# Patient Record
Sex: FEMALE | Race: WHITE | NOT HISPANIC OR LATINO | Employment: OTHER | URBAN - METROPOLITAN AREA
[De-identification: names, ages, dates, MRNs, and addresses within clinical notes are randomized per-mention and may not be internally consistent; named-entity substitution may affect disease eponyms.]

---

## 2017-01-16 ENCOUNTER — OFFICE VISIT (OUTPATIENT)
Dept: FAMILY MEDICINE CLINIC | Facility: CLINIC | Age: 62
End: 2017-01-16

## 2017-01-16 VITALS
RESPIRATION RATE: 16 BRPM | SYSTOLIC BLOOD PRESSURE: 122 MMHG | WEIGHT: 123.3 LBS | HEIGHT: 63 IN | OXYGEN SATURATION: 98 % | TEMPERATURE: 97.6 F | DIASTOLIC BLOOD PRESSURE: 80 MMHG | HEART RATE: 84 BPM | BODY MASS INDEX: 21.85 KG/M2

## 2017-01-16 DIAGNOSIS — M25.552 PAIN OF BOTH HIP JOINTS: ICD-10-CM

## 2017-01-16 DIAGNOSIS — R35.0 URINARY FREQUENCY: Primary | ICD-10-CM

## 2017-01-16 DIAGNOSIS — M25.551 PAIN OF BOTH HIP JOINTS: ICD-10-CM

## 2017-01-16 DIAGNOSIS — K21.9 GASTROESOPHAGEAL REFLUX DISEASE WITHOUT ESOPHAGITIS: ICD-10-CM

## 2017-01-16 LAB
BILIRUB BLD-MCNC: NEGATIVE MG/DL
CLARITY, POC: CLEAR
COLOR UR: YELLOW
GLUCOSE UR STRIP-MCNC: NEGATIVE MG/DL
KETONES UR QL: NEGATIVE
LEUKOCYTE EST, POC: ABNORMAL
NITRITE UR-MCNC: NEGATIVE MG/ML
PH UR: 6.5 [PH] (ref 5–8)
PROT UR STRIP-MCNC: ABNORMAL MG/DL
RBC # UR STRIP: ABNORMAL /UL
SP GR UR: 1.02 (ref 1–1.03)
UROBILINOGEN UR QL: NORMAL

## 2017-01-16 PROCEDURE — 81003 URINALYSIS AUTO W/O SCOPE: CPT | Performed by: FAMILY MEDICINE

## 2017-01-16 PROCEDURE — 99213 OFFICE O/P EST LOW 20 MIN: CPT | Performed by: FAMILY MEDICINE

## 2017-01-16 RX ORDER — NITROFURANTOIN 25; 75 MG/1; MG/1
100 CAPSULE ORAL 2 TIMES DAILY
Qty: 14 CAPSULE | Refills: 0 | Status: SHIPPED | OUTPATIENT
Start: 2017-01-16 | End: 2017-02-20

## 2017-01-16 RX ORDER — PHENAZOPYRIDINE HYDROCHLORIDE 95 MG/1
95 TABLET ORAL 3 TIMES DAILY PRN
Qty: 21 TABLET | Refills: 0 | Status: SHIPPED | OUTPATIENT
Start: 2017-01-16 | End: 2017-02-20

## 2017-01-16 RX ORDER — OMEPRAZOLE 40 MG/1
40 CAPSULE, DELAYED RELEASE ORAL DAILY
Qty: 30 CAPSULE | Refills: 5 | Status: SHIPPED | OUTPATIENT
Start: 2017-01-16 | End: 2017-07-27 | Stop reason: SDUPTHER

## 2017-01-16 NOTE — MR AVS SNAPSHOT
Shanika Hector   1/16/2017 10:30 AM   Office Visit    Dept Phone:  [phone]   Encounter #:  [id number]    Provider:  Bryan Vega MD   Department:  [facility] PRIMARY [facility]ARE                Your Full Care Plan              Today's Medication Changes          These changes are accurate as of: 1/16/17 11:09 AM.  If you have any questions, ask your nurse or doctor.               New Medication(s)Ordered:     nitrofurantoin (macrocrystal-monohydrate) 100 MG capsule   Commonly known as:  MACROBID   Take 1 capsule by mouth 2 (Two) Times a Day.   Started by:  Bryan Vega MD       phenazopyridine 95 MG tablet   Commonly known as:  PYRIDIUM   Take 1 tablet by mouth 3 (Three) Times a Day As Needed for bladder spasms.   Started by:  Bryan Vega MD            Where to Get Your Medications      These medications were sent to [address] AT [facility] - [phone]  - [phone] [place] [address]     Phone:  [phone]     nitrofurantoin (macrocrystal-monohydrate) 100 MG capsule    omeprazole 40 MG capsule    phenazopyridine 95 MG tablet                  Your Updated Medication List          This list is accurate as of: 1/16/17 11:09 AM.  Always use your most recent med list.                acetaminophen 650 MG 8 hr tablet   Commonly known as:  TYLENOL       amitriptyline 10 MG tablet   Commonly known as:  ELAVIL   Take 1 tablet by mouth Every Night.       CLINORIL 200 MG tablet   Generic drug:  sulindac       clobetasol 0.05 % cream   Commonly known as:  TEMOVATE       hydrocortisone 2.5 % cream       nitrofurantoin (macrocrystal-monohydrate) 100 MG capsule   Commonly known as:  MACROBID   Take 1 capsule by mouth 2 (Two) Times a Day.       omeprazole 40 MG capsule   Commonly known as:  priLOSEC   Take 1 capsule by mouth Daily.       phenazopyridine 95 MG tablet   Commonly  known as:  PYRIDIUM   Take 1 tablet by mouth 3 (Three) Times a Day As Needed for bladder spasms.       triamcinolone 0.1 % ointment   Commonly known as:  KENALOG       VAGIFEM 10 MCG tablet vaginal tablet   Generic drug:  estradiol               We Performed the Following     Ambulatory Referral to Pain Management     POC Urinalysis Dipstick, Automated     Urine Culture       You Were Diagnosed With        Codes Comments    Urinary frequency    -  Primary ICD-10-CM: R35.0  ICD-9-CM: 788.41     Pain of both hip joints     ICD-10-CM: M25.551, M25.552  ICD-9-CM: 719.45     Gastroesophageal reflux disease without esophagitis     ICD-10-CM: K21.9  ICD-9-CM: 530.81       Instructions     None    Patient Instructions History      Upcoming Appointments     Visit Type Date Time Department    OFFICE VISIT 2017 10:30 AM [employer] Signup     [employer] allows you to send messages to your doctor, view your test results, renew your prescriptions, schedule appointments, and more. To sign up, go to [employer] and click on the Sign Up Now link in the New User? box. Enter your [employer] Activation Code exactly as it appears below along with the last four digits of your Social Security Number and your Date of Birth () to complete the sign-up process. If you do not sign up before the expiration date, you must request a new code.    [employer] Activation Code: [email or web address]  Expires: 2017  5:36 AM    If you have questions, you can email [email or web address] or call [phone] to talk to our [employer] staff. Remember, [employer] is NOT to be used for urgent needs. For medical emergencies, dial 911.               Other Info from Your Visit           Allergies     Sulfa Antibiotics  Other (See Comments)    Past history, unknown reaction      Reason for Visit     Urinary Frequency           Vital Signs     Blood Pressure Pulse Temperature Respirations Height Weight    122/80 84  "97.6 °F (36.4 °C) (Oral) 16 63\" (160 cm) 123 lb 4.8 oz (55.9 kg)    Last Menstrual Period Oxygen Saturation Body Mass Index Smoking Status          (Approximate) 98% 21.84 kg/m2 Never Smoker        Problems and Diagnoses Noted     Acid reflux disease    Urinary frequency    -  Primary    Pain of both hip joints          Results     POC Urinalysis Dipstick, Automated      Component Value Standard Range & Units    Color Yellow Yellow, Straw, Dark Yellow, Terrie    Clarity, UA Clear Clear    Glucose, UA Negative Negative, 1000 mg/dL (3+) mg/dL    Bilirubin Negative Negative    Ketones, UA Negative Negative    Specific Gravity  1.020 1.005 - 1.030    Blood, UA Trace Negative    pH, Urine 6.5 5.0 - 8.0    Protein, POC Trace Negative mg/dL    Urobilinogen, UA Normal Normal    Leukocytes Moderate (2+) Negative    Nitrite, UA Negative Negative                    "

## 2017-01-18 LAB
BACTERIA UR CULT: ABNORMAL
BACTERIA UR CULT: ABNORMAL
OTHER ANTIBIOTIC SUSC ISLT: ABNORMAL

## 2017-01-19 NOTE — PROGRESS NOTES
Urine Culture Positive for UTI. Continue same antibiotic, macrobid. Call or see a doctor if not getting better.

## 2017-02-20 ENCOUNTER — OFFICE VISIT (OUTPATIENT)
Dept: PAIN MEDICINE | Facility: CLINIC | Age: 62
End: 2017-02-20

## 2017-02-20 VITALS
TEMPERATURE: 99.4 F | DIASTOLIC BLOOD PRESSURE: 75 MMHG | SYSTOLIC BLOOD PRESSURE: 121 MMHG | OXYGEN SATURATION: 99 % | WEIGHT: 122 LBS | RESPIRATION RATE: 16 BRPM | BODY MASS INDEX: 21.62 KG/M2 | HEIGHT: 63 IN | HEART RATE: 96 BPM

## 2017-02-20 DIAGNOSIS — M99.04 SOMATIC DYSFUNCTION OF SACROILIAC JOINT: Primary | ICD-10-CM

## 2017-02-20 PROCEDURE — 99214 OFFICE O/P EST MOD 30 MIN: CPT | Performed by: ANESTHESIOLOGY

## 2017-02-20 RX ORDER — SODIUM PHOSPHATE,MONO-DIBASIC 19G-7G/118
2 ENEMA (ML) RECTAL DAILY
COMMUNITY
End: 2017-07-10

## 2017-02-20 RX ORDER — MAGNESIUM GLUCONATE 27 MG(500)
500 TABLET ORAL DAILY
COMMUNITY
End: 2017-07-10

## 2017-02-20 NOTE — PROGRESS NOTES
CHIEF COMPLAINT    Pt was last seen on 12/28/2015 for neck pain. States she had trigger point injections here which helped and saw PT last year who did dry needling which helped for awhile. She has new pain now which started last July. She said she was diagnosed with bursitis in her hips, so she has pain in her lower back, hips. She has tried PT, sports med and got injections in hips which did not help. She complains of leg pain as well. She describes it as a constant needle pain. Tylenol gives her a couple hours of relief. States she has some intermittent numbness in her right toes and right hand. Also states when she drives it feels like sciatica pain in her leg.     Subjective   Shanika Hector is a 61 y.o. female  who presents to the office for follow-up.She has a history of neck pain, and recently diagnosed with sacroilitis.  Neck pain is not flared up at this time.        Back Pain   This is a chronic problem. The current episode started more than 1 month ago (JUly 2016). The problem occurs constantly. The problem has been gradually worsening since onset. The pain is present in the sacro-iliac and lumbar spine. The quality of the pain is described as aching and shooting. The pain radiates to the right foot. The pain is moderate. The symptoms are aggravated by sitting, standing, bending and twisting. Associated symptoms include headaches, numbness and paresthesias. Pertinent negatives include no chest pain, fever or weakness. Risk factors: no trauma. She has tried analgesics, bed rest, heat, home exercises, ice, NSAIDs and walking for the symptoms. The treatment provided no relief.        PEG Assessment   What number best describes your pain on average in the past week?7  What number best describes how, during the past week, pain has interfered with your enjoyment of life?7  What number best describes how, during the past week, pain has interfered with your general activity?  7      The following portions of the  "patient's history were reviewed and updated as appropriate: allergies, current medications, past family history, past medical history, past social history, past surgical history and problem list.    Review of Systems   Constitutional: Positive for activity change (decreased). Negative for appetite change, chills and fever.   HENT: Negative for ear pain.    Eyes: Negative for pain.   Respiratory: Positive for cough and shortness of breath. Negative for chest tightness and wheezing.    Cardiovascular: Negative for chest pain, palpitations and leg swelling.   Gastrointestinal: Negative for constipation, diarrhea, nausea and vomiting.   Endocrine: Negative for polydipsia and polyuria.   Genitourinary: Negative for difficulty urinating.   Musculoskeletal: Positive for back pain, neck pain and neck stiffness.   Skin: Negative for rash.   Neurological: Positive for numbness, headaches and paresthesias. Negative for dizziness, weakness and light-headedness.   Hematological: Bruises/bleeds easily.   Psychiatric/Behavioral: Positive for agitation and sleep disturbance. Negative for confusion, hallucinations and suicidal ideas. The patient is nervous/anxious.        Vitals:    02/20/17 1410   BP: 121/75   Pulse: 96   Resp: 16   Temp: 99.4 °F (37.4 °C)   SpO2: 99%   Weight: 122 lb (55.3 kg)   Height: 63\" (160 cm)   PainSc: 7  Comment: back, hips, leg   PainLoc: Neck         Objective   Physical Exam   Constitutional: She is oriented to person, place, and time. She appears well-developed and well-nourished.   HENT:   Head: Normocephalic and atraumatic.   Right Ear: External ear normal.   Left Ear: External ear normal.   Eyes: Conjunctivae and EOM are normal. Pupils are equal, round, and reactive to light.   Neck: Neck supple.   Cardiovascular: Normal rate, regular rhythm and normal heart sounds.    Pulmonary/Chest: Effort normal and breath sounds normal.   Abdominal: Soft. Bowel sounds are normal.   Musculoskeletal:        " Lumbar back: She exhibits bony tenderness. She exhibits normal range of motion, no tenderness, no swelling and no edema.   Pain on SI palpation.  Juliana positive.  Minimal pain on flexion.  SLR equivocal.       Neurological: She is alert and oriented to person, place, and time. No cranial nerve deficit or sensory deficit.   Reflex Scores:       Patellar reflexes are 1+ on the right side and 1+ on the left side.       Achilles reflexes are 1+ on the right side and 1+ on the left side.  Psychiatric: She has a normal mood and affect. Her behavior is normal.   Nursing note and vitals reviewed.          Assessment/Plan   Shanika was seen today for pain, extremity pain and back pain.    Diagnoses and all orders for this visit:    Somatic dysfunction of sacroiliac joint  -     Case Request        --- Follow-up for procedure  -- Bilateral Sacroiliac joint injections, x2, 3-4 wks apart    --  We discussed the potential therapeutic effect of the SI injection, and also the diagnostic need, and how the injection is used prognostically to consider RF ablation of the sensory innervation to the SI joints.      -- she has some elements of sciatica, but the most painful maneuver on exam were the SI joint provocation maneuvers, which triggered her pain most accurately also.     -- if diagnostically negative, then the next step would be to consider diagnostic LTFESI.                     RAMONITA REPORT  RAMONITA report has been reviewed and scanned into the patient's chart.    Date of last RAMONITA : 2-15-17  No current use of controlled substances.      EMR Dragon/Transcription disclaimer:   Much of this encounter note is an electronic transcription/translation of spoken language to printed text. The electronic translation of spoken language may permit erroneous, or at times, nonsensical words or phrases to be inadvertently transcribed; Although I have reviewed the note for such errors, some may still exist.

## 2017-03-16 ENCOUNTER — OUTSIDE FACILITY SERVICE (OUTPATIENT)
Dept: PAIN MEDICINE | Facility: CLINIC | Age: 62
End: 2017-03-16

## 2017-03-16 PROCEDURE — 27096 INJECT SACROILIAC JOINT: CPT | Performed by: ANESTHESIOLOGY

## 2017-03-23 ENCOUNTER — TELEPHONE (OUTPATIENT)
Dept: PAIN MEDICINE | Facility: CLINIC | Age: 62
End: 2017-03-23

## 2017-03-23 NOTE — TELEPHONE ENCOUNTER
Patient called and states that her procedure has not helped at all and she states that when she lays down or rest that her pain is uncomfortable and that she is not getting any rest.

## 2017-04-06 ENCOUNTER — OUTSIDE FACILITY SERVICE (OUTPATIENT)
Dept: PAIN MEDICINE | Facility: CLINIC | Age: 62
End: 2017-04-06

## 2017-04-06 PROCEDURE — 27096 INJECT SACROILIAC JOINT: CPT | Performed by: ANESTHESIOLOGY

## 2017-05-08 ENCOUNTER — OFFICE VISIT (OUTPATIENT)
Dept: PAIN MEDICINE | Facility: CLINIC | Age: 62
End: 2017-05-08

## 2017-05-08 VITALS
TEMPERATURE: 98.8 F | WEIGHT: 122 LBS | RESPIRATION RATE: 16 BRPM | HEIGHT: 63 IN | BODY MASS INDEX: 21.62 KG/M2 | OXYGEN SATURATION: 97 % | HEART RATE: 70 BPM | SYSTOLIC BLOOD PRESSURE: 119 MMHG | DIASTOLIC BLOOD PRESSURE: 80 MMHG

## 2017-05-08 DIAGNOSIS — S46.812D STRAIN OF LEFT TRAPEZIUS MUSCLE, SUBSEQUENT ENCOUNTER: ICD-10-CM

## 2017-05-08 DIAGNOSIS — M46.1 BILATERAL SACROILIITIS (HCC): ICD-10-CM

## 2017-05-08 DIAGNOSIS — M99.04 SOMATIC DYSFUNCTION OF SACROILIAC JOINT: ICD-10-CM

## 2017-05-08 DIAGNOSIS — G89.29 OTHER CHRONIC PAIN: Primary | ICD-10-CM

## 2017-05-08 DIAGNOSIS — M79.18 MYOFASCIAL PAIN ON LEFT SIDE: ICD-10-CM

## 2017-05-08 PROBLEM — S46.812A STRAIN OF LEFT TRAPEZIUS MUSCLE: Status: ACTIVE | Noted: 2017-05-08

## 2017-05-08 PROCEDURE — 20552 NJX 1/MLT TRIGGER POINT 1/2: CPT | Performed by: ANESTHESIOLOGY

## 2017-05-08 PROCEDURE — 99213 OFFICE O/P EST LOW 20 MIN: CPT | Performed by: ANESTHESIOLOGY

## 2017-07-10 ENCOUNTER — OFFICE VISIT (OUTPATIENT)
Dept: PAIN MEDICINE | Facility: CLINIC | Age: 62
End: 2017-07-10

## 2017-07-10 VITALS
HEART RATE: 76 BPM | BODY MASS INDEX: 21.83 KG/M2 | WEIGHT: 123.2 LBS | TEMPERATURE: 97.8 F | OXYGEN SATURATION: 96 % | SYSTOLIC BLOOD PRESSURE: 121 MMHG | DIASTOLIC BLOOD PRESSURE: 69 MMHG | HEIGHT: 63 IN | RESPIRATION RATE: 16 BRPM

## 2017-07-10 DIAGNOSIS — M54.17 LUMBOSACRAL RADICULOPATHY: ICD-10-CM

## 2017-07-10 DIAGNOSIS — M54.2 CERVICALGIA: ICD-10-CM

## 2017-07-10 DIAGNOSIS — M46.1 BILATERAL SACROILIITIS (HCC): Primary | ICD-10-CM

## 2017-07-10 DIAGNOSIS — G89.29 OTHER CHRONIC PAIN: ICD-10-CM

## 2017-07-10 DIAGNOSIS — M54.12 CERVICAL RADICULAR PAIN: ICD-10-CM

## 2017-07-10 DIAGNOSIS — M99.04 SOMATIC DYSFUNCTION OF SACROILIAC JOINT: ICD-10-CM

## 2017-07-10 PROCEDURE — 99214 OFFICE O/P EST MOD 30 MIN: CPT | Performed by: ANESTHESIOLOGY

## 2017-07-10 RX ORDER — OXAZEPAM 15 MG/1
15 CAPSULE ORAL
Qty: 2 CAPSULE | Refills: 0 | Status: SHIPPED | OUTPATIENT
Start: 2017-07-10 | End: 2017-07-10

## 2017-07-10 RX ORDER — CHOLECALCIFEROL (VITAMIN D3) 125 MCG
5 CAPSULE ORAL NIGHTLY PRN
COMMUNITY
End: 2017-11-13

## 2017-07-10 NOTE — PROGRESS NOTES
CHIEF COMPLAINT    Since last visit, pt states her neck, back, hip, and leg pain has increased.    Subjective   Shanika Hector is a 62 y.o. female  who presents to the office for follow-up.She has a history of back pain and history of diagnostically positive Sacroiliac joint injections.  Her insurer has denied the more definitive treatment of neural interruption / ablation of the sensory branches to the SI joints.  We are appealing this denial of care.      She has been dealing with this for over a year, and she has failed with tylenol, PT, MMT, NSAIDs, muscle relaxants.  She has significant diagnostic relief from SI joint injections.      Back Pain   This is a chronic problem. The current episode started more than 1 month ago (JUly 2016). The problem occurs constantly. The problem has been gradually worsening since onset. The pain is present in the sacro-iliac. The quality of the pain is described as aching and shooting. Radiates to: bilateral buttock radiation from the SI joints; she does have some other history of radiation of pain in a radicular like fashion on the right. The pain is moderate. The symptoms are aggravated by sitting and standing. Associated symptoms include numbness and paresthesias. Pertinent negatives include no chest pain, fever, headaches or weakness. Risk factors: no trauma. She has tried analgesics, bed rest, heat, home exercises, ice, NSAIDs and walking for the symptoms. The treatment provided significant (Significant diagnostic relief on SI joint injection as above) relief.   Muscle Pain   This is a recurrent problem. The current episode started more than 1 month ago. The problem occurs constantly. The problem has been gradually worsening (she attributes her neck pain to how she has to sleep due to her Sacroiliac pain) since onset. The pain is present in the neck (left trap area). The pain is medium. The symptoms are aggravated by any movement. Associated symptoms include constipation.  Pertinent negatives include no chest pain, diarrhea, eye pain, fever, headaches, nausea, rash, shortness of breath, vomiting, weakness or wheezing. Past treatments include acetaminophen, cold pack, heat pack, OTC NSAID, prescription NSAID and rest. The treatment provided no relief (previous TPI no help.). Swelling location: mild in the area. She has been sleeping poorly. Her past medical history is significant for chronic back pain.        PEG Assessment   What number best describes your pain on average in the past week?9  What number best describes how, during the past week, pain has interfered with your enjoyment of life?8  What number best describes how, during the past week, pain has interfered with your general activity?  8      The following portions of the patient's history were reviewed and updated as appropriate: allergies, current medications, past family history, past medical history, past social history, past surgical history and problem list.    Review of Systems   Constitutional: Positive for activity change (decreased). Negative for appetite change, chills and fever.   HENT: Negative for ear pain.    Eyes: Negative for pain.   Respiratory: Negative for cough, chest tightness, shortness of breath and wheezing.    Cardiovascular: Negative for chest pain, palpitations and leg swelling.   Gastrointestinal: Positive for constipation. Negative for diarrhea, nausea and vomiting.   Endocrine: Negative for polydipsia and polyuria.   Genitourinary: Negative for difficulty urinating.   Musculoskeletal: Positive for back pain, neck pain and neck stiffness.   Skin: Negative for rash.   Allergic/Immunologic: Negative for immunocompromised state.   Neurological: Positive for numbness and paresthesias. Negative for dizziness, weakness, light-headedness and headaches.   Hematological: Bruises/bleeds easily.   Psychiatric/Behavioral: Positive for agitation and sleep disturbance. Negative for confusion, hallucinations  "and suicidal ideas. The patient is nervous/anxious.        Vitals:    07/10/17 0801   BP: 121/69   Pulse: 76   Resp: 16   Temp: 97.8 °F (36.6 °C)   SpO2: 96%   Weight: 123 lb 3.2 oz (55.9 kg)   Height: 63\" (160 cm)   PainSc:   9   PainLoc: Leg         Objective   Physical Exam   Constitutional: She is oriented to person, place, and time. She appears well-developed and well-nourished.   HENT:   Head: Normocephalic and atraumatic.   Eyes: Conjunctivae and EOM are normal. Pupils are equal, round, and reactive to light.   Neck: Neck supple.   Pulmonary/Chest: Effort normal.   Musculoskeletal:        Cervical back: She exhibits tenderness (left trapezius spasm), swelling, pain and spasm.   Pain on SI palpation.  Juliana positive.    Neurological: She is alert and oriented to person, place, and time. No cranial nerve deficit or sensory deficit. Gait normal.   Reflex Scores:       Patellar reflexes are 1+ on the right side and 1+ on the left side.  SLR negative left bilaterally   Psychiatric: She has a normal mood and affect. Her behavior is normal.   Nursing note and vitals reviewed.          Assessment/Plan   Shanika was seen today for back pain, extremity pain and neck pain.    Diagnoses and all orders for this visit:    Bilateral sacroiliitis  -     MRI Lumbar Spine Without Contrast; Future  -     Case Request    Somatic dysfunction of sacroiliac joint  -     MRI Lumbar Spine Without Contrast; Future  -     Case Request    Other chronic pain  -     MRI Lumbar Spine Without Contrast; Future    Cervicalgia  -     MRI Cervical Spine Without Contrast; Future    Cervical radicular pain  -     MRI Cervical Spine Without Contrast; Future    Lumbosacral radiculopathy  -     MRI Lumbar Spine Without Contrast; Future    Other orders  -     oxazepam (SERAX) 15 MG capsule; Take 1 capsule by mouth Once for 1 dose. May repeat x1 if needed        --- Follow-up for procedure... We still are in process to seek authorization as we appeal " denial of this needed therapy for this patient...  [id number] x3, for the S1-3 sacral dorsal branch Radiofrequency once again.         One often misrepresented article was the one by Williams et al from [employer] in January 2006.  This article notes that radiofrequency neurotomy of the sensory innervation to the sacroiliac joint had limited evidence at that time.  It is a misrepresentation of the data and our knowledge of human anatomy to state that this is therefore experimental.  These interventional treatments have a significant role for patients who have failed more conservative therapies.  This includes patients who have failed conservative management including physical therapy and manual techniques and pelvic stabilization and also with the nonnarcotic medication management.  Also these patients are likely not candidates for surgical treatment.  Furthermore prolotherapy is not usually a viable option either from an insurance coverage standpoint.  The alternative therapy is to do nothing and allow the patient to suffer from immobility and muscle wasting which will no doubt cause more problems, pain related and otherwise to general health, or to alternatively prescribe ever increasing doses of narcotic medications forever and suffer the consequences of that.      -- MRI of lumbar and cervical spines      RAMONITA REPORT  RAMONITA report has been reviewed and scanned into the patient's chart.  Date of last RAMONITA : as above.  No current use of opioids.        EMR Dragon/Transcription disclaimer:   Much of this encounter note is an electronic transcription/translation of spoken language to printed text. The electronic translation of spoken language may permit erroneous, or at times, nonsensical words or phrases to be inadvertently transcribed; Although I have reviewed the note for such errors, some may still exist.

## 2017-07-18 ENCOUNTER — TELEPHONE (OUTPATIENT)
Dept: PAIN MEDICINE | Facility: CLINIC | Age: 62
End: 2017-07-18

## 2017-07-18 NOTE — TELEPHONE ENCOUNTER
Let's check status of appeal. Also, is she having more neck or low back pain? Also, do not give pain medication over telephone but could consider other medications such as a muscle relaxant. But first, let's ascertain status of appeal. Thanks. kris

## 2017-07-18 NOTE — TELEPHONE ENCOUNTER
Patient called and states that she needs something for pain. States she is having a hard time dealing with the day to day pain while waiting for her procedure to be approved. Procedure is in an appeal process.

## 2017-07-19 NOTE — TELEPHONE ENCOUNTER
Patient took it upon herself and appealed so when our appeal went through it was denied because they had already denied within so many days prior. Rosanna explained to patient that it was denied because of this.

## 2017-07-27 DIAGNOSIS — K21.9 GASTROESOPHAGEAL REFLUX DISEASE WITHOUT ESOPHAGITIS: ICD-10-CM

## 2017-07-27 RX ORDER — OMEPRAZOLE 40 MG/1
CAPSULE, DELAYED RELEASE ORAL
Qty: 30 CAPSULE | Refills: 5 | Status: SHIPPED | OUTPATIENT
Start: 2017-07-27 | End: 2018-02-19 | Stop reason: SDUPTHER

## 2017-09-11 ENCOUNTER — TELEPHONE (OUTPATIENT)
Dept: PAIN MEDICINE | Facility: CLINIC | Age: 62
End: 2017-09-11

## 2017-09-11 NOTE — TELEPHONE ENCOUNTER
Patient called and states she has been selected for Jury Duty and states that she wants a note because she is not able to sit for long periods of time.

## 2017-09-27 ENCOUNTER — OFFICE VISIT (OUTPATIENT)
Dept: PAIN MEDICINE | Facility: CLINIC | Age: 62
End: 2017-09-27

## 2017-09-27 VITALS
HEART RATE: 82 BPM | TEMPERATURE: 98.1 F | HEIGHT: 63 IN | DIASTOLIC BLOOD PRESSURE: 83 MMHG | OXYGEN SATURATION: 100 % | RESPIRATION RATE: 16 BRPM | BODY MASS INDEX: 21.93 KG/M2 | WEIGHT: 123.8 LBS | SYSTOLIC BLOOD PRESSURE: 127 MMHG

## 2017-09-27 DIAGNOSIS — M54.16 LUMBAR RADICULOPATHY: ICD-10-CM

## 2017-09-27 DIAGNOSIS — M47.812 CERVICAL SPONDYLOSIS WITHOUT MYELOPATHY: ICD-10-CM

## 2017-09-27 DIAGNOSIS — M48.061 LUMBAR SPINAL STENOSIS: Primary | ICD-10-CM

## 2017-09-27 PROCEDURE — 99214 OFFICE O/P EST MOD 30 MIN: CPT | Performed by: ANESTHESIOLOGY

## 2017-10-19 ENCOUNTER — OUTSIDE FACILITY SERVICE (OUTPATIENT)
Dept: PAIN MEDICINE | Facility: CLINIC | Age: 62
End: 2017-10-19

## 2017-10-19 ENCOUNTER — DOCUMENTATION (OUTPATIENT)
Dept: PAIN MEDICINE | Facility: CLINIC | Age: 62
End: 2017-10-19

## 2017-10-19 PROCEDURE — 64483 NJX AA&/STRD TFRM EPI L/S 1: CPT | Performed by: ANESTHESIOLOGY

## 2017-10-19 PROCEDURE — 64490 INJ PARAVERT F JNT C/T 1 LEV: CPT | Performed by: ANESTHESIOLOGY

## 2017-10-19 PROCEDURE — 64491 INJ PARAVERT F JNT C/T 2 LEV: CPT | Performed by: ANESTHESIOLOGY

## 2017-10-19 NOTE — PROGRESS NOTES
EXTENDED NOTE.... Two independent procedures.....    ---    Bilateral L4 Lumbar Transforaminal Epidural Steroid Injection  [facility]    PREOPERATIVE DIAGNOSIS:  Lumbar Radiculopathy and Lumbar Spinal Stenosis    POSTOPERATIVE DIAGNOSIS:  Same as preop diagnosis    PROCEDURE:  CPT 65832(-50) --  Diagnostic Transforaminal Epidural Steroid Injection at the L4 level, bilaterally    PRE-PROCEDURE DISCUSSION WITH PATIENT:    Risks and complications were discussed with the patient prior to starting the procedure and informed consent was obtained.  We discussed various topics including but not limited to bleeding, infection, injury, nerve injury, paralysis, coma, death, postprocedural painful flare-up, postprocedural site soreness, and a lack of pain relief.  We discussed the diagnostic aspect of transforaminal epidural / selective nerve root blockade.    SURGEON:  Facundo Huerta MD    REASON FOR PROCEDURE:    Degenerative changes are noted in the area., Radiating pattern of pain is likely consistent with degenerative changes in the area. and Radicular pain pattern seems consistent with this dermatome.    SEDATION:  Versed 4mg & Fentanyl 100 mcg IV  ANESTHETIC:  Marcaine 0.25%  STEROID:  Methylprednisolone (DEPO MEDROL) 80mg/ml    DESCRIPTON OF PROCEDURE:  After obtaining informed consent, an I.V. was started in the preoperative area. The patient taken to the operating room and was placed in the prone position with a pillow under the abdomen.  All pressure points were well padded.  EKG, blood pressure, and pulse oximeter were monitored.  The lumbosacral area was prepped with Chloraprep and draped in a sterile fashion. Under fluoroscopic guidance in an oblique dimension, the transverse process of the aforementioned vertebra at the junction of the body at 6 o'clock position on one side was identified. Skin and subcutaneous tissue was anesthetized with 1% lidocaine. A 22-gauge spinal needle was  introduced under fluoroscopic guidance at the above junction into the foramen without parasthesias and into the epidural space. After confirming the position of the needle with PA, lateral, and oblique fluoroscopic views, aspiration was checked and was clear of blood or CSF.  Next, 1 mL of Omnipaque was injected. After seeing adequate spread on the corresponding nerve root, a total volume 2mL of injectate containing 0.5ml of the above mentioned local anesthetic, 1 ml saline,  and half of the above mentioned corticosteroid was injected into the epidural space.    The needle was removed intact.      Next, the same vertebral level and corresponding foramen on the contralateral side was visualized with fluoroscopy in an oblique view, and a similar approach was used to place the spinal needle in that foramen.  After confirming the position of the needle with PA, lateral, and oblique fluoroscopic views, aspiration was checked and was clear of blood or CSF.  Next, 1 mL of Omnipaque was injected. After seeing adequate spread on the corresponding nerve root, a total volume 2mL of injectate containing 0.5ml of the above mentioned local anesthetic, 1 ml saline, and half of the above mentioned corticosteroid was injected into the epidural space. The needle was removed intact.  Vital signs were stable throughout.      ESTIMATED BLOOD LOSS:  <5 mL  SPECIMENS:  none    COMPLICATIONS:   No complications were noted. and There was no indication of vascular uptake on live injection of contrast dye.    TOLERANCE & DISCHARGE CONDITION:    The patient tolerated the procedure well.  The patient was transported to the recovery area without difficulties.  The patient was discharged to home under the care of family in stable and satisfactory condition.    PLAN OF CARE:  1. The patient was given our standard instruction sheet.  2. The patient will Repeat injection 2 wks.  3. The patient will resume all medications as per the medication  reconciliation sheet.      -------    Cervical Medial Branch Blockade  [facility]      PREOPERATIVE DIAGNOSIS:  Cervical spondylosis without myelopathy   POSTOPERATIVE DIAGNOSIS:  Same as preoperative diagnosis    PROCEDURE:    Cervical Facet Nerve (medial branch) Blocks, with Fluoroscopy:      LEVELS: left  C5, C6 and C7    PRE-PROCEDURE DISCUSSION WITH PATIENT:    Risks and complications were discussed with the patient prior to starting the procedure and informed consent was obtained.  We discussed various topics, including but not limited to bleeding, infection, injury, nerve injury, paralysis, coma, death, postprocedural soreness or painful flare, worsening of clinical picture, lack of pain relief.  We discussed the diagnostic nature of medial branch blockades.      SURGEON:  Facundo Huerta MD    REASON FOR PROCEDURE:    The patient complains of pain that seems to have a significant axial component Tenderness of the affected facet joints on palpation Tenderness of the affected facet joints on exam under fluoroscopy Pain on extension of the cervical spine    SEDATION:  see above  ANESTHETIC:   Marcaine 0.5%  STEROID:  dexamethasone 3mg  TOTAL VOLUME OF SOLUTION:  3 mL      DESCRIPTON OF PROCEDURE:  After obtaining informed consent, the patient was placed in the prone position. IV access was obtained.  EKG, blood pressure, and pulse oximeter were monitored and all sedation was administered by an RN under my direct guidance.  The cervical area was prepped with Chloraprep and draped with sterile barrier.     Under fluoroscopic guidance the waists of the above mentioned vertebra were identified and marked.  Skin and subcutaneous tissue were then anesthetized with 1% lidocaine 1mL at each point. Then spinal needles were introduced under fluoroscopic guidance at the waists of these vertebrae contacting periosteum on the lateral edge of the vertebra on the AP fluroscopic view. After confirming the  position of the needle under fluoroscopic PA and lateral views, confirming the needle position in the center of the trapezoid at each level, and confirming negative aspiration of blood and CSF, 0.25 mL of Omnipaque was injected.  Proper spread and lack of vascular uptake was demonstrated.  A solution was prepared as above, and 1 mL of that solution was injected very slowly each level.      Needles were removed intact from all levels.  Vitals were stable throughout.       ESTIMATED BLOOD LOSS:  minimal  SPECIMENS:  None    COMPLICATIONS:    No complications were noted., There was no indication of vascular uptake on live injection of contrast dye. and There was no indication of intrathecal uptake on live injection of contrast dye.    TOLERANCE & DISCHARGE CONDITION:    The patient tolerated the procedure well.  The patient was transported to the recovery area without difficulties.  The patient was discharged to home under the care of family in stable and satisfactory condition.  The patient was too sedated in the PACU to notice improvement in pain on extension and rotation of the cervical spine.      PLAN OF CARE:  4. The patient was given our standard instruction sheet.  5. We discussed that Cervical Medial Branch Blockade is a diagnostic procedure in consideration for radiofrequency ablation if two diagnostic procedures proved to be positive for significant benefit.  If sustained relief of six to eight weeks is obtained, then an alternative plan could be therapeutic cervical medial branch blocks on an as-needed basis.  6. The patient is asked to keep a pain log hourly for 8 hours postoperatively today.  7. The patient will Return to clinic 2 wks.  8. The patient will resume all medications as per the medication reconciliation sheet.      \

## 2017-10-26 DIAGNOSIS — Z00.00 HEALTH CARE MAINTENANCE: Primary | ICD-10-CM

## 2017-11-02 ENCOUNTER — OUTSIDE FACILITY SERVICE (OUTPATIENT)
Dept: PAIN MEDICINE | Facility: CLINIC | Age: 62
End: 2017-11-02

## 2017-11-02 ENCOUNTER — DOCUMENTATION (OUTPATIENT)
Dept: PAIN MEDICINE | Facility: CLINIC | Age: 62
End: 2017-11-02

## 2017-11-02 PROCEDURE — 64483 NJX AA&/STRD TFRM EPI L/S 1: CPT | Performed by: ANESTHESIOLOGY

## 2017-11-02 PROCEDURE — 64490 INJ PARAVERT F JNT C/T 1 LEV: CPT | Performed by: ANESTHESIOLOGY

## 2017-11-02 NOTE — PROGRESS NOTES
TWO INDEPENDENT PROCEDURES.....    ---    Lumbar Transforaminal Epidural Steroid Injection (one level Unilateral)  [facility]      PREOPERATIVE DIAGNOSIS:  Lumbar Spinal Stenosis and right Lumbar Radiculopathy    POSTOPERATIVE DIAGNOSIS:  Same as preop diagnosis    PROCEDURE:  CPT 17172 --  Diagnostic Transforaminal Epidural Steroid Injection at the L5 level, on the right     PRE-PROCEDURE DISCUSSION WITH PATIENT:    Risks and complications were discussed with the patient prior to starting the procedure and informed consent was obtained.  We discussed various topics including but not limited to bleeding, infection, injury, nerve injury, paralysis, coma, death, postprocedural painful flare-up, postprocedural site soreness, and a lack of pain relief.  We discussed the diagnostic aspect of transforaminal epidural / selective nerve root blockade.    SURGEON:  Facundo Huerta MD    REASON FOR PROCEDURE:    Diagnostic injection at this level is needed, Despite some diagnostic positivity at the particular procedure, some modifcation of approach is reasonable to determine whether or not more relief might be achieved., Making some clinical improvement after the previous procedure. and The left Lumbar radic resolved after Bilateral L4 LTFESI, and the right is decreased by 40-50%, and is approximating this L5 dermatome more than the L4.    SEDATION:  Versed 4mg & Fentanyl 100 mcg IV  ANESTHETIC:  Marcaine 0.25%  STEROID:  Methylprednisolone (DEPO MEDROL) 80mg/ml    DESCRIPTON OF PROCEDURE:  After obtaining informed consent, an I.V. was started in the preoperative area. The patient taken to the operating room and was placed in the prone position with a pillow under the abdomen.  All pressure points were well padded.  EKG, blood pressure, and pulse oximeter were monitored.  The lumbar area was prepped with Chloraprep and draped in a sterile fashion. Under fluoroscopic guidance in an oblique dimension on the  above mentioned side, the transverse process of the aforementioned vertebra at the junction of the body at 6 o'clock position was identified. Skin and subcutaneous tissue was anesthetized with 1% lidocaine. A 22-gauge spinal needle was introduced under fluoroscopic guidance at the above junction into the foramen without parasthesias and into the epidural space. After confirming the position of the needle with PA, lateral, and oblique fluoroscopic views, aspiration was checked and was clear of blood or CSF.  Next, 1 mL of Omnipaque was injected. After seeing adequate spread on the corresponding nerve root, a total volume 3mL of injectate containing 1ml of the above mentioned local anesthetic, 1 ml saline,  and the above mentioned corticosteroid was injected into the epidural space.    The needle was removed intact.  Vital signs were stable throughout.        ESTIMATED BLOOD LOSS:  <5 mL  SPECIMENS:  none    COMPLICATIONS:   No complications were noted. and There was no indication of vascular uptake on live injection of contrast dye.    TOLERANCE & DISCHARGE CONDITION:    The patient tolerated the procedure well.  The patient was transported to the recovery area without difficulties.  The patient was discharged to home under the care of family in stable and satisfactory condition.    PLAN OF CARE:  1. The patient was given our standard instruction sheet.  2. The patient will Return to clinic 2 wks.  3. The patient will resume all medications as per the medication reconciliation sheet.      ---    Cervical Medial Branch Blockade  [facility]      PREOPERATIVE DIAGNOSIS:  Cervical spondylosis without myelopathy   POSTOPERATIVE DIAGNOSIS:  Same as preoperative diagnosis    PROCEDURE:    Cervical Facet Nerve (medial branch) Blocks, with Fluoroscopy:      LEVELS: left  C7 and T1    PRE-PROCEDURE DISCUSSION WITH PATIENT:    Risks and complications were discussed with the patient prior to starting the  procedure and informed consent was obtained.  We discussed various topics, including but not limited to bleeding, infection, injury, nerve injury, paralysis, coma, death, postprocedural soreness or painful flare, worsening of clinical picture, lack of pain relief.  We discussed the diagnostic nature of medial branch blockades.      SURGEON:  Facundo Huerta MD    REASON FOR PROCEDURE:    The patient complains of pain that seems to have a significant axial component Positive cervical facet loading maneuver.  Previous CMBB was Left C5-7 and there was a mild tx+ effect but dx-effect was equivocal.  Known significant C7-T1 arthrosis.    SEDATION:  see above  ANESTHETIC:   Marcaine 0.5%  STEROID:  Dexamethasone 4mg  TOTAL VOLUME OF SOLUTION:  3 mL      DESCRIPTON OF PROCEDURE:  After obtaining informed consent, the patient was placed in the prone position. IV access was obtained.  EKG, blood pressure, and pulse oximeter were monitored and all sedation was administered by an RN under my direct guidance.  The cervical area was prepped with Chloraprep and draped with sterile barrier.     Under fluoroscopic guidance the waists of the above mentioned vertebra were identified and marked.  Skin and subcutaneous tissue were then anesthetized with 1% lidocaine 1mL at each point. Then spinal needles were introduced under fluoroscopic guidance at the waists of these vertebrae contacting periosteum on the lateral edge of the vertebra on the AP fluroscopic view. After confirming the position of the needle under fluoroscopic PA and lateral views, confirming the needle position in the center of the trapezoid at each level, and confirming negative aspiration of blood and CSF, 0.25 mL of Omnipaque was injected.  Proper spread and lack of vascular uptake was demonstrated.  A solution was prepared as above, and 1 mL of that solution was injected very slowly each level.      Needles were removed intact from all levels.  Vitals were stable  throughout.       ESTIMATED BLOOD LOSS:  minimal  SPECIMENS:  None    COMPLICATIONS:    No complications were noted. and There was no indication of vascular uptake on live injection of contrast dye.    TOLERANCE & DISCHARGE CONDITION:    The patient tolerated the procedure well.  The patient was transported to the recovery area without difficulties.  The patient was discharged to home under the care of family in stable and satisfactory condition.  The patient did notice improvement in pain on extension and rotation of the cervical spine.      PLAN OF CARE:  4. The patient was given our standard instruction sheet.  5. We discussed that Cervical Medial Branch Blockade is a diagnostic procedure in consideration for radiofrequency ablation if two diagnostic procedures proved to be positive for significant benefit.  If sustained relief of six to eight weeks is obtained, then an alternative plan could be therapeutic cervical medial branch blocks on an as-needed basis.  6. The patient is asked to keep a pain log hourly for 8 hours postoperatively today.  7. The patient will Return to clinic 2 wks.  8. The patient will resume all medications as per the medication reconciliation sheet.

## 2017-11-06 ENCOUNTER — TRANSCRIBE ORDERS (OUTPATIENT)
Dept: ADMINISTRATIVE | Facility: HOSPITAL | Age: 62
End: 2017-11-06

## 2017-11-06 DIAGNOSIS — Z12.31 VISIT FOR SCREENING MAMMOGRAM: Primary | ICD-10-CM

## 2017-11-06 LAB
ALBUMIN SERPL-MCNC: 4.7 G/DL (ref 3.5–5.2)
ALBUMIN/GLOB SERPL: 1.6 G/DL
ALP SERPL-CCNC: 66 U/L (ref 39–117)
ALT SERPL-CCNC: 12 U/L (ref 1–33)
APPEARANCE UR: CLEAR
AST SERPL-CCNC: 13 U/L (ref 1–32)
BASOPHILS # BLD AUTO: 0.01 10*3/MM3 (ref 0–0.2)
BASOPHILS NFR BLD AUTO: 0.2 % (ref 0–1.5)
BILIRUB SERPL-MCNC: 0.6 MG/DL (ref 0.1–1.2)
BILIRUB UR QL STRIP: NEGATIVE
BUN SERPL-MCNC: 19 MG/DL (ref 8–23)
BUN/CREAT SERPL: 25 (ref 7–25)
CALCIUM SERPL-MCNC: 9.6 MG/DL (ref 8.6–10.5)
CHLORIDE SERPL-SCNC: 99 MMOL/L (ref 98–107)
CHOLEST SERPL-MCNC: 248 MG/DL (ref 0–200)
CO2 SERPL-SCNC: 31.4 MMOL/L (ref 22–29)
COLOR UR: YELLOW
CREAT SERPL-MCNC: 0.76 MG/DL (ref 0.57–1)
EOSINOPHIL # BLD AUTO: 0.08 10*3/MM3 (ref 0–0.7)
EOSINOPHIL NFR BLD AUTO: 1.4 % (ref 0.3–6.2)
ERYTHROCYTE [DISTWIDTH] IN BLOOD BY AUTOMATED COUNT: 12.6 % (ref 11.7–13)
GFR SERPLBLD CREATININE-BSD FMLA CKD-EPI: 77 ML/MIN/1.73
GFR SERPLBLD CREATININE-BSD FMLA CKD-EPI: 93 ML/MIN/1.73
GLOBULIN SER CALC-MCNC: 2.9 GM/DL
GLUCOSE SERPL-MCNC: 87 MG/DL (ref 65–99)
GLUCOSE UR QL: NEGATIVE
HCT VFR BLD AUTO: 42.1 % (ref 35.6–45.5)
HDLC SERPL-MCNC: 83 MG/DL (ref 40–60)
HGB BLD-MCNC: 12.8 G/DL (ref 11.9–15.5)
HGB UR QL STRIP: NEGATIVE
IMM GRANULOCYTES # BLD: 0 10*3/MM3 (ref 0–0.03)
IMM GRANULOCYTES NFR BLD: 0 % (ref 0–0.5)
KETONES UR QL STRIP: NEGATIVE
LDLC SERPL CALC-MCNC: 147 MG/DL (ref 0–100)
LDLC/HDLC SERPL: 1.77 {RATIO}
LEUKOCYTE ESTERASE UR QL STRIP: NEGATIVE
LYMPHOCYTES # BLD AUTO: 1.4 10*3/MM3 (ref 0.9–4.8)
LYMPHOCYTES NFR BLD AUTO: 24.9 % (ref 19.6–45.3)
MCH RBC QN AUTO: 30.8 PG (ref 26.9–32)
MCHC RBC AUTO-ENTMCNC: 30.4 G/DL (ref 32.4–36.3)
MCV RBC AUTO: 101.2 FL (ref 80.5–98.2)
MONOCYTES # BLD AUTO: 0.75 10*3/MM3 (ref 0.2–1.2)
MONOCYTES NFR BLD AUTO: 13.3 % (ref 5–12)
NEUTROPHILS # BLD AUTO: 3.38 10*3/MM3 (ref 1.9–8.1)
NEUTROPHILS NFR BLD AUTO: 60.2 % (ref 42.7–76)
NITRITE UR QL STRIP: NEGATIVE
PH UR STRIP: 7.5 [PH] (ref 5–8)
PLATELET # BLD AUTO: 314 10*3/MM3 (ref 140–500)
POTASSIUM SERPL-SCNC: 4.3 MMOL/L (ref 3.5–5.2)
PROT SERPL-MCNC: 7.6 G/DL (ref 6–8.5)
PROT UR QL STRIP: NEGATIVE
RBC # BLD AUTO: 4.16 10*6/MM3 (ref 3.9–5.2)
SODIUM SERPL-SCNC: 144 MMOL/L (ref 136–145)
SP GR UR: 1.02 (ref 1–1.03)
TRIGL SERPL-MCNC: 92 MG/DL (ref 0–150)
UROBILINOGEN UR STRIP-MCNC: NORMAL MG/DL
VLDLC SERPL CALC-MCNC: 18.4 MG/DL (ref 5–40)
WBC # BLD AUTO: 5.62 10*3/MM3 (ref 4.5–10.7)

## 2017-11-13 ENCOUNTER — OFFICE VISIT (OUTPATIENT)
Dept: FAMILY MEDICINE CLINIC | Facility: CLINIC | Age: 62
End: 2017-11-13

## 2017-11-13 ENCOUNTER — OFFICE VISIT (OUTPATIENT)
Dept: OBSTETRICS AND GYNECOLOGY | Facility: CLINIC | Age: 62
End: 2017-11-13

## 2017-11-13 VITALS
DIASTOLIC BLOOD PRESSURE: 78 MMHG | SYSTOLIC BLOOD PRESSURE: 142 MMHG | HEIGHT: 63 IN | BODY MASS INDEX: 21.85 KG/M2 | HEART RATE: 84 BPM | TEMPERATURE: 98.1 F | WEIGHT: 123.3 LBS

## 2017-11-13 VITALS
SYSTOLIC BLOOD PRESSURE: 132 MMHG | DIASTOLIC BLOOD PRESSURE: 78 MMHG | HEIGHT: 63 IN | BODY MASS INDEX: 21.79 KG/M2 | WEIGHT: 123 LBS

## 2017-11-13 DIAGNOSIS — Z23 NEEDS FLU SHOT: ICD-10-CM

## 2017-11-13 DIAGNOSIS — M46.1 BILATERAL SACROILIITIS (HCC): ICD-10-CM

## 2017-11-13 DIAGNOSIS — Z00.00 ANNUAL PHYSICAL EXAM: ICD-10-CM

## 2017-11-13 DIAGNOSIS — Z78.0 POST-MENOPAUSAL: ICD-10-CM

## 2017-11-13 DIAGNOSIS — Z11.59 NEED FOR HEPATITIS C SCREENING TEST: ICD-10-CM

## 2017-11-13 DIAGNOSIS — Z13.9 SCREENING FOR CONDITION: Primary | ICD-10-CM

## 2017-11-13 DIAGNOSIS — Z12.4 PAP SMEAR FOR CERVICAL CANCER SCREENING: ICD-10-CM

## 2017-11-13 DIAGNOSIS — Z00.00 HEALTH CARE MAINTENANCE: Primary | ICD-10-CM

## 2017-11-13 LAB
BILIRUB BLD-MCNC: NEGATIVE MG/DL
CLARITY, POC: CLEAR
COLOR UR: YELLOW
GLUCOSE UR STRIP-MCNC: NEGATIVE MG/DL
KETONES UR QL: NEGATIVE
LEUKOCYTE EST, POC: NEGATIVE
NITRITE UR-MCNC: NEGATIVE MG/ML
PH UR: 7 [PH] (ref 5–8)
PROT UR STRIP-MCNC: NEGATIVE MG/DL
RBC # UR STRIP: NEGATIVE /UL
SP GR UR: 1 (ref 1–1.03)
UROBILINOGEN UR QL: NORMAL

## 2017-11-13 PROCEDURE — 81002 URINALYSIS NONAUTO W/O SCOPE: CPT | Performed by: OBSTETRICS & GYNECOLOGY

## 2017-11-13 PROCEDURE — 99396 PREV VISIT EST AGE 40-64: CPT | Performed by: OBSTETRICS & GYNECOLOGY

## 2017-11-13 PROCEDURE — 99396 PREV VISIT EST AGE 40-64: CPT | Performed by: FAMILY MEDICINE

## 2017-11-13 PROCEDURE — 90472 IMMUNIZATION ADMIN EACH ADD: CPT | Performed by: FAMILY MEDICINE

## 2017-11-13 PROCEDURE — 90471 IMMUNIZATION ADMIN: CPT | Performed by: FAMILY MEDICINE

## 2017-11-13 PROCEDURE — 90715 TDAP VACCINE 7 YRS/> IM: CPT | Performed by: FAMILY MEDICINE

## 2017-11-13 PROCEDURE — 90686 IIV4 VACC NO PRSV 0.5 ML IM: CPT | Performed by: FAMILY MEDICINE

## 2017-11-13 RX ORDER — ESTRADIOL 0.1 MG/G
1 CREAM VAGINAL 2 TIMES WEEKLY
Qty: 45 G | Refills: 6 | Status: SHIPPED | OUTPATIENT
Start: 2017-11-13 | End: 2017-11-20

## 2017-11-13 NOTE — PROGRESS NOTES
GYN Annual Exam     CC- Here for annual exam.     Pt new to practice? no  Pt new to me? no    HPI:   HPI    Shanika Hector is a 62 y.o. female who presents for annual well woman exam. Patient states first date of last normal period was: No LMP recorded. Patient is postmenopausal..       Problems:  Patient Active Problem List   Diagnosis   • Anxiety   • Cervical radiculopathy   • Acid reflux   • Generalized osteoarthritis   • Gastroesophageal reflux disease   • Irritable bowel syndrome   • Lichen sclerosus of female genitalia   • Osteoporosis   • Pruritus   • Seborrheic eczema   • Osteoarthritis of cervical spine without myelopathy   • Eczema   • Degeneration of intervertebral disc   • Somatic dysfunction of sacroiliac joint   • Bilateral sacroiliitis   • Strain of left trapezius muscle   ; otherwise none    OB History      Para Term  AB Living    2 2 2       SAB TAB Ectopic Multiple Live Births                     Past Medical History:   Diagnosis Date   • ITZEL positive     Repeat ITZEL 2014 negative   • Anxiety    • Bursitis 2016   • Cervical radiculopathy    • Cervical spondylosis    • Degeneration of intervertebral disc    • Eczema    • GERD (gastroesophageal reflux disease)    • Irritable bowel syndrome    • Lichen sclerosus of female genitalia    • Myalgia and myositis    • Osteoporosis    • Pruritus        Past Surgical History:   Procedure Laterality Date   • BREAST CYST ASPIRATION      10+ years ago   • CHOLECYSTECTOMY     • MOUTH SURGERY           Current Outpatient Prescriptions:   •  acetaminophen (TYLENOL) 650 MG 8 hr tablet, Take 2 tablets by mouth 2 (two) times a day as needed., Disp: , Rfl:   •  estradiol (ESTRACE) 0.1 MG/GM vaginal cream, Insert 1 g into the vagina 2 (Two) Times a Week., Disp: 45 g, Rfl: 6  •  omeprazole (priLOSEC) 40 MG capsule, TAKE ONE CAPSULE BY MOUTH DAILY, Disp: 30 capsule, Rfl: 5    Allergies   Allergen Reactions   • Sulfa Antibiotics Other (See Comments)      "Past history, unknown reaction       Social History   Substance Use Topics   • Smoking status: Never Smoker   • Smokeless tobacco: Never Used   • Alcohol use No     Counseling given: Not Answered      Family History   Problem Relation Age of Onset   • Irritable bowel syndrome Mother    • Hypertension Father          Review of Systems    /78  Ht 63\" (160 cm)  Wt 123 lb (55.8 kg)  BMI 21.79 kg/m2    Physical Exam   Constitutional: She is oriented to person, place, and time. She appears well-developed and well-nourished. No distress.   Neck: Normal range of motion. Neck supple. No JVD present. No tracheal deviation present. No thyromegaly present.   Cardiovascular: Normal rate, regular rhythm, normal heart sounds and intact distal pulses.  Exam reveals no gallop and no friction rub.    No murmur heard.  Pulmonary/Chest: Effort normal and breath sounds normal. No stridor. No respiratory distress. She has no wheezes. She has no rales. She exhibits no tenderness.   Abdominal: Soft. Bowel sounds are normal. She exhibits no distension and no mass. There is no tenderness. There is no rebound and no guarding. No hernia.   Genitourinary: Vagina normal and uterus normal. No vaginal discharge found.   Genitourinary Comments: cx wnl, pap done   Musculoskeletal: Normal range of motion. She exhibits no edema, tenderness or deformity.   Lymphadenopathy:     She has no cervical adenopathy.   Neurological: She is alert and oriented to person, place, and time.   Skin: Skin is warm and dry. No rash noted. She is not diaphoretic. No erythema. No pallor.   Bilateral normal breasts   Psychiatric: She has a normal mood and affect. Her behavior is normal. Judgment and thought content normal.   Nursing note and vitals reviewed.       As part of wellness and prevention, the following topics were discussed with the patient:    []  Nutrition  []  Physical activity/regular exercise   []  Healthy weight  []  Injury prevention  []  " Substance misuse/abuse  []  Sexual behavior  []  STD prevention  []  Contaception  []  Dental health  []  Mental health  []  Immunization  [x]  Encouraged SBE     Assessment     1) GYN annual well woman exam.   2) PAP done today? yes       Plan     1) Breast Health - Clinical breast exam yearly, Self breast awareness monthly  2) Repeat Pap - 1 yr  3) If smoker; Counseling given: Not Answered    4) Seat belts recommended  5) Follow up prn and one year.    Shanika was seen today for gynecologic exam.    Diagnoses and all orders for this visit:    Screening for condition  -     POC Urinalysis Dipstick    Annual physical exam    Pap smear for cervical cancer screening  -     PapIG, HPV, Rfx 16 / 18 - ThinPrep Vial, Cervix    Other orders  -     estradiol (ESTRACE) 0.1 MG/GM vaginal cream; Insert 1 g into the vagina 2 (Two) Times a Week.        RTO Return in about 1 year (around 11/13/2018) for Annual physical.    Nino Nguyen MD    11/13/2017  3:38 PM

## 2017-11-13 NOTE — PROGRESS NOTES
"Subjective   Shanika Hector is a 62 y.o. female.     Chief Complaint   Patient presents with   • Annual Exam     follow up with labs        History of Present Illness     Ongoing low back pain.  Sacroiliitis with spinal stenosis with bilateral sciatica symptoms.  Multiple epidural injections.  Pain gets problematic at times.  Most recent PEG 3 score was 8 each category.  She is not exercising.  She does know or conditioning.  Negative depression screen.  Not feeling sad and hopeless or depressed and no anhedonia.    She would like TB and hepatitis screening.      Social History   Substance Use Topics   • Smoking status: Never Smoker   • Smokeless tobacco: Never Used   • Alcohol use No     Immunization History   Administered Date(s) Administered   • Flu Vaccine Quad PF >36MO 11/13/2017   • Influenza, Quadrivalent 10/26/2015   • Tdap 11/13/2017     Family History   Problem Relation Age of Onset   • Irritable bowel syndrome Mother    • Hypertension Father          The following portions of the patient's history were reviewed and updated as appropriate: allergies, current medications, past family history, past medical history, past social history, past surgical history and problem list.          Review of Systems   Constitutional: Negative.  Negative for fatigue and fever.   Respiratory: Negative.  Negative for choking.    Cardiovascular: Negative.    Gastrointestinal: Negative.    Musculoskeletal: Positive for back pain.   Skin: Negative for rash.   Neurological: Negative.  Negative for weakness.   Psychiatric/Behavioral: Negative.        Objective   Blood pressure 142/78, pulse 84, temperature 98.1 °F (36.7 °C), temperature source Oral, height 63\" (160 cm), weight 123 lb 4.8 oz (55.9 kg), not currently breastfeeding.  Physical Exam   Constitutional: She is oriented to person, place, and time. She appears well-developed and well-nourished.   HENT:   Head: Normocephalic and atraumatic.   Right Ear: Tympanic membrane, " external ear and ear canal normal.   Left Ear: Tympanic membrane, external ear and ear canal normal.   Nose: Nose normal.   Mouth/Throat: Oropharynx is clear and moist. No oropharyngeal exudate.   Eyes: EOM are normal. Pupils are equal, round, and reactive to light. No scleral icterus.   Neck: Normal range of motion. Neck supple. No thyromegaly present.   Cardiovascular: Normal rate, regular rhythm, normal heart sounds and intact distal pulses.    No murmur heard.  Pulmonary/Chest: Effort normal and breath sounds normal. She has no wheezes.   Abdominal: Soft. Bowel sounds are normal. She exhibits no distension and no mass. There is no tenderness. No hernia.   Musculoskeletal: She exhibits tenderness (sacro iliac areas. Neg SLL). She exhibits no edema.   Lymphadenopathy:     She has no cervical adenopathy.   Neurological: She is alert and oriented to person, place, and time. She exhibits normal muscle tone.   Skin: Skin is warm and dry. No rash noted.   Psychiatric: She has a normal mood and affect. Her behavior is normal. Judgment and thought content normal.   Nursing note and vitals reviewed.      Assessment/Plan   Shanika was seen today for annual exam.    Diagnoses and all orders for this visit:    Health care maintenance  -     QuantiFERON TB Gold    Needs flu shot  -     Flu Vaccine Quad PF 3YR+ (FLUARIX/FLUZONE [id number])    Post-menopausal  -     DEXA Bone Density Axial; Future    Need for hepatitis C screening test  -     Hepatitis C Antibody    Bilateral sacroiliitis  -     Ambulatory Referral to Physical Therapy Evaluate and treat    Other orders  -     Tdap Vaccine Greater Than or Equal To 8yo IM      Annual complete physical examination.    Spinal stenosis and sacroiliitis.  I'm recommending she get to physical therapy for core conditioning, strengthening, flexibility.  I strongly believe this will help her symptoms.    Hepatitis C screening today.    Osteoporosis.  Previous  on Fosamax. Stopped because of  dental work.  Last DEXA scan about 4 years ago.  Needs repeat.    Immunizations.  TdaP today.  Follow-up next year for a welcome to Medicare physical.

## 2017-11-14 LAB — HCV AB S/CO SERPL IA: <0.1 S/CO RATIO (ref 0–0.9)

## 2017-11-16 LAB
ANNOTATION COMMENT IMP: NORMAL
GAMMA INTERFERON BACKGROUND BLD IA-ACNC: 0.07 IU/ML
M TB IFN-G BLD-IMP: NEGATIVE
M TB IFN-G CD4+ BCKGRND COR BLD-ACNC: <0 IU/ML
M TB IFN-G CD4+ T-CELLS BLD-ACNC: 0.04 IU/ML
MITOGEN IGNF BLD-ACNC: 8.79 IU/ML
QUANTIFERON INCUBATION: NORMAL
SERVICE CMNT-IMP: NORMAL

## 2017-11-20 ENCOUNTER — OFFICE VISIT (OUTPATIENT)
Dept: PAIN MEDICINE | Facility: CLINIC | Age: 62
End: 2017-11-20

## 2017-11-20 ENCOUNTER — TREATMENT (OUTPATIENT)
Dept: PHYSICAL THERAPY | Facility: CLINIC | Age: 62
End: 2017-11-20

## 2017-11-20 VITALS
RESPIRATION RATE: 16 BRPM | TEMPERATURE: 97 F | HEIGHT: 63 IN | OXYGEN SATURATION: 97 % | SYSTOLIC BLOOD PRESSURE: 116 MMHG | BODY MASS INDEX: 21.97 KG/M2 | DIASTOLIC BLOOD PRESSURE: 77 MMHG | WEIGHT: 124 LBS | HEART RATE: 57 BPM

## 2017-11-20 DIAGNOSIS — M54.16 LUMBAR RADICULOPATHY: ICD-10-CM

## 2017-11-20 DIAGNOSIS — M48.061 NEURAL FORAMINAL STENOSIS OF LUMBAR SPINE: ICD-10-CM

## 2017-11-20 DIAGNOSIS — G89.29 OTHER CHRONIC PAIN: ICD-10-CM

## 2017-11-20 DIAGNOSIS — M46.1 SACROILIITIS (HCC): Primary | ICD-10-CM

## 2017-11-20 DIAGNOSIS — S46.812D STRAIN OF LEFT TRAPEZIUS MUSCLE, SUBSEQUENT ENCOUNTER: ICD-10-CM

## 2017-11-20 DIAGNOSIS — M54.42 CHRONIC BILATERAL LOW BACK PAIN WITH BILATERAL SCIATICA: ICD-10-CM

## 2017-11-20 DIAGNOSIS — G89.29 CHRONIC BILATERAL LOW BACK PAIN WITH BILATERAL SCIATICA: ICD-10-CM

## 2017-11-20 DIAGNOSIS — M54.2 NECK PAIN: ICD-10-CM

## 2017-11-20 DIAGNOSIS — M54.41 CHRONIC BILATERAL LOW BACK PAIN WITH BILATERAL SCIATICA: ICD-10-CM

## 2017-11-20 DIAGNOSIS — M51.26 DISPLACEMENT OF LUMBAR INTERVERTEBRAL DISC WITHOUT MYELOPATHY: Primary | ICD-10-CM

## 2017-11-20 DIAGNOSIS — M79.604 ACUTE PAIN OF RIGHT LOWER EXTREMITY: ICD-10-CM

## 2017-11-20 PROCEDURE — 99214 OFFICE O/P EST MOD 30 MIN: CPT | Performed by: ANESTHESIOLOGY

## 2017-11-20 PROCEDURE — 97112 NEUROMUSCULAR REEDUCATION: CPT | Performed by: PHYSICAL THERAPIST

## 2017-11-20 PROCEDURE — 97161 PT EVAL LOW COMPLEX 20 MIN: CPT | Performed by: PHYSICAL THERAPIST

## 2017-11-20 PROCEDURE — 97110 THERAPEUTIC EXERCISES: CPT | Performed by: PHYSICAL THERAPIST

## 2017-11-20 RX ORDER — GABAPENTIN 600 MG/1
600 TABLET ORAL 3 TIMES DAILY
Qty: 90 TABLET | Refills: 0 | Status: SHIPPED | OUTPATIENT
Start: 2017-11-20 | End: 2017-12-18

## 2017-11-20 NOTE — PROGRESS NOTES
CHIEF COMPLAINT  Pt states had increase in bilateral upper leg pain following 11/2/17 L5 TFESI for 1 week, with continued R upper leg pain.  Pt also states is having balance difficulty due to R leg pain/weakness.      Subjective   Shanika Hector is a 62 y.o. female  who presents to the office for follow-up of procedure.  She completed a Right L5 LTFESI (along with a Left C7-T1 CMBB)   on  11-2-17 performed by Dr. Huerta for management of neck/shoulder pain along with the significant LB/RLE pain. Patient reports overall no relief from the procedure.   The previous procedure from a few weeks prior as a bilateral L4 lumbar transforaminal epidural steroid injection.  She had significant resolution of left lumbar radicular symptoms and also had some improvement and L4 dermatome right leg symptoms, and the residual radicular pain was more in the L5 dermatome on the right leg.  Therefore we change the approach as noted above on that second procedure.    Regarding the right upper leg, there are no significant canal or foraminal stenoses in the upper/mid lumbar area on the 8-30-17 MRI.            Back Pain   This is a chronic problem. The current episode started more than 1 month ago (JUly 2016). The problem occurs constantly. The problem has been rapidly worsening since onset. The pain is present in the sacro-iliac. The quality of the pain is described as aching and shooting. The pain radiates to the right thigh, right foot and right knee (bilateral buttock radiation from the SI joints; she does have some other history of radiation of pain in a radicular like fashion bilaterally R>L laterally especially in the legs). The pain is severe. The symptoms are aggravated by sitting and standing. Associated symptoms include numbness (feetat times) and paresthesias. Pertinent negatives include no chest pain, fever, headaches or weakness. Risk factors: no trauma. She has tried analgesics, bed rest, heat, home exercises, ice, NSAIDs and  walking for the symptoms. Improvement on treatment: radicular pains continue on the right.   Muscle Pain   This is a recurrent problem. The current episode started more than 1 month ago. The problem occurs constantly. The problem is unchanged (she attributes her neck pain to how she has to sleep due to her Sacroiliac pain). The pain is present in the neck (left trap area). The pain is medium. The symptoms are aggravated by any movement. Associated symptoms include constipation (pt states off and on.). Pertinent negatives include no chest pain, diarrhea, eye pain, fever, headaches, nausea, rash, shortness of breath, vomiting, weakness or wheezing. Past treatments include acetaminophen, cold pack, heat pack, OTC NSAID, prescription NSAID and rest. The treatment provided no relief (previous TPI no help.). Swelling location: mild in the area. She has been sleeping poorly. Her past medical history is significant for chronic back pain.   Neck Pain    This is a chronic problem. The problem has been unchanged. The pain is present in the left side. The quality of the pain is described as aching and stabbing. The pain is moderate. Associated symptoms include numbness (feetat times). Pertinent negatives include no chest pain, fever, headaches or weakness.        PEG Assessment   What number best describes your pain on average in the past week?8  What number best describes how, during the past week, pain has interfered with your enjoyment of life?8  What number best describes how, during the past week, pain has interfered with your general activity?  8      The following portions of the patient's history were reviewed and updated as appropriate: allergies, current medications, past family history, past medical history, past social history, past surgical history and problem list.    Review of Systems   Constitutional: Positive for activity change (decreased). Negative for appetite change, chills and fever.   HENT: Negative for  "ear pain.    Eyes: Negative for pain.   Respiratory: Negative for cough, chest tightness, shortness of breath and wheezing.    Cardiovascular: Negative for chest pain, palpitations and leg swelling.   Gastrointestinal: Positive for constipation (pt states off and on.). Negative for diarrhea, nausea and vomiting.   Endocrine: Negative for polydipsia and polyuria.   Genitourinary: Negative for difficulty urinating.   Musculoskeletal: Positive for neck pain and neck stiffness. Negative for back pain.   Skin: Negative for rash.   Allergic/Immunologic: Negative for immunocompromised state.   Neurological: Positive for numbness (feetat times) and paresthesias. Negative for dizziness, weakness, light-headedness and headaches.   Hematological: Bruises/bleeds easily.   Psychiatric/Behavioral: Positive for sleep disturbance. Negative for agitation, confusion, hallucinations and suicidal ideas. The patient is not nervous/anxious.          Vitals:    11/20/17 0752   BP: 116/77   Pulse: 57   Resp: 16   Temp: 97 °F (36.1 °C)   SpO2: 97%   Weight: 124 lb (56.2 kg)   Height: 63\" (160 cm)   PainSc: 6  Comment: bilateral upper leg pain,R > L, ranges from 5-9/10   PainLoc: Leg         Objective   Physical Exam   Constitutional: She is oriented to person, place, and time. Vital signs are normal. She appears well-developed and well-nourished.  Non-toxic appearance. No distress.   HENT:   Head: Normocephalic and atraumatic.   Right Ear: Hearing and external ear normal.   Left Ear: Hearing and external ear normal.   Nose: Nose normal.   Eyes: Conjunctivae, EOM and lids are normal. Pupils are equal, round, and reactive to light.   Neck: Neck supple. Muscular tenderness present. No spinous process tenderness present. Decreased range of motion present.   Pulmonary/Chest: Effort normal. No respiratory distress.   Abdominal: Normal appearance.   Musculoskeletal:        Cervical back: She exhibits decreased range of motion and tenderness.       "  Lumbar back: She exhibits tenderness.   Neurological: She is alert and oriented to person, place, and time. No cranial nerve deficit. Gait abnormal.   slr and fem stretch positive on the right.  Mild antalgia.  Sensitivity / dysesthesia on right L4 dermatomoe.   Psychiatric: She has a normal mood and affect. Her behavior is normal.   Nursing note and vitals reviewed.          Assessment/Plan   Shanika was seen today for leg pain.    Diagnoses and all orders for this visit:    Displacement of lumbar intervertebral disc without myelopathy  -     Ambulatory Referral to Neurosurgery    Lumbar radiculopathy  -     Case Request  -     Ambulatory Referral to Neurosurgery    Neural foraminal stenosis of lumbar spine  -     Ambulatory Referral to Neurosurgery    Other chronic pain    Acute pain of right lower extremity    Neck pain    Strain of left trapezius muscle, subsequent encounter    Other orders  -     gabapentin (NEURONTIN) 600 MG tablet; Take 1 tablet by mouth 3 (Three) Times a Day.        --- Follow-up for procedure... Right L4 Lumbar transforaminal epidural steroid injection  -- acute reliever with Gabapentin.    -- if this continues to be needed, we will need to complete a prescribing agreement at next visit  -- referral to Neurosurgery...  I feel the RIght L4 LTFSI is needed diagnostically prior to the consultation.  She has known foraminal narrowing from disc bulge/tear from MRI from 8-30-17.  -- she will consult with Physical therapy later today; neck pain continues but is stable             RAMONITA REPORT  RAMONITA report has been reviewed and scanned into the patient's chart.  Date of last RAMONITA : as above.  No current use of opioids.        EMR Dragon/Transcription disclaimer:   Much of this encounter note is an electronic transcription/translation of spoken language to printed text. The electronic translation of spoken language may permit erroneous, or at times, nonsensical words or phrases to be  inadvertently transcribed; Although I have reviewed the note for such errors, some may still exist.

## 2017-11-20 NOTE — PROGRESS NOTES
Physical Therapy Initial Evaluation and Plan of Care    TIME IN 1205 TIME OUT 1245  Patient: Shanika Hector   : 1955  Diagnosis/ICD-10 Code:  Sacroiliitis [M46.1]  Referring practitioner: Bryan Vega MD     OSWESTRY- BACK    Please answer every section and susy in each section only the ONE answer which applies to you or most closely describes your problem.  Thank you.    Section 1- Pain Intensity  []  The pain comes and goes and is very mild.  []  The pain is mild and does not vary much.  []  The pain comes and goes and is moderate.  []  The pain is moderate and does not vary much.  [x]  The pain comes and goes and is severe.  []  The pain is severe and does not vary much.    Section 2- Personal Care  []  I would not have to change my way of washing or dressing in order to avoid pain.  [x]  I do not normally change my way of washing or dressing even though it causes      some pain.  []  Washing and dressing increases the pain but I manage not to change my way of doing it.  []  Washing and dressing increase the pain and I find it necessary to change my way of doing it.  []  Because of the pain I am unable to do some washing and dressing without help.  []  Because of the pain I am unable to do any washing or dressing without help  .  Section 3- Lifting  []  I can lift heavy weight without extra pain.  []  I can lift heavy weight but it causes extra pain.  []  Pain prevents me lifting heavy weight off the floor.  [x]  Pain prevents me lifting heavy weight off the floor but I can manage if they are conveniently positioned, e.g., on a table.  []  Pain prevents me from lifting heavy weight but I can manage light to medium weight if it is conveniently positioned.  []  I can only lift very light weight at the most.    Section 4- Walking  [] I have no pain on walking.  [] I have some pain on walking but it does not increase with distance.  [] I cannot walk more than One Mile without increasing pain.  [x] I cannot  walk more than 1/2 Mile without increasing pain.  [] I cannot walk more than 1/4 Mile without increasing pain.  [] I cannot walk at all without increasing pain.    Section 5- Sitting  [] I can sit in any chair as long as I like.  [] I can sit only in my favorite chair as long as I like.  [] Pain prevents me from sitting more than one hour.  [] Pain prevents me from sitting more than 1/2 hour.  [] Pain prevents me from sitting more than 10 minutes.  [x] I avoid sitting because it increases pain straight away.      Section 6- Standing  [] I can stand as long as I want without pain.  [x] I have some pain on standing but it does not increase with time.  [] I cannot stand for longer than one hour without increasing pain.  [] I cannot stand for longer than 1/2 hour without increasing pain.  [] I cannot stand for longer than 10 minutes without increasing pain.  [] I avoid standing because it increases the pain straight away.    Section 7- Sleeping  [] I get no pain in bed.  [] I get pain in bed but it does not prevent me from sleeping well.  [] Because of pain my normal nights sleep is reduced by less than 1/4.  [] Because of pain my normal nights sleep is reduced by less than 1/2.  [x] Because of pain my normal nights sleep is reduced by less than 3/4.  [] Pain prevents me from sleeping at all.    Section 8-Social Life  [] My social life is normal and gives me no pain.  [x] My social life is normal but increases the degree of my pain.  [] Pain has no significant effect on my social life apart from limiting my more energetic interests e.g., dancing etc.  [] Pain has restricted my social life and I do not go out very often.  [] Pain has restricted my social life to my home.  [] I have hardly any social life because of pain.    Section 9- Traveling  [] I get no pain while traveling.  [] I get some pain while traveling but none of my usual forms of travel make it any worse.  [x] I get extra pain while traveling but it does  not compel me to seek alternative forms of travel.  [] I get extra pain while traveling which compels me to seek alternative forms of travel.  [] Pain restricts all forms of travel.  [] Pain prevents all forms of travel except that done lying down.    Section 10 - Changing Degree of Pain  [] My pain is rapidly getting better.  [] My pain fluctuates but overall is definitely getting better.  [] My pain seems to be getting better but improvement is slow at present.  [x] My pain is neither getting better or worse.  [] My pain is gradually worsening.  [] My pain is rapidly worsening.                                                           Initial 6 Visits D/C Change   % Score 56%      VAS #                   Subjective Evaluation    History of Present Illness  Date of onset: 3/20/2017  Mechanism of injury: Pt reports insidious onset of bilateral LE radicular symptoms starting about 4 1/2 to 5 years ago.  Pt reports exacerbation of symptoms in 2017.  Pt reports steroid injection in lumbar spine 17 without relief, Gabapentin prescription that is not yet filled, plan for 2nd epidural injection in December, and possible plan for referral to neurosurgeon.      Subjective comment: Pt reports battling sciatica bilaterally 4 1/2 years.  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  Pain  Current pain ratin  At best pain ratin  At worst pain rating: 10 (after last injection 17)  Location: bilateral hip to posterior knee pain R > L; occasionally extending to bilateral calves  Relieving factors: heat (hip and lumbar spine flexion, sit in hot tub of water, Bio[employer]ze [employer])  Aggravating factors: sleeping (lying on right side, sitting, driving in car, touching lateral and posterior hips; heating pad)    Diagnostic Tests  MRI studies: abnormal (L4 disc torn)    Treatments  Previous treatment: injection treatment and  physical therapy (physical therapy at [facility])  Current treatment comments: extra strength Tylenol; will start Gabapentin after today.     Patient Goals  Patient goals for therapy: decreased pain (become more active)             Objective       Static Posture     Thoracic Spine  Shifted left and hyperkyphosis.    Palpation   Left   No palpable tenderness to the quadratus lumborum.   Hypertonic in the lumbar paraspinals and piriformis.   Tenderness of the piriformis.     Right   No palpable tenderness to the quadratus lumborum.   Hypertonic in the lumbar paraspinals and piriformis. Tenderness of the piriformis.     Tenderness     Left Hip   Tenderness in the greater trochanter. No tenderness in the PSIS.     Right Hip   Tenderness in the PSIS and greater trochanter.     Neurological Testing   Sensation     Lumbar   Left   Intact: light touch    Right   Intact: light touch    Active Range of Motion     Lumbar   Flexion: 60 degrees with pain  Extension: 20 degrees   Left lateral flexion: 20 degrees   Right lateral flexion: 25 degrees with pain    Additional Active Range of Motion Details  Bilateral rotation 50% and increased pain to L    Passive Range of Motion     Additional Passive Range of Motion Details  Hip IR and ER L grossly WFL and R limited compared to left    Strength/Myotome Testing     Left Hip   Planes of Motion   Flexion: 4-    Right Hip   Planes of Motion   Flexion: 4-  Abduction: 4-    Tests       Thoracic   Negative slump.     Lumbar     Left   Positive passive SLR.     Right   Positive passive SLR.     Left Pelvic Girdle/Sacrum   Negative: gapping.     Right Pelvic Girdle/Sacrum   Negative: gapping.     Additional Tests Details  Supine leg length (+) Right longer  BUDDY (+) bilaterally  Hamstring flexibility R moderate restriction; L moderate restriction  Lumbar L1-5 CPA's hypomobile and pain-free         Assessment & Plan     Assessment  Impairments: abnormal or restricted ROM, impaired physical  strength, lacks appropriate home exercise program and pain with function  Assessment details: Pt is pleasant 62 y.o. Female who presents with chronic bilateral posterior hip pain and radiating symptoms to bilateral thighs, rarely extending past knees.  Pt demonstrates decreased lumbopelvic control/stabilization, increased neural tension bilaterally R > L, and SIJ dysfunction with possible anterior innominate rotation on right.  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  Prognosis: good  Functional Limitations: carrying objects, lifting, uncomfortable because of pain and sitting  Goals  Plan Goals: Short-Term Goals - In 2 weeks:  1. Pt will be (I) with initial HEP.  2. Sahrmann for lumbar stabilization 0.1/5 with ability to maintain neutral spine during supine heel slide.  3.  Pt will sit in standard chair for 15 minutes without increased pain.    Long-Term Goals - In 4 weeks:  1. Sahrmann for lumbar stabilization score improve to 0.25/5 with ability to maintain neutral spine during supine heel slide to demonstrate improved local control as required for walking and stair navigation.  2. Bilateral hip flexion and abduction strength improve to 4+/5.  3. Pt will sit for 30 minutes without increased pain.  4. YANIRA score improve to 46% to demonstrate functional improvement.    Plan  Therapy options: will be seen for skilled physical therapy services  Planned modality interventions: cryotherapy, electrical stimulation/Russian stimulation and thermotherapy (hydrocollator packs)  Planned therapy interventions: abdominal trunk stabilization, balance/weight-bearing training, body mechanics training, functional ROM exercises, home exercise program, joint mobilization, manual therapy, neuromuscular re-education, soft tissue mobilization, spinal/joint mobilization, strengthening, stretching and therapeutic  activities  Frequency: 2x week  Duration in weeks: 4  Treatment plan discussed with: patient        Manual Therapy:    -     mins  35402;  Therapeutic Exercise:    10     mins  27165;     Neuromuscular Patsy:    15    mins  33573;    Therapeutic Activity:     -     mins  76992;     Gait Training:      -     mins  66538;     Ultrasound:     -     mins  53494;    Electrical Stimulation:    -     mins  43723 ( );  Dry Needling     -     mins self-pay    Timed Treatment:   25   mins   Total Treatment:     40   mins    PT SIGNATURE: Paula Sanchez, PT, DPT   DATE TREATMENT INITIATED: 11/20/2017    Initial Certification  Certification Period: 2/18/2018  I certify that the therapy services are furnished while this patient is under my care.  The services outlined above are required by this patient, and will be reviewed every 90 days.     PHYSICIAN: Bryan Vega MD      DATE:     Please sign and return via fax to [phone]. Thank you, [facility].

## 2017-11-20 NOTE — PATIENT INSTRUCTIONS
Benefits of dry needling   Purpose of treatment  HEP performance  Please view [facility] Shanika Hector for a complete list of HEP instructions.  Pathology and involved anatomy

## 2017-11-21 LAB
CYTOLOGIST CVX/VAG CYTO: NORMAL
CYTOLOGY CVX/VAG DOC THIN PREP: NORMAL
DX ICD CODE: NORMAL
HIV 1 & 2 AB SER-IMP: NORMAL
HPV I/H RISK 1 DNA CVX QL PROBE+SIG AMP: NEGATIVE
OTHER STN SPEC: NORMAL
PATH REPORT.FINAL DX SPEC: NORMAL
STAT OF ADQ CVX/VAG CYTO-IMP: NORMAL

## 2017-11-24 ENCOUNTER — HOSPITAL ENCOUNTER (OUTPATIENT)
Dept: BONE DENSITY | Facility: HOSPITAL | Age: 62
Discharge: HOME | End: 2017-11-24
Admitting: FAMILY MEDICINE

## 2017-11-24 DIAGNOSIS — Z78.0 POST-MENOPAUSAL: ICD-10-CM

## 2017-11-24 PROCEDURE — 77080 DXA BONE DENSITY AXIAL: CPT

## 2017-11-27 ENCOUNTER — HOSPITAL ENCOUNTER (OUTPATIENT)
Dept: MAMMOGRAPHY | Facility: HOSPITAL | Age: 62
Discharge: HOME | End: 2017-11-27
Attending: OBSTETRICS & GYNECOLOGY | Admitting: OBSTETRICS & GYNECOLOGY

## 2017-11-27 DIAGNOSIS — Z12.31 VISIT FOR SCREENING MAMMOGRAM: ICD-10-CM

## 2017-11-27 PROCEDURE — G0202 SCR MAMMO BI INCL CAD: HCPCS

## 2017-11-29 ENCOUNTER — TREATMENT (OUTPATIENT)
Dept: PHYSICAL THERAPY | Facility: CLINIC | Age: 62
End: 2017-11-29

## 2017-11-29 DIAGNOSIS — M54.42 CHRONIC BILATERAL LOW BACK PAIN WITH BILATERAL SCIATICA: ICD-10-CM

## 2017-11-29 DIAGNOSIS — M54.41 CHRONIC BILATERAL LOW BACK PAIN WITH BILATERAL SCIATICA: ICD-10-CM

## 2017-11-29 DIAGNOSIS — M46.1 SACROILIITIS (HCC): Primary | ICD-10-CM

## 2017-11-29 DIAGNOSIS — G89.29 CHRONIC BILATERAL LOW BACK PAIN WITH BILATERAL SCIATICA: ICD-10-CM

## 2017-11-29 PROCEDURE — 97112 NEUROMUSCULAR REEDUCATION: CPT | Performed by: PHYSICAL THERAPIST

## 2017-11-29 PROCEDURE — 97110 THERAPEUTIC EXERCISES: CPT | Performed by: PHYSICAL THERAPIST

## 2017-11-29 NOTE — PROGRESS NOTES
Physical Therapy Daily Progress Note    Time In 1454  Time Out 1550    Shanika Hector reports: pain in right hip extending to right proximal thigh.  Pt reports increased symptoms with posterior pelvic tilt exercise.      Subjective     Objective       Tests     Additional Tests Details  Supine leg length (+) right longer       See Exercise, Manual, and Modality Logs for complete treatment.       Assessment & Plan     Assessment  Assessment details: Pt demonstrated directional preference of lumbar extension with centralized symptoms when lying prone.  Pt also reported pain with posterior pelvic tilt likely associated with lumbar flexion.  Posterior pelvic tilt omitted from treatment and HEP today and may re-initiate in future as indicated and if tolerated.  Reviewed seated posture with patient to support lumbar lordosis and directional preference for lumbar extension.        Progress strengthening /stabilization /functional activity           Manual Therapy:    -     mins  56239;  Therapeutic Exercise:    28     mins  07490;     Neuromuscular Patsy:    10    mins  62050;    Therapeutic Activity:     -     mins  18994;     Gait Training:      -     mins  62275;     Ultrasound:     -     mins  05938;    Electrical Stimulation:    15     mins  64581 ( );  Dry Needling     -     mins self-pay    Timed Treatment:   38   mins   Total Treatment:     56   mins    Paula Sanchez, PT, DPT  Physical Therapist

## 2017-11-29 NOTE — PATIENT INSTRUCTIONS
See assessment for further details  HEP performance  Please view [facility] Shanika Hector for a complete list of HEP instructions.  Purpose of treatment  Pathology and involved anatomy

## 2017-12-01 ENCOUNTER — TREATMENT (OUTPATIENT)
Dept: PHYSICAL THERAPY | Facility: CLINIC | Age: 62
End: 2017-12-01

## 2017-12-01 DIAGNOSIS — M46.1 SACROILIITIS (HCC): Primary | ICD-10-CM

## 2017-12-01 DIAGNOSIS — M54.42 CHRONIC BILATERAL LOW BACK PAIN WITH BILATERAL SCIATICA: ICD-10-CM

## 2017-12-01 DIAGNOSIS — G89.29 CHRONIC BILATERAL LOW BACK PAIN WITH BILATERAL SCIATICA: ICD-10-CM

## 2017-12-01 DIAGNOSIS — M54.41 CHRONIC BILATERAL LOW BACK PAIN WITH BILATERAL SCIATICA: ICD-10-CM

## 2017-12-01 PROCEDURE — 97014 ELECTRIC STIMULATION THERAPY: CPT | Performed by: PHYSICAL THERAPIST

## 2017-12-01 PROCEDURE — 97112 NEUROMUSCULAR REEDUCATION: CPT | Performed by: PHYSICAL THERAPIST

## 2017-12-01 PROCEDURE — 97110 THERAPEUTIC EXERCISES: CPT | Performed by: PHYSICAL THERAPIST

## 2017-12-01 NOTE — PROGRESS NOTES
Physical Therapy Daily Progress Note    Time In 1400  Time Out 1457    Shanika Hector reports: felt good when got up from doing exercises but then felt increased right leg pain.  Pt reports difficulty with right piriformis stretch.   Pt reports right leg pain pre-treatment today.      Subjective     Objective   See Exercise, Manual, and Modality Logs for complete treatment.     Assessment & Plan     Assessment  Assessment details: Pt required frequent review of appropriate response to treatment and encouragement to terminate or alter activity to avoid increased symptoms.  Pt continues to demonstrate directional preference for lumbar extension but required verbal and tactile cues to minimize degree of motion to avoid increased leg symptoms.       Progress strengthening /stabilization /functional activity         Manual Therapy:    -     mins  70569;  Therapeutic Exercise:    28     mins  19079;     Neuromuscular Patsy:    10    mins  32594;    Therapeutic Activity:     -     mins  44750;     Gait Training:      -     mins  28226;     Ultrasound:     -     mins  53687;    Electrical Stimulation:    15     mins  86735 ( );  Dry Needling     -     mins self-pay    Timed Treatment:   38   mins   Total Treatment:     57   mins    Paula Sanchez, PT, DPT  Physical Therapist

## 2017-12-01 NOTE — PATIENT INSTRUCTIONS
Review HEP performance and appropriate response to treatment.  Please view [facility] Shanika Hector for a complete list of HEP instructions.

## 2017-12-04 ENCOUNTER — TREATMENT (OUTPATIENT)
Dept: PHYSICAL THERAPY | Facility: CLINIC | Age: 62
End: 2017-12-04

## 2017-12-04 ENCOUNTER — OFFICE VISIT (OUTPATIENT)
Dept: NEUROSURGERY | Facility: CLINIC | Age: 62
End: 2017-12-04

## 2017-12-04 VITALS
HEIGHT: 63 IN | WEIGHT: 125 LBS | BODY MASS INDEX: 22.15 KG/M2 | RESPIRATION RATE: 16 BRPM | HEART RATE: 60 BPM | SYSTOLIC BLOOD PRESSURE: 128 MMHG | DIASTOLIC BLOOD PRESSURE: 66 MMHG

## 2017-12-04 DIAGNOSIS — M46.1 BILATERAL SACROILIITIS (HCC): Primary | ICD-10-CM

## 2017-12-04 DIAGNOSIS — M46.1 SACROILIITIS (HCC): Primary | ICD-10-CM

## 2017-12-04 DIAGNOSIS — M54.42 CHRONIC BILATERAL LOW BACK PAIN WITH BILATERAL SCIATICA: ICD-10-CM

## 2017-12-04 DIAGNOSIS — M54.41 CHRONIC BILATERAL LOW BACK PAIN WITH BILATERAL SCIATICA: ICD-10-CM

## 2017-12-04 DIAGNOSIS — G89.29 CHRONIC BILATERAL LOW BACK PAIN WITH BILATERAL SCIATICA: ICD-10-CM

## 2017-12-04 DIAGNOSIS — G57.10 LATERAL FEMORAL CUTANEOUS NEUROPATHY, UNSPECIFIED LATERALITY: ICD-10-CM

## 2017-12-04 PROCEDURE — 97014 ELECTRIC STIMULATION THERAPY: CPT | Performed by: PHYSICAL THERAPIST

## 2017-12-04 PROCEDURE — 99243 OFF/OP CNSLTJ NEW/EST LOW 30: CPT | Performed by: NEUROLOGICAL SURGERY

## 2017-12-04 PROCEDURE — 97110 THERAPEUTIC EXERCISES: CPT | Performed by: PHYSICAL THERAPIST

## 2017-12-04 PROCEDURE — 97112 NEUROMUSCULAR REEDUCATION: CPT | Performed by: PHYSICAL THERAPIST

## 2017-12-04 RX ORDER — POLYETHYLENE GLYCOL 3350 17 G/17G
17 POWDER, FOR SOLUTION ORAL DAILY
COMMUNITY
End: 2018-01-29 | Stop reason: HOSPADM

## 2017-12-04 NOTE — PROGRESS NOTES
Physical Therapy Daily Progress Note    Time In 1029  Time Out 1123    Shanika Hector reports: doing well and able to sit as long as her hand is under her hip.    Subjective     Objective       Tests     Additional Tests Details  Supine leg length (-)       See Exercise, Manual, and Modality Logs for complete treatment.       Assessment & Plan     Assessment  Assessment details: Modified prone shoulder exercises without increased left shoulder pain.  Continued exercises with focus on directional preference for lumbar extension and trunk control/stabilization.  Progressed lumbopelvic control/stabilization exercises in standing position.  Pt demonstrated bilateral hip adduction and IR during controlled sit <-> stand and tactile cues initiated to facilitate hip ER and abduction.  Pt demonstrated improved performance with sit to stand with Theraband above bilateral knees.      Progress strengthening /stabilization /functional activity           Manual Therapy:    -     mins  87662;  Therapeutic Exercise:    28     mins  95300;     Neuromuscular Patsy:    10    mins  93673;    Therapeutic Activity:     -     mins  35686;     Gait Training:      -     mins  15924;     Ultrasound:     -     mins  19193;    Electrical Stimulation:    15     mins  22902 ( );  Dry Needling     -     mins self-pay    Timed Treatment:   38   mins   Total Treatment:     54   mins    Paula Sanchez, PT, DPT  Physical Therapist

## 2017-12-04 NOTE — PROGRESS NOTES
Subjective   Patient ID: Shanika Hector is a 62 y.o. female is being seen for consultation today at the request of Facundo Huerta MD for back and leg pain R>L. She is unaccompanied.    History of Present Illness Shivani is a 61 yo female with a hisotry of bilaterla leg pain R worse than L since July 2016.  No inciting event.  SHe reports sacrililiac pain and posterior leg pain.  She reports some PT recently.  SHe has only gone 3 times.  Most of her pain is in her legs.  Her lateral legs bother her.  SHe has been in pain management with several injections with limited efficacy.  No loss of b/b.  SHe occasionally has dorsum of foot numbness.  This comes and goes.  Her pain is rated 8/10.  Sitting aggravates.  SHe reports laying on her side aggravates.  Movement helps.  She denies NSAIDS.  She has some GI issues and is poorly toerant of oral NSAIDS.  SHe is a non smoker.  SHe has a history of osteoporosis and this is not managed.  She has had an elevated ER in the past and was managed by rheumatology for a time.  She denies RA.  She has not taken oral steroids.  Her pain is above the knee.      The following portions of the patient's history were reviewed and updated as appropriate: allergies, current medications, past family history, past medical history, past social history, past surgical history and problem list.    Review of Systems   HENT: Negative for trouble swallowing.    Eyes: Negative for visual disturbance.   Respiratory: Negative for cough and wheezing.    Cardiovascular: Negative for chest pain and palpitations.   Gastrointestinal: Negative for abdominal pain.   Genitourinary: Negative for difficulty urinating and enuresis.   Musculoskeletal: Positive for back pain (into both legs). Negative for gait problem.   Skin: Negative for rash.   Neurological: Positive for weakness (R leg) and numbness (intermittent numbness feet).   Psychiatric/Behavioral: Positive for sleep disturbance.       Objective   Physical  Exam   Neurological: She has a normal Romberg Test. Gait normal.   Reflex Scores:       Tricep reflexes are 1+ on the right side and 1+ on the left side.       Bicep reflexes are 1+ on the right side and 1+ on the left side.       Brachioradialis reflexes are 1+ on the right side and 1+ on the left side.       Patellar reflexes are 3+ on the right side and 3+ on the left side.       Achilles reflexes are 1+ on the right side and 1+ on the left side.    Neurologic Exam     Motor Exam     Strength   Right deltoid: 5/5  Left deltoid: 5/5  Right biceps: 5/5  Left biceps: 5/5  Right triceps: 5/5  Left triceps: 5/5  Right wrist flexion: 5/5  Left wrist flexion: 5/5  Right wrist extension: 5/5  Left wrist extension: 5/5  Right interossei: 5/5  Left interossei: 5/5  Right iliopsoas: 5/5  Left iliopsoas: 5/5  Right quadriceps: 5/5  Left quadriceps: 5/5  Right hamstrin/5  Left hamstrin/5  Right anterior tibial: 5/5  Left anterior tibial: 5/5  Right gastroc: 5/5  Left gastroc: 5/5    Sensory Exam   Right arm light touch: normal  Left arm light touch: normal  Right leg light touch: normal  Left leg light touch: normal  Right arm pinprick: normal  Left arm pinprick: normal  Right leg pinprick: normal  Left leg pinprick: normal    Gait, Coordination, and Reflexes     Gait  Gait: normal    Coordination   Romberg: negative    Tremor   Resting tremor: absent  Intention tremor: absent    Reflexes   Right brachioradialis: 1+  Left brachioradialis: 1+  Right biceps: 1+  Left biceps: 1+  Right triceps: 1+  Left triceps: 1+  Right patellar: 3+  Left patellar: 3+  Right achilles: 1+  Left achilles: 1+  Right Rowland: absent  Left Rowland: absent  Right ankle clonus: absent  Left ankle clonus: absent     Her pain is truly on the LAteral aspect of both legs.    No SLR  No pain at hips with internal or external rotation.  Tender at SI joints.     Assessment/Plan   Independent Review of Radiographic Studies:  SHe has some mild  acquired stenosis at L45 and L5S1.  No significant disc bulges are noted.  Mild lateral recess encroachment at L45.  L5S1 looks ok laterally.    Medical Decision Making:  Osteoporotic with bilateral lateral leg pain.  Her pain is diminished by activity which is classic for OA related pain.  He rpain is also in the lateral aspects of the legs.  I am concerned this may be meralgia parasthetics (lateral femeoral nerve irritation). If so, gabapentin should assist.  This could also be polymyalgia rheumatica at her age.  I suggest she actually may need to see a rheumatologist.  I agree with PT.  I agree with neurontin as a possible way to assist with this.  Topical NSAIDS might help.  At this juncture the success of decompressing her L45 (worst level) is dubious.  I agree with some of these other endeavors.  Her SI joint issues might respond to topical diclofenac and I wrote this.    Shanika was seen today for back pain and leg pain.    Diagnoses and all orders for this visit:    Bilateral sacroiliitis    Lateral femoral cutaneous neuropathy, unspecified laterality    Other orders  -     diclofenac (VOLTAREN) 1 % gel gel; Apply 4 g topically 3 (Three) Times a Day.      No Follow-up on file.

## 2017-12-07 ENCOUNTER — DOCUMENTATION (OUTPATIENT)
Dept: PAIN MEDICINE | Facility: CLINIC | Age: 62
End: 2017-12-07

## 2017-12-07 ENCOUNTER — OUTSIDE FACILITY SERVICE (OUTPATIENT)
Dept: PAIN MEDICINE | Facility: CLINIC | Age: 62
End: 2017-12-07

## 2017-12-07 PROCEDURE — 64483 NJX AA&/STRD TFRM EPI L/S 1: CPT | Performed by: ANESTHESIOLOGY

## 2017-12-10 NOTE — PROGRESS NOTES
Bilateral L4 Lumbar Transforaminal Epidural Steroid Injection  [facility]    PREOPERATIVE DIAGNOSIS:  Lumbar Radiculopathy, Lumbar Spinal Stenosis and Lumbar Disc Displacement    POSTOPERATIVE DIAGNOSIS:  Same as preop diagnosis    PROCEDURE:  CPT 44610(-50) --  Diagnostic & Therapeutic Transforaminal Epidural Steroid Injection at the L4 level, bilaterally    PRE-PROCEDURE DISCUSSION WITH PATIENT:    Risks and complications were discussed with the patient prior to starting the procedure and informed consent was obtained.  We discussed various topics including but not limited to bleeding, infection, injury, nerve injury, paralysis, coma, death, postprocedural painful flare-up, postprocedural site soreness, and a lack of pain relief.  We discussed the diagnostic aspect of transforaminal epidural / selective nerve root blockade.    SURGEON:  Facundo Huerta MD    REASON FOR PROCEDURE:    Previous diagnostic positivity from injection at same location, Previous clinically significant therapeutic effect is noted., Stenotic area is noted, and is likely contributing to this chronic &/or recurrent pain.  and Radiating pattern of pain is likely consistent with degenerative changes in the area.    SEDATION:  Versed 4mg & Fentanyl 100 mcg IV  ANESTHETIC:  Marcaine 0.25%  STEROID:  Methylprednisolone (DEPO MEDROL) 80mg/ml    DESCRIPTON OF PROCEDURE:  After obtaining informed consent, an I.V. was started in the preoperative area. The patient taken to the operating room and was placed in the prone position with a pillow under the abdomen.  All pressure points were well padded.  EKG, blood pressure, and pulse oximeter were monitored.  The lumbosacral area was prepped with Chloraprep and draped in a sterile fashion. Under fluoroscopic guidance in an oblique dimension, the transverse process of the aforementioned vertebra at the junction of the body at 6 o'clock position on one side was identified. Skin and  subcutaneous tissue was anesthetized with 1% lidocaine. A 22-gauge spinal needle was introduced under fluoroscopic guidance at the above junction into the foramen without parasthesias and into the epidural space. After confirming the position of the needle with PA, lateral, and oblique fluoroscopic views, aspiration was checked and was clear of blood or CSF.  Next, 1 mL of Omnipaque was injected. After seeing adequate spread on the corresponding nerve root, a total volume 2mL of injectate containing 0.5ml of the above mentioned local anesthetic, 1 ml saline,  and half of the above mentioned corticosteroid was injected into the epidural space.    The needle was removed intact.      Next, the same vertebral level and corresponding foramen on the contralateral side was visualized with fluoroscopy in an oblique view, and a similar approach was used to place the spinal needle in that foramen.  After confirming the position of the needle with PA, lateral, and oblique fluoroscopic views, aspiration was checked and was clear of blood or CSF.  Next, 1 mL of Omnipaque was injected. After seeing adequate spread on the corresponding nerve root, a total volume 2mL of injectate containing 0.5ml of the above mentioned local anesthetic, 1 ml saline, and half of the above mentioned corticosteroid was injected into the epidural space. The needle was removed intact.  Vital signs were stable throughout.      ESTIMATED BLOOD LOSS:  <5 mL  SPECIMENS:  none    COMPLICATIONS:   No complications were noted. and There was no indication of vascular uptake on live injection of contrast dye.    TOLERANCE & DISCHARGE CONDITION:    The patient tolerated the procedure well.  The patient was transported to the recovery area without difficulties.  The patient was discharged to home under the care of family in stable and satisfactory condition.    PLAN OF CARE:  1. The patient was given our standard instruction sheet.  2. The patient will Return to  clinic 2 wks.  3. The patient will resume all medications as per the medication reconciliation sheet.

## 2017-12-11 ENCOUNTER — TREATMENT (OUTPATIENT)
Dept: PHYSICAL THERAPY | Facility: CLINIC | Age: 62
End: 2017-12-11

## 2017-12-11 DIAGNOSIS — G89.29 CHRONIC BILATERAL LOW BACK PAIN WITH BILATERAL SCIATICA: ICD-10-CM

## 2017-12-11 DIAGNOSIS — M54.41 CHRONIC BILATERAL LOW BACK PAIN WITH BILATERAL SCIATICA: ICD-10-CM

## 2017-12-11 DIAGNOSIS — M54.42 CHRONIC BILATERAL LOW BACK PAIN WITH BILATERAL SCIATICA: ICD-10-CM

## 2017-12-11 DIAGNOSIS — M46.1 SACROILIITIS (HCC): Primary | ICD-10-CM

## 2017-12-11 PROCEDURE — 97110 THERAPEUTIC EXERCISES: CPT | Performed by: PHYSICAL THERAPIST

## 2017-12-11 PROCEDURE — 97014 ELECTRIC STIMULATION THERAPY: CPT | Performed by: PHYSICAL THERAPIST

## 2017-12-11 PROCEDURE — 97112 NEUROMUSCULAR REEDUCATION: CPT | Performed by: PHYSICAL THERAPIST

## 2017-12-11 NOTE — PATIENT INSTRUCTIONS
Purpose of treatment  HEP performance  Please view [facility] Shanika Hector for a complete list of HEP instructions.

## 2017-12-11 NOTE — PROGRESS NOTES
Physical Therapy Daily Progress Note    Time In 0957  Time Out 1051    Shanika Hector reports: injection last week at L4 with increased pain for 24 hours and no improvement in symptoms.  Pt reports taking Gabapentin 3x/day now, but it doesn't start working until 4 hours later.  Pt reports improvement in symptoms with HEP performance lumbar extension and increased available LE ROM during sciatic nerve glide.    Subjective     Objective       Static Posture     Thoracic Spine  Hyperkyphosis.    Comments  Standing posture (-) lateral shift      General Comments     Spine Comments  Supine leg length (-)     See Exercise, Manual, and Modality Logs for complete treatment.     Assessment & Plan     Assessment  Assessment details: Pt demonstrates improvement in upright posture without lateral shift and improved ability to maintain level pelvis inter-session.  Progressed prone and quadruped exercises today with mild difficulty and without increased pain.  Pt required mild verbal and tactile cues for neutral spine and level pelvis during quadruped activities today.  Replaced seated calf stretch with standing to decrease tension on sciatic nerve and improve muscle flexibility.      Progress per Plan of Care         Manual Therapy:    -     mins  33837;  Therapeutic Exercise:    23     mins  91312;     Neuromuscular Patsy:    15    mins  34642;    Therapeutic Activity:     -     mins  18834;     Gait Training:      -     mins  77268;     Ultrasound:     -     mins  05397;    Electrical Stimulation:    15    mins  20292 ( );  Dry Needling     -     mins self-pay    Timed Treatment:   38   mins   Total Treatment:     54   mins    Paula Sanchez, PT, DPT  Physical Therapist

## 2017-12-15 ENCOUNTER — TREATMENT (OUTPATIENT)
Dept: PHYSICAL THERAPY | Facility: CLINIC | Age: 62
End: 2017-12-15

## 2017-12-15 DIAGNOSIS — M46.1 SACROILIITIS (HCC): Primary | ICD-10-CM

## 2017-12-15 DIAGNOSIS — M54.42 CHRONIC BILATERAL LOW BACK PAIN WITH BILATERAL SCIATICA: ICD-10-CM

## 2017-12-15 DIAGNOSIS — G89.29 CHRONIC BILATERAL LOW BACK PAIN WITH BILATERAL SCIATICA: ICD-10-CM

## 2017-12-15 DIAGNOSIS — M54.41 CHRONIC BILATERAL LOW BACK PAIN WITH BILATERAL SCIATICA: ICD-10-CM

## 2017-12-15 PROCEDURE — 97110 THERAPEUTIC EXERCISES: CPT | Performed by: PHYSICAL THERAPIST

## 2017-12-15 PROCEDURE — 97014 ELECTRIC STIMULATION THERAPY: CPT | Performed by: PHYSICAL THERAPIST

## 2017-12-15 PROCEDURE — 97112 NEUROMUSCULAR REEDUCATION: CPT | Performed by: PHYSICAL THERAPIST

## 2017-12-18 ENCOUNTER — TREATMENT (OUTPATIENT)
Dept: PHYSICAL THERAPY | Facility: CLINIC | Age: 62
End: 2017-12-18

## 2017-12-18 ENCOUNTER — OFFICE VISIT (OUTPATIENT)
Dept: PAIN MEDICINE | Facility: CLINIC | Age: 62
End: 2017-12-18

## 2017-12-18 VITALS
WEIGHT: 127 LBS | OXYGEN SATURATION: 98 % | SYSTOLIC BLOOD PRESSURE: 127 MMHG | BODY MASS INDEX: 22.5 KG/M2 | DIASTOLIC BLOOD PRESSURE: 73 MMHG | HEIGHT: 63 IN | HEART RATE: 78 BPM | TEMPERATURE: 97.3 F | RESPIRATION RATE: 16 BRPM

## 2017-12-18 DIAGNOSIS — S46.812S STRAIN OF LEFT TRAPEZIUS MUSCLE, SEQUELA: ICD-10-CM

## 2017-12-18 DIAGNOSIS — M54.41 CHRONIC BILATERAL LOW BACK PAIN WITH BILATERAL SCIATICA: ICD-10-CM

## 2017-12-18 DIAGNOSIS — M54.16 LUMBAR RADICULOPATHY: Primary | ICD-10-CM

## 2017-12-18 DIAGNOSIS — G57.10 LATERAL FEMORAL CUTANEOUS NEUROPATHY, UNSPECIFIED LATERALITY: ICD-10-CM

## 2017-12-18 DIAGNOSIS — M54.42 CHRONIC BILATERAL LOW BACK PAIN WITH BILATERAL SCIATICA: ICD-10-CM

## 2017-12-18 DIAGNOSIS — M46.1 BILATERAL SACROILIITIS (HCC): ICD-10-CM

## 2017-12-18 DIAGNOSIS — Z79.899 ENCOUNTER FOR LONG-TERM (CURRENT) USE OF MEDICATIONS: ICD-10-CM

## 2017-12-18 DIAGNOSIS — M46.1 SACROILIITIS (HCC): Primary | ICD-10-CM

## 2017-12-18 DIAGNOSIS — G89.29 CHRONIC BILATERAL LOW BACK PAIN WITH BILATERAL SCIATICA: ICD-10-CM

## 2017-12-18 DIAGNOSIS — M79.18 MYOFASCIAL PAIN SYNDROME: ICD-10-CM

## 2017-12-18 DIAGNOSIS — M54.12 CERVICAL RADICULOPATHY: ICD-10-CM

## 2017-12-18 PROCEDURE — 99214 OFFICE O/P EST MOD 30 MIN: CPT | Performed by: ANESTHESIOLOGY

## 2017-12-18 PROCEDURE — 97014 ELECTRIC STIMULATION THERAPY: CPT | Performed by: PHYSICAL THERAPIST

## 2017-12-18 PROCEDURE — 97112 NEUROMUSCULAR REEDUCATION: CPT | Performed by: PHYSICAL THERAPIST

## 2017-12-18 PROCEDURE — 97110 THERAPEUTIC EXERCISES: CPT | Performed by: PHYSICAL THERAPIST

## 2017-12-18 RX ORDER — GABAPENTIN 800 MG/1
800 TABLET ORAL 4 TIMES DAILY
Qty: 120 TABLET | Refills: 1 | Status: SHIPPED | OUTPATIENT
Start: 2017-12-18 | End: 2018-01-29

## 2017-12-18 NOTE — PATIENT INSTRUCTIONS
-- Follow-up 1 month  -- continue with Gabapentin, increasing the dose as another trial  -- prescribe a TENS unit for home use  -- continue diclofenac for arthritis/sacroilitis  -- can take Acetaminophen 325mg or 500mg with each gabapentin dose.  Do not exceed 3000 mg per day

## 2017-12-18 NOTE — PATIENT INSTRUCTIONS
See assessment for further details  HEP performance  Please view [facility] Shanika Hector for a complete list of HEP instructions.

## 2017-12-18 NOTE — PROGRESS NOTES
Re-Assessment / Re-Certification    Time In 1008     Time Out 1102    Patient: Shanika Hector   : 1955  Diagnosis/ICD-10 Code:  Sacroiliitis [M46.1]  Referring practitioner: Bryan Vega MD  Date of Initial Visit: 2017  Today's Date: 2017  Patient seen for 6 sessions    OSWESTRY- BACK    Please answer every section and susy in each section only the ONE answer which applies to you or most closely describes your problem.  Thank you.    Section 1- Pain Intensity  []  The pain comes and goes and is very mild.  [x]  The pain is mild and does not vary much.  []  The pain comes and goes and is moderate.  []  The pain is moderate and does not vary much.  []  The pain comes and goes and is severe.  []  The pain is severe and does not vary much.    Section 2- Personal Care  []  I would not have to change my way of washing or dressing in order to avoid pain.  [x]  I do not normally change my way of washing or dressing even though it causes      some pain.  []  Washing and dressing increases the pain but I manage not to change my way of doing it.  []  Washing and dressing increase the pain and I find it necessary to change my way of doing it.  []  Because of the pain I am unable to do some washing and dressing without help.  []  Because of the pain I am unable to do any washing or dressing without help  .  Section 3- Lifting  []  I can lift heavy weight without extra pain.  []  I can lift heavy weight but it causes extra pain.  []  Pain prevents me lifting heavy weight off the floor.  [x]  Pain prevents me lifting heavy weight off the floor but I can manage if they are conveniently positioned, e.g., on a table.  []  Pain prevents me from lifting heavy weight but I can manage light to medium weight if it is conveniently positioned.  []  I can only lift very light weight at the most.    Section 4- Walking  [] I have no pain on walking.  [x] I have some pain on walking but it does not increase with  distance.  [] I cannot walk more than One Mile without increasing pain.  [] I cannot walk more than 1/2 Mile without increasing pain.  [] I cannot walk more than 1/4 Mile without increasing pain.  [] I cannot walk at all without increasing pain.    Section 5- Sitting  [] I can sit in any chair as long as I like.  [] I can sit only in my favorite chair as long as I like.  [] Pain prevents me from sitting more than one hour.  [] Pain prevents me from sitting more than 1/2 hour.  [x] Pain prevents me from sitting more than 10 minutes.  [] I avoid sitting because it increases pain straight away.      Section 6- Standing  [] I can stand as long as I want without pain.  [x] I have some pain on standing but it does not increase with time.  [] I cannot stand for longer than one hour without increasing pain.  [] I cannot stand for longer than 1/2 hour without increasing pain.  [] I cannot stand for longer than 10 minutes without increasing pain.  [] I avoid standing because it increases the pain straight away.    Section 7- Sleeping  [] I get no pain in bed.  [] I get pain in bed but it does not prevent me from sleeping well.  [x] Because of pain my normal nights sleep is reduced by less than 1/4.  [] Because of pain my normal nights sleep is reduced by less than 1/2.  [] Because of pain my normal nights sleep is reduced by less than 3/4.  [] Pain prevents me from sleeping at all.    Section 8-Social Life  [] My social life is normal and gives me no pain.  [x] My social life is normal but increases the degree of my pain.  [] Pain has no significant effect on my social life apart from limiting my more energetic interests e.g., dancing etc.  [] Pain has restricted my social life and I do not go out very often.  [] Pain has restricted my social life to my home.  [] I have hardly any social life because of pain.    Section 9- Traveling  [] I get no pain while traveling.  [] I get some pain while traveling but none of my usual  forms of travel make it any worse.  [x] I get extra pain while traveling but it does not compel me to seek alternative forms of travel.  [] I get extra pain while traveling which compels me to seek alternative forms of travel.  [] Pain restricts all forms of travel.  [] Pain prevents all forms of travel except that done lying down.    Section 10 - Changing Degree of Pain  [] My pain is rapidly getting better.  [x] My pain fluctuates but overall is definitely getting better.  [] My pain seems to be getting better but improvement is slow at present.  [] My pain is neither getting better or worse.  [] My pain is gradually worsening.  [] My pain is rapidly worsening.                                                           Initial 6 Visits D/C Change   % Score  34%     VAS #                 Subjective:   Shanika Hector reports: overall doing better but if she doesn't do exercises she suffers.  Pt reports 10 minute limit to sit in standard chair without pain.  Pt reports sitting is the worst activity and most pain.  Subjective Questionnaire: Oswestry: 34%  Clinical Progress: improved  Home Program Compliance: Yes  Treatment has included: therapeutic exercise, neuromuscular re-education, manual therapy, therapeutic activity, electrical stimulation and moist heat    Subjective Evaluation    Pain  Current pain rating: 3  At worst pain ratin (when sitting or certain positions trying to sleep)         Objective       Strength/Myotome Testing     Left Hip   Planes of Motion   Flexion: 4+  Abduction: 4    Right Hip   Planes of Motion   Flexion: 4+  Abduction: 4    Tests     Additional Tests Details  Supine leg length (+) Right longer  Sahrmann for lumbar stabilization 0.1/5 with ability to maintain neutral spine during supine heel slide but not supine march         Assessment & Plan     Assessment  Impairments: impaired physical strength and pain with function  Assessment details: Pt demonstrates improved understanding of  directional preference and improved ability to partially manage symptoms with HEP performance.  Pt continues to demonstrate decreased local trunk neuromuscular control/stabilization and directional preference for lumbar extension.  Reviewed seated posture and sleeping positions with patient today.  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  Prognosis: good  Functional Limitations: uncomfortable because of pain and sitting  Plan  Therapy options: will be seen for skilled physical therapy services  Planned modality interventions: cryotherapy, electrical stimulation/Russian stimulation and thermotherapy (hydrocollator packs)  Planned therapy interventions: abdominal trunk stabilization, balance/weight-bearing training, body mechanics training, functional ROM exercises, home exercise program, joint mobilization, manual therapy, neuromuscular re-education, soft tissue mobilization, spinal/joint mobilization and therapeutic activities  Frequency: 2x week  Duration in weeks: 2  Treatment plan discussed with: patient      Progress toward previous goals: Partially Met     Previous Goals:  Plan Goals: Short-Term Goals - In 2 weeks:  1. Pt will be (I) with initial HEP. - MET  2. Sahrmann for lumbar stabilization 0.1/5 with ability to maintain neutral spine during supine heel slide. - MET  3.  Pt will sit in standard chair for 15 minutes without increased pain. - UNMET    Long-Term Goals - In 4 weeks:  1. Sahrmann for lumbar stabilization score improve to 0.25/5 with ability to maintain neutral spine during supine heel slide to demonstrate improved local control as required for walking and stair navigation. - UNMET  2. Bilateral hip flexion and abduction strength improve to 4+/5. - Partially MET  3. Pt will sit for 30 minutes without increased pain. - UNMET  4. YANIRA score improve to 46% to demonstrate functional  improvement. - MET    New Goals  Short-term goals (STG): In 2 weeks:  1. Sahrmann for lumbar stabilization score improve to 0.25/5 with ability to maintain neutral spine during supine heel slide to demonstrate improved local control as required for walking and stair navigation.  2. YANIRA score improve to 24% to demonstrate functional improvement.  3.  Pt will report ability to sit for 20 minutes in a standard chair with modifications and awareness of upright posture and without increased pain.     Recommendations: Continue as planned  Timeframe: 2 weeks  Prognosis to achieve goals: good    PT Signature: Paula Sanchez, PT, DPT      Based upon review of the patient's progress and continued therapy plan, it is my medical opinion that Shanika Hector should continue physical therapy treatment at [facility]  [address] [address] [address]  [phone].    Signature: __________________________________  Bryan Vega MD    Manual Therapy:    -     mins  91138;  Therapeutic Exercise:    28     mins  20302;     Neuromuscular Patsy:    10    mins  38996;    Therapeutic Activity:     -     mins  33308;     Gait Training:      -     mins  05654;     Ultrasound:     -     mins  69131;    Electrical Stimulation:    15     mins  18824 ( );  Dry Needling     -     mins self-pay    Timed Treatment:   38   mins   Total Treatment:     54   mins    Please sign and return via fax to [phone]. Thank you, [facility].

## 2017-12-18 NOTE — PROGRESS NOTES
CHIEF COMPLAINT  Pt reports having 25% pain reduction following 12/7/17 Bilat. L4 TFESI      Subjective   Shanika Hector is a 62 y.o. female  who presents to the office for follow-up of procedure.  She completed a bilateral L4 LTFESI   on  Dec 7th  performed by Dr. Huerta for management of bilateral leg pain. Patient reports mild relief from the procedure.     I reviewed the office note from December 4 from neurosurgeon Dr. Roland.  He did note some mild L4-L5 and L5-S1 spinal stenosis.  He had concern about osteoarthritis related pain.  He also did express some concern for potential lateral femoral cutaneous nerve irritation and/or potential for polymyalgia rheumatica.  He did recommend rheumatology consultation and prescribed topical diclofenac and also agreed with gabapentin therapy.  He also expressed in sacroiliac dysfunction may be an issue and consider topical diclofenac for this as well.    She has found some improvement with the use of the LTFESI and also with Gabapentin and also Physical therapy.  She expresses some comfort and appreciation for Physical Therapy.  She has much less sciatica pain especially in the right lateral thigh, and whether this is meralgia paresthetica or sciatica, she is responding some to Gabapentin.    She finds that Gabapentin takes a couple hours to take effect, and wears off after about 5-6 hours.    She has had 2 rheumatology consults in the past, and was dismissed after negative autoimmune serologies.      Also of note, she has found some significant relief with TENS use @ physical therapy.      Back Pain   This is a chronic problem. The current episode started more than 1 month ago (JUly 2016). The problem occurs constantly. The problem has been gradually improving since onset. The pain is present in the sacro-iliac. The quality of the pain is described as aching and shooting. The pain radiates to the right thigh, right foot and right knee (bilateral buttock radiation from the SI  joints; she does have some other history of radiation of pain in a radicular like fashion bilaterally R>L laterally especially in the legs). The pain is moderate. The symptoms are aggravated by sitting and standing. Associated symptoms include paresthesias. Pertinent negatives include no chest pain, fever, headaches, numbness (feetat times) or weakness. Risk factors: no trauma. She has tried analgesics, bed rest, heat, home exercises, ice, NSAIDs and walking for the symptoms. The treatment provided mild (radicular pains continue on the right but are improving) relief.   Muscle Pain   This is a recurrent problem. The current episode started more than 1 month ago. The problem occurs constantly. The problem is unchanged (she attributes her neck pain to how she has to sleep due to her Sacroiliac pain). The pain is present in the neck (left trap area). The pain is medium. The symptoms are aggravated by any movement. Associated symptoms include constipation (pt states off and on.). Pertinent negatives include no chest pain, diarrhea, eye pain, fever, headaches, nausea, rash, shortness of breath, vomiting, weakness or wheezing. Past treatments include acetaminophen, cold pack, heat pack, OTC NSAID, prescription NSAID and rest. The treatment provided mild (previous TPI no help.) relief. Swelling location: mild in the area. She has been sleeping poorly. Her past medical history is significant for chronic back pain.   Neck Pain    This is a chronic problem. The problem has been gradually improving. The pain is present in the left side. The quality of the pain is described as aching and stabbing. The pain is moderate. Pertinent negatives include no chest pain, fever, headaches, numbness (feetat times) or weakness. The treatment provided mild relief.        PEG Assessment   What number best describes your pain on average in the past week?5  What number best describes how, during the past week, pain has interfered with your  "enjoyment of life?5  What number best describes how, during the past week, pain has interfered with your general activity?  5      The following portions of the patient's history were reviewed and updated as appropriate: allergies, current medications, past family history, past medical history, past social history, past surgical history and problem list.    Review of Systems   Constitutional: Positive for activity change (decreasedincreased). Negative for appetite change, chills and fever.   HENT: Negative for ear pain.    Eyes: Negative for pain.   Respiratory: Negative for cough, chest tightness, shortness of breath and wheezing.    Cardiovascular: Negative for chest pain, palpitations and leg swelling.   Gastrointestinal: Positive for constipation (pt states off and on.). Negative for diarrhea, nausea and vomiting.   Endocrine: Negative for polydipsia and polyuria.   Genitourinary: Negative for difficulty urinating.   Musculoskeletal: Positive for neck pain and neck stiffness. Negative for back pain.   Skin: Negative for rash.   Allergic/Immunologic: Negative for immunocompromised state.   Neurological: Positive for paresthesias. Negative for dizziness, weakness, light-headedness, numbness (feetat times) and headaches.   Hematological: Bruises/bleeds easily.   Psychiatric/Behavioral: Positive for sleep disturbance. Negative for agitation, confusion, hallucinations and suicidal ideas. The patient is not nervous/anxious.          Vitals:    12/18/17 0752   BP: 127/73   Pulse: 78   Resp: 16   Temp: 97.3 °F (36.3 °C)   SpO2: 98%   Weight: 57.6 kg (127 lb)   Height: 160 cm (62.99\")   PainSc: 5  Comment: LBP bilaterally ranges from 3-5/10   PainLoc: Back         Objective   Physical Exam   Constitutional: She is oriented to person, place, and time. Vital signs are normal. She appears well-developed and well-nourished.  Non-toxic appearance. No distress.   HENT:   Head: Normocephalic and atraumatic.   Right Ear: Hearing " and external ear normal.   Left Ear: Hearing and external ear normal.   Nose: Nose normal.   Eyes: Conjunctivae and lids are normal. Pupils are equal, round, and reactive to light.   Pulmonary/Chest: Effort normal. No respiratory distress.   Abdominal: Normal appearance.   Musculoskeletal:        Cervical back: She exhibits tenderness and spasm.        Thoracic back: She exhibits tenderness and spasm.        Lumbar back: She exhibits tenderness, pain and spasm.   Neurological: She is alert and oriented to person, place, and time. No cranial nerve deficit.   Psychiatric: She has a normal mood and affect. Her behavior is normal.   Nursing note and vitals reviewed.          Assessment/Plan   Shanika was seen today for back pain.    Diagnoses and all orders for this visit:    Lumbar radiculopathy    Lateral femoral cutaneous neuropathy, unspecified laterality    Bilateral sacroiliitis    Myofascial pain syndrome  -     TENS (Transcutaneous Electrical Nerve Stimulator)    Strain of left trapezius muscle, sequela    Cervical radiculopathy    Encounter for long-term (current) use of medications    Other orders  -     gabapentin (NEURONTIN) 800 MG tablet; Take 1 tablet by mouth 4 (Four) Times a Day.        -- Follow-up 1 month  -- continue with Gabapentin, increasing the dose as another trial  -- prescribe a TENS unit for home use  -- continue diclofenac for arthritis/sacroilitis  -- can take Acetaminophen 325mg or 500mg with each gabapentin dose.  Do not exceed 3000 mg per day             RAMONITA REPORT    As part of the patient's treatment plan, I am prescribing controlled substances. The patient has been made aware of appropriate use of such medications, including potential risk of somnolence, limited ability to drive and/or work safely, and the potential for dependence or overdose. It has also bee made clear that these medications are for use by this patient only, without concomitant use of alcohol or other substances  unless prescribed.     Patient has completed prescribing agreement detailing terms of continued prescribing of controlled substances, including monitoring RAMONITA reports, urine drug screening, and pill counts if necessary. The patient is aware that inappropriate use will results in cessation of prescribing such medications.    RAMONITA report has been reviewed and scanned into the patient's chart.    Date of last RAMONITA : as above    History and physical exam exhibit continued safe and appropriate use of controlled substances.       EMR Dragon/Transcription disclaimer:   Much of this encounter note is an electronic transcription/translation of spoken language to printed text. The electronic translation of spoken language may permit erroneous, or at times, nonsensical words or phrases to be inadvertently transcribed; Although I have reviewed the note for such errors, some may still exist.

## 2017-12-26 ENCOUNTER — TREATMENT (OUTPATIENT)
Dept: PHYSICAL THERAPY | Facility: CLINIC | Age: 62
End: 2017-12-26

## 2017-12-26 DIAGNOSIS — M54.42 CHRONIC BILATERAL LOW BACK PAIN WITH BILATERAL SCIATICA: ICD-10-CM

## 2017-12-26 DIAGNOSIS — G89.29 CHRONIC BILATERAL LOW BACK PAIN WITH BILATERAL SCIATICA: ICD-10-CM

## 2017-12-26 DIAGNOSIS — M54.41 CHRONIC BILATERAL LOW BACK PAIN WITH BILATERAL SCIATICA: ICD-10-CM

## 2017-12-26 DIAGNOSIS — M46.1 SACROILIITIS (HCC): Primary | ICD-10-CM

## 2017-12-26 PROCEDURE — 97110 THERAPEUTIC EXERCISES: CPT | Performed by: PHYSICAL THERAPIST

## 2017-12-26 PROCEDURE — 97014 ELECTRIC STIMULATION THERAPY: CPT | Performed by: PHYSICAL THERAPIST

## 2017-12-26 PROCEDURE — 97112 NEUROMUSCULAR REEDUCATION: CPT | Performed by: PHYSICAL THERAPIST

## 2017-12-26 NOTE — PROGRESS NOTES
Physical Therapy Daily Progress Note    Time In 1055  Time Out 1200    Shanika Hector reports: doing well pre-treatment, but still having a hard time sitting even with medication.    Subjective     Objective   See Exercise, Manual, and Modality Logs for complete treatment.       Assessment & Plan     Assessment  Assessment details: Pt required mild verbal cues to maintain trunk control/stabilization during therapeutic exercises and activities.  Pt reported only hip muscle fatigue and related soreness during and post-treatment and no increased back pain or LE symptoms.  Initiated additional standing hip and trunk strengthening today with mild difficulty.        Progress per Plan of Care           Manual Therapy:    -     mins  16953;  Therapeutic Exercise:    30     mins  73070;     Neuromuscular Patsy:    12    mins  46664;    Therapeutic Activity:     -     mins  72544;     Gait Training:      -     mins  38548;     Ultrasound:     -     mins  00429;    Electrical Stimulation:    15     mins  29083 ( );  Dry Needling     -     mins self-pay    Timed Treatment:   42   mins   Total Treatment:     65   mins    Paula Sanchez, PT, DPT  Physical Therapist

## 2017-12-28 ENCOUNTER — TREATMENT (OUTPATIENT)
Dept: PHYSICAL THERAPY | Facility: CLINIC | Age: 62
End: 2017-12-28

## 2017-12-28 DIAGNOSIS — G89.29 CHRONIC BILATERAL LOW BACK PAIN WITH BILATERAL SCIATICA: ICD-10-CM

## 2017-12-28 DIAGNOSIS — M54.41 CHRONIC BILATERAL LOW BACK PAIN WITH BILATERAL SCIATICA: ICD-10-CM

## 2017-12-28 DIAGNOSIS — M54.42 CHRONIC BILATERAL LOW BACK PAIN WITH BILATERAL SCIATICA: ICD-10-CM

## 2017-12-28 DIAGNOSIS — M46.1 SACROILIITIS (HCC): Primary | ICD-10-CM

## 2017-12-28 PROCEDURE — 97112 NEUROMUSCULAR REEDUCATION: CPT | Performed by: PHYSICAL THERAPIST

## 2017-12-28 PROCEDURE — 97110 THERAPEUTIC EXERCISES: CPT | Performed by: PHYSICAL THERAPIST

## 2017-12-28 NOTE — PROGRESS NOTES
Physical Therapy Daily Progress Note    Time In 1430  Time Out 1530    Shanika Hector reports: doing well and performance of HEP.    Subjective     Objective       Tests     Additional Tests Details  Supine leg length (+) right longer       See Exercise, Manual, and Modality Logs for complete treatment.       Assessment & Plan     Assessment  Assessment details: Progressed HEP today increased lumbopelvic control/stabilization and hip strengthening exercises performed in PT session with mild difficulty and without pain.  Pt demonstrates good understanding of HEP and is appropriate for d/c to (I) with HEP and instructed to call PT with questions or concerns.        Other - d/c to (I) with HEP and instructed to call PT with questions or concerns.            Manual Therapy:    -     mins  57883;  Therapeutic Exercise:    37     mins  01792;     Neuromuscular Patsy:    8    mins  32166;    Therapeutic Activity:     -     mins  90358;     Gait Training:      -     mins  07714;     Ultrasound:     -     mins  64964;    Electrical Stimulation:    15     mins  35942 ( );  Dry Needling     -     mins self-pay    Timed Treatment:   45   mins   Total Treatment:     60   mins    Paula Sanchez, PT, DPT  Physical Therapist

## 2017-12-29 NOTE — PROGRESS NOTES
Discharge Report    Patient Name: Shanika Hector       Patient MRN: [id number]  : 1955  Physician:Bryan Vega MD  Date: 2017    Encounter Diagnoses   Name Primary?   • Sacroiliitis Yes   • Chronic bilateral low back pain with bilateral sciatica        Treatment has included therapeutic exercise, neuromuscular re-education, manual therapy, electrical stimulation and dry needling    Physical Therapy Daily Progress Note  17    Shanika Hector reports: doing well and performance of HEP.    Subjective     Objective       Tests     Additional Tests Details  Supine leg length (+) right longer       See Exercise, Manual, and Modality Logs for complete treatment.       Assessment & Plan     Assessment  Assessment details: Progressed HEP today increased lumbopelvic control/stabilization and hip strengthening exercises performed in PT session with mild difficulty and without pain.  Pt demonstrates good understanding of HEP and is appropriate for d/c to (I) with HEP and instructed to call PT with questions or concerns.        Other - d/c to (I) with HEP and instructed to call PT with questions or concerns.     Progress towards goals: Partially Met    Discharge Reason: Anticipate patient will achieve long-term goals independently  ; and insurance visit limits    Plan of Care: Continue with current home exercise program as instructed    Prognosis: good    Understanding at Discharge: good    Paula Sanchez, PT, DPT  Physical Therapist

## 2018-01-29 ENCOUNTER — OFFICE VISIT (OUTPATIENT)
Dept: PAIN MEDICINE | Facility: CLINIC | Age: 63
End: 2018-01-29

## 2018-01-29 ENCOUNTER — LAB (OUTPATIENT)
Dept: LAB | Facility: HOSPITAL | Age: 63
End: 2018-01-29

## 2018-01-29 VITALS
BODY MASS INDEX: 23.57 KG/M2 | DIASTOLIC BLOOD PRESSURE: 84 MMHG | OXYGEN SATURATION: 97 % | SYSTOLIC BLOOD PRESSURE: 125 MMHG | HEIGHT: 63 IN | HEART RATE: 79 BPM | RESPIRATION RATE: 18 BRPM | WEIGHT: 133 LBS | TEMPERATURE: 97.3 F

## 2018-01-29 DIAGNOSIS — M99.04 SOMATIC DYSFUNCTION OF SACROILIAC JOINT: ICD-10-CM

## 2018-01-29 DIAGNOSIS — M46.1 BILATERAL SACROILIITIS (HCC): ICD-10-CM

## 2018-01-29 DIAGNOSIS — M47.812 OSTEOARTHRITIS OF CERVICAL SPINE WITHOUT MYELOPATHY: ICD-10-CM

## 2018-01-29 DIAGNOSIS — M54.12 CERVICAL RADICULOPATHY: ICD-10-CM

## 2018-01-29 DIAGNOSIS — M15.9 GENERALIZED OSTEOARTHRITIS: ICD-10-CM

## 2018-01-29 DIAGNOSIS — M47.816 LUMBAR FACET ARTHROPATHY: ICD-10-CM

## 2018-01-29 DIAGNOSIS — G57.10 LATERAL FEMORAL CUTANEOUS NEUROPATHY, UNSPECIFIED LATERALITY: ICD-10-CM

## 2018-01-29 DIAGNOSIS — G89.29 OTHER CHRONIC PAIN: Primary | ICD-10-CM

## 2018-01-29 PROCEDURE — 99214 OFFICE O/P EST MOD 30 MIN: CPT | Performed by: NURSE PRACTITIONER

## 2018-01-29 PROCEDURE — 82652 VIT D 1 25-DIHYDROXY: CPT | Performed by: NURSE PRACTITIONER

## 2018-01-29 PROCEDURE — 36415 COLL VENOUS BLD VENIPUNCTURE: CPT

## 2018-01-29 RX ORDER — CYCLOBENZAPRINE HCL 10 MG
10 TABLET ORAL 2 TIMES DAILY PRN
Qty: 60 TABLET | Refills: 1 | Status: SHIPPED | OUTPATIENT
Start: 2018-01-29 | End: 2018-02-26

## 2018-01-29 NOTE — PROGRESS NOTES
CHIEF COMPLAINT  Back pain is unchanged since last visit.    Subjective   Shanika Hector is a 62 y.o. female  who presents to the office for follow-up.She has a history of chronic back pain, which she reports as being unchanged since last office visit.    At her last office visit with Dr. Huerta, she was trialed on Gabapentin 800 mg TID. Patient was increased up to QID. She reports little relief.  She is also reporting side effects from this, including weight gain. She reports 10-12 lbs in last month and a half.  She states she wants to come off of this totally. Is down to 2/day now.  Wants to stop this.     She completed a bilateral L4 LTFESI   on  Dec 7th  performed by Dr. Huerta for management of bilateral leg pain.  She completed a Right L5 LTFESI (along with a Left C7-T1 CMBB)   on  11-2-17 performed by Dr. Huerta for management of neck/shoulder pain along with the significant LB/RLE pain.   She completed a diagnostic bilateral Sacroiliac joint injection, x2,   on  3/16/17 and 4/6/17 performed by Dr. Huerta for management of bilateral lumbrosacral pain.  She reported 100% relief for 2 days with both procedures.     I reviewed the office note from December 4 from neurosurgeon Dr. Roland.  He did note some mild L4-L5 and L5-S1 spinal stenosis.  He had concern about osteoarthritis related pain.  He also did express some concern for potential lateral femoral cutaneous nerve irritation and/or potential for polymyalgia rheumatica.  He did recommend rheumatology consultation and prescribed topical diclofenac and also agreed with gabapentin therapy.  He also expressed in sacroiliac dysfunction may be an issue and consider topical diclofenac for this as well.    She has had 2 rheumatology consults in the past, and was dismissed after negative autoimmune serologies.      She is having complaints of pain in her legs. Describes it as muscle cramping.  Reports mild relief with TFLESI previously with this.      Back Pain    This is a chronic problem. The current episode started more than 1 year ago (JUly 2016). The problem occurs constantly. The problem has been gradually worsening since onset. The pain is present in the sacro-iliac. The quality of the pain is described as aching and shooting. The pain radiates to the right thigh, right foot and right knee (bilateral buttock radiation from the SI joints; she does have some other history of radiation of pain in a radicular like fashion bilaterally R>L laterally especially in the legs). The pain is at a severity of 8/10. The pain is moderate. The symptoms are aggravated by sitting and standing. Associated symptoms include paresthesias. Pertinent negatives include no chest pain, dysuria, fever, headaches, numbness or weakness. Risk factors: no trauma. She has tried analgesics, bed rest, heat, home exercises, ice, NSAIDs and walking for the symptoms. The treatment provided mild (radicular pains continue on the right but are improving) relief.   Muscle Pain   This is a recurrent problem. The current episode started more than 1 month ago. The problem occurs constantly. The problem is unchanged (she attributes her neck pain to how she has to sleep due to her Sacroiliac pain). The pain is present in the neck (left trap area). The pain is medium. The symptoms are aggravated by any movement. Associated symptoms include constipation. Pertinent negatives include no chest pain, diarrhea, dysuria, eye pain, fever, headaches, nausea, rash, shortness of breath, vomiting, weakness or wheezing. Past treatments include acetaminophen, cold pack, heat pack, OTC NSAID, prescription NSAID and rest. The treatment provided mild (previous TPI no help.) relief. Swelling location: mild in the area. She has been sleeping poorly. Her past medical history is significant for chronic back pain.   Neck Pain    This is a chronic problem. The problem has been gradually improving. The pain is present in the left side.  "The quality of the pain is described as aching and stabbing. The pain is moderate. Pertinent negatives include no chest pain, fever, headaches, numbness or weakness. The treatment provided mild relief.        PEG Assessment   What number best describes your pain on average in the past week?8  What number best describes how, during the past week, pain has interfered with your enjoyment of life?8  What number best describes how, during the past week, pain has interfered with your general activity?  8      The following portions of the patient's history were reviewed and updated as appropriate: allergies, current medications, past family history, past medical history, past social history, past surgical history and problem list.    Review of Systems   Constitutional: Negative for chills and fever.   Eyes: Negative for pain.   Respiratory: Negative for shortness of breath and wheezing.    Cardiovascular: Negative for chest pain.   Gastrointestinal: Positive for constipation. Negative for diarrhea, nausea and vomiting.   Genitourinary: Negative for difficulty urinating, dyspareunia and dysuria.   Musculoskeletal: Positive for back pain and neck pain.   Skin: Negative for rash.   Neurological: Positive for paresthesias. Negative for dizziness, weakness, light-headedness, numbness and headaches.   Psychiatric/Behavioral: Positive for sleep disturbance. Negative for confusion, hallucinations, self-injury and suicidal ideas. The patient is not nervous/anxious.        Vitals:    01/29/18 0805   BP: 125/84   Pulse: 79   Resp: 18   Temp: 97.3 °F (36.3 °C)   SpO2: 97%   Weight: 60.3 kg (133 lb)   Height: 160 cm (63\")   PainSc:   8   PainLoc: Back     Objective   Physical Exam   Constitutional: She is oriented to person, place, and time. She appears well-developed and well-nourished.   HENT:   Head: Normocephalic and atraumatic.   Eyes: Conjunctivae are normal.   Neck: Neck supple.   Cardiovascular: Normal rate.  "   Pulmonary/Chest: Effort normal.   Musculoskeletal:        Cervical back: She exhibits tenderness (left trapezius spasm), swelling, pain and spasm.        Lumbar back: She exhibits tenderness and bony tenderness (moderate tenderness  + L4-S3 ; + loading manuever).   Pain on bilatera SI palpation.  Juliana positive.    Neurological: She is alert and oriented to person, place, and time. Gait normal.   Reflex Scores:       Bicep reflexes are 1+ on the right side and 1+ on the left side.       Brachioradialis reflexes are 1+ on the right side and 1+ on the left side.       Patellar reflexes are 1+ on the right side and 1+ on the left side.  SLR negative left bilaterally   Psychiatric: She has a normal mood and affect. Her behavior is normal.   Nursing note and vitals reviewed.      Assessment/Plan   Shanika was seen today for back pain.    Diagnoses and all orders for this visit:    Other chronic pain    Somatic dysfunction of sacroiliac joint  -     Case Request    Bilateral sacroiliitis  -     Case Request    Osteoarthritis of cervical spine without myelopathy  -     Vitamin D 1,25 Dihydroxy; Future    Cervical radiculopathy    Lateral femoral cutaneous neuropathy, unspecified laterality    Generalized osteoarthritis  -     Vitamin D 1,25 Dihydroxy; Future    Lumbar facet arthropathy  -     Case Request    Other orders  -     cyclobenzaprine (FLEXERIL) 10 MG tablet; Take 1 tablet by mouth 2 (Two) Times a Day As Needed for Muscle Spasms.        --- check Vitamin D. Orders noted.  --- Bilateral L4-S3 RFL. No blood thinners. Reviewed the procedure at length with the patient.  Included in the review was expectations, complications, risk and benefits.The procedure was described in detail and the risks, benefits and alternatives were discussed with the patient (including but not limited to: bleeding, infection, nerve damage, worsening of pain, inability to perform injection, paralysis, seizures, and death) who agreed to  proceed.  Discussed the potential for sedation if warranted/wanted.  Questions were answered and in a way the patient could understand.  Patient verbalized understanding and wishes to proceed.  This intervention will be ordered.  --- Titrate down on gabapentin. Currently at 2/day. Go down to 1/day for 1 week and then stop. Discussed medication with the patient.  Included in this discussion was the potential for side effects and adverse events.  Patient verbalized understanding and wished to proceed.    --- Trial of Flexeril 10 mg BID PRN. Discussed medication with the patient.  Included in this discussion was the potential for side effects and adverse events.  Patient verbalized understanding and wished to proceed.  Prescription will be sent to pharmacy.  --- Follow-up after procedure or sooner if needed.    ----------  Education about Sacroiliac joint injections:  This Sacroiliac joint injection (blockade) we have suggested is intended for diagnostic purposes, with the intent of offering the patient Radiofrequency thermal rhizotomy (RF) of the sensory branches to the joint if the block is diagnostically effective.  The diagnostic blockade is necessary to determine the likelihood that RF therapy could be efficacious in providing long term relief to the patient.    In this procedure, the sacroiliac joint is aligned with imaging, and under image guidance a needle is placed with the needle tip into the joint.  The needle position is confirmed to be appropriately in the joint before injection of medication into the joint.  When xray fluoroscopy is used, contrast dye is used to confirm a proper arthrogram (i.e., outline of the joint).  When ultrasound is used, IV fluid (normal saline) is injected to see the flow of the fluid into the joint.  Once confirmed, then the medication can be injected into the joint.  Oftentimes this medication is a combination of local anesthetics (for diagnostic purposes) and also a steroid (to  decrease irritation & inflammation in the joint, also known as sacroilitis).      Medically, a successful RF procedure should provide a patient with 50% pain relief or more for at least 6 months.  Clinical experience suggests that successful patients receive relief more in the range of 12 months on average.  We also discussed that many patients receive therapeutic success from the intraarticular joint injection, and may not require RF ablation.  If a patient receives more than 8 weeks of relief from joint injection, then occasional repeat joint blocks for therapeutic purposes is a very reasonable alternative therapy.  This course of therapy is consistent with our LCDs according to our CMS  in the area, and therefore other insurance providers should follow accordingly.  We will monitor our patients to screen for these therapeutic responders and will offer RF therapy only when necessary.      We discussed that joint injections & also RF procedures are not without risks.  Best practices regarding anticoagulant use & neuraxial procedures will be respected.  Oftentimes a patient on an anticoagulant can be offered a joint injection safely, but again this is not risk-free, and such patients give consent with regards to this increased bleeding risk, which could cause problems including but not limited to worsening of pain, nerve damage, or muscle damage.  Patients that are ill or otherwise may be at risk for sepsis will not have their spines accessed by neuraxial injections of any type.  This patient will not be offered these therapies if there is an increased risk.   We discussed that there is a risk of postprocedural pain and also a risk of worsening of clinical picture with these procedures as with any neuraxial procedure.    ----------           RAMONITA REPORT  RAMONITA report has been reviewed and scanned into the patient's chart.    Date of last RAMONITA : 1-26-18          EMR Dragon/Transcription disclaimer:    Much of this encounter note is an electronic transcription/translation of spoken language to printed text. The electronic translation of spoken language may permit erroneous, or at times, nonsensical words or phrases to be inadvertently transcribed; Although I have reviewed the note for such errors, some may still exist.

## 2018-02-01 LAB — 1,25(OH)2D3 SERPL-MCNC: 32.5 PG/ML (ref 19.9–79.3)

## 2018-02-14 DIAGNOSIS — M47.816 LUMBAR FACET ARTHROPATHY: Primary | ICD-10-CM

## 2018-02-19 DIAGNOSIS — M54.16 LUMBAR RADICULOPATHY: Primary | ICD-10-CM

## 2018-02-19 DIAGNOSIS — K21.9 GASTROESOPHAGEAL REFLUX DISEASE WITHOUT ESOPHAGITIS: ICD-10-CM

## 2018-02-19 RX ORDER — GABAPENTIN 800 MG/1
800 TABLET ORAL 2 TIMES DAILY
Qty: 60 TABLET | Refills: 2 | OUTPATIENT
Start: 2018-02-19 | End: 2018-04-09

## 2018-02-19 RX ORDER — OMEPRAZOLE 40 MG/1
CAPSULE, DELAYED RELEASE ORAL
Qty: 30 CAPSULE | Refills: 5 | Status: SHIPPED | OUTPATIENT
Start: 2018-02-19 | End: 2018-10-06 | Stop reason: SDUPTHER

## 2018-02-26 ENCOUNTER — OFFICE VISIT (OUTPATIENT)
Dept: PAIN MEDICINE | Facility: CLINIC | Age: 63
End: 2018-02-26

## 2018-02-26 ENCOUNTER — OFFICE VISIT (OUTPATIENT)
Dept: SPORTS MEDICINE | Facility: CLINIC | Age: 63
End: 2018-02-26

## 2018-02-26 VITALS
RESPIRATION RATE: 18 BRPM | BODY MASS INDEX: 23.07 KG/M2 | WEIGHT: 130.2 LBS | TEMPERATURE: 97.3 F | HEIGHT: 63 IN | OXYGEN SATURATION: 98 % | HEART RATE: 79 BPM | SYSTOLIC BLOOD PRESSURE: 111 MMHG | DIASTOLIC BLOOD PRESSURE: 76 MMHG

## 2018-02-26 VITALS
SYSTOLIC BLOOD PRESSURE: 112 MMHG | BODY MASS INDEX: 23.04 KG/M2 | DIASTOLIC BLOOD PRESSURE: 72 MMHG | HEIGHT: 63 IN | WEIGHT: 130 LBS

## 2018-02-26 DIAGNOSIS — M99.04 SOMATIC DYSFUNCTION OF SACROILIAC JOINT: ICD-10-CM

## 2018-02-26 DIAGNOSIS — G89.29 OTHER CHRONIC PAIN: Primary | ICD-10-CM

## 2018-02-26 DIAGNOSIS — G89.29 CHRONIC LEFT SHOULDER PAIN: Primary | ICD-10-CM

## 2018-02-26 DIAGNOSIS — M51.36 DDD (DEGENERATIVE DISC DISEASE), LUMBAR: ICD-10-CM

## 2018-02-26 DIAGNOSIS — M47.816 LUMBAR FACET ARTHROPATHY: ICD-10-CM

## 2018-02-26 DIAGNOSIS — M25.512 CHRONIC LEFT SHOULDER PAIN: Primary | ICD-10-CM

## 2018-02-26 DIAGNOSIS — M46.1 BILATERAL SACROILIITIS (HCC): ICD-10-CM

## 2018-02-26 DIAGNOSIS — M75.42 IMPINGEMENT SYNDROME OF LEFT SHOULDER: ICD-10-CM

## 2018-02-26 PROCEDURE — 99214 OFFICE O/P EST MOD 30 MIN: CPT | Performed by: NURSE PRACTITIONER

## 2018-02-26 PROCEDURE — 73030 X-RAY EXAM OF SHOULDER: CPT | Performed by: FAMILY MEDICINE

## 2018-02-26 PROCEDURE — 20610 DRAIN/INJ JOINT/BURSA W/O US: CPT | Performed by: FAMILY MEDICINE

## 2018-02-26 PROCEDURE — 99214 OFFICE O/P EST MOD 30 MIN: CPT | Performed by: FAMILY MEDICINE

## 2018-02-26 RX ORDER — TRIAMCINOLONE ACETONIDE 40 MG/ML
80 INJECTION, SUSPENSION INTRA-ARTICULAR; INTRAMUSCULAR ONCE
Status: COMPLETED | OUTPATIENT
Start: 2018-02-26 | End: 2018-02-26

## 2018-02-26 RX ADMIN — TRIAMCINOLONE ACETONIDE 80 MG: 40 INJECTION, SUSPENSION INTRA-ARTICULAR; INTRAMUSCULAR at 10:56

## 2018-02-26 NOTE — PROGRESS NOTES
"Shanika is a 62 y.o. year old female    Chief Complaint   Patient presents with   • Left Shoulder - Pain       History of Present Illness  HPI     L shoulder pain: Onset greater than 1 year ago with no inciting event.  She states that it is acutely worsened over the past week.  She takes care of her grandchildren including a young baby which she has to carry off.  He has multilevel degenerative disc disease of her cervical spine and is seeing pain management for this.  Feels like her shoulder pain is different.  Pain radiates down to her mid bicep.  Also has nighttime awakenings.  Requests injection.    I have reviewed the patient's medical, family, and social history in detail and updated the computerized patient record.    Review of Systems   Constitutional: Negative for fever.   Musculoskeletal:        Per HPI   Skin: Negative for rash.   Neurological: Negative for weakness and numbness.   Psychiatric/Behavioral: Negative for sleep disturbance.   All other systems reviewed and are negative.      /72  Ht 160 cm (63\")  Wt 59 kg (130 lb)  BMI 23.03 kg/m2     Physical Exam    Vital signs reviewed.   General: No acute distress.  Eyes: conjunctiva clear; pupils equally round and reactive  ENT: external ears and nose atraumatic; oropharynx clear  CV: no peripheral edema, 2+ distal pulses  Resp: normal respiratory effort, no use of accessory muscles  Skin: no rashes or wounds; normal turgor  Psych: mood and affect appropriate; recent and remote memory intact  Neuro: sensation to light touch intact; 2+ DTR C5, C6 bilateral    MSK Exam:  Ortho Exam    L shoulder: no tenderness or warmth; full ROM; negative Martha's; negative empty can; negative lift off; positive Aburto; negative scarf  R shoulder: no tenderness or warmth; full ROM; negative Martha's; negative empty can; negative lift off    Left Shoulder X-Ray  Indication: Pain  Views: Axillary Internal and External    Findings:  No fracture  No bony " "lesion  Normal soft tissues  Normal joint spaces    No prior studies were available for comparison.    Shoulder Injection Procedure Note    Left shoulder subacromial bursa injection was discussed with the patient in detail, including indication, risks, benefits, and alternatives. Verbal consent was given for the procedure. Injection was performed by physician.  Injection site was identified by physical examination and cleaned with Betadine and alcohol swabs. Prior to needle insertion, ethyl chloride spray was used for surface anesthesia. Sterile technique was used.  A 22-gauge, 1.5\" needle was directed to the joint from a(n) posterior approach. Injectate was passed into the subacromial bursa without difficulty. The needle was removed and a simple bandage was applied. The procedure was tolerated well without difficulty.    Injection mixture:  1% lidocaine without epinephrine: 2 mL  0.5% bupivacaine without epinephrine: 2 mL  40 mg/mL triamcinolone acetonide: 2 mL    Diagnoses and all orders for this visit:    Chronic left shoulder pain  -     XR Shoulder 2+ View Left  -     triamcinolone acetonide (KENALOG-40) injection 80 mg; Inject 2 mL into the joint 1 (One) Time.    Impingement syndrome of left shoulder  -     triamcinolone acetonide (KENALOG-40) injection 80 mg; Inject 2 mL into the joint 1 (One) Time.      Discussed x-ray findings as well as likely diagnosis of impingement syndrome.  Had near full relief of pain prior to leaving office.  Home exercise program rotator cuff tendinitis given as well as therapy.  Follow-up when necessary.    EMR Dragon/Transcription disclaimer:    Much of this encounter note is an electronic transcription/translation of spoken language to printed text.  The electronic translation of spoken language may permit erroneous, or at times, nonsensical words or phrases to be inadvertently transcribed.  Although I have reviewed the note for such errors some may still exist.    "

## 2018-02-26 NOTE — PROGRESS NOTES
"CHIEF COMPLAINT  Back pain is unchanged since last visit.    Subjective   Shanika Hector is a 62 y.o. female  who presents to the office for follow-up.She has a history of chronic back pain, which she reports as being unchanged since last office visit.  At her last office visit, she was ordered to have lumbar RFL.   Reviewed denial from insurance. Her RFL was denied. Has a large deductible.  She wants to cash pay for RFL procedure.  She has a $7000 deductible with her current insurance, and if the procedure is less than this, she wants to pay for it. We discussed waiting until April when she gets Medicare but she declined. \"I have to do something.\"     Complains of pain in her low back. It can radiate to her lateral and posterior thighs but stops above the knee. Today her pain is 7/10VAs. Describes the pain as continuous and unchanged. At her last office visit, we had discussed weaning down on the Gabapentin. \"I only got down to Gabapentin 600 mg 2/day but then I crashed.\"  \"I had to go back to 800 mg.\" Is taking gabapentin 800 mg 2/day. Also taking OTC Tylenol Extra Strength. Noted no improvement with Flexeril. Denies any side effects. ADL's by self.    She completed a bilateral L4 LTFESI   on  Dec 7th  performed by Dr. Huerta for management of bilateral leg pain.  She completed a Right L5 LTFESI (along with a Left C7-T1 CMBB)   on  11-2-17 performed by Dr. Huerta for management of neck/shoulder pain along with the significant LB/RLE pain.   She completed a diagnostic bilateral Sacroiliac joint injection, x2,   on  3/16/17 and 4/6/17 performed by Dr. Huerta for management of bilateral lumbrosacral pain.  She reported 100% relief for 2 days with both procedures.     Has previously had 2 rheumatology consults.  HAs been seen by Dr. Roland who recommended no surgery and had concern for SI joint dysfunction.    Is going to see Dr. Amaya later for joint pain in her shoulders.  Back Pain   This is a chronic problem. The " current episode started more than 1 year ago (JUly 2016). The problem occurs constantly. The problem has been gradually worsening since onset. The pain is present in the sacro-iliac. The quality of the pain is described as aching and shooting. The pain radiates to the right thigh, right foot and right knee (bilateral buttock radiation from the SI joints; she does have some other history of radiation of pain in a radicular like fashion bilaterally R>L laterally especially in the legs). The pain is at a severity of 8/10. The pain is moderate. The symptoms are aggravated by sitting and standing. Associated symptoms include paresthesias. Pertinent negatives include no chest pain, dysuria, fever, headaches, numbness or weakness. Risk factors: no trauma. She has tried analgesics, bed rest, heat, home exercises, ice, NSAIDs and walking for the symptoms. The treatment provided mild (radicular pains continue on the right but are improving) relief.   Muscle Pain   This is a recurrent problem. The current episode started more than 1 month ago. The problem occurs constantly. The problem is unchanged (she attributes her neck pain to how she has to sleep due to her Sacroiliac pain). The pain is present in the neck (left trap area). The pain is medium. The symptoms are aggravated by any movement. Pertinent negatives include no chest pain, constipation, diarrhea, dysuria, eye pain, fever, headaches, nausea, rash, shortness of breath, vomiting, weakness or wheezing. Past treatments include acetaminophen, cold pack, heat pack, OTC NSAID, prescription NSAID and rest. The treatment provided mild (previous TPI no help.) relief. Swelling location: mild in the area. She has been sleeping poorly. Her past medical history is significant for chronic back pain.      PEG Assessment   What number best describes your pain on average in the past week?8  What number best describes how, during the past week, pain has interfered with your enjoyment  "of life?8  What number best describes how, during the past week, pain has interfered with your general activity?  8    The following portions of the patient's history were reviewed and updated as appropriate: allergies, current medications, past family history, past medical history, past social history, past surgical history and problem list.    Review of Systems   Constitutional: Negative for chills and fever.   Eyes: Negative for pain.   Respiratory: Negative for shortness of breath and wheezing.    Cardiovascular: Negative for chest pain.   Gastrointestinal: Negative for constipation, diarrhea, nausea and vomiting.   Genitourinary: Negative for difficulty urinating, dyspareunia and dysuria.   Musculoskeletal: Positive for back pain and neck pain.   Skin: Negative for rash.   Neurological: Positive for paresthesias. Negative for dizziness, weakness, light-headedness, numbness and headaches.   Psychiatric/Behavioral: Negative for confusion, hallucinations, self-injury, sleep disturbance and suicidal ideas. The patient is not nervous/anxious.        Vitals:    02/26/18 0820   BP: 111/76   Pulse: 79   Resp: 18   Temp: 97.3 °F (36.3 °C)   SpO2: 98%   Weight: 59.1 kg (130 lb 3.2 oz)   Height: 160 cm (63\")   PainSc:   7   PainLoc: Back     Objective   Physical Exam   Constitutional: She is oriented to person, place, and time. She appears well-developed and well-nourished.   HENT:   Head: Normocephalic and atraumatic.   Eyes: Conjunctivae are normal.   Neck: Neck supple.   Cardiovascular: Normal rate.    Pulmonary/Chest: Effort normal.   Musculoskeletal:        Cervical back: She exhibits tenderness (left trapezius spasm), swelling, pain and spasm.        Lumbar back: She exhibits tenderness and bony tenderness (moderate tenderness  + L4-S3 ; + loading manuever).   MODERATE PAIN on bilatera SI palpation.  Juliana positive previously   Neurological: She is alert and oriented to person, place, and time. Gait normal.   Reflex " Scores:       Patellar reflexes are 1+ on the right side and 1+ on the left side.  Psychiatric: She has a normal mood and affect. Her behavior is normal.   Nursing note and vitals reviewed.      Assessment/Plan   Shanika was seen today for back pain.    Diagnoses and all orders for this visit:    Other chronic pain    Somatic dysfunction of sacroiliac joint    Bilateral sacroiliitis    DDD (degenerative disc disease), lumbar    Lumbar facet arthropathy      --- Proceed with bilateral L4-S3 RFL. No blood thinners. Reviewed the procedure at length with the patient.  Included in the review was expectations, complications, risk and benefits.The procedure was described in detail and the risks, benefits and alternatives were discussed with the patient (including but not limited to: bleeding, infection, nerve damage, worsening of pain, inability to perform injection, paralysis, seizures, and death) who agreed to proceed.  Discussed the potential for sedation if warranted/wanted.  Questions were answered and in a way the patient could understand.  Patient verbalized understanding and wishes to proceed.  This intervention will be ordered.  --- Discontinue Flexeril due to side effects.  --- Continue with Gabapentin as previously prescribed. Discussing weaning in future when her pain is better controlled after procedure. Patient verbalized understanding and agreed with plan of care.   --- Discussed Tylenol/Acetaminophen daily dosing limits.   --- Follow-up after procedure or sooner if needed.    ----------  Education about Sacroiliac joint injections:  This Sacroiliac joint injection (blockade) we have suggested is intended for diagnostic purposes, with the intent of offering the patient Radiofrequency thermal rhizotomy (RF) of the sensory branches to the joint if the block is diagnostically effective.  The diagnostic blockade is necessary to determine the likelihood that RF therapy could be efficacious in providing long term  relief to the patient.    In this procedure, the sacroiliac joint is aligned with imaging, and under image guidance a needle is placed with the needle tip into the joint.  The needle position is confirmed to be appropriately in the joint before injection of medication into the joint.  When xray fluoroscopy is used, contrast dye is used to confirm a proper arthrogram (i.e., outline of the joint).  When ultrasound is used, IV fluid (normal saline) is injected to see the flow of the fluid into the joint.  Once confirmed, then the medication can be injected into the joint.  Oftentimes this medication is a combination of local anesthetics (for diagnostic purposes) and also a steroid (to decrease irritation & inflammation in the joint, also known as sacroilitis).      Medically, a successful RF procedure should provide a patient with 50% pain relief or more for at least 6 months.  Clinical experience suggests that successful patients receive relief more in the range of 12 months on average.  We also discussed that many patients receive therapeutic success from the intraarticular joint injection, and may not require RF ablation.  If a patient receives more than 8 weeks of relief from joint injection, then occasional repeat joint blocks for therapeutic purposes is a very reasonable alternative therapy.  This course of therapy is consistent with our LCDs according to our CMS  in the area, and therefore other insurance providers should follow accordingly.  We will monitor our patients to screen for these therapeutic responders and will offer RF therapy only when necessary.      We discussed that joint injections & also RF procedures are not without risks.  Best practices regarding anticoagulant use & neuraxial procedures will be respected.  Oftentimes a patient on an anticoagulant can be offered a joint injection safely, but again this is not risk-free, and such patients give consent with regards to this  increased bleeding risk, which could cause problems including but not limited to worsening of pain, nerve damage, or muscle damage.  Patients that are ill or otherwise may be at risk for sepsis will not have their spines accessed by neuraxial injections of any type.  This patient will not be offered these therapies if there is an increased risk.   We discussed that there is a risk of postprocedural pain and also a risk of worsening of clinical picture with these procedures as with any neuraxial procedure.    ----------       RAMONITA REPORT      RAMONITA report has been reviewed and scanned into the patient's chart.    Date of last RAMONITA : 2-23-18        EMR Dragon/Transcription disclaimer:   Much of this encounter note is an electronic transcription/translation of spoken language to printed text. The electronic translation of spoken language may permit erroneous, or at times, nonsensical words or phrases to be inadvertently transcribed; Although I have reviewed the note for such errors, some may still exist.

## 2018-03-08 ENCOUNTER — DOCUMENTATION (OUTPATIENT)
Dept: PAIN MEDICINE | Facility: CLINIC | Age: 63
End: 2018-03-08

## 2018-03-08 ENCOUNTER — OUTSIDE FACILITY SERVICE (OUTPATIENT)
Dept: PAIN MEDICINE | Facility: CLINIC | Age: 63
End: 2018-03-08

## 2018-03-08 PROCEDURE — 64636 DESTROY L/S FACET JNT ADDL: CPT | Performed by: ANESTHESIOLOGY

## 2018-03-08 PROCEDURE — 64635 DESTROY LUMB/SAC FACET JNT: CPT | Performed by: ANESTHESIOLOGY

## 2018-03-08 NOTE — PROGRESS NOTES
Bilateral L4-S3 Medial and Dorsal Branch Radiofrequency Lesioning  [facility]      PREOPERATIVE DIAGNOSIS:  Sacroiliac Dysfunction, bilaterally.  Lumbar spondylosis without myelopathy    POSTOPERATIVE DIAGNOSIS:  Same as above.      PROCEDURE:   Bilateral Lumbrosacral Medial Branch and Sacral Dorsal Branch Nerve Radiofrequency lesioning, with fluoroscopy:  The medial branches of L4 and L5 and the Dorsal Branches of S1-3      PRE-PROCEDURE DISCUSSION WITH PATIENT:    Risks and complications were discussed with the patient prior to starting the procedure and informed consent was obtained.      SURGEON:  PROVIDER: Facundo Huerta MD    REASON FOR PROCEDURE:    Patient has pain consistent with SI pathology on history and physical exam. Tender to palpation over the affected SI joint. Previous diagnostic positivity of SI joint injection.   Positive Ag Test.    SEDATION:  Fentanyl 100 mcg IV  TIME OF PROCEDURE:  The intraoperative procedure time after administration of the sedatives was 37 minutes.    ANESTHETIC:  Lidocaine 1%  STEROID:  NONE  TOTAL VOLUME OF SOLUTION:  24 mL      DESCRIPTON OF PROCEDURE:  After obtaining informed consent, IV access was  obtained in the preoperative area.   The patient was taken to the operating room.  The patient was placed in the prone position with a pillow under the abdomen. All pressure points were well padded.  EKG, blood pressure, and pulse oximeter were monitored.  The patient was monitored and sedated by the RN under my direction. The lumbosacral area was prepped with Chloraprep and draped in a sterile fashion.     Under fluoroscopic guidance the transverse process of the L5 vertebrae at the junctions of the superior articular processes were identified on the first side. Also identified was the groove between the ala and the superior articular process of the sacrum on the ipsilateral side.  Also identified were the points on the lateral margin of the S1 &  S2 & S3 posterior foramina.  Skin and subcutaneous tissue were anesthetized with 1% lidocaine above each of these points.     A radiofrequency probe needle was introduced under fluoroscopic guidance at the above junctions in the following manner.   After confirming the position of the needle with fluoroscope in all views, testing was initiated.   Aspiration was negative for blood and CSF.  First, sensory testing was started on the needles at the L4 and L5 medial branches, with initiation @ 1V and 50Hz and slowly decreased until painful pressure stimulation diminished at 0.5V.  Next, motor testing was confirmed to be negative at 3V and 2Hz for any radicular stimulation.  Then 1mL of the local anesthetic was instilled in each needle.  Two minutes elapsed, and during this time a lateral fluoroscopic view was confirmed again to ensure the needles had not advanced nor retracted.  Then, Radiofrequency Lesioning was initiated for 1.5 minutes at 85 degrees Celsius.  Needles were removed intact from each of the areas.       At the lateral margins of the S1-S3 foramina bilaterally, two needles were placed in parallel, about 3 mm apart, contacting periosteum at the lateral margin of the foramen at each level and not entering any of the foramina.  Lateral fluoroscopic imaging confirmed.  Motor testing performed as detailed above, and then anesthesia was applied as above in each needle prior to performing RF at the same thermal parameters.      A similar procedure was repeated to block the L4-S3 nerves on the contralateral side.   Onset of analgesia was noted.  Vital signs remained stable throughout.        ESTIMATED BLOOD LOSS:  minimal  SPECIMENS:  none    COMPLICATIONS:   No complications were noted.    TOLERANCE & DISCHARGE CONDITION:    The patient tolerated the procedure well.  The patient was transported to the recovery area without difficulties.  The patient was discharged to home under the care of family in stable and  satisfactory condition.    PLAN OF CARE:  1. The patient was given our standard instruction sheet.  2. The patient is asked to keep a pain log each hour for 8 hours after the procedure today.  3. The patient will  Return to clinic 6 wks  4. The patient will resume all medications as per the medication reconciliation sheet.

## 2018-03-19 RX ORDER — AMITRIPTYLINE HYDROCHLORIDE 10 MG/1
TABLET, FILM COATED ORAL
Qty: 30 TABLET | Refills: 5 | OUTPATIENT
Start: 2018-03-19

## 2018-03-19 RX ORDER — METHYLPREDNISOLONE 4 MG/1
TABLET ORAL
Qty: 1 EACH | Refills: 0 | OUTPATIENT
Start: 2018-03-19 | End: 2018-04-09

## 2018-04-09 ENCOUNTER — OFFICE VISIT (OUTPATIENT)
Dept: PAIN MEDICINE | Facility: CLINIC | Age: 63
End: 2018-04-09

## 2018-04-09 VITALS
HEIGHT: 63 IN | RESPIRATION RATE: 16 BRPM | DIASTOLIC BLOOD PRESSURE: 72 MMHG | BODY MASS INDEX: 21.62 KG/M2 | WEIGHT: 122 LBS | HEART RATE: 92 BPM | TEMPERATURE: 97.9 F | SYSTOLIC BLOOD PRESSURE: 114 MMHG | OXYGEN SATURATION: 98 %

## 2018-04-09 DIAGNOSIS — G57.10 LATERAL FEMORAL CUTANEOUS NEUROPATHY, UNSPECIFIED LATERALITY: ICD-10-CM

## 2018-04-09 DIAGNOSIS — M51.36 DDD (DEGENERATIVE DISC DISEASE), LUMBAR: ICD-10-CM

## 2018-04-09 DIAGNOSIS — G89.29 OTHER CHRONIC PAIN: Primary | ICD-10-CM

## 2018-04-09 DIAGNOSIS — M47.816 LUMBAR FACET ARTHROPATHY: ICD-10-CM

## 2018-04-09 PROCEDURE — 99214 OFFICE O/P EST MOD 30 MIN: CPT | Performed by: NURSE PRACTITIONER

## 2018-04-09 NOTE — PROGRESS NOTES
"CHIEF COMPLAINT  Pt has no improvement yet with Bilat. Upper leg pain,R>L,and LBP is \"numb,no pain\" since 3/8/16 L4-S3 RF    Subjective   Shanika Hector is a 62 y.o. female  who presents to the office for follow-up.She has a history of chronic back and leg pain. She had a bilateral L4-S3 RFL performed by Dr. Huerta on 3-8-18. Reports no pain in low back. Did take a MDP after procedure.  However, her right thigh pain is unchanged.    States she has an area of numb feeling in her low back. Denies any pain in her back.     Complains of pain in her right thigh, as well as more consistent numbness and tingling in her feet. Today her right leg pain is 7/10VAS. Is no longer taking Gabapentin.  Has noticed that numbness and tingling has increased since cessation of Gabapentin. \"I'm not taking that again.\" Has not tried Vitamin B. Takes OTC Tylenol with moderate temporary improvement. ADL's by self.     Has previously had 2 rheumatology consults.  Has been seen by Dr. Roland who recommended no surgery and had concern for SI joint dysfunction.     Is going to see Dr. Amaya later for joint pain in her shoulders.    Back Pain   This is a chronic problem. The current episode started more than 1 year ago (JUly 2016). The problem occurs constantly. Progression since onset: improved with RFL. The pain is present in the sacro-iliac. The quality of the pain is described as aching and shooting. The pain is at a severity of 0/10. The patient is experiencing no pain. The symptoms are aggravated by sitting and standing. Associated symptoms include leg pain and paresthesias. Pertinent negatives include no chest pain, dysuria, fever, headaches, numbness or weakness. Risk factors: no trauma. She has tried analgesics, bed rest, heat, home exercises, ice, NSAIDs and walking for the symptoms. The treatment provided mild (L4-S3 RFL--- significant improvement in low back pain) relief.   Leg Pain    Incident onset: July 2016. The pain is present in " "the right thigh, right knee, right ankle, right foot and right toes. The quality of the pain is described as burning (numbing). The pain is at a severity of 7/10. The pain is moderate. The pain has been worsening since onset. Associated symptoms include an inability to bear weight and a loss of motion. Pertinent negatives include no numbness. The symptoms are aggravated by movement and weight bearing (laying on right side). She has tried acetaminophen, elevation, heat, ice, non-weight bearing, NSAIDs and rest (PT, TFLESI) for the symptoms. The treatment provided moderate relief.     The following portions of the patient's history were reviewed and updated as appropriate: allergies, current medications, past family history, past medical history, past social history, past surgical history and problem list.    Review of Systems   Constitutional: Negative for activity change, appetite change, chills and fever.   Eyes: Negative for pain.   Respiratory: Negative for shortness of breath and wheezing.    Cardiovascular: Negative for chest pain.   Gastrointestinal: Negative for constipation, diarrhea, nausea and vomiting.   Genitourinary: Negative for difficulty urinating, dyspareunia and dysuria.   Musculoskeletal: Positive for back pain and neck pain.   Skin: Negative for rash.   Neurological: Positive for paresthesias. Negative for dizziness, weakness, light-headedness, numbness and headaches.   Psychiatric/Behavioral: Positive for sleep disturbance. Negative for confusion, hallucinations, self-injury and suicidal ideas. The patient is not nervous/anxious.        Vitals:    04/09/18 0803   BP: 114/72   Pulse: 92   Resp: 16   Temp: 97.9 °F (36.6 °C)   SpO2: 98%   Weight: 55.3 kg (122 lb)   Height: 160 cm (62.99\")   PainSc: 7  Comment: Leg pain,R>L, ranges from 5-8/10   PainLoc: Leg     Objective   Physical Exam   Constitutional: She is oriented to person, place, and time. She appears well-developed and well-nourished. "   HENT:   Head: Normocephalic and atraumatic.   Eyes: Conjunctivae are normal.   Neck: Neck supple.   Cardiovascular: Normal rate.    Pulmonary/Chest: Effort normal.   Musculoskeletal:        Cervical back: She exhibits tenderness (left trapezius spasm), swelling, pain and spasm.        Lumbar back: She exhibits tenderness and bony tenderness (moderate tenderness  + L4-S3 ; + loading manuever).        Legs:  NEgative tenderness of Si joint, Negative tenderness of bilateral Lumbar facets    Negative SLR bilaterally   Neurological: She is alert and oriented to person, place, and time. She has normal strength. No cranial nerve deficit. Gait normal.   Reflex Scores:       Patellar reflexes are 1+ on the right side and 1+ on the left side.       Achilles reflexes are 1+ on the right side and 1+ on the left side.  Psychiatric: She has a normal mood and affect. Her behavior is normal.   Nursing note and vitals reviewed.      Assessment/Plan   Shanika was seen today for leg pain.    Diagnoses and all orders for this visit:    Other chronic pain    DDD (degenerative disc disease), lumbar    Lumbar facet arthropathy    Lateral femoral cutaneous neuropathy, unspecified laterality  -     Case Request      --- Right lateral femoral cutaneous nerve block. No blood thinners. Reviewed the procedure at length with the patient.  Included in the review was expectations, complications, risk and benefits.The procedure was described in detail and the risks, benefits and alternatives were discussed with the patient (including but not limited to: bleeding, infection, nerve damage, worsening of pain, inability to perform injection, paralysis, seizures, and death) who agreed to proceed.  Discussed the potential for sedation if warranted/wanted.  Questions were answered and in a way the patient could understand.  Patient verbalized understanding and wishes to proceed.  This intervention will be ordered. She wants to wait until after May 1st due  to Medicare insurance change.   --- Discussed OTC Vitamin B. Discussed medication with the patient.  Included in this discussion was the potential for side effects and adverse events.  Patient verbalized understanding and wished to proceed.  Prescription will be sent to pharmacy.  --- Discussed consideration for Amitriptylline, Lyrica, or Cymbalta. Patient wants to wait at this time.   --- Discussed how cessation of Gabapentin may be playing into increased dysthesia complaints.   --- Follow-up after procedure or sooner if needed.          RAMONITA REPORT  RAMONITA report has been reviewed and scanned into the patient's chart.    Date of last RAMONITA : 4-6-18        EMR Dragon/Transcription disclaimer:   Much of this encounter note is an electronic transcription/translation of spoken language to printed text. The electronic translation of spoken language may permit erroneous, or at times, nonsensical words or phrases to be inadvertently transcribed; Although I have reviewed the note for such errors, some may still exist.

## 2018-05-04 ENCOUNTER — OFFICE VISIT (OUTPATIENT)
Dept: FAMILY MEDICINE CLINIC | Facility: CLINIC | Age: 63
End: 2018-05-04

## 2018-05-04 VITALS
BODY MASS INDEX: 22.22 KG/M2 | WEIGHT: 125.4 LBS | SYSTOLIC BLOOD PRESSURE: 102 MMHG | OXYGEN SATURATION: 98 % | HEIGHT: 63 IN | DIASTOLIC BLOOD PRESSURE: 60 MMHG | HEART RATE: 99 BPM | TEMPERATURE: 97.9 F

## 2018-05-04 DIAGNOSIS — N30.90 CYSTITIS: Primary | ICD-10-CM

## 2018-05-04 LAB
BILIRUB BLD-MCNC: NEGATIVE MG/DL
CLARITY, POC: ABNORMAL
COLOR UR: YELLOW
GLUCOSE UR STRIP-MCNC: NEGATIVE MG/DL
KETONES UR QL: NEGATIVE
LEUKOCYTE EST, POC: ABNORMAL
NITRITE UR-MCNC: NEGATIVE MG/ML
PH UR: 6.5 [PH] (ref 5–8)
PROT UR STRIP-MCNC: NEGATIVE MG/DL
RBC # UR STRIP: ABNORMAL /UL
SP GR UR: 1.01 (ref 1–1.03)
UROBILINOGEN UR QL: NORMAL

## 2018-05-04 PROCEDURE — 99213 OFFICE O/P EST LOW 20 MIN: CPT | Performed by: FAMILY MEDICINE

## 2018-05-04 PROCEDURE — 81003 URINALYSIS AUTO W/O SCOPE: CPT | Performed by: FAMILY MEDICINE

## 2018-05-04 RX ORDER — NITROFURANTOIN 25; 75 MG/1; MG/1
100 CAPSULE ORAL 2 TIMES DAILY
Qty: 14 CAPSULE | Refills: 0 | Status: SHIPPED | OUTPATIENT
Start: 2018-05-04 | End: 2018-05-14

## 2018-05-04 NOTE — PROGRESS NOTES
"Subjective   Shanika Hector is a 62 y.o. female.     Chief Complaint   Patient presents with   • Cystitis     x tue   • Urinary Frequency        Cystitis   Pertinent negatives include no chills, nausea or vomiting.   Urinary Frequency    Associated symptoms include frequency, hesitancy and urgency. Pertinent negatives include no chills, discharge, flank pain, hematuria, nausea, possible pregnancy, sweats or vomiting.   Dysuria    This is a new problem. The current episode started in the past 7 days. The problem occurs every urination. The problem has been gradually worsening. The quality of the pain is described as aching and burning. The pain is at a severity of 7/10. There has been no fever. The fever has been present for less than 1 day. She is sexually active. There is no history of pyelonephritis. Associated symptoms include frequency, hesitancy and urgency. Pertinent negatives include no chills, discharge, flank pain, hematuria, nausea, possible pregnancy, sweats or vomiting.     About 3 days of urinary symptoms.  Urgency.  Hesitancy.  Some dysuria.  No fever.  No vomiting.  No abdominal pain.  She's had these before.  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  Mild to moderate but persistent symptoms.        The following portions of the patient's history were reviewed and updated as appropriate: allergies, current medications, past family history, past medical history, past social history, past surgical history and problem list.          Review of Systems   Constitutional: Negative for chills.   Gastrointestinal: Negative for nausea and vomiting.   Genitourinary: Positive for dysuria, frequency, hesitancy and urgency. Negative for flank pain and hematuria.       Objective   Blood pressure 102/60, pulse 99, temperature 97.9 °F (36.6 °C), temperature source Oral, height 160 cm (62.99\"), weight 56.9 kg (125 lb 6.4 oz), SpO2 98 %, not currently breastfeeding.  Physical Exam   Constitutional: No " distress.   Abdominal: Soft. Bowel sounds are normal. She exhibits no distension and no mass. There is no tenderness. There is no guarding.   No CVA tenderness to percussion   Skin: No rash noted. No erythema. No pallor.       Assessment/Plan   Shanika was seen today for cystitis and urinary frequency.    Diagnoses and all orders for this visit:    Cystitis  -     POC Urinalysis Dipstick, Automated  -     Urine Culture - Urine, Urine, Clean Catch    Other orders  -     nitrofurantoin, macrocrystal-monohydrate, (MACROBID) 100 MG capsule; Take 1 capsule by mouth 2 (Two) Times a Day.      Acute cystitis.  Uncomplicated at this time.  Urinalysis today reveals some blood and leukocytes.  Send urine culture.  Start Macrobid 100 mg twice a day for 5-7 days.  If having severe symptoms this weekend seek medical attention medially.  Otherwise plenty fluids and rest.

## 2018-05-09 ENCOUNTER — TELEPHONE (OUTPATIENT)
Dept: FAMILY MEDICINE CLINIC | Facility: CLINIC | Age: 63
End: 2018-05-09

## 2018-05-09 LAB
BACTERIA UR CULT: ABNORMAL
BACTERIA UR CULT: ABNORMAL
OTHER ANTIBIOTIC SUSC ISLT: ABNORMAL

## 2018-05-09 NOTE — TELEPHONE ENCOUNTER
Lara, looks like pt has a urine on 5/4/18. But nothing has come back. Please check on this it is on your overdue task thanks

## 2018-05-10 NOTE — PROGRESS NOTES
Urine Culture Positive for UTI. Continue same antibiotic. Call or see a doctor if not getting better.

## 2018-05-14 ENCOUNTER — OFFICE VISIT (OUTPATIENT)
Dept: PAIN MEDICINE | Facility: CLINIC | Age: 63
End: 2018-05-14

## 2018-05-14 VITALS
DIASTOLIC BLOOD PRESSURE: 75 MMHG | HEART RATE: 80 BPM | BODY MASS INDEX: 22.15 KG/M2 | HEIGHT: 63 IN | OXYGEN SATURATION: 98 % | RESPIRATION RATE: 16 BRPM | TEMPERATURE: 97.7 F | SYSTOLIC BLOOD PRESSURE: 117 MMHG | WEIGHT: 125 LBS

## 2018-05-14 DIAGNOSIS — M51.36 DDD (DEGENERATIVE DISC DISEASE), LUMBAR: ICD-10-CM

## 2018-05-14 DIAGNOSIS — M47.816 LUMBAR FACET ARTHROPATHY: ICD-10-CM

## 2018-05-14 DIAGNOSIS — M46.1 BILATERAL SACROILIITIS (HCC): ICD-10-CM

## 2018-05-14 DIAGNOSIS — G57.10 LATERAL FEMORAL CUTANEOUS NEUROPATHY, UNSPECIFIED LATERALITY: ICD-10-CM

## 2018-05-14 DIAGNOSIS — G89.29 OTHER CHRONIC PAIN: Primary | ICD-10-CM

## 2018-05-14 DIAGNOSIS — M54.16 LUMBAR RADICULOPATHY: ICD-10-CM

## 2018-05-14 PROCEDURE — 99214 OFFICE O/P EST MOD 30 MIN: CPT | Performed by: ANESTHESIOLOGY

## 2018-05-14 NOTE — PATIENT INSTRUCTIONS
--- Follow-up after 2nd opinion  --- I recommend that she get a neurological opinion, as I am concerned about meralgia paresthetica, but she has researched the lateral femoral cutaneous nerve block and she has not wanted to consider that at this time in considering all her options.  She is hesitant about medications and their potential to interfere with IBS management.  Therefore I feel that a neurologist consultation to consider her history of neuropathy and also my (and neurosurgeon Dr. Roland's) concern about LFCN neuropathy (meralgia parasthetica) is most reasonable  -- she has not had a recent rheumatological opinion

## 2018-05-14 NOTE — PROGRESS NOTES
"CHIEF COMPLAINT  Pt states did not receive from the 3/8/18 L4-S3 RF. Pt continues with pain,described as constant,to the touch and deep pain from bilateral hips into thighs;R hip/upper leg is worse than L.  Pt has no leg numbness, but has bilateral gluteal numbness with bilat. feet numbness.     Subjective   Shanika Hector is a 63 y.o. female  who presents to the office for follow-up.She has a history of back pain as well as thigh pain and also neuropathy related issues.    This 53-year-old had had a history of lumbar and lumbosacral area pain and also evidence of sacroiliac dysfunction.  This was also concern of the neurosurgeon after neurosurgical consult.  After positive diagnostic blockades we proceeded with a bilateral L4 S3 radiofrequency lesioning for that lower lumbar lumbosacral and also sacroiliac pain.  Therefore radiofrequency procedure was completed on March 8 and then on the follow-up visit on April 9 was confirmed that the patient had no low back pain.  The patient was complaining at that point about right thigh pain.  Also she had a history of some neuropathic symptoms with numbness and tingling in the feet.    I reviewed the previous office note from my colleague, Anny WEISS, from April 9.  There was concern about the right thigh pain that it could be meralgia paresthetica and the plan was for a right sided lateral femoral cutaneous nerve block.  In the interim.  And that procedure has not been completed.  The patient did present about a week and a half ago to her primary care provider and was treated for cystitis.    I did review a patient email chain multiple notes in the Epic system from [employer].  The patient stated \"up her legs give me fits feeling like my skin is crawling making daily life miserable and nighttime even worse\".  She is asking for some more education regarding the lateral femoral cutaneous nerve block.  This guidance was given to the patient by my sonam colleague.      She " complains of painful points over the SI joints that started 2 wks ago.   She describes this as tender.      She is distressed about bilateral thigh and hip pain anteriorly and laterally, bilaterally, tender to touch, and the pain is slightly worse on the right than the left.  She denies any leg numbness.   Walking is painful.    She has intermittent anterior thigh pain that might flare if she tries to sleep on her stomach.      She had stopped gabapentin due to cost, and there was some concern previously about weight gain.  Concern about taking more drugs and the effect that can have on her Irritable Bowel Syndrome symptoms.      History of Present Illness     PEG Assessment   What number best describes your pain on average in the past week?9  What number best describes how, during the past week, pain has interfered with your enjoyment of life?9  What number best describes how, during the past week, pain has interfered with your general activity?  9      The following portions of the patient's history were reviewed and updated as appropriate: allergies, current medications, past family history, past medical history, past social history, past surgical history and problem list.    Review of Systems   Constitutional: Positive for activity change (decreased). Negative for appetite change, chills and fever.   Eyes: Negative for pain.   Respiratory: Negative for apnea, chest tightness, shortness of breath and wheezing.    Cardiovascular: Negative for chest pain.   Gastrointestinal: Negative for constipation, diarrhea, nausea and vomiting.   Genitourinary: Negative for difficulty urinating, dyspareunia and dysuria.   Musculoskeletal: Positive for back pain and neck pain.   Skin: Negative for rash.   Neurological: Positive for weakness (legs bilat.) and numbness. Negative for dizziness, light-headedness and headaches.   Psychiatric/Behavioral: Positive for sleep disturbance. Negative for confusion, hallucinations,  "self-injury and suicidal ideas. The patient is nervous/anxious.          Vitals:    05/14/18 0807   BP: 117/75   Pulse: 80   Resp: 16   Temp: 97.7 °F (36.5 °C)   SpO2: 98%   Weight: 56.7 kg (125 lb)   Height: 160 cm (62.99\")         Objective   Physical Exam   Constitutional: She is oriented to person, place, and time. She appears well-developed and well-nourished.   HENT:   Head: Normocephalic and atraumatic.   Eyes: Conjunctivae are normal.   Neck: Neck supple.   Cardiovascular: Normal rate.    Pulmonary/Chest: Effort normal.   Musculoskeletal:        Cervical back: She exhibits no tenderness (left trapezius spasm), no swelling, no pain and no spasm.        Lumbar back: She exhibits tenderness and bony tenderness.        Legs:  Moderate tenderness of Si joint, Negative tenderness of bilateral Lumbar facets.    Positive Britt.  She has pain while sitting.     Neurological: She is alert and oriented to person, place, and time. She has normal strength. No cranial nerve deficit. Gait normal.   Reflex Scores:       Patellar reflexes are 1+ on the right side and 1+ on the left side.       Achilles reflexes are 1+ on the right side and 1+ on the left side.  Psychiatric: She has a normal mood and affect. Her behavior is normal.   Nursing note and vitals reviewed.          Assessment/Plan   Shanika was seen today for back pain and leg pain.    Diagnoses and all orders for this visit:    Other chronic pain    Lateral femoral cutaneous neuropathy, unspecified laterality  -     Ambulatory Referral to Neurology    DDD (degenerative disc disease), lumbar    Bilateral sacroiliitis    Lumbar facet arthropathy    Lumbar radiculopathy  -     Ambulatory Referral to Neurology        --- Follow-up after 2nd opinion  --- I recommend that she get a neurological opinion, as I am concerned about meralgia paresthetica, but she has researched the lateral femoral cutaneous nerve block and she has not wanted to consider that at this time in " considering all her options.  She is hesitant about medications and their potential to interfere with IBS management.  Therefore I feel that a neurologist consultation to consider her history of neuropathy and also my (and neurosurgeon Dr. Roland's) concern about LFCN neuropathy (meralgia parasthetica) is most reasonable  -- she has not had a recent rheumatological opinion    --    Addendum: the patient did some research on her own about meralgia parasthetica and she agrees with Rosaura WEISS & Dr. Roland's assessments and she would like to have the [facility] LFCN block as suggested above.    We will work on scheduling that.          RAMONITA REPORT  RAMONITA report has been reviewed and scanned into the patient's chart.  Date of last RAMONITA : as above.  No current use of opioids.          EMR Dragon/Transcription disclaimer:   Much of this encounter note is an electronic transcription/translation of spoken language to printed text. The electronic translation of spoken language may permit erroneous, or at times, nonsensical words or phrases to be inadvertently transcribed; Although I have reviewed the note for such errors, some may still exist.

## 2018-05-21 ENCOUNTER — OFFICE VISIT (OUTPATIENT)
Dept: FAMILY MEDICINE CLINIC | Facility: CLINIC | Age: 63
End: 2018-05-21

## 2018-05-21 VITALS
OXYGEN SATURATION: 98 % | DIASTOLIC BLOOD PRESSURE: 74 MMHG | WEIGHT: 126.5 LBS | BODY MASS INDEX: 21.6 KG/M2 | HEIGHT: 64 IN | SYSTOLIC BLOOD PRESSURE: 120 MMHG | HEART RATE: 76 BPM | TEMPERATURE: 97.9 F

## 2018-05-21 DIAGNOSIS — E78.00 PURE HYPERCHOLESTEROLEMIA: ICD-10-CM

## 2018-05-21 DIAGNOSIS — G89.29 OTHER CHRONIC PAIN: ICD-10-CM

## 2018-05-21 DIAGNOSIS — M15.9 GENERALIZED OSTEOARTHRITIS: ICD-10-CM

## 2018-05-21 DIAGNOSIS — Z00.00 MEDICARE WELCOME EXAM: Primary | ICD-10-CM

## 2018-05-21 DIAGNOSIS — K21.9 GASTROESOPHAGEAL REFLUX DISEASE WITHOUT ESOPHAGITIS: ICD-10-CM

## 2018-05-21 PROCEDURE — G0009 ADMIN PNEUMOCOCCAL VACCINE: HCPCS | Performed by: FAMILY MEDICINE

## 2018-05-21 PROCEDURE — 90670 PCV13 VACCINE IM: CPT | Performed by: FAMILY MEDICINE

## 2018-05-21 PROCEDURE — G0402 INITIAL PREVENTIVE EXAM: HCPCS | Performed by: FAMILY MEDICINE

## 2018-05-21 PROCEDURE — 99213 OFFICE O/P EST LOW 20 MIN: CPT | Performed by: FAMILY MEDICINE

## 2018-05-21 RX ORDER — AMITRIPTYLINE HYDROCHLORIDE 10 MG/1
10 TABLET, FILM COATED ORAL NIGHTLY
COMMUNITY
End: 2018-05-21 | Stop reason: SDUPTHER

## 2018-05-21 RX ORDER — AMITRIPTYLINE HYDROCHLORIDE 10 MG/1
10 TABLET, FILM COATED ORAL NIGHTLY
Qty: 90 TABLET | Refills: 1 | Status: SHIPPED | OUTPATIENT
Start: 2018-05-21 | End: 2018-07-16 | Stop reason: HOSPADM

## 2018-05-21 RX ORDER — HEPATITIS A VACCINE, INACTIVATED 50 [IU]/ML
INJECTION, SUSPENSION INTRAMUSCULAR
COMMUNITY
Start: 2018-05-12 | End: 2018-05-21

## 2018-05-21 RX ORDER — ASPIRIN 81 MG/1
81 TABLET ORAL DAILY
COMMUNITY
End: 2018-11-12 | Stop reason: SDDI

## 2018-05-21 RX ORDER — NAPROXEN SODIUM 220 MG
220 TABLET ORAL 2 TIMES DAILY PRN
COMMUNITY
End: 2018-08-13

## 2018-05-21 NOTE — PROGRESS NOTES
QUICK REFERENCE INFORMATION:  The ABCs of the Annual Wellness Visit    [facility]    HEALTH RISK ASSESSMENT    1955    Recent Hospitalizations:  No hospitalization(s) within the last year..      Current Medical Providers:  Patient Care Team:  Bryan Vega MD as PCP - General  Bryan Vega MD as PCP - Family Medicine      Smoking Status:  History   Smoking Status   • Never Smoker   Smokeless Tobacco   • Never Used       Alcohol Consumption:  History   Alcohol Use   • Yes     Comment: occationally       Depression Screen:   PHQ-2/PHQ-9 Depression Screening 5/21/2018   Little interest or pleasure in doing things 0   Feeling down, depressed, or hopeless 0   Total Score 0       Health Habits and Functional and Cognitive Screening:  Functional & Cognitive Status 5/21/2018   Do you have difficulty preparing food and eating? No   Do you have difficulty bathing yourself, getting dressed or grooming yourself? No   Do you have difficulty using the toilet? No   Do you have difficulty moving around from place to place? Yes   Do you have trouble with steps or getting out of a bed or a chair? Yes   In the past year have you fallen or experienced a near fall? No   Current Diet Frequent Junk Food   Dental Exam Up to date   Eye Exam Up to date   Exercise (times per week) 3 times per week   Current Exercise Activities Include Walking   Do you need help using the phone?  No   Are you deaf or do you have serious difficulty hearing?  No   Do you need help with transportation? No   Do you need help shopping? No   Do you need help preparing meals?  No   Do you need help with housework?  No   Do you need help with laundry? No   Do you need help taking your medications? No   Do you need help managing money? No   Do you ever drive or ride in a car without wearing a seat belt? No   Have you felt unusual stress, anger or loneliness in the last month? No   Who do you live with? Spouse   If you need help, do you have  trouble finding someone available to you? No   Have you been bothered in the last four weeks by sexual problems? Yes   Do you have difficulty concentrating, remembering or making decisions? No           Does the patient have evidence of cognitive impairment? No    Aspirin use counseling? Start ASA 81 mg daily       Recent Lab Results:  CMP:  Lab Results   Component Value Date    GLU 87 11/06/2017    BUN 19 11/06/2017    CREATININE 0.76 11/06/2017    EGFRIFNONA 77 11/06/2017    EGFRIFAFRI 93 11/06/2017    BCR 25.0 11/06/2017     11/06/2017    K 4.3 11/06/2017    CO2 31.4 (H) 11/06/2017    CALCIUM 9.6 11/06/2017    PROTENTOTREF 7.6 11/06/2017    ALBUMIN 4.70 11/06/2017    LABGLOBREF 2.9 11/06/2017    LABIL2 1.6 11/06/2017    BILITOT 0.6 11/06/2017    ALKPHOS 66 11/06/2017    AST 13 11/06/2017    ALT 12 11/06/2017     Lipid Panel:  Lab Results   Component Value Date    TRIG 92 11/06/2017    HDL 83 (H) 11/06/2017    VLDL 18.4 11/06/2017    LDLHDL 1.77 11/06/2017     HbA1c:       Visual Acuity:  No exam data present    Age-appropriate Screening Schedule:  Refer to the list below for future screening recommendations based on patient's age, sex and/or medical conditions. Orders for these recommended tests are listed in the plan section. The patient has been provided with a written plan.    Health Maintenance   Topic Date Due   • ZOSTER VACCINE (1 of 2) 05/11/2003   • PNEUMOCOCCAL VACCINES (65+ LOW/MEDIUM RISK) (1 of 2 - PCV13) 05/11/2018   • INFLUENZA VACCINE  08/01/2018   • LIPID PANEL  11/06/2018   • DXA SCAN  11/24/2019   • MAMMOGRAM  11/27/2019   • PAP SMEAR  11/13/2020   • COLONOSCOPY  06/30/2022   • TDAP/TD VACCINES (2 - Td) 11/13/2027        Immunization History   Administered Date(s) Administered   • Flu Vaccine Quad PF >36MO 11/13/2017   • Influenza, Quadrivalent 10/26/2015   • Tdap 11/13/2017         Subjective   History of Present Illness    Shanika Hector is a 63 y.o. female an established patient  "presenting for a Welcome to Medicare Visit.     The following portions of the patient's history were reviewed and updated as appropriate: allergies, current medications, past family history, past medical history, past social history, past surgical history and problem list.    Outpatient Medications Prior to Visit   Medication Sig Dispense Refill   • acetaminophen (TYLENOL) 650 MG 8 hr tablet Take 2 tablets by mouth 2 (two) times a day as needed.     • omeprazole (priLOSEC) 40 MG capsule TAKE ONE CAPSULE BY MOUTH DAILY 30 capsule 5   • B Complex-C-E-Zn (B COMPLEX-C-E-ZINC) tablet Take 1 tablet by mouth Daily.       No facility-administered medications prior to visit.        Patient Active Problem List   Diagnosis   • Cervical radiculopathy   • Acid reflux   • Generalized osteoarthritis   • Gastroesophageal reflux disease   • Irritable bowel syndrome   • Osteoporosis   • Pruritus   • Seborrheic eczema   • Osteoarthritis of cervical spine without myelopathy   • Eczema   • DDD (degenerative disc disease), lumbar   • Somatic dysfunction of sacroiliac joint   • Bilateral sacroiliitis   • Strain of left trapezius muscle   • Lateral femoral cutaneous neuropathy   • Other chronic pain   • Lumbar facet arthropathy       Advance Care Planning:  has an advance directive - a copy HAS NOT been provided. Have asked the patient to send this to us to add to record.    Identification of Risk Factors:  Risk factors include: chronic pain.    Review of Systems    Compared to one year ago, the patient feels her physical health is the same.  Compared to one year ago, the patient feels her mental health is the same.    Objective    Physical Exam    Vitals:    05/21/18 0842   BP: 120/74   Pulse: 76   Temp: 97.9 °F (36.6 °C)   SpO2: 98%   Weight: 57.4 kg (126 lb 8 oz)   Height: 162.6 cm (64\")       Patient's Body mass index is 21.71 kg/m². BMI is within normal parameters. No follow-up required.      Procedure   Procedures   "     Assessment/Plan   Patient Self-Management and Personalized Health Advice  The patient has been provided with information about: exercise, prevention of cardiac or vascular disease, designing advance directives and mental health concerns and preventive services including:   · Advance directive, Pneumococcal vaccine .    Visit Diagnoses:    ICD-10-CM ICD-9-CM   1. Medicare welcome exam Z00.00 V70.0   2. Gastroesophageal reflux disease without esophagitis K21.9 530.81   3. Other chronic pain G89.29 338.29   4. Generalized osteoarthritis M15.9 715.00   5. Pure hypercholesterolemia E78.00 272.0       Orders Placed This Encounter   Procedures   • Pneumococcal Conjugate Vaccine 13-Valent All   • Comprehensive Metabolic Panel     Standing Status:   Future     Standing Expiration Date:   5/22/2019     Scheduling Instructions:      Patient to have lab work drawn before next visit         • Lipid Panel     Standing Status:   Future     Standing Expiration Date:   5/22/2019     Scheduling Instructions:      Patient to have lab work drawn before next visit             Outpatient Encounter Prescriptions as of 5/21/2018   Medication Sig Dispense Refill   • acetaminophen (TYLENOL) 650 MG 8 hr tablet Take 2 tablets by mouth 2 (two) times a day as needed.     • amitriptyline (ELAVIL) 10 MG tablet Take 1 tablet by mouth Every Night. 90 tablet 1   • aspirin 81 MG EC tablet Take 81 mg by mouth Daily.     • Cyanocobalamin (VITAMIN B 12 PO) Take  by mouth.     • naproxen sodium (ALEVE) 220 MG tablet Take 220 mg by mouth 2 (Two) Times a Day As Needed.     • omeprazole (priLOSEC) 40 MG capsule TAKE ONE CAPSULE BY MOUTH DAILY 30 capsule 5   • [DISCONTINUED] amitriptyline (ELAVIL) 10 MG tablet Take 10 mg by mouth Every Night.     • [DISCONTINUED] VAQTA 50 UNIT/ML injection      • B Complex-C-E-Zn (B COMPLEX-C-E-ZINC) tablet Take 1 tablet by mouth Daily.       No facility-administered encounter medications on file as of 5/21/2018.         Reviewed use of high risk medication in the elderly: yes  Reviewed for potential of harmful drug interactions in the elderly: yes    Follow Up:  Return in about 6 months (around 11/21/2018) for Recheck.     An After Visit Summary and PPPS with all of these plans were given to the patient.

## 2018-05-21 NOTE — PROGRESS NOTES
"Subjective   Shanika Hector is a 63 y.o. female.     Chief Complaint   Patient presents with   • Annual Exam     adult wellness exam        History of Present Illness    Chronic back pain.  Low back pain.  Known degenerative disc disease.  Previous seen by rheumatology.  Positive ITZEL but no definite lupus or rheumatoid arthritis.  Was on Plaquenil and a high strength NSAID for period time that was stopped.  She has ongoing low back pain with some right lateral radiculopathy.  They're concerned about a possible femoral nerve entrapment.  She is undergoing a block soon.  She has some trouble with sleep but no depression symptoms.  She has no discomfort above the waist.  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  She has taken amitriptyline in the past with some improvement in sleep and overall feeling better.  She restarted it recently which seems to help.  She takes acetaminophen about 3 g a day.  No NSAID use.  She continues her omeprazole for GERD.      The following portions of the patient's history were reviewed and updated as appropriate: allergies, current medications, past family history, past medical history, past social history, past surgical history and problem list.          Review of Systems   Constitutional: Negative.    HENT: Negative.    Eyes: Negative.    Respiratory: Negative.    Cardiovascular: Negative.    Gastrointestinal: Negative.  Negative for constipation.   Genitourinary: Negative.  Negative for hematuria.   Musculoskeletal: Positive for back pain.   Neurological: Negative.    Psychiatric/Behavioral: Negative.  Negative for dysphoric mood.       Objective   Blood pressure 120/74, pulse 76, temperature 97.9 °F (36.6 °C), height 162.6 cm (64\"), weight 57.4 kg (126 lb 8 oz), SpO2 98 %, not currently breastfeeding.  Physical Exam   Constitutional: She appears well-developed and well-nourished. No distress.   Neck: No thyromegaly present.   Cardiovascular: Normal rate, regular rhythm, " normal heart sounds and intact distal pulses.    Pulmonary/Chest: Effort normal and breath sounds normal.   Abdominal: Soft. Bowel sounds are normal. She exhibits no distension and no mass. There is no tenderness. There is no guarding.   Musculoskeletal: She exhibits no edema.   There is some sacroiliac discomfort to palpation.  Negative straight leg lift.  Strength is 5 out of 5 lower extremities.   Skin: Skin is warm and dry.   Psychiatric: She has a normal mood and affect. Her behavior is normal. Judgment and thought content normal.   Nursing note and vitals reviewed.      Assessment/Plan   Shanika was seen today for annual exam.    Diagnoses and all orders for this visit:    Medicare welcome exam    Gastroesophageal reflux disease without esophagitis    Other chronic pain    Generalized osteoarthritis    Pure hypercholesterolemia  -     Comprehensive Metabolic Panel; Future  -     Lipid Panel; Future    Other orders  -     Pneumococcal Conjugate Vaccine 13-Valent All  -     amitriptyline (ELAVIL) 10 MG tablet; Take 1 tablet by mouth Every Night.      Back pain.  Known degenerative disc disease.  Also reported lateral femoral nerve entrapment.  She is followed by pain management.  I'm recommending restarting amitriptyline 10 g by mouth daily at bedtime.  Moderately high dose of acetaminophen.  I recommend she take Aleve over-the-counter one tablet twice a day and try to keep the acetaminophen under 3 g a day.  I'll see her back in recheck 6 months.     Hyperlipidemia.  High HDL.  Exercise as possible.  Recheck prior to next visit.

## 2018-05-24 ENCOUNTER — DOCUMENTATION (OUTPATIENT)
Dept: PAIN MEDICINE | Facility: CLINIC | Age: 63
End: 2018-05-24

## 2018-05-25 NOTE — PROGRESS NOTES
Lateral Femoral Nerve Blockade, unilateral, with Ultrasound Guidance  [facility]       PREOPERATIVE DIAGNOSIS Meralgia Paresthetica  POSTOPERATIVE DIAGNOSIS Same as preoperative    Procedure:  CPT 68989,   Diagnostic  Lateral Femoral Cutaneous Nerve Blockade, on the Right, with Ultrasound Guidance      PREPROCEDURE DISCUSSION:  Risks and complications were discussed with the patient prior to starting the procedure, including but not to limited bleeding and ecchymosis and hematoma, infection/peritonitis, visceral injury, peritoneal irritation, risk of nerve injury and paralysis, risks of coma and seizure and death, risk of a lack of pain relief, and risks of postprocedural soreness or painful flare.  Informed consent was obtained.      SURGEON:  Facundo Huerta MD    REASON FOR PROCEDURE:  Pain radiation c/w LFCN.    SEDATION:  Versed 4mg IV    ANESTHETIC SOLUTION:   Marcaine 0.5%  STEROID:  Methylprednisolone (DEPO MEDROL) 80mg/ml  TOTAL VOLUME OF SOLUTION: 8 mL    DESCRIPTION OF PROCEDURE:  After obtaining informed consent, IV access was obtained in the preoperative area and the patient was transported to the operative suite.  The patient was placed in a supine position with proper support.  All pressure points were well padded.  The patient's arms were resting comfortably at the sides.  The patient was monitored throughout the procedure with routine monitoring.  The inferior lateral aspect of the abdominal wall were prepped with chlorhexidine solution in a wide manner and draped in sterile routine fashion.    The ultrasound probe is placed  perpendicular to the inguinal ligament, with the lateral aspect of the probe overlying the ASIS, and the probe is oriented obliquely & pointing toward the pubic symphysis.  The probe is moved inferior and medial.  After identifying the femoral artery and vein, with further movement the Sartorius muscle was identified just lateral to the Femoral  Nerve/Artery/Vein.  The muscle was followed to where the Tensor Fascia Mary Muscle appeared shallow and superior to the Sartorious and the lateral femoral cutaneous nerve was identified in the plane. A 25-gauge needle was used to anesthetize the skin entry point, then the needle was advanced from the lateral border of the ultrasound transducer using an in-plane approach until the needle tip rested inside the fascial plane containing the LFC nerve.  Aspiration is checked and was confirmed negative.  Then the solution is slowly injected under ultrasound guidance with minimal resistance to injection. The needle is then removed intact.      ESTIMATED BLOOD LOSS:  minimal  SPECIMENS:  None    COMPLICATIONS: No complications were noted. and no postop weakness.    TOLERANCE AND DISCHARGE CONDITION:  The patient tolerated the procedure well and was transported to the recovery area without difficulties.  Vital signs were stable throughout.  Sterile pressure dressing and ice pack were applied to the injection site per routine.  The patient was discharged home under the care of family in stable and satisfactory condition.      PLAN OF CARE:    1. The patient was given our standard instruction sheet and will resume medications as per the medication reconciliation sheet.  2. The patient is asked to keep a pain log hourly for 8 hours after the procedure.  3. We discussed the potential diagnostic utility and nature of this blockade to distinguish between abdominal wall pain pathology versus lumbar nerve root pain pathology. Diagnostic positive results may indicate appropriateness of repeated injection for therapeutic purposes to block nerves and settle inflammation of the nerves.  4. The patient will Repeat injection 2 wks.

## 2018-06-07 ENCOUNTER — DOCUMENTATION (OUTPATIENT)
Dept: PAIN MEDICINE | Facility: CLINIC | Age: 63
End: 2018-06-07

## 2018-06-07 ENCOUNTER — OUTSIDE FACILITY SERVICE (OUTPATIENT)
Dept: PAIN MEDICINE | Facility: CLINIC | Age: 63
End: 2018-06-07

## 2018-06-07 PROCEDURE — 64450 NJX AA&/STRD OTHER PN/BRANCH: CPT | Performed by: ANESTHESIOLOGY

## 2018-06-07 PROCEDURE — 76942 ECHO GUIDE FOR BIOPSY: CPT | Performed by: ANESTHESIOLOGY

## 2018-06-10 ENCOUNTER — DOCUMENTATION (OUTPATIENT)
Dept: PAIN MEDICINE | Facility: CLINIC | Age: 63
End: 2018-06-10

## 2018-06-10 NOTE — PROGRESS NOTES
Lateral Femoral Nerve Blockade, unilateral, with Ultrasound Guidance  [facility]      PREOPERATIVE DIAGNOSIS Meralgia Paresthetica  POSTOPERATIVE DIAGNOSIS Same as preoperative    Procedure:  CPT 81522,   Diagnostic  Lateral Femoral Cutaneous Nerve Blockade, on the Right, with Ultrasound Guidance      PREPROCEDURE DISCUSSION:  Risks and complications were discussed with the patient prior to starting the procedure, including but not to limited bleeding and ecchymosis and hematoma, infection/peritonitis, visceral injury, peritoneal irritation, risk of nerve injury and paralysis, risks of coma and seizure and death, risk of a lack of pain relief, and risks of postprocedural soreness or painful flare.  Informed consent was obtained.      SURGEON:  Facundo Huerta MD    REASON FOR PROCEDURE:  Pain distribution on Right seems c/w meralgia paresthetica    SEDATION:  Versed 4mg IV    ANESTHETIC SOLUTION:   Marcaine 0.5%  STEROID:  Methylprednisolone (DEPO MEDROL) 80mg/ml  TOTAL VOLUME OF SOLUTION: 8 mL    DESCRIPTION OF PROCEDURE:  After obtaining informed consent, IV access was obtained in the preoperative area and the patient was transported to the operative suite.  The patient was placed in a supine position with proper support.  All pressure points were well padded.  The patient's arms were resting comfortably at the sides.  The patient was monitored throughout the procedure with routine monitoring.  The inferior lateral aspect of the abdominal wall were prepped with chlorhexidine solution in a wide manner and draped in sterile routine fashion.    The ultrasound probe is placed  perpendicular to the inguinal ligament, with the lateral aspect of the probe overlying the ASIS, and the probe is oriented obliquely & pointing toward the pubic symphysis.  The probe is moved inferior and medial.  After identifying the femoral artery and vein, with further movement the Sartorius muscle was identified just  lateral to the Femoral Nerve/Artery/Vein.  The muscle was followed to where the Tensor Fascia Mary Muscle appeared shallow and superior to the Sartorious and the lateral femoral cutaneous nerve was identified in the plane. A 25-gauge needle was used to anesthetize the skin entry point, then the needle was advanced from the lateral border of the ultrasound transducer using an in-plane approach until the needle tip rested inside the fascial plane containing the LFC nerve.  Aspiration is checked and was confirmed negative.  Then the solution is slowly injected under ultrasound guidance with minimal resistance to injection. The needle is then removed intact.      ESTIMATED BLOOD LOSS:  minimal  SPECIMENS:  None    COMPLICATIONS: No complications were noted.    TOLERANCE AND DISCHARGE CONDITION:  The patient tolerated the procedure well and was transported to the recovery area without difficulties.  Vital signs were stable throughout.  Sterile pressure dressing and ice pack were applied to the injection site per routine.  The patient was discharged home under the care of family in stable and satisfactory condition.      PLAN OF CARE:    1. The patient was given our standard instruction sheet and will resume medications as per the medication reconciliation sheet.  2. The patient is asked to keep a pain log hourly for 8 hours after the procedure.  3. We discussed the potential diagnostic utility and nature of this blockade to distinguish between abdominal wall pain pathology versus lumbar nerve root pain pathology. Diagnostic positive results may indicate appropriateness of repeated injection for therapeutic purposes to block nerves and settle inflammation of the nerves.  4. The patient will Return to clinic 2 wks.

## 2018-06-11 ENCOUNTER — OFFICE VISIT (OUTPATIENT)
Dept: FAMILY MEDICINE CLINIC | Facility: CLINIC | Age: 63
End: 2018-06-11

## 2018-06-11 VITALS
HEIGHT: 64 IN | HEART RATE: 80 BPM | OXYGEN SATURATION: 98 % | DIASTOLIC BLOOD PRESSURE: 80 MMHG | SYSTOLIC BLOOD PRESSURE: 116 MMHG | WEIGHT: 125.8 LBS | TEMPERATURE: 97.8 F | BODY MASS INDEX: 21.48 KG/M2

## 2018-06-11 DIAGNOSIS — N30.90 CYSTITIS: Primary | ICD-10-CM

## 2018-06-11 LAB
BILIRUB BLD-MCNC: NEGATIVE MG/DL
CLARITY, POC: ABNORMAL
COLOR UR: YELLOW
GLUCOSE UR STRIP-MCNC: NEGATIVE MG/DL
KETONES UR QL: NEGATIVE
LEUKOCYTE EST, POC: ABNORMAL
NITRITE UR-MCNC: NEGATIVE MG/ML
PH UR: 6 [PH] (ref 5–8)
PROT UR STRIP-MCNC: ABNORMAL MG/DL
RBC # UR STRIP: ABNORMAL /UL
SP GR UR: 1.02 (ref 1–1.03)
UROBILINOGEN UR QL: NORMAL

## 2018-06-11 PROCEDURE — 81003 URINALYSIS AUTO W/O SCOPE: CPT | Performed by: FAMILY MEDICINE

## 2018-06-11 PROCEDURE — 99213 OFFICE O/P EST LOW 20 MIN: CPT | Performed by: FAMILY MEDICINE

## 2018-06-11 RX ORDER — NITROFURANTOIN 25; 75 MG/1; MG/1
100 CAPSULE ORAL 2 TIMES DAILY
Qty: 14 CAPSULE | Refills: 0 | Status: SHIPPED | OUTPATIENT
Start: 2018-06-11 | End: 2018-07-16 | Stop reason: HOSPADM

## 2018-06-11 NOTE — PROGRESS NOTES
"Subjective   Shanika Hector is a 63 y.o. female.     Chief Complaint   Patient presents with   • Cystitis     x yesterday   • Urinary Frequency        History of Present Illness    UTI May 4, 2018.  Culture demonstrated Escherichia coli a 25,000 units sensitive to attention for and 2 in, Cipro, resistant to sulfa.  Patient is allergic to sulfa.Recurrent symptoms yesterday.  Frequency.  Hesitancy.  Burning.  No blood.  No fever.  No abdominal pain.  Just some suprapubic pressure.  She and epidural steroid injection last week          The following portions of the patient's history were reviewed and updated as appropriate: allergies, current medications, past family history, past medical history, past social history, past surgical history and problem list.          Review of Systems   Constitutional: Negative.    Gastrointestinal: Negative.    Genitourinary: Positive for dysuria.       Objective   Blood pressure 116/80, pulse 80, temperature 97.8 °F (36.6 °C), temperature source Oral, height 162.6 cm (64.02\"), weight 57.1 kg (125 lb 12.8 oz), SpO2 98 %, not currently breastfeeding.  Physical Exam   Constitutional: No distress.   HENT:   Head: Atraumatic.   Abdominal: Soft. Bowel sounds are normal. She exhibits no distension. There is no tenderness.   No CVA tenderness to percussion       Assessment/Plan   Shanika was seen today for cystitis and urinary frequency.    Diagnoses and all orders for this visit:    Cystitis  -     POCT urinalysis dipstick, automated  -     Urine Culture - Urine, Urine, Clean Catch    Other orders  -     nitrofurantoin, macrocrystal-monohydrate, (MACROBID) 100 MG capsule; Take 1 capsule by mouth 2 (Two) Times a Day.        Recurrent cystitis.  Previous culture Escherichia coli sensitive to Macrobid.  Repeating Macrobid twice a day for 7 days.  With worsening symptoms such as vomiting, abdominal pain, fever or other skin concerns, seek medical attention immediately, call with questions.  " Otherwise over-the-counter Azo as needed.

## 2018-06-15 LAB
BACTERIA UR CULT: ABNORMAL
BACTERIA UR CULT: ABNORMAL
OTHER ANTIBIOTIC SUSC ISLT: ABNORMAL

## 2018-06-15 NOTE — PROGRESS NOTES
Urine Culture Positive for UTI. Continue same antibiotic.... Macrobid.  Call or see a doctor if not getting better.

## 2018-07-16 ENCOUNTER — OFFICE VISIT (OUTPATIENT)
Dept: PAIN MEDICINE | Facility: CLINIC | Age: 63
End: 2018-07-16

## 2018-07-16 VITALS
WEIGHT: 128 LBS | BODY MASS INDEX: 21.85 KG/M2 | TEMPERATURE: 97.3 F | RESPIRATION RATE: 18 BRPM | HEART RATE: 91 BPM | SYSTOLIC BLOOD PRESSURE: 127 MMHG | OXYGEN SATURATION: 97 % | DIASTOLIC BLOOD PRESSURE: 78 MMHG | HEIGHT: 64 IN

## 2018-07-16 DIAGNOSIS — M70.61 GREATER TROCHANTERIC BURSITIS OF BOTH HIPS: ICD-10-CM

## 2018-07-16 DIAGNOSIS — G89.29 OTHER CHRONIC PAIN: Primary | ICD-10-CM

## 2018-07-16 DIAGNOSIS — M70.62 GREATER TROCHANTERIC BURSITIS OF BOTH HIPS: ICD-10-CM

## 2018-07-16 DIAGNOSIS — G57.10 LATERAL FEMORAL CUTANEOUS NEUROPATHY, UNSPECIFIED LATERALITY: ICD-10-CM

## 2018-07-16 DIAGNOSIS — M46.1 BILATERAL SACROILIITIS (HCC): ICD-10-CM

## 2018-07-16 PROCEDURE — 20610 DRAIN/INJ JOINT/BURSA W/O US: CPT | Performed by: NURSE PRACTITIONER

## 2018-07-16 PROCEDURE — 99214 OFFICE O/P EST MOD 30 MIN: CPT | Performed by: NURSE PRACTITIONER

## 2018-07-16 RX ORDER — METHYLPREDNISOLONE ACETATE 40 MG/ML
40 INJECTION, SUSPENSION INTRA-ARTICULAR; INTRALESIONAL; INTRAMUSCULAR; SOFT TISSUE
Status: DISCONTINUED | OUTPATIENT
Start: 2018-07-16 | End: 2018-07-16 | Stop reason: HOSPADM

## 2018-07-16 RX ORDER — BUPIVACAINE HYDROCHLORIDE 5 MG/ML
3 INJECTION, SOLUTION PERINEURAL
Status: DISCONTINUED | OUTPATIENT
Start: 2018-07-16 | End: 2018-07-16 | Stop reason: HOSPADM

## 2018-07-16 RX ADMIN — METHYLPREDNISOLONE ACETATE 40 MG: 40 INJECTION, SUSPENSION INTRA-ARTICULAR; INTRALESIONAL; INTRAMUSCULAR; SOFT TISSUE at 14:52

## 2018-07-16 RX ADMIN — METHYLPREDNISOLONE ACETATE 40 MG: 40 INJECTION, SUSPENSION INTRA-ARTICULAR; INTRALESIONAL; INTRAMUSCULAR; SOFT TISSUE at 14:54

## 2018-07-16 RX ADMIN — BUPIVACAINE HYDROCHLORIDE 3 ML: 5 INJECTION, SOLUTION PERINEURAL at 14:54

## 2018-07-16 RX ADMIN — BUPIVACAINE HYDROCHLORIDE 3 ML: 5 INJECTION, SOLUTION PERINEURAL at 14:52

## 2018-07-16 NOTE — PROGRESS NOTES
CHIEF COMPLAINT  Back pain is unchanged since last visit.    Subjective   Shanika Hector is a 63 y.o. female  who presents to the office for follow-up of procedure.  She completed a Lateral Femoral Nerve Blockade, unilateral, with Ultrasound Guidance   on  5/24/18 6/7/18 performed by Dr. HUERTA for management of leg pain. Patient reports 80-90% ongoing relief from the procedure.  However, she is having posterior thigh pain now.     Last evaluated in office by Dr. Huerta on 5/14/18.  Previously she had a bilateral L4-S3 radiofrequency lesioning.  In April she was reporting no back pain.  Her primary complaint was her right thigh pain.  She is ordered to have a lateral femoral cutaneous nerve block but did not proceed with this.  Requested more information regarding the lateral femoral cutaneous nerve block.  Has stopped gabapentin due to cost and some concern about weight gain.  Also has concerns about taking more drugs and the affect it can have on her IBS.  Recommend neurological opinion as I have concerns about myalgia paresthetica.  She later decided that she didn't want to go with the [facility] WEISS and Dr. Bill Roland's assessment and would like to proceed with a ultrasound-guided lateral femoral cutaneous nerve block.    Patient completed a right side and lateral femoral cutaneous nerve block on 5/24/18 and 6/7/18.    Reviewed Dr. Roland office note 12/4/17.  Patient is osteoporotic with a history of bilateral leg pain.  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  Plan is to trial gabapentin.  Consider rheumatology consult.  Also referred to physical therapy.  Prescribed topical NSAIDs.  Also recommend topical diclofenac for SI joint issues.    Complains of pain in bilateral hips. THis pain is continuous and worsening. Today her pain is 9/10VAS. Pain increases with walking, standing, prolonged sitting, driving, laying on side; pain decreases with changing position. Pain can interfere with her sleep. ADL's by  self.   Back Pain   This is a chronic problem. The current episode started more than 1 year ago (JUly 2016). The problem occurs constantly. Progression since onset: improved with RFL. The pain is present in the sacro-iliac. The quality of the pain is described as aching and shooting. The pain is at a severity of 0/10. The patient is experiencing no pain. The symptoms are aggravated by sitting and standing. Associated symptoms include leg pain and paresthesias. Pertinent negatives include no chest pain, dysuria, fever, headaches, numbness or weakness. Risk factors: no trauma. She has tried analgesics, bed rest, heat, home exercises, ice, NSAIDs and walking for the symptoms. The treatment provided mild (L4-S3 RFL--- significant improvement in low back pain) relief.   Leg Pain    Incident onset: July 2016. The pain is present in the right thigh, right knee, right ankle, right foot and right toes. The quality of the pain is described as burning (numbing). The pain is at a severity of 7/10. The pain is moderate. The pain has been worsening since onset. Associated symptoms include an inability to bear weight and a loss of motion. Pertinent negatives include no numbness. The symptoms are aggravated by movement and weight bearing (laying on right side). She has tried acetaminophen, elevation, heat, ice, non-weight bearing, NSAIDs and rest (PT, TFLESI) for the symptoms. The treatment provided moderate relief.      PEG Assessment   What number best describes your pain on average in the past week?9  What number best describes how, during the past week, pain has interfered with your enjoyment of life?9  What number best describes how, during the past week, pain has interfered with your general activity?  9    The following portions of the patient's history were reviewed and updated as appropriate: allergies, current medications, past family history, past medical history, past social history, past surgical history and problem  "list.    Review of Systems   Constitutional: Negative for chills and fever.   Respiratory: Negative for shortness of breath.    Cardiovascular: Negative for chest pain.   Gastrointestinal: Negative for constipation, diarrhea, nausea and vomiting.   Genitourinary: Negative for difficulty urinating, dyspareunia and dysuria.   Musculoskeletal: Positive for back pain.   Neurological: Positive for paresthesias. Negative for dizziness, weakness, light-headedness, numbness and headaches.   Psychiatric/Behavioral: Negative for confusion, hallucinations, self-injury, sleep disturbance and suicidal ideas. The patient is not nervous/anxious.        Vitals:    07/16/18 1408   BP: 127/78   Pulse: 91   Resp: 18   Temp: 97.3 °F (36.3 °C)   SpO2: 97%   Weight: 58.1 kg (128 lb)   Height: 162.6 cm (64.02\")   PainSc:   9   PainLoc: Leg     Objective   Physical Exam   Constitutional: She is oriented to person, place, and time. She appears well-developed and well-nourished.   HENT:   Head: Normocephalic and atraumatic.   Eyes: Conjunctivae are normal.   Neck: Neck supple.   Cardiovascular: Normal rate.    Pulmonary/Chest: Effort normal.   Musculoskeletal:        Cervical back: She exhibits tenderness (left trapezius spasm), swelling, pain and spasm.        Lumbar back: She exhibits no tenderness and no bony tenderness.        Legs:  NEgative tenderness of Si joint, Negative tenderness of bilateral Lumbar facets    Negative SLR bilaterally    Exquisite tenderness of bilateral greater trochanteric bursa   Neurological: She is alert and oriented to person, place, and time. She has normal strength. No cranial nerve deficit. Gait normal.   Reflex Scores:       Patellar reflexes are 1+ on the right side and 1+ on the left side.       Achilles reflexes are 1+ on the right side and 1+ on the left side.  Psychiatric: She has a normal mood and affect. Her behavior is normal.   Nursing note and vitals reviewed.      Large Joint " Arthrocentesis  Date/Time: 7/16/2018 2:52 PM  Consent given by: patient  Site marked: site marked  Timeout: Immediately prior to procedure a time out was called to verify the correct patient, procedure, equipment, support staff and site/side marked as required   Supporting Documentation  Indications: pain and diagnostic evaluation   Procedure Details  Location: hip - L greater trochanteric bursa  Preparation: Patient was prepped and draped in the usual sterile fashion  Needle size: 25 G  Approach: lateral  Medications administered: 40 mg methylPREDNISolone acetate 40 MG/ML; 3 mL bupivacaine 0.5 %  Aspirate amount: 0 mL  Patient tolerance: patient tolerated the procedure well with no immediate complications    Large Joint Arthrocentesis  Date/Time: 7/16/2018 2:54 PM  Consent given by: patient  Site marked: site marked  Timeout: Immediately prior to procedure a time out was called to verify the correct patient, procedure, equipment, support staff and site/side marked as required   Supporting Documentation  Indications: diagnostic evaluation and pain   Procedure Details  Location: hip - R greater trochanteric bursa  Needle size: 25 G  Approach: lateral  Medications administered: 3 mL bupivacaine 0.5 %; 40 mg methylPREDNISolone acetate 40 MG/ML  Aspirate amount: 0 mL  Patient tolerance: patient tolerated the procedure well with no immediate complications          Assessment/Plan   Shanika was seen today for back pain and extremity pain.    Diagnoses and all orders for this visit:    Other chronic pain    Bilateral sacroiliitis (CMS/HCC)    Lateral femoral cutaneous neuropathy, unspecified laterality    Greater trochanteric bursitis of both hips  -     Large Joint Arthrocentesis  -     Large Joint Arthrocentesis      --- bilateral greater trochanteric bursa injection.  --- Monitor results from other procedures.  --- Follow-up 3-4 weeks or sooner if needed.       RAMONITA REPORT  RAMONITA report has been reviewed and scanned  into the patient's chart.    As the clinician, I personally reviewed the RAMONITA from 7-13-18 while the patient was in the office today.          EMR Dragon/Transcription disclaimer:   Much of this encounter note is an electronic transcription/translation of spoken language to printed text. The electronic translation of spoken language may permit erroneous, or at times, nonsensical words or phrases to be inadvertently transcribed; Although I have reviewed the note for such errors, some may still exist.

## 2018-08-13 ENCOUNTER — OFFICE VISIT (OUTPATIENT)
Dept: PAIN MEDICINE | Facility: CLINIC | Age: 63
End: 2018-08-13

## 2018-08-13 VITALS
HEIGHT: 64 IN | RESPIRATION RATE: 15 BRPM | TEMPERATURE: 96.7 F | BODY MASS INDEX: 21.72 KG/M2 | WEIGHT: 127.2 LBS | OXYGEN SATURATION: 99 % | DIASTOLIC BLOOD PRESSURE: 78 MMHG | SYSTOLIC BLOOD PRESSURE: 123 MMHG | HEART RATE: 84 BPM

## 2018-08-13 DIAGNOSIS — G89.29 OTHER CHRONIC PAIN: Primary | ICD-10-CM

## 2018-08-13 DIAGNOSIS — M99.04 SOMATIC DYSFUNCTION OF SACROILIAC JOINT: ICD-10-CM

## 2018-08-13 DIAGNOSIS — G57.10 LATERAL FEMORAL CUTANEOUS NEUROPATHY, UNSPECIFIED LATERALITY: ICD-10-CM

## 2018-08-13 DIAGNOSIS — M51.36 DDD (DEGENERATIVE DISC DISEASE), LUMBAR: ICD-10-CM

## 2018-08-13 DIAGNOSIS — M47.816 LUMBAR FACET ARTHROPATHY: ICD-10-CM

## 2018-08-13 PROCEDURE — 99213 OFFICE O/P EST LOW 20 MIN: CPT | Performed by: NURSE PRACTITIONER

## 2018-08-13 RX ORDER — AMITRIPTYLINE HYDROCHLORIDE 10 MG/1
10 TABLET, FILM COATED ORAL NIGHTLY
COMMUNITY
End: 2018-11-12

## 2018-08-13 NOTE — PROGRESS NOTES
"CHIEF COMPLAINT  F/U back, hip, and bilateral leg pain. States her pain has improved a little since last visit.     Subjective   Shanika Hector \"Shivani\" is a 63 y.o. female  who presents to the office for follow-up.She has a history of chronic back and leg pain. Reports her pain is slightly improved since last office visit. At her last office visit, she had bilateral greater trochanteric bursa injections.  She reports 25% relief with this. But admits she is sleeping better and able to sit for longer periods. Admits she is not taking as much Tylenol. Keeps at 2000 mg/day with no Aleve.  Is no longer having posterior or anterior thigh pain. \"it's a 3 inch swath\" near her buttocks. 'I'm tolerating it.\"  She states her pain is generally at a \"constant 3.\" '\"as long as i'm distracted, I hardly notice it.\"    Was recently re-started on Amitriptylline. Prescribed by Dr. Vega. Does notice improvement in her sleep with this as well. Admits when she sleeps well \"My pain is better controlled.\"  Admits she has not been doing HEP as previously prescribed by her PT. Pt is $40 co-pay.     Complains of pain in her low back, bilateral buttocks and thighs. TOday her pain is 6/10VAS. (after pain scale education, she did admit her pain was 3/10VAS)> Describes her pain as intermittent and improved. Pain decreases with injections and \"distractions.\" ADL's by self.     She completed a Lateral Femoral Nerve Blockade, unilateral, with Ultrasound Guidance   on  5/24/18 6/7/18 performed by Dr. BERNARDO for management of leg pain. Patient reported 80-90% ongoing relief from the procedure at last office visit.  Previously she had a bilateral L4-S3 radiofrequency lesioning 3-8-16.    Has previously had 2 rheumatology consults.  Has been seen by Dr. Roland who recommended no surgery and had concern for SI joint dysfunction.  Refuses referral to neurology.    Back Pain   This is a chronic problem. The current episode started more than 1 year ago (JUly " 2016). The problem occurs constantly. Progression since onset: improved with RFL. The pain is present in the sacro-iliac. The quality of the pain is described as aching and shooting. The pain is at a severity of 6/10. The patient is experiencing no pain. The symptoms are aggravated by sitting and standing. Associated symptoms include leg pain, numbness (toes-baseline) and paresthesias. Pertinent negatives include no chest pain, dysuria, fever, headaches or weakness. Risk factors: no trauma. She has tried analgesics, bed rest, heat, home exercises, ice, NSAIDs and walking for the symptoms. The treatment provided mild (L4-S3 RFL--- significant improvement in low back pain) relief.   Leg Pain    Incident onset: July 2016. The pain is present in the right thigh, right knee, right ankle, right foot and right toes. The quality of the pain is described as burning (numbing). The pain is moderate. The pain has been worsening since onset. Associated symptoms include an inability to bear weight, a loss of motion and numbness (toes-baseline). The symptoms are aggravated by movement and weight bearing (laying on right side). She has tried acetaminophen, elevation, heat, ice, non-weight bearing, NSAIDs and rest (PT, TFLESI) for the symptoms. The treatment provided moderate relief.   Hip Pain    The incident occurred more than 1 week ago. There was no injury mechanism. The pain is present in the left hip and right hip. The quality of the pain is described as aching. The pain is at a severity of 6/10. The pain is moderate. The pain has been fluctuating (improved since last office visit) since onset. Associated symptoms include an inability to bear weight, a loss of motion and numbness (toes-baseline). Treatments tried: bilateral GTB injections- 7-13-18.     PEG Assessment   What number best describes your pain on average in the past week?7  What number best describes how, during the past week, pain has interfered with your  "enjoyment of life?7  What number best describes how, during the past week, pain has interfered with your general activity?  6    The following portions of the patient's history were reviewed and updated as appropriate: allergies, current medications, past family history, past medical history, past social history, past surgical history and problem list.    Review of Systems   Constitutional: Negative for chills and fever.   Respiratory: Negative for cough, shortness of breath and wheezing.    Cardiovascular: Negative for chest pain.   Gastrointestinal: Negative for constipation (\"managed\"), diarrhea, nausea and vomiting.   Genitourinary: Negative for difficulty urinating, dyspareunia and dysuria.   Musculoskeletal: Positive for back pain.   Neurological: Positive for numbness (toes-baseline) and paresthesias. Negative for dizziness, weakness, light-headedness and headaches.   Psychiatric/Behavioral: Negative for agitation, confusion, hallucinations, self-injury, sleep disturbance (pt on amitriptyline) and suicidal ideas. The patient is not nervous/anxious.        Vitals:    08/13/18 0819   BP: 123/78   Pulse: 84   Resp: 15   Temp: 96.7 °F (35.9 °C)   SpO2: 99%   Weight: 57.7 kg (127 lb 3.2 oz)   Height: 162.6 cm (64.02\")   PainSc:   6   PainLoc: Back  Comment: bilateral legs, and hips     Objective   Physical Exam   Constitutional: She is oriented to person, place, and time. She appears well-developed and well-nourished.   HENT:   Head: Normocephalic and atraumatic.   Eyes: Conjunctivae are normal.   Neck: Neck supple.   Cardiovascular: Normal rate.    Pulmonary/Chest: Effort normal.   Musculoskeletal:        Lumbar back: She exhibits no tenderness and no bony tenderness.   Negative tenderness of Si joint, Negative tenderness of bilateral Lumbar facets    Negative SLR bilaterally    MILD tenderness of bilateral greater trochanteric bursa   Neurological: She is alert and oriented to person, place, and time. Gait " normal.   Reflex Scores:       Patellar reflexes are 1+ on the right side and 1+ on the left side.  Psychiatric: She has a normal mood and affect. Her behavior is normal.   Nursing note and vitals reviewed.    Assessment/Plan   Shanika was seen today for extremity pain and back pain.    Diagnoses and all orders for this visit:    Other chronic pain    Lumbar facet arthropathy    DDD (degenerative disc disease), lumbar    Somatic dysfunction of sacroiliac joint    Lateral femoral cutaneous neuropathy, unspecified laterality      --- Continue with regimen. NO plans for interventions at this time.  --- HEP. Encouraged patient to re-start.  --- Extensive education given regarding pain scale.  --- Declined referral to neurology  ---declined referral to pain psychologist.  --- Follow-up 3 months or sooner if needed.    This was an extended office visit. Over 15 minutes was spent in face to face contact with the patient. Over half of the time was spent in education and counseling, specific to pain expectations and coping.        RAMONITA REPORT      RAMONITA report has been reviewed and scanned into the patient's chart.    As the clinician, I personally reviewed the RAMONITA from 8-9-18 while the patient was in the office today.          EMR Dragon/Transcription disclaimer:   Much of this encounter note is an electronic transcription/translation of spoken language to printed text. The electronic translation of spoken language may permit erroneous, or at times, nonsensical words or phrases to be inadvertently transcribed; Although I have reviewed the note for such errors, some may still exist.

## 2018-08-19 ENCOUNTER — RESULTS ENCOUNTER (OUTPATIENT)
Dept: FAMILY MEDICINE CLINIC | Facility: CLINIC | Age: 63
End: 2018-08-19

## 2018-08-19 DIAGNOSIS — E78.00 PURE HYPERCHOLESTEROLEMIA: ICD-10-CM

## 2018-09-17 ENCOUNTER — TELEPHONE (OUTPATIENT)
Dept: PAIN MEDICINE | Facility: CLINIC | Age: 63
End: 2018-09-17

## 2018-09-17 DIAGNOSIS — M25.50 ARTHRALGIA, UNSPECIFIED JOINT: Primary | ICD-10-CM

## 2018-10-06 DIAGNOSIS — K21.9 GASTROESOPHAGEAL REFLUX DISEASE WITHOUT ESOPHAGITIS: ICD-10-CM

## 2018-10-08 RX ORDER — OMEPRAZOLE 40 MG/1
CAPSULE, DELAYED RELEASE ORAL
Qty: 30 CAPSULE | Refills: 5 | Status: SHIPPED | OUTPATIENT
Start: 2018-10-08 | End: 2019-04-12 | Stop reason: SDUPTHER

## 2018-11-05 LAB
ALBUMIN SERPL-MCNC: 4.6 G/DL (ref 3.5–5.2)
ALBUMIN/GLOB SERPL: 1.6 G/DL
ALP SERPL-CCNC: 69 U/L (ref 39–117)
ALT SERPL-CCNC: 12 U/L (ref 1–33)
AST SERPL-CCNC: 16 U/L (ref 1–32)
BILIRUB SERPL-MCNC: 0.5 MG/DL (ref 0.1–1.2)
BUN SERPL-MCNC: 18 MG/DL (ref 8–23)
BUN/CREAT SERPL: 25.4 (ref 7–25)
CALCIUM SERPL-MCNC: 9.4 MG/DL (ref 8.6–10.5)
CHLORIDE SERPL-SCNC: 101 MMOL/L (ref 98–107)
CHOLEST SERPL-MCNC: 229 MG/DL (ref 0–200)
CO2 SERPL-SCNC: 28.6 MMOL/L (ref 22–29)
CREAT SERPL-MCNC: 0.71 MG/DL (ref 0.57–1)
GLOBULIN SER CALC-MCNC: 2.8 GM/DL
GLUCOSE SERPL-MCNC: 95 MG/DL (ref 65–99)
HDLC SERPL-MCNC: 72 MG/DL (ref 40–60)
LDLC SERPL CALC-MCNC: 141 MG/DL (ref 0–100)
POTASSIUM SERPL-SCNC: 4.3 MMOL/L (ref 3.5–5.2)
PROT SERPL-MCNC: 7.4 G/DL (ref 6–8.5)
SODIUM SERPL-SCNC: 142 MMOL/L (ref 136–145)
TRIGL SERPL-MCNC: 80 MG/DL (ref 0–150)
VLDLC SERPL CALC-MCNC: 16 MG/DL (ref 5–40)

## 2018-11-12 ENCOUNTER — OFFICE VISIT (OUTPATIENT)
Dept: PAIN MEDICINE | Facility: CLINIC | Age: 63
End: 2018-11-12

## 2018-11-12 ENCOUNTER — OFFICE VISIT (OUTPATIENT)
Dept: FAMILY MEDICINE CLINIC | Facility: CLINIC | Age: 63
End: 2018-11-12

## 2018-11-12 VITALS
WEIGHT: 129 LBS | DIASTOLIC BLOOD PRESSURE: 79 MMHG | SYSTOLIC BLOOD PRESSURE: 127 MMHG | BODY MASS INDEX: 22.02 KG/M2 | OXYGEN SATURATION: 92 % | RESPIRATION RATE: 18 BRPM | TEMPERATURE: 98.4 F | HEART RATE: 80 BPM | HEIGHT: 64 IN

## 2018-11-12 VITALS
BODY MASS INDEX: 22.04 KG/M2 | DIASTOLIC BLOOD PRESSURE: 77 MMHG | SYSTOLIC BLOOD PRESSURE: 116 MMHG | HEIGHT: 64 IN | HEART RATE: 80 BPM | WEIGHT: 129.1 LBS | TEMPERATURE: 96.7 F | OXYGEN SATURATION: 100 %

## 2018-11-12 DIAGNOSIS — M47.816 LUMBAR FACET ARTHROPATHY: ICD-10-CM

## 2018-11-12 DIAGNOSIS — E78.00 PURE HYPERCHOLESTEROLEMIA: ICD-10-CM

## 2018-11-12 DIAGNOSIS — G57.10 LATERAL FEMORAL CUTANEOUS NEUROPATHY, UNSPECIFIED LATERALITY: ICD-10-CM

## 2018-11-12 DIAGNOSIS — G89.29 OTHER CHRONIC PAIN: Primary | ICD-10-CM

## 2018-11-12 DIAGNOSIS — F41.9 ANXIETY: Primary | ICD-10-CM

## 2018-11-12 DIAGNOSIS — K58.1 IRRITABLE BOWEL SYNDROME WITH CONSTIPATION: ICD-10-CM

## 2018-11-12 DIAGNOSIS — M99.04 SOMATIC DYSFUNCTION OF SACROILIAC JOINT: ICD-10-CM

## 2018-11-12 DIAGNOSIS — M70.60 GREATER TROCHANTERIC BURSITIS, UNSPECIFIED LATERALITY: ICD-10-CM

## 2018-11-12 DIAGNOSIS — H61.21 IMPACTED CERUMEN OF RIGHT EAR: ICD-10-CM

## 2018-11-12 DIAGNOSIS — M51.36 DDD (DEGENERATIVE DISC DISEASE), LUMBAR: ICD-10-CM

## 2018-11-12 PROCEDURE — 69210 REMOVE IMPACTED EAR WAX UNI: CPT | Performed by: FAMILY MEDICINE

## 2018-11-12 PROCEDURE — 99214 OFFICE O/P EST MOD 30 MIN: CPT | Performed by: NURSE PRACTITIONER

## 2018-11-12 PROCEDURE — 99213 OFFICE O/P EST LOW 20 MIN: CPT | Performed by: FAMILY MEDICINE

## 2018-11-12 RX ORDER — IBUPROFEN 800 MG/1
800 TABLET ORAL EVERY 8 HOURS PRN
Qty: 90 TABLET | Refills: 0 | Status: SHIPPED | OUTPATIENT
Start: 2018-11-12 | End: 2019-04-15 | Stop reason: ALTCHOICE

## 2018-11-12 RX ORDER — SERTRALINE HYDROCHLORIDE 25 MG/1
25 TABLET, FILM COATED ORAL DAILY
Qty: 30 TABLET | Refills: 5 | Status: SHIPPED | OUTPATIENT
Start: 2018-11-12 | End: 2019-04-15

## 2018-11-12 NOTE — PROGRESS NOTES
"CHIEF COMPLAINT  F/U back, hip, and bilateral leg pain is unchanged since last visit.    Subjective   Shanika Hector is a 63 y.o. female  who presents to the office for follow-up.She has a history of chronic back pain. Reports this is unchanged since last office visit. She later admits that she has points that are tender and \"maybe I need shots again?\"    Complains of pain in her low back(near buttocks), right hip and left hip. Today her pain is 4/10VAs. Describes her pain as nearly continuous aching. Pain increases with walking, standing; pain decreases with rest, injections and medication.  She takes Tylenol OTC with mild relief. Has stopped taking Elavil 10 mg at night. \"It quit working.\" She states \"I don't like the after effects of Amitriptylline, I felt dopey the next day.\"  ADL's by self.    She completed a Lateral Femoral Nerve Blockade, unilateral, with Ultrasound Guidance   on  5/24/18 6/7/18 performed by Dr. BERNARDO for management of leg pain. Patient reported 80-90% ongoing relief from the procedure at last office visit.  Previously she had a bilateral L4-S3 radiofrequency lesioning 3-8-18.    Has previously had 2 rheumatology consults.  Has been seen by Dr. Roland who recommended no surgery and had concern for SI joint dysfunction. Has been referred to rheumatology, Dr Christina Roland at  January 24th 2019.  Refused referral to neurology.  Back Pain   This is a chronic problem. The current episode started more than 1 year ago (JUly 2016). The problem occurs constantly. Progression since onset: improved with RFL; unchanged since last office visit. The pain is present in the sacro-iliac. The quality of the pain is described as aching and shooting. The pain is at a severity of 4/10. The patient is experiencing no pain. The symptoms are aggravated by sitting and standing. Associated symptoms include leg pain and paresthesias. Pertinent negatives include no chest pain, dysuria, fever, headaches, numbness or " weakness. Risk factors: no trauma. She has tried analgesics, bed rest, heat, home exercises, ice, NSAIDs and walking for the symptoms. The treatment provided mild (L4-S3 RFL--- significant improvement in low back pain) relief.   Leg Pain    Incident onset: July 2016. The pain is present in the right thigh, right knee, right ankle, right foot and right toes. The quality of the pain is described as burning (numbing). The pain is moderate. The pain has been worsening since onset. Associated symptoms include an inability to bear weight and a loss of motion. Pertinent negatives include no numbness. The symptoms are aggravated by movement and weight bearing (laying on right side). She has tried acetaminophen, elevation, heat, ice, non-weight bearing, NSAIDs and rest (PT, TFLESI) for the symptoms. The treatment provided moderate relief.   Hip Pain    The incident occurred more than 1 week ago. There was no injury mechanism. The pain is present in the left hip and right hip. The quality of the pain is described as aching. The pain is at a severity of 4/10. The pain is moderate. The pain has been fluctuating (improved since last office visit) since onset. Associated symptoms include an inability to bear weight and a loss of motion. Pertinent negatives include no numbness. Treatments tried: bilateral GTB injections- 7-13-18.      PEG Assessment   What number best describes your pain on average in the past week?4  What number best describes how, during the past week, pain has interfered with your enjoyment of life?6  What number best describes how, during the past week, pain has interfered with your general activity?  3    The following portions of the patient's history were reviewed and updated as appropriate: allergies, current medications, past family history, past medical history, past social history, past surgical history and problem list.    Review of Systems   Constitutional: Negative for chills and fever.  "  Respiratory: Negative for shortness of breath.    Cardiovascular: Negative for chest pain.   Gastrointestinal: Negative for constipation, diarrhea, nausea and vomiting.   Genitourinary: Negative for difficulty urinating, dyspareunia and dysuria.   Musculoskeletal: Positive for back pain.   Neurological: Positive for paresthesias. Negative for dizziness, weakness, light-headedness, numbness and headaches.   Psychiatric/Behavioral: Negative for confusion, hallucinations, self-injury, sleep disturbance and suicidal ideas. The patient is not nervous/anxious.        Vitals:    11/12/18 0752   BP: 127/79   BP Location: Left arm   Patient Position: Sitting   Pulse: 80   Resp: 18   Temp: 98.4 °F (36.9 °C)   SpO2: 92%   Weight: 58.5 kg (129 lb)   Height: 162.6 cm (64.02\")   PainSc:   4   PainLoc: Back     Objective   Physical Exam   Constitutional: She is oriented to person, place, and time. She appears well-developed and well-nourished.   HENT:   Head: Normocephalic and atraumatic.   Eyes: Conjunctivae are normal.   Neck: Neck supple.   Cardiovascular: Normal rate.   Pulmonary/Chest: Effort normal.   Musculoskeletal:        Lumbar back: She exhibits decreased range of motion, tenderness and bony tenderness.   MODERATE tenderness of bilateral Si joint, Negative tenderness of bilateral Lumbar facets    MODERATE tenderness of bilateral greater trochanteric bursa   Neurological: She is alert and oriented to person, place, and time. Gait normal.   Reflex Scores:       Patellar reflexes are 1+ on the right side and 1+ on the left side.  Psychiatric: She has a normal mood and affect. Her behavior is normal.   Nursing note and vitals reviewed.      Assessment/Plan   Shanika was seen today for back pain.    Diagnoses and all orders for this visit:    Other chronic pain    DDD (degenerative disc disease), lumbar    Lumbar facet arthropathy    Somatic dysfunction of sacroiliac joint  -     Case Request    Greater trochanteric bursitis, " unspecified laterality  -     Case Request    Other orders  -     ibuprofen (ADVIL,MOTRIN) 800 MG tablet; Take 1 tablet by mouth Every 8 (Eight) Hours As Needed for Moderate Pain .      --- Discussed taking Ibuprofen more regularly.  Prescription sent to pharmacy.  --- Bilateral Si joint injection and hip bursa injection. No blood thinners. Reviewed the procedure at length with the patient.  Included in the review was expectations, complications, risk and benefits.The procedure was described in detail and the risks, benefits and alternatives were discussed with the patient (including but not limited to: bleeding, infection, nerve damage, worsening of pain, inability to perform injection, paralysis, seizures, and death) who agreed to proceed.  Discussed the potential for sedation if warranted/wanted.  Questions were answered and in a way the patient could understand.  Patient verbalized understanding and wishes to proceed.  This intervention will be ordered.  --- Follow-up after procedure or sooner if needed.    ----------  Education about Sacroiliac joint injections:  This Sacroiliac joint injection (blockade) we have suggested is intended for diagnostic purposes, with the intent of offering the patient Radiofrequency thermal rhizotomy (RF) of the sensory branches to the joint if the block is diagnostically effective.  The diagnostic blockade is necessary to determine the likelihood that RF therapy could be efficacious in providing long term relief to the patient.    In this procedure, the sacroiliac joint is aligned with imaging, and under image guidance a needle is placed with the needle tip into the joint.  The needle position is confirmed to be appropriately in the joint before injection of medication into the joint.  When xray fluoroscopy is used, contrast dye is used to confirm a proper arthrogram (i.e., outline of the joint).  When ultrasound is used, IV fluid (normal saline) is injected to see the flow of  the fluid into the joint.  Once confirmed, then the medication can be injected into the joint.  Oftentimes this medication is a combination of local anesthetics (for diagnostic purposes) and also a steroid (to decrease irritation & inflammation in the joint, also known as sacroilitis).      Medically, a successful RF procedure should provide a patient with 50% pain relief or more for at least 6 months.  Clinical experience suggests that successful patients receive relief more in the range of 12 months on average.  We also discussed that many patients receive therapeutic success from the intraarticular joint injection, and may not require RF ablation.  If a patient receives more than 8 weeks of relief from joint injection, then occasional repeat joint blocks for therapeutic purposes is a very reasonable alternative therapy.  This course of therapy is consistent with our LCDs according to our CMS  in the area, and therefore other insurance providers should follow accordingly.  We will monitor our patients to screen for these therapeutic responders and will offer RF therapy only when necessary.      We discussed that joint injections & also RF procedures are not without risks.  Best practices regarding anticoagulant use & neuraxial procedures will be respected.  Oftentimes a patient on an anticoagulant can be offered a joint injection safely, but again this is not risk-free, and such patients give consent with regards to this increased bleeding risk, which could cause problems including but not limited to worsening of pain, nerve damage, or muscle damage.  Patients that are ill or otherwise may be at risk for sepsis will not have their spines accessed by neuraxial injections of any type.  This patient will not be offered these therapies if there is an increased risk.   We discussed that there is a risk of postprocedural pain and also a risk of worsening of clinical picture with these procedures as with any  neuraxial procedure.    ----------       RAMONITA REPORT    RAMONITA report has been reviewed and scanned into the patient's chart.    As the clinician, I personally reviewed the RAMONITA from 11-9-18 while the patient was in the office today.          EMR Dragon/Transcription disclaimer:   Much of this encounter note is an electronic transcription/translation of spoken language to printed text. The electronic translation of spoken language may permit erroneous, or at times, nonsensical words or phrases to be inadvertently transcribed; Although I have reviewed the note for such errors, some may still exist.

## 2018-11-12 NOTE — PROGRESS NOTES
"Subjective   Shanika Hector is a 63 y.o. female.     Chief Complaint   Patient presents with   • Heartburn     follow up labs   • Abnormal Lab   • Irritable Bowel Syndrome        History of Present Illness    IBS, chronic pain syndrome.  We attempted to improve things with amitriptyline.  Helped her sleep and meld her mood out slightly, but maybe constipation worse.  She stopped it before she got a therapeutic effect with her chronic pain including the lateral femoral cutaneous neuropathy.  She continues to follow with pain management.  Next    Hyperlipidemia.  The total and LDL cholesterol is improved.    Right ear.  Decreased hearing.  She thinks she has wax.  No ear pain.  No tinnitus.      The following portions of the patient's history were reviewed and updated as appropriate: allergies, current medications, past family history, past medical history, past social history, past surgical history and problem list.          Review of Systems   Constitutional: Negative.    Respiratory: Negative.    Cardiovascular: Negative.    Gastrointestinal: Positive for constipation.   Musculoskeletal: Positive for back pain.   Psychiatric/Behavioral: Positive for dysphoric mood. The patient is nervous/anxious.        Objective   Blood pressure 116/77, pulse 80, temperature 96.7 °F (35.9 °C), temperature source Oral, height 162.6 cm (64.02\"), weight 58.6 kg (129 lb 1.6 oz), SpO2 100 %, not currently breastfeeding.  Physical Exam   Constitutional: She appears well-developed and well-nourished. No distress.   HENT:   Right external canal blocked with hard wax.  Removed with curette by physician.  No complication.   Neck: No thyromegaly present.   Cardiovascular: Normal rate, regular rhythm, normal heart sounds and intact distal pulses.   Pulmonary/Chest: Effort normal and breath sounds normal.   Musculoskeletal: She exhibits no edema.   Skin: Skin is warm and dry.   Psychiatric: She has a normal mood and affect. Her behavior is " normal. Judgment and thought content normal.   Nursing note and vitals reviewed.      Assessment/Plan   Shanika was seen today for heartburn, abnormal lab and irritable bowel syndrome.    Diagnoses and all orders for this visit:    Anxiety    Lateral femoral cutaneous neuropathy, unspecified laterality    Irritable bowel syndrome with constipation    Pure hypercholesterolemia    Impacted cerumen of right ear    Other orders  -     sertraline (ZOLOFT) 25 MG tablet; Take 1 tablet by mouth Daily.      Anxiety.  Chronic pain syndrome.  Lateral femoral kidney is neuropathy.  IBS.  Switching to Zoloft 25 mg a day to help with constipation, and probable mood support.  May also help chronic pain syndrome.  Side effects discussed.  See me in 6 months.    Cerumen impaction right ear.  Wax removed today by curette.

## 2018-11-15 ENCOUNTER — DOCUMENTATION (OUTPATIENT)
Dept: PAIN MEDICINE | Facility: CLINIC | Age: 63
End: 2018-11-15

## 2018-11-15 ENCOUNTER — OUTSIDE FACILITY SERVICE (OUTPATIENT)
Dept: PAIN MEDICINE | Facility: CLINIC | Age: 63
End: 2018-11-15

## 2018-11-15 PROCEDURE — 27096 INJECT SACROILIAC JOINT: CPT | Performed by: ANESTHESIOLOGY

## 2018-11-15 PROCEDURE — 20610 DRAIN/INJ JOINT/BURSA W/O US: CPT | Performed by: ANESTHESIOLOGY

## 2018-11-15 NOTE — PROGRESS NOTES
Bilateral Sacroiliac Joint Injection + Bilateral Greater Trochanteric Hip Bursa injections under fluoroscopic guidance    [facility]        PREOPERATIVE DIAGNOSIS:   Sacroiliac joint dysfunction as well as greater trochanteric hip bursitis     POSTOPERATIVE DIAGNOSIS:  Same as preop     PROCEDURE:  Sacroiliac Joint Injection, Bilaterally, with fluoroscopic guidance, plus bilateral greater trochanteric bursa injections under fluoroscopic guidance as well     PRE-PROCEDURE DISCUSSION WITH PATIENT:    Risks and complications were discussed with the patient prior to starting the procedure and informed consent was obtained.  We discussed various topics including but not limited to bleeding, infection, injury, postprocedural site soreness, painful flareup, worsening of clinical picture, paralysis, coma, and death.      SURGEON:  Facundo Huerta MD     REASON FOR PROCEDURE:    Patient has pain consistent with SI pathology on history and physical exam. Positive sacroiliac provocation maneuvers noted.   Hip bursitis is flared up also.       SEDATION:  Versed 4mg & Fentanyl 50 mcg IV  ANESTHETIC AGENT:  Marcaine 0.5%  STEROID AGENT:  120mg DepoMedrol split between the two SI joint injections and hip bursae     DESCRIPTON OF PROCEDURE:    After obtaining informed consent, IV access was obtained in the preoperative area.  The patient was transported to the operative suite and placed in the prone position with a pillow under the pelvic area. EKG, blood pressure, and pulse oximeter were monitored.     A solution was prepared of 6ml of Marcaine 0.5% and 120mg Depomedrol, for a total of 8ml.    The lumbosacral area was prepped with Chloraprep and draped in a sterile fashion. Under fluoroscopic guidance the inferior most portion of the right SI joint was identified. The overlying skin and subcutaneous tissue was anesthetized with 1% lidocaine. A 22-gauge spinal needle was introduced from the inferior most  portion of the joint into the RIGHT SI joint under fluoroscopic guidance in the AP dimension with slight oblique rotation to the contralateral side.  Aspiration was negative.  After confirming the position of the needle with fluoroscopy, 1 mL of Omnipaque was injected and after seeing appropriate spread into the joint a total of 2mL of 0.5% Marcaine, with approximately 40 mg of DepoMedrol, was injected very slowly.  Needle was removed intact.  A similar procedure was performed on the LEFT side.   Vital signs remained stable.  The onset of analgesia was noted.      Areas over the greater trochanters were palpated and marked on the skin and then cleansed with Hibiclens ×2. A sterile needle was inserted about 1 & 1/2 inches in to the skin and into the bursa, under fluoro guidance, with periosteum contact over the greater trochanter and the needle was withdrawn about 2 mm into the bursa. Aspiration was negative and slowly after confirming bursa spread with omnipaque on each side, equal contents of the injectate syringe were injected into each of the hip bursae.   The needle was removed intact on each side and bleeding was minimal. The insertion site was dressed with a Band-Aid.  There were no apparent complications.         ESTIMATED BLOOD LOSS:  Minimal  SPECIMENS:  None  COMPLICATIONS:   No complications were noted.       TOLERANCE & DISCHARGE CONDITION:    The patient tolerated the procedure well.  The patient was transported to the recovery area without difficulties.  The patient was discharged to home under the care of family in stable and satisfactory condition.     PLAN OF CARE:  1. The patient was given our standard instruction sheet and will resume all medications as per the medication reconciliation sheet.  2. The patient will Return to clinic 4 wks  3. The patient is instructed to keep a pain log hourly for 8 hours after the procedure.

## 2018-12-17 ENCOUNTER — OFFICE VISIT (OUTPATIENT)
Dept: PAIN MEDICINE | Facility: CLINIC | Age: 63
End: 2018-12-17

## 2018-12-17 VITALS
DIASTOLIC BLOOD PRESSURE: 87 MMHG | WEIGHT: 126 LBS | TEMPERATURE: 97.6 F | SYSTOLIC BLOOD PRESSURE: 147 MMHG | OXYGEN SATURATION: 97 % | BODY MASS INDEX: 21.51 KG/M2 | HEART RATE: 73 BPM | RESPIRATION RATE: 18 BRPM | HEIGHT: 64 IN

## 2018-12-17 DIAGNOSIS — M99.04 SOMATIC DYSFUNCTION OF SACROILIAC JOINT: ICD-10-CM

## 2018-12-17 DIAGNOSIS — G89.29 OTHER CHRONIC PAIN: Primary | ICD-10-CM

## 2018-12-17 DIAGNOSIS — M47.816 LUMBAR FACET ARTHROPATHY: ICD-10-CM

## 2018-12-17 DIAGNOSIS — M51.36 DDD (DEGENERATIVE DISC DISEASE), LUMBAR: ICD-10-CM

## 2018-12-17 PROCEDURE — 99212 OFFICE O/P EST SF 10 MIN: CPT | Performed by: NURSE PRACTITIONER

## 2018-12-17 NOTE — PROGRESS NOTES
"CHIEF COMPLAINT  Back and hip pain are unchanged since last visit, she states she received 1/2 day relief with 50% relief from the injecitons    Subjective   Shanika Hector is a 63 y.o. female  who presents to the office for follow-up of procedure.  She completed a Bilateral Sacroiliac Joint Injection + Bilateral Greater Trochanteric Hip Bursa injections under fluoroscopic guidance   on  11/15/18 performed by Dr. BERNARDO for management of BACK AND HIP PAIN. Patient reports 50% temporary relief from the procedure.     Complains of pain in her back and hips. Today her pain is 4/10VAS.  Describes the pain as intermittent aching and numbing. She states her pain is a \"nuisance pain.\"  Her biggest complaint is that she cant lay on her right side to sleep. Pain increases with increased activity and prolonged position; pain decreases with Ibuprofen and injections.  She takes Ibuprofen 800 mg intermittently and Tylenol. ADL's by self.    Has an appointment with Rheumatologist in [place] in late January. Does not want to repeat any injections until that time.     She completed a Lateral Femoral Nerve Blockade, unilateral, with Ultrasound Guidance   on  5/24/18 and 6/7/18 performed by Dr. BERNARDO for management of leg pain. Patient reported 80-90% ongoing relief from the procedure at last office visit.  Previously she had a bilateral L4-S3 radiofrequency lesioning 3-8-18.  Back Pain   This is a chronic problem. The current episode started more than 1 year ago (JUly 2016). The problem occurs constantly. Progression since onset: improved with RFL; unchanged since last office visit. The pain is present in the sacro-iliac. The quality of the pain is described as aching and shooting. The pain is at a severity of 4/10. The patient is experiencing no pain. The symptoms are aggravated by sitting and standing. Associated symptoms include leg pain and paresthesias. Pertinent negatives include no chest pain, dysuria, fever, headaches, " numbness or weakness. Risk factors: no trauma. She has tried analgesics, bed rest, heat, home exercises, ice, NSAIDs and walking for the symptoms. The treatment provided mild (L4-S3 RFL--- significant improvement in low back pain) relief.   Leg Pain    Incident onset: July 2016. The pain is present in the right thigh, right knee, right ankle, right foot and right toes. The quality of the pain is described as burning (numbing). The pain is moderate. The pain has been worsening since onset. Associated symptoms include an inability to bear weight and a loss of motion. Pertinent negatives include no numbness. The symptoms are aggravated by movement and weight bearing (laying on right side). She has tried acetaminophen, elevation, heat, ice, non-weight bearing, NSAIDs and rest (PT, TFLESI) for the symptoms. The treatment provided moderate relief.   Hip Pain    The incident occurred more than 1 week ago. There was no injury mechanism. The pain is present in the left hip and right hip. The quality of the pain is described as aching. The pain is at a severity of 4/10. The pain is moderate. The pain has been fluctuating (improved since last office visit) since onset. Associated symptoms include an inability to bear weight and a loss of motion. Pertinent negatives include no numbness. Treatments tried: bilateral GTB injections- 7-13-18.      PEG Assessment   What number best describes your pain on average in the past week?4  What number best describes how, during the past week, pain has interfered with your enjoyment of life?6  What number best describes how, during the past week, pain has interfered with your general activity?  5    The following portions of the patient's history were reviewed and updated as appropriate: allergies, current medications, past family history, past medical history, past social history, past surgical history and problem list.    Review of Systems   Constitutional: Negative for chills and fever.  "  Respiratory: Negative for shortness of breath.    Cardiovascular: Negative for chest pain.   Gastrointestinal: Negative for constipation, diarrhea, rectal pain and vomiting.   Genitourinary: Negative for difficulty urinating, dyspareunia and dysuria.   Musculoskeletal: Positive for back pain.   Neurological: Positive for paresthesias. Negative for dizziness, weakness, light-headedness, numbness and headaches.   Psychiatric/Behavioral: Positive for sleep disturbance. Negative for confusion, hallucinations, self-injury and suicidal ideas. The patient is not nervous/anxious.        Vitals:    12/17/18 0755   BP: 147/87   BP Location: Left arm   Patient Position: Sitting   Cuff Size: Adult   Pulse: 73   Resp: 18   Temp: 97.6 °F (36.4 °C)   TempSrc: Oral   SpO2: 97%   Weight: 57.2 kg (126 lb)   Height: 162.6 cm (64.02\")   PainSc:   4   PainLoc: Back     Objective   Physical Exam   Constitutional: She is oriented to person, place, and time. She appears well-developed and well-nourished.   HENT:   Head: Normocephalic and atraumatic.   Eyes: Conjunctivae are normal.   Neck: Neck supple.   Cardiovascular: Normal rate.   Pulmonary/Chest: Effort normal.   Musculoskeletal:        Lumbar back: She exhibits decreased range of motion and tenderness.   Rubs lateral right thigh and buttock   Neurological: She is alert and oriented to person, place, and time. Gait normal.   Non-focal   Psychiatric: She has a normal mood and affect. Her behavior is normal.   Nursing note and vitals reviewed.      Assessment/Plan   Shanika was seen today for back pain and hip pain.    Diagnoses and all orders for this visit:    Other chronic pain    DDD (degenerative disc disease), lumbar    Somatic dysfunction of sacroiliac joint    Lumbar facet arthropathy      --- Hold on injections at this time per patient request. Discussed consideration for repeat L4-S3 RFL or bilateral lateral femoral cutaneous block. Patient declined at this time.   --- " Follow-up 2-3 months or sooner if needed.     RAMONITA REPORT  RAMONITA report has been reviewed and scanned into the patient's chart.    As the clinician, I personally reviewed the RAMONITA from 12-14-18 while the patient was in the office today.          EMR Dragon/Transcription disclaimer:   Much of this encounter note is an electronic transcription/translation of spoken language to printed text. The electronic translation of spoken language may permit erroneous, or at times, nonsensical words or phrases to be inadvertently transcribed; Although I have reviewed the note for such errors, some may still exist.

## 2019-04-12 DIAGNOSIS — K21.9 GASTROESOPHAGEAL REFLUX DISEASE WITHOUT ESOPHAGITIS: ICD-10-CM

## 2019-04-12 RX ORDER — OMEPRAZOLE 40 MG/1
CAPSULE, DELAYED RELEASE ORAL
Qty: 30 CAPSULE | Refills: 5 | Status: SHIPPED | OUTPATIENT
Start: 2019-04-12 | End: 2019-10-07 | Stop reason: SDUPTHER

## 2019-04-15 ENCOUNTER — OFFICE VISIT (OUTPATIENT)
Dept: PAIN MEDICINE | Facility: CLINIC | Age: 64
End: 2019-04-15

## 2019-04-15 VITALS
WEIGHT: 125.8 LBS | HEART RATE: 88 BPM | BODY MASS INDEX: 21.48 KG/M2 | OXYGEN SATURATION: 93 % | DIASTOLIC BLOOD PRESSURE: 79 MMHG | RESPIRATION RATE: 16 BRPM | SYSTOLIC BLOOD PRESSURE: 129 MMHG | HEIGHT: 64 IN | TEMPERATURE: 97.7 F

## 2019-04-15 DIAGNOSIS — M46.1 BILATERAL SACROILIITIS (HCC): ICD-10-CM

## 2019-04-15 DIAGNOSIS — G57.10 LATERAL FEMORAL CUTANEOUS NEUROPATHY, UNSPECIFIED LATERALITY: ICD-10-CM

## 2019-04-15 DIAGNOSIS — M51.36 DDD (DEGENERATIVE DISC DISEASE), LUMBAR: ICD-10-CM

## 2019-04-15 DIAGNOSIS — M70.60 GREATER TROCHANTERIC BURSITIS, UNSPECIFIED LATERALITY: ICD-10-CM

## 2019-04-15 DIAGNOSIS — G89.29 OTHER CHRONIC PAIN: Primary | ICD-10-CM

## 2019-04-15 PROCEDURE — 99214 OFFICE O/P EST MOD 30 MIN: CPT | Performed by: NURSE PRACTITIONER

## 2019-04-15 RX ORDER — HYDROXYCHLOROQUINE SULFATE 200 MG/1
200 TABLET, FILM COATED ORAL 2 TIMES DAILY
COMMUNITY
End: 2020-07-06

## 2019-04-15 RX ORDER — SULINDAC 200 MG/1
200 TABLET ORAL 2 TIMES DAILY
COMMUNITY
Start: 2019-03-26 | End: 2020-02-13

## 2019-04-15 RX ORDER — METHOCARBAMOL 500 MG/1
TABLET, FILM COATED ORAL
COMMUNITY
Start: 2019-04-02 | End: 2019-07-20 | Stop reason: HOSPADM

## 2019-04-15 RX ORDER — POLYETHYLENE GLYCOL 3350 17 G/17G
17 POWDER, FOR SOLUTION ORAL DAILY
COMMUNITY
End: 2019-06-10

## 2019-04-15 NOTE — PROGRESS NOTES
"CHIEF COMPLAINT  F/U LBP- patient states that her LBP has worsened and the pain is radiating down bilateral legs R>L.     Subjective   Shanika Hector is a 63 y.o. female  who presents to the office for follow-up.She has a history of chronic back pain. Reports her pain is worse.    Has been seen by rheumatology, Dr Christina Roland, in [place]. Was told she has osteoarthritis. \"she's concerned about my back.\"   Has had lumbar RFL in the past. She reports she was \"numb for 7-8 months.\"  Reports that she had no pain but did not like the numbness.   She has had evaluation with Dr Roland previously. Told no surgical options at this time.     Complains of pain in her low back and thighs. Today her pain is 6/10VAS.  Describes her low back pain as near continuous aching. Describes the leg pain as continuous burning. Pain increase with walking, standing, laying flat on back; pain decreases with \"soemtimes nothing.\" ADL\"s by self. Denies any bowel or bladder changes.  After much discussion, her primary complaint is her thigh and side of hip pain. She would like to address this with interventions first.   Asks about updating lumbar MRI and seeing [facility].     Back Pain   This is a chronic problem. The current episode started more than 1 month ago. The problem occurs constantly. The problem has been gradually worsening since onset. The pain is present in the gluteal, lumbar spine, thoracic spine and sacro-iliac. The quality of the pain is described as aching, shooting and stabbing. The pain radiates to the left thigh and right thigh. The pain is at a severity of 6/10. The pain is the same all the time. The symptoms are aggravated by position, lying down, sitting, standing and twisting. Stiffness is present all day. Associated symptoms include headaches, leg pain, numbness, paresthesias, tingling and weakness. Pertinent negatives include no abdominal pain, bladder incontinence, bowel incontinence, chest pain, dysuria, " "fever, paresis, pelvic pain, perianal numbness or weight loss. Risk factors include poor posture.      Procedures  --11-15-18-- bilateral SI and Greater trochanteric bursa injections  --6-7-18-- lateral femoral cutaneous-- 80-90% long-term.  -- 5-24-18-- lateral femoral cutaneous  --3-8-18-- bilateral L4-S3 RFL.    PEG Assessment   What number best describes your pain on average in the past week?7  What number best describes how, during the past week, pain has interfered with your enjoyment of life?9  What number best describes how, during the past week, pain has interfered with your general activity?  7    The following portions of the patient's history were reviewed and updated as appropriate: allergies, current medications, past family history, past medical history, past social history, past surgical history and problem list.    Review of Systems   Constitutional: Positive for activity change (decreased) and fatigue. Negative for chills, fever and weight loss.   Respiratory: Negative for chest tightness and shortness of breath.    Cardiovascular: Negative for chest pain.   Gastrointestinal: Negative for abdominal pain, bowel incontinence, constipation, diarrhea, rectal pain and vomiting.   Genitourinary: Negative for bladder incontinence, difficulty urinating, dyspareunia, dysuria and pelvic pain.   Musculoskeletal: Positive for back pain.   Neurological: Positive for tingling, weakness, numbness, headaches and paresthesias. Negative for dizziness and light-headedness.   Psychiatric/Behavioral: Positive for sleep disturbance. Negative for agitation, confusion, hallucinations, self-injury and suicidal ideas. The patient is not nervous/anxious.        Vitals:    04/15/19 1030   BP: 129/79   Pulse: 88   Resp: 16   Temp: 97.7 °F (36.5 °C)   SpO2: 93%   Weight: 57.1 kg (125 lb 12.8 oz)   Height: 162.6 cm (64.02\")   PainSc:   6   PainLoc: Back     Objective   Physical Exam   Constitutional: She is oriented to person, " place, and time. She appears well-developed and well-nourished.   HENT:   Head: Normocephalic and atraumatic.   Eyes: Conjunctivae are normal.   Neck: Neck supple.   Cardiovascular: Normal rate.   Pulmonary/Chest: Effort normal.   Musculoskeletal:        Cervical back: She exhibits tenderness (left trapezius spasm), swelling, pain and spasm.        Lumbar back: She exhibits no tenderness and no bony tenderness.        Legs:  Mild to moderate tenderness of Si joint, Mild to moderate  tenderness of bilateral Lumbar facets    Negative SLR bilaterally    Exquisite tenderness of bilateral greater trochanteric bursa   Neurological: She is alert and oriented to person, place, and time. Gait normal.   Reflex Scores:       Patellar reflexes are 1+ on the right side and 1+ on the left side.  Psychiatric: She has a normal mood and affect. Her behavior is normal.   Nursing note and vitals reviewed.      Assessment/Plan   Shanika was seen today for back pain.    Diagnoses and all orders for this visit:    Other chronic pain    DDD (degenerative disc disease), lumbar    Bilateral sacroiliitis (CMS/HCC)    Lateral femoral cutaneous neuropathy, unspecified laterality  -     Case Request    Greater trochanteric bursitis, unspecified laterality  -     Case Request      --- Repeat bilateral lateral femoral cutaneous nerve block with Dr Huerta and bilateral greater trochanteric bursa. Reviewed the procedure at length with the patient.  Included in the review was expectations, complications, risk and benefits.The procedure was described in detail and the risks, benefits and alternatives were discussed with the patient (including but not limited to: bleeding, infection, nerve damage, worsening of pain, inability to perform injection, paralysis, seizures, and death) who agreed to proceed.  Discussed the potential for sedation if warranted/wanted.  The procedure will plan to be performed at [facility] with fluoroscopic  guidance(unless ultrasound is indicated). Questions were answered and in a way the patient could understand.  Patient verbalized understanding and wishes to proceed.  This intervention will be ordered.  --- Consider lumbar RFL. Patient wants to wait at this time.  --- Continue with rheumatology.  --- Follow-up after procedure or sooner if needed.     RAMONITA REPORT    RAMONITA report has been reviewed and scanned into the patient's chart.    As the clinician, I personally reviewed the RAMONITA from 4-12-19 while the patient was in the office today.          EMR Dragon/Transcription disclaimer:   Much of this encounter note is an electronic transcription/translation of spoken language to printed text. The electronic translation of spoken language may permit erroneous, or at times, nonsensical words or phrases to be inadvertently transcribed; Although I have reviewed the note for such errors, some may still exist.

## 2019-04-25 ENCOUNTER — DOCUMENTATION (OUTPATIENT)
Dept: PAIN MEDICINE | Facility: CLINIC | Age: 64
End: 2019-04-25

## 2019-04-25 ENCOUNTER — OUTSIDE FACILITY SERVICE (OUTPATIENT)
Dept: PAIN MEDICINE | Facility: CLINIC | Age: 64
End: 2019-04-25

## 2019-04-25 PROCEDURE — 20610 DRAIN/INJ JOINT/BURSA W/O US: CPT | Performed by: ANESTHESIOLOGY

## 2019-04-25 PROCEDURE — 77002 NEEDLE LOCALIZATION BY XRAY: CPT | Performed by: ANESTHESIOLOGY

## 2019-04-26 NOTE — PROGRESS NOTES
Greater Trochanteric Hip Bursa injection under fluoroscopic guidance    [facility]        PREOPERATIVE DIAGNOSIS:   Greater trochanteric hip bursitis, bilateral     POSTOPERATIVE DIAGNOSIS:  Same as preop     PROCEDURE:  Greater trochanteric bursa injection, under fluoroscopic guidance, bilaterally     PRE-PROCEDURE DISCUSSION WITH PATIENT:    Risks and complications were discussed with the patient prior to starting the procedure and informed consent was obtained.  We discussed various topics including but not limited to bleeding, infection, injury, postprocedural site soreness, painful flareup, worsening of clinical picture, paralysis, coma, and death.      SURGEON:  Facundo Huerta MD     REASON FOR PROCEDURE:     Hip bursitis is flared up with tenderness of the GT Bursa on both sides.    --The patient was complaining not of meralgia paresthetica today but I have just lateral hip pain and exquisite tenderness is noted on either side consistent with bursitis.     SEDATION:  Versed 2mg IV  ANESTHETIC AGENT:  Marcaine 0.5%  STEROID AGENT:  Methylprednisolone (DEPO MEDROL) 80mg/ml, which was distributed equally on each side  Total volume of injected solution:   8 mL     DESCRIPTON OF PROCEDURE:    After obtaining informed consent, IV access was obtained in the preoperative area.  The patient was transported to the operative suite and placed in the prone position with a pillow under the pelvic area. EKG, blood pressure, and pulse oximeter were monitored.  Injectate solution prepared as above    Area over the greater trochanter on the right side was palpated and marked on the skin and then cleansed with Hibiclens ×2. A sterile needle was inserted about 1 & 1/2 inches in to the skin and into the bursa, under fluoro guidance, with periosteum contact over the greater trochanter and the needle was withdrawn about 2 mm into the bursa. Aspiration was negative and slowly after confirming bursa spread with  omnipaque on each side, the rest of the injectate syringe was injected into the hip bursa.  The needle was removed intact on each side and bleeding was minimal. The insertion site was dressed with a Band-Aid.  There were no apparent complications.      Same approach was taken on the left side, and again no complications.     ESTIMATED BLOOD LOSS:  None  SPECIMENS:  None  COMPLICATIONS:   No complications were noted., There was no indication of vascular uptake on live injection of contrast dye. and The patient did not have any signs of postprocedure numbness nor weakness.       TOLERANCE & DISCHARGE CONDITION:    The patient tolerated the procedure well.  The patient was transported to the recovery area without difficulties.  The patient was discharged to home under the care of family in stable and satisfactory condition.     PLAN OF CARE:  1. The patient was given our standard instruction sheet and will resume all medications as per the medication reconciliation sheet.  2. The patient will Return to clinic 2 wks and The patient might need to reconsider lateral femoral cutaneous nerve block aids in the future.  3. The patient is instructed to keep a pain log hourly for 8 hours after the procedure.

## 2019-05-06 ENCOUNTER — OFFICE VISIT (OUTPATIENT)
Dept: OBSTETRICS AND GYNECOLOGY | Facility: CLINIC | Age: 64
End: 2019-05-06

## 2019-05-06 VITALS
WEIGHT: 126 LBS | DIASTOLIC BLOOD PRESSURE: 82 MMHG | BODY MASS INDEX: 22.32 KG/M2 | SYSTOLIC BLOOD PRESSURE: 118 MMHG | HEIGHT: 63 IN

## 2019-05-06 DIAGNOSIS — Z01.419 WELL WOMAN EXAM: Primary | ICD-10-CM

## 2019-05-06 LAB
BILIRUB BLD-MCNC: NEGATIVE MG/DL
CLARITY, POC: CLEAR
COLOR UR: YELLOW
GLUCOSE UR STRIP-MCNC: NEGATIVE MG/DL
KETONES UR QL: NEGATIVE
LEUKOCYTE EST, POC: NEGATIVE
NITRITE UR-MCNC: NEGATIVE MG/ML
PH UR: 5 [PH] (ref 5–8)
PROT UR STRIP-MCNC: NEGATIVE MG/DL
RBC # UR STRIP: NEGATIVE /UL
SP GR UR: 1 (ref 1–1.03)
UROBILINOGEN UR QL: NORMAL

## 2019-05-06 PROCEDURE — 81002 URINALYSIS NONAUTO W/O SCOPE: CPT | Performed by: OBSTETRICS & GYNECOLOGY

## 2019-05-06 PROCEDURE — G0101 CA SCREEN;PELVIC/BREAST EXAM: HCPCS | Performed by: OBSTETRICS & GYNECOLOGY

## 2019-05-13 ENCOUNTER — HOSPITAL ENCOUNTER (OUTPATIENT)
Dept: MAMMOGRAPHY | Facility: HOSPITAL | Age: 64
Discharge: HOME | End: 2019-05-13
Admitting: OBSTETRICS & GYNECOLOGY

## 2019-05-13 DIAGNOSIS — Z01.419 WELL WOMAN EXAM: ICD-10-CM

## 2019-05-13 PROCEDURE — 77067 SCR MAMMO BI INCL CAD: CPT

## 2019-05-13 PROCEDURE — 77063 BREAST TOMOSYNTHESIS BI: CPT

## 2019-05-17 ENCOUNTER — OFFICE VISIT (OUTPATIENT)
Dept: PAIN MEDICINE | Facility: CLINIC | Age: 64
End: 2019-05-17

## 2019-05-17 VITALS
TEMPERATURE: 97.7 F | SYSTOLIC BLOOD PRESSURE: 106 MMHG | DIASTOLIC BLOOD PRESSURE: 70 MMHG | HEART RATE: 85 BPM | HEIGHT: 63 IN | BODY MASS INDEX: 22.15 KG/M2 | RESPIRATION RATE: 16 BRPM | WEIGHT: 125 LBS | OXYGEN SATURATION: 98 %

## 2019-05-17 DIAGNOSIS — G89.29 OTHER CHRONIC PAIN: Primary | ICD-10-CM

## 2019-05-17 DIAGNOSIS — M25.552 BILATERAL HIP PAIN: ICD-10-CM

## 2019-05-17 DIAGNOSIS — M51.36 DDD (DEGENERATIVE DISC DISEASE), LUMBAR: ICD-10-CM

## 2019-05-17 DIAGNOSIS — M46.1 BILATERAL SACROILIITIS (HCC): ICD-10-CM

## 2019-05-17 DIAGNOSIS — M25.551 BILATERAL HIP PAIN: ICD-10-CM

## 2019-05-17 PROCEDURE — 99213 OFFICE O/P EST LOW 20 MIN: CPT | Performed by: NURSE PRACTITIONER

## 2019-05-17 RX ORDER — SERTRALINE HYDROCHLORIDE 25 MG/1
TABLET, FILM COATED ORAL
COMMUNITY
Start: 2019-04-21 | End: 2019-06-10

## 2019-05-17 NOTE — PROGRESS NOTES
"CHIEF COMPLAINT  Pt is here to f/u on Greater Trochanteric Hip Bursa injection under fluoroscopic guidance pt sts she received  20% of relief.    Subjective   Shanika Hector is a 64 y.o. female  who presents to the office for follow-up of procedure.  She completed a bilateral GT bursa injection   on  4/25/19 performed by Dr. Huerta for management of hip pain. Patient reports moderate relief from the procedure. Felt the best for the first 3 days.      Bilateral hip pain remains primary complaint today.      Has had lumbar RFL in the past. She reports she was \"numb for 7-8 months.\"  Reports that she had no pain but did not like the numbness.   She has had evaluation with Dr Roland previously. Told no surgical options at this time.      Complains of pain in her low back and thighs. Today her pain is 6/10VAS.  Describes her low back pain as near continuous aching. Describes the leg pain as continuous burning. Pain increase with walking, standing, laying flat on back; pain decreases with \"sometimes nothing.\" ADL\"s by self. Denies any bowel or bladder changes.    Back Pain   This is a chronic problem. The current episode started more than 1 month ago. The problem occurs constantly. The problem has been gradually worsening since onset. The pain is present in the gluteal, lumbar spine, thoracic spine and sacro-iliac. The quality of the pain is described as aching, shooting and stabbing. The pain radiates to the left thigh and right thigh. The pain is at a severity of 6/10. The pain is the same all the time. The symptoms are aggravated by position, lying down, sitting, standing and twisting. Stiffness is present all day. Associated symptoms include headaches, leg pain, numbness, paresthesias, tingling and weakness. Pertinent negatives include no abdominal pain, bladder incontinence, bowel incontinence, chest pain, dysuria, fever, paresis, pelvic pain, perianal numbness or weight loss. Risk factors include poor posture.    " "  PEG Assessment   What number best describes your pain on average in the past week?6  What number best describes how, during the past week, pain has interfered with your enjoyment of life?8  What number best describes how, during the past week, pain has interfered with your general activity?  6    The following portions of the patient's history were reviewed and updated as appropriate: allergies, current medications, past family history, past medical history, past social history, past surgical history and problem list.    Review of Systems   Constitutional: Negative for fatigue, fever and weight loss.   HENT: Negative for congestion.    Respiratory: Negative for shortness of breath.    Cardiovascular: Negative for chest pain and palpitations.   Gastrointestinal: Negative for abdominal pain and bowel incontinence.   Genitourinary: Negative for bladder incontinence, difficulty urinating, dysuria and pelvic pain.   Musculoskeletal: Positive for back pain. Negative for neck pain.   Neurological: Positive for tingling, weakness, numbness, headaches and paresthesias. Negative for dizziness.   Psychiatric/Behavioral: Negative for sleep disturbance.       Vitals:    05/17/19 0756   BP: 106/70   Pulse: 85   Resp: 16   Temp: 97.7 °F (36.5 °C)   SpO2: 98%   Weight: 56.7 kg (125 lb)   Height: 160 cm (62.99\")   PainSc:   6   PainLoc: Leg         Objective   Physical Exam   Constitutional: She is oriented to person, place, and time. She appears well-developed and well-nourished. No distress.   HENT:   Head: Normocephalic and atraumatic.   Eyes: Conjunctivae and EOM are normal.   Neck: Neck supple.   Cardiovascular: Normal rate.   Pulmonary/Chest: Effort normal. No respiratory distress.   Musculoskeletal:        Right hip: She exhibits tenderness.        Left hip: She exhibits tenderness.        Lumbar back: She exhibits tenderness and bony tenderness.   Neurological: She is alert and oriented to person, place, and time. She has " normal strength. No sensory deficit. Coordination and gait normal.   Skin: Skin is warm and dry. She is not diaphoretic.   Psychiatric: She has a normal mood and affect. Her behavior is normal.   Nursing note and vitals reviewed.    Assessment/Plan   Shanika was seen today for hip pain.    Diagnoses and all orders for this visit:    Other chronic pain    DDD (degenerative disc disease), lumbar    Bilateral sacroiliitis (CMS/HCC)    Bilateral hip pain  -     XR Hips Bilateral With or Without Pelvis 2 View; Future      --- XR bilateral hips   --- Might consider intra-articular injections and/or orthopedic referral   --- Hip pain most severe today, does not want to address back at this time, could always consider repeat lumbar MBB/RFL in the future         RAMONITA REPORT  RAMONITA report has been reviewed and scanned into the patient's chart.    As the clinician, I personally reviewed the RAMONITA from 5/16/19 while the patient was in the office today.    EMR Dragon/Transcription disclaimer:   Much of this encounter note is an electronic transcription/translation of spoken language to printed text. The electronic translation of spoken language may permit erroneous, or at times, nonsensical words or phrases to be inadvertently transcribed; Although I have reviewed the note for such errors, some may still exist.    INITIAL VISIT WITH ABHISHEK MELO

## 2019-05-20 ENCOUNTER — HOSPITAL ENCOUNTER (OUTPATIENT)
Dept: GENERAL RADIOLOGY | Facility: HOSPITAL | Age: 64
Discharge: HOME | End: 2019-05-20
Admitting: NURSE PRACTITIONER

## 2019-05-20 DIAGNOSIS — M25.551 BILATERAL HIP PAIN: ICD-10-CM

## 2019-05-20 DIAGNOSIS — M25.552 BILATERAL HIP PAIN: ICD-10-CM

## 2019-05-20 PROCEDURE — 73521 X-RAY EXAM HIPS BI 2 VIEWS: CPT

## 2019-05-21 DIAGNOSIS — M25.551 PAIN OF BOTH HIP JOINTS: Primary | ICD-10-CM

## 2019-05-21 DIAGNOSIS — M25.552 PAIN OF BOTH HIP JOINTS: Primary | ICD-10-CM

## 2019-05-21 NOTE — PROGRESS NOTES
Mild arthritis of both hips.  We can try some bilateral hip joint injections if she would like. Order placed.

## 2019-06-10 ENCOUNTER — OFFICE VISIT (OUTPATIENT)
Dept: FAMILY MEDICINE CLINIC | Facility: CLINIC | Age: 64
End: 2019-06-10

## 2019-06-10 VITALS
WEIGHT: 123.4 LBS | TEMPERATURE: 97.4 F | HEIGHT: 63 IN | OXYGEN SATURATION: 98 % | BODY MASS INDEX: 21.86 KG/M2 | SYSTOLIC BLOOD PRESSURE: 102 MMHG | HEART RATE: 92 BPM | DIASTOLIC BLOOD PRESSURE: 65 MMHG

## 2019-06-10 DIAGNOSIS — E78.00 PURE HYPERCHOLESTEROLEMIA: ICD-10-CM

## 2019-06-10 DIAGNOSIS — Z00.00 MEDICARE ANNUAL WELLNESS VISIT, SUBSEQUENT: Primary | ICD-10-CM

## 2019-06-10 DIAGNOSIS — M46.1 BILATERAL SACROILIITIS (HCC): ICD-10-CM

## 2019-06-10 DIAGNOSIS — G57.10 LATERAL FEMORAL CUTANEOUS NEUROPATHY, UNSPECIFIED LATERALITY: ICD-10-CM

## 2019-06-10 DIAGNOSIS — K58.1 IRRITABLE BOWEL SYNDROME WITH CONSTIPATION: ICD-10-CM

## 2019-06-10 DIAGNOSIS — M15.9 GENERALIZED OSTEOARTHRITIS: ICD-10-CM

## 2019-06-10 PROCEDURE — G0439 PPPS, SUBSEQ VISIT: HCPCS | Performed by: FAMILY MEDICINE

## 2019-06-10 PROCEDURE — G0009 ADMIN PNEUMOCOCCAL VACCINE: HCPCS | Performed by: FAMILY MEDICINE

## 2019-06-10 PROCEDURE — 90732 PPSV23 VACC 2 YRS+ SUBQ/IM: CPT | Performed by: FAMILY MEDICINE

## 2019-06-10 PROCEDURE — 99214 OFFICE O/P EST MOD 30 MIN: CPT | Performed by: FAMILY MEDICINE

## 2019-06-10 RX ORDER — PREDNISOLONE ACETATE 10 MG/ML
SUSPENSION/ DROPS OPHTHALMIC
COMMUNITY
Start: 2019-06-03 | End: 2019-07-20 | Stop reason: HOSPADM

## 2019-06-10 NOTE — PROGRESS NOTES
QUICK REFERENCE INFORMATION:  The ABCs of the Annual Wellness Visit    Subsequent Medicare Wellness Visit     HEALTH RISK ASSESSMENT    : 1955    Recent Hospitalizations:  No hospitalization(s) within the last year..      Current Medical Providers:  Patient Care Team:  Bryan Vega MD as PCP - General  Bryan Vega MD as PCP - Family Medicine  Anny Wyman APRN as PCP - Claims Attributed        Smoking Status:  Social History     Tobacco Use   Smoking Status Never Smoker   Smokeless Tobacco Never Used       Alcohol Consumption:  Social History     Substance and Sexual Activity   Alcohol Use Yes    Comment: occationally       Depression Screen:   PHQ-2/PHQ-9 Depression Screening 6/10/2019   Little interest or pleasure in doing things 0   Feeling down, depressed, or hopeless 0   Trouble falling or staying asleep, or sleeping too much 1   Feeling tired or having little energy 1   Poor appetite or overeating 1   Feeling bad about yourself - or that you are a failure or have let yourself or your family down 0   Trouble concentrating on things, such as reading the newspaper or watching television 0   Moving or speaking so slowly that other people could have noticed. Or the opposite - being so fidgety or restless that you have been moving around a lot more than usual 0   Thoughts that you would be better off dead, or of hurting yourself in some way 0   Total Score 3   If you checked off any problems, how difficult have these problems made it for you to do your work, take care of things at home, or get along with other people? Not difficult at all       Health Habits and Functional and Cognitive Screening:  Functional & Cognitive Status 6/10/2019   Do you have difficulty preparing food and eating? No   Do you have difficulty bathing yourself, getting dressed or grooming yourself? No   Do you have difficulty using the toilet? No   Do you have difficulty moving around from place to place? No   Do you  have trouble with steps or getting out of a bed or a chair? No   In the past year have you fallen or experienced a near fall? No   Current Diet Frequent Junk Food   Dental Exam Up to date   Eye Exam Up to date   Exercise (times per week) 2 times per week   Current Exercise Activities Include Walking   Do you need help using the phone?  No   Are you deaf or do you have serious difficulty hearing?  No   Do you need help with transportation? No   Do you need help shopping? No   Do you need help preparing meals?  No   Do you need help with housework?  No   Do you need help with laundry? No   Do you need help taking your medications? No   Do you need help managing money? No   Do you ever drive or ride in a car without wearing a seat belt? No   Have you felt unusual stress, anger or loneliness in the last month? No   Who do you live with? Spouse   If you need help, do you have trouble finding someone available to you? No   Have you been bothered in the last four weeks by sexual problems? No   Do you have difficulty concentrating, remembering or making decisions? No           Does the patient have evidence of cognitive impairment? No    Asiprin use counseling: Does not need ASA (and currently is not on it)      Recent Lab Results:    Lab Results   Component Value Date    GLU 95 11/05/2018        Lab Results   Component Value Date    TRIG 80 11/05/2018    HDL 72 (H) 11/05/2018    VLDL 16 11/05/2018    LDLHDL 1.77 11/06/2017           Age-appropriate Screening Schedule:  Refer to the list below for future screening recommendations based on patient's age, sex and/or medical conditions. Orders for these recommended tests are listed in the plan section. The patient has been provided with a written plan.    Health Maintenance   Topic Date Due   • ZOSTER VACCINE (1 of 2) 05/11/2003   • PNEUMOCOCCAL VACCINES (65+ LOW/MEDIUM RISK) (2 of 2 - PPSV23) 05/21/2019   • INFLUENZA VACCINE  08/01/2019   • LIPID PANEL  11/05/2019   • DXA  SCAN  11/24/2019   • MAMMOGRAM  05/13/2021   • COLONOSCOPY  06/30/2022   • TDAP/TD VACCINES (2 - Td) 11/13/2027        Subjective   History of Present Illness    Shanika Hector is a 64 y.o. female who presents for an Annual Wellness Visit.    The following portions of the patient's history were reviewed and updated as appropriate: allergies, current medications, past family history, past medical history, past social history, past surgical history and problem list.    Outpatient Medications Prior to Visit   Medication Sig Dispense Refill   • acetaminophen (TYLENOL) 650 MG 8 hr tablet Take 2 tablets by mouth 2 (two) times a day as needed.     • hydroxychloroquine (PLAQUENIL) 200 MG tablet Take 200 mg by mouth 2 (Two) Times a Day.     • methocarbamol (ROBAXIN) 500 MG tablet      • omeprazole (priLOSEC) 40 MG capsule TAKE ONE CAPSULE BY MOUTH DAILY 30 capsule 5   • prednisoLONE acetate (PRED FORTE) 1 % ophthalmic suspension      • sulindac (CLINORIL) 200 MG tablet      • Cyanocobalamin (VITAMIN B 12 PO) Take  by mouth.     • polyethylene glycol (MIRALAX) packet Take 17 g by mouth Daily.     • sertraline (ZOLOFT) 25 MG tablet      • tobramycin 0.3 % solution ophthalmic solution Administer 2 drops to both eyes Every 6 (Six) Hours. 1 bottle 0     No facility-administered medications prior to visit.        Patient Active Problem List   Diagnosis   • Cervical radiculopathy   • Acid reflux   • Generalized osteoarthritis   • Gastroesophageal reflux disease   • Irritable bowel syndrome with constipation   • Osteoporosis   • Pruritus   • Seborrheic eczema   • Osteoarthritis of cervical spine without myelopathy   • Eczema   • DDD (degenerative disc disease), lumbar   • Somatic dysfunction of sacroiliac joint   • Bilateral sacroiliitis (CMS/HCC)   • Strain of left trapezius muscle   • Lateral femoral cutaneous neuropathy   • Other chronic pain   • Lumbar facet arthropathy       Advance Care Planning:  Patient has an advance  "directive - a copy has not been provided. Have asked the patient to send this to us to add to record    Identification of Risk Factors:  Risk factors include: chronic pain.    Review of Systems    Compared to one year ago, the patient feels her physical health is the same.  Compared to one year ago, the patient feels her mental health is the same.    Objective     Physical Exam    Vitals:    06/10/19 0857   BP: 102/65   Pulse: 92   Temp: 97.4 °F (36.3 °C)   SpO2: 98%   Weight: 56 kg (123 lb 6.4 oz)   Height: 160 cm (62.99\")       Patient's Body mass index is 21.86 kg/m². BMI is within normal parameters. No follow-up required..      Assessment/Plan   Patient Self-Management and Personalized Health Advice  The patient has been provided with information about: exercise and designing advance directives and preventive services including:   · Advance directive, Exercise counseling provided, Pneumococcal vaccine .    Visit Diagnoses:    ICD-10-CM ICD-9-CM   1. Medicare annual wellness visit, subsequent Z00.00 V70.0   2. Lateral femoral cutaneous neuropathy, unspecified laterality G57.10 355.1   3. Irritable bowel syndrome with constipation K58.1 564.1       No orders of the defined types were placed in this encounter.      Outpatient Encounter Medications as of 6/10/2019   Medication Sig Dispense Refill   • acetaminophen (TYLENOL) 650 MG 8 hr tablet Take 2 tablets by mouth 2 (two) times a day as needed.     • hydroxychloroquine (PLAQUENIL) 200 MG tablet Take 200 mg by mouth 2 (Two) Times a Day.     • methocarbamol (ROBAXIN) 500 MG tablet      • omeprazole (priLOSEC) 40 MG capsule TAKE ONE CAPSULE BY MOUTH DAILY 30 capsule 5   • prednisoLONE acetate (PRED FORTE) 1 % ophthalmic suspension      • sulindac (CLINORIL) 200 MG tablet      • [DISCONTINUED] Cyanocobalamin (VITAMIN B 12 PO) Take  by mouth.     • [DISCONTINUED] polyethylene glycol (MIRALAX) packet Take 17 g by mouth Daily.     • [DISCONTINUED] sertraline (ZOLOFT) 25 " MG tablet      • [DISCONTINUED] tobramycin 0.3 % solution ophthalmic solution Administer 2 drops to both eyes Every 6 (Six) Hours. 1 bottle 0     No facility-administered encounter medications on file as of 6/10/2019.        Reviewed use of high risk medication in the elderly: yes  Reviewed for potential of harmful drug interactions in the elderly: yes    Follow Up:  No Follow-up on file.     An After Visit Summary and PPPS with all of these plans were given to the patient.

## 2019-06-10 NOTE — PROGRESS NOTES
"Subjective   Shanika Hector is a 64 y.o. female.     Chief Complaint   Patient presents with   • Medicare Wellness-Initial Visit        History of Present Illness    Hyperlipidemia follow-up.  Diet controlled.  Her LDL is moderately elevated 140 but the HDL remains high in the 70s.  She is not a good candidate for statins because of her chronic pain syndrome.    Bilateral lateral femoral cutaneous neuropathy, also bilateral sacroiliac-itis.  Essentially chronic pain of the hips.  Radiating to the knees.  Lateral aspect.  She also has osteoarthritis of multiple joints.  She has a ITZEL titer that is nonspecific.  She follows with a rheumatologist in [place].  She is on Plaquenil that she states helps her joint symptoms, especially in her fingers.  Not her metacarpophalangeal joints.  She states since restarting the Plaquenil couple months ago the symptoms are much better better without it her hands are very stiff and swollen in the morning.  She follows with her ophthalmologist.    IBS with constipation.  She continues Plaquenil.  Prune-juice helps also.  She attempted to take the sertraline last visit, off label use.  It helped her GI symptoms but made her mood irritable and she stopped it.  Her PHQ 9 screen today has a score of 3.      The following portions of the patient's history were reviewed and updated as appropriate: allergies, current medications, past family history, past medical history, past social history, past surgical history and problem list.          Review of Systems   Constitutional: Negative.    Respiratory: Negative.    Cardiovascular: Negative.    Gastrointestinal: Positive for constipation.   Musculoskeletal: Positive for arthralgias and joint swelling.       Objective   Blood pressure 102/65, pulse 92, temperature 97.4 °F (36.3 °C), height 160 cm (62.99\"), weight 56 kg (123 lb 6.4 oz), SpO2 98 %, not currently breastfeeding.  Physical Exam   Constitutional: She appears well-developed and " well-nourished. No distress.   Neck: No thyromegaly present.   Cardiovascular: Normal rate, regular rhythm, normal heart sounds and intact distal pulses.   Pulmonary/Chest: Effort normal and breath sounds normal.   Musculoskeletal: She exhibits no edema.   No synovitis noted   Skin: Skin is warm and dry.   Psychiatric: She has a normal mood and affect. Her behavior is normal. Judgment and thought content normal.   Nursing note and vitals reviewed.      Assessment/Plan   Shanika was seen today for medicare wellness-initial visit.    Diagnoses and all orders for this visit:    Medicare annual wellness visit, subsequent    Lateral femoral cutaneous neuropathy, unspecified laterality    Irritable bowel syndrome with constipation    Pure hypercholesterolemia    Generalized osteoarthritis    Bilateral sacroiliitis (CMS/HCC)    Other orders  -     Pneumococcal Polysaccharide Vaccine 23-Valent Greater Than or Equal To 1yo Subcutaneous / IM    Bilateral sacroiliitis, lateral femoral cutaneous neuropathy bilaterally.  Chronic pain syndrome.  She states she is becoming more accepting of the discomfort.  Is not getting a waiver day-to-day activities.  She continues to follow pain management.    Hyperlipidemia.  Diet controlled.    Generalized osteoarthritis.  Positive ITZEL titer, nonspecific.  She is prescribed Plaquenil by her rheumatologist.  It does seem to be improving her joint symptoms and quality of life.  She understands the risks involved.  She does follow with her ophthalmologist for retinal evaluation.  She will follow-up with the rheumatologist as scheduled.    IBS with constipation.  She continues her diet and MiraLAX supplement.    Follow-up in 6 months for recheck

## 2019-08-01 ENCOUNTER — DOCUMENTATION (OUTPATIENT)
Dept: PAIN MEDICINE | Facility: CLINIC | Age: 64
End: 2019-08-01

## 2019-08-01 ENCOUNTER — OUTSIDE FACILITY SERVICE (OUTPATIENT)
Dept: PAIN MEDICINE | Facility: CLINIC | Age: 64
End: 2019-08-01

## 2019-08-01 PROCEDURE — 20610 DRAIN/INJ JOINT/BURSA W/O US: CPT | Performed by: ANESTHESIOLOGY

## 2019-08-01 PROCEDURE — 77002 NEEDLE LOCALIZATION BY XRAY: CPT | Performed by: ANESTHESIOLOGY

## 2019-08-02 NOTE — PROGRESS NOTES
Bilateral Hip injections - lateral approaches  [facility]      PREOPERATIVE DIAGNOSIS:     Bilateral hip pain, bilateral hip arthritis    POSTOPERATIVE DIAGNOSIS:   Same as preop diagnosis    PROCEDURE:  [id number] - Bilateral Intra-articular Hip Joint Injections, with fluoroscopic guidance & hip arthrograms - Lateral Approach    PRE-PROCEDURE DISCUSSION WITH PATIENT:    Risks and complications were discussed with the patient prior to starting the procedure (including but not limited to infection, bleeding, injury, paralysis, and death) and informed consent was obtained.      SURGEON:  Facundo Huerta MD    REASON FOR PROCEDURE:    Bilateral hip pain and OA    SEDATION:   Patient declined administration of moderate sedation    ANESTHETIC AGENT:   Marcaine 0.5%  STEROID AGENT:    Methylprednisolone (DEPO MEDROL) 80mg/ml    DESCRIPTON OF PROCEDURE:  After obtaining informed consent, IV access was obtained in the preoperative area.  The patient was transported to the operative suite and placed in a right lateral decubitus position.  The hip and knee were flexed.  EKG, blood pressure, and pulse oximetry were monitored.  Any and all sedation given was administered by the RN under my direct supervision and guidance.   The hip area was prepped in a wide diameter with Chloraprep and draped in a sterile fashion.     A point just cephalad of the Left greater trochanter was identified and the point was anesthetized with subcutaneous 1% Lidocaine.  A  22-gauge spinal needle was inserted perpendicular to the skin and, under fluoroscopic guidance and strict aseptic technique, directed over the greater trochanter toward the head of the femur and to a point near the medial neck of the femur and just lateral to the head of the femur.  Aspiration was confirmed negative.  After confirming the position of the needle with fluoroscopy, 1 mL of Omnipaque was injected and after seeing appropriate spread into the joint  a total of 4mL of injectate including the above listed local anesthetic and steroid was injected into the joint.  Vital signs remained stable.  The onset of analgesia was noted.  Needle removed intact.      Next, the patient was placed in a left lateral decubitus position.  The Right hip and knee were flexed.  EKG, blood pressure, and pulse oximetry were monitored.  Any and all sedation given was administered by the RN under my direct supervision and guidance.   The hip area was prepped in a wide diameter with Chloraprep and draped in a sterile fashion.   In like fashion, this contralateral hip joint was addressed.  Vital signs remained stable.  The onset of analgesia was noted.  Needle removed intact.        ESTIMATED BLOOD LOSS:  minimal  SPECIMENS:  None    COMPLICATIONS:  No complications were noted., There was no indication of vascular uptake on live injection of contrast dye. and The patient did not have any signs of postprocedure numbness nor weakness.    TOLERANCE & DISCHARGE CONDITION:    The patient tolerated the procedure well.  The patient was transported to the recovery area without difficulties.  The patient was discharged to home under the care of a chaperone and in satisfactory condition.    PLAN OF CARE:  1. The patient was given our standard instruction sheet.  2. The patient will have an office visit in 2 to 3 wks.   3. The patient is asked to keep a pain log sheet hourly for 8 hours today.  4. The patient will resume all medications as per the medication reconciliation sheet.

## 2019-08-07 ENCOUNTER — HOSPITAL ENCOUNTER (EMERGENCY)
Facility: HOSPITAL | Age: 64
Discharge: HOME | End: 2019-08-08
Attending: EMERGENCY MEDICINE | Admitting: EMERGENCY MEDICINE

## 2019-08-07 ENCOUNTER — APPOINTMENT (OUTPATIENT)
Dept: CT IMAGING | Facility: HOSPITAL | Age: 64
End: 2019-08-07

## 2019-08-07 DIAGNOSIS — N95.2 VAGINAL ATROPHY: ICD-10-CM

## 2019-08-07 DIAGNOSIS — R10.2 PELVIC PAIN: Primary | ICD-10-CM

## 2019-08-07 LAB
ALBUMIN SERPL-MCNC: 4.9 G/DL (ref 3.5–5.2)
ALBUMIN/GLOB SERPL: 1.8 G/DL
ALP SERPL-CCNC: 74 U/L (ref 39–117)
ALT SERPL W P-5'-P-CCNC: 14 U/L (ref 1–33)
ANION GAP SERPL CALCULATED.3IONS-SCNC: 10.4 MMOL/L (ref 5–15)
AST SERPL-CCNC: 19 U/L (ref 1–32)
BASOPHILS # BLD AUTO: 0.03 10*3/MM3 (ref 0–0.2)
BASOPHILS NFR BLD AUTO: 0.5 % (ref 0–1.5)
BILIRUB SERPL-MCNC: 0.2 MG/DL (ref 0.2–1.2)
BILIRUB UR QL STRIP: NEGATIVE
BUN BLD-MCNC: 12 MG/DL (ref 8–23)
BUN/CREAT SERPL: 14.6 (ref 7–25)
CALCIUM SPEC-SCNC: 9.1 MG/DL (ref 8.6–10.5)
CHLORIDE SERPL-SCNC: 98 MMOL/L (ref 98–107)
CLARITY UR: ABNORMAL
CLUE CELLS SPEC QL WET PREP: NORMAL
CO2 SERPL-SCNC: 29.6 MMOL/L (ref 22–29)
COLOR UR: YELLOW
CREAT BLD-MCNC: 0.82 MG/DL (ref 0.57–1)
DEPRECATED RDW RBC AUTO: 42.4 FL (ref 37–54)
EOSINOPHIL # BLD AUTO: 0.22 10*3/MM3 (ref 0–0.4)
EOSINOPHIL NFR BLD AUTO: 3.9 % (ref 0.3–6.2)
ERYTHROCYTE [DISTWIDTH] IN BLOOD BY AUTOMATED COUNT: 11.9 % (ref 12.3–15.4)
GFR SERPL CREATININE-BSD FRML MDRD: 70 ML/MIN/1.73
GLOBULIN UR ELPH-MCNC: 2.8 GM/DL
GLUCOSE BLD-MCNC: 98 MG/DL (ref 65–99)
GLUCOSE UR STRIP-MCNC: NEGATIVE MG/DL
HCT VFR BLD AUTO: 38.6 % (ref 34–46.6)
HGB BLD-MCNC: 11.9 G/DL (ref 12–15.9)
HGB UR QL STRIP.AUTO: NEGATIVE
HOLD SPECIMEN: NORMAL
HOLD SPECIMEN: NORMAL
HYDATID CYST SPEC WET PREP: NORMAL
IMM GRANULOCYTES # BLD AUTO: 0.02 10*3/MM3 (ref 0–0.05)
IMM GRANULOCYTES NFR BLD AUTO: 0.4 % (ref 0–0.5)
KETONES UR QL STRIP: NEGATIVE
LEUKOCYTE ESTERASE UR QL STRIP.AUTO: NEGATIVE
LIPASE SERPL-CCNC: 30 U/L (ref 13–60)
LYMPHOCYTES # BLD AUTO: 1.55 10*3/MM3 (ref 0.7–3.1)
LYMPHOCYTES NFR BLD AUTO: 27.5 % (ref 19.6–45.3)
MCH RBC QN AUTO: 29.9 PG (ref 26.6–33)
MCHC RBC AUTO-ENTMCNC: 30.8 G/DL (ref 31.5–35.7)
MCV RBC AUTO: 97 FL (ref 79–97)
MONOCYTES # BLD AUTO: 0.52 10*3/MM3 (ref 0.1–0.9)
MONOCYTES NFR BLD AUTO: 9.2 % (ref 5–12)
NEUTROPHILS # BLD AUTO: 3.3 10*3/MM3 (ref 1.7–7)
NEUTROPHILS NFR BLD AUTO: 58.5 % (ref 42.7–76)
NITRITE UR QL STRIP: NEGATIVE
NRBC BLD AUTO-RTO: 0 /100 WBC (ref 0–0.2)
PH UR STRIP.AUTO: 7.5 [PH] (ref 5–8)
PLATELET # BLD AUTO: 316 10*3/MM3 (ref 140–450)
PMV BLD AUTO: 10.4 FL (ref 6–12)
POTASSIUM BLD-SCNC: 4.1 MMOL/L (ref 3.5–5.2)
PROT SERPL-MCNC: 7.7 G/DL (ref 6–8.5)
PROT UR QL STRIP: NEGATIVE
RBC # BLD AUTO: 3.98 10*6/MM3 (ref 3.77–5.28)
SODIUM BLD-SCNC: 138 MMOL/L (ref 136–145)
SP GR UR STRIP: 1.01 (ref 1–1.03)
T VAGINALIS SPEC QL WET PREP: NORMAL
UROBILINOGEN UR QL STRIP: ABNORMAL
WBC NRBC COR # BLD: 5.64 10*3/MM3 (ref 3.4–10.8)
WBC SPEC QL WET PREP: NORMAL
WHOLE BLOOD HOLD SPECIMEN: NORMAL
WHOLE BLOOD HOLD SPECIMEN: NORMAL
YEAST GENITAL QL WET PREP: NORMAL

## 2019-08-07 PROCEDURE — 81003 URINALYSIS AUTO W/O SCOPE: CPT

## 2019-08-07 PROCEDURE — 85025 COMPLETE CBC W/AUTO DIFF WBC: CPT

## 2019-08-07 PROCEDURE — 83690 ASSAY OF LIPASE: CPT

## 2019-08-07 PROCEDURE — 74176 CT ABD & PELVIS W/O CONTRAST: CPT

## 2019-08-07 PROCEDURE — 99284 EMERGENCY DEPT VISIT MOD MDM: CPT

## 2019-08-07 PROCEDURE — 87210 SMEAR WET MOUNT SALINE/INK: CPT | Performed by: PHYSICIAN ASSISTANT

## 2019-08-07 PROCEDURE — 25010000002 KETOROLAC TROMETHAMINE PER 15 MG: Performed by: PHYSICIAN ASSISTANT

## 2019-08-07 PROCEDURE — 96374 THER/PROPH/DIAG INJ IV PUSH: CPT

## 2019-08-07 PROCEDURE — 80053 COMPREHEN METABOLIC PANEL: CPT

## 2019-08-07 RX ORDER — ESTRADIOL 0.1 MG/G
2 CREAM VAGINAL 2 TIMES WEEKLY
Qty: 42.5 G | Refills: 0 | Status: SHIPPED | OUTPATIENT
Start: 2019-08-08 | End: 2019-08-12 | Stop reason: SDUPTHER

## 2019-08-07 RX ORDER — KETOROLAC TROMETHAMINE 15 MG/ML
15 INJECTION, SOLUTION INTRAMUSCULAR; INTRAVENOUS ONCE
Status: COMPLETED | OUTPATIENT
Start: 2019-08-07 | End: 2019-08-07

## 2019-08-07 RX ORDER — SODIUM CHLORIDE 0.9 % (FLUSH) 0.9 %
10 SYRINGE (ML) INJECTION AS NEEDED
Status: DISCONTINUED | OUTPATIENT
Start: 2019-08-07 | End: 2019-08-08 | Stop reason: HOSPADM

## 2019-08-07 RX ADMIN — KETOROLAC TROMETHAMINE 15 MG: 15 INJECTION, SOLUTION INTRAMUSCULAR; INTRAVENOUS at 20:59

## 2019-08-08 VITALS
BODY MASS INDEX: 21.88 KG/M2 | TEMPERATURE: 98.1 F | WEIGHT: 123.5 LBS | HEART RATE: 88 BPM | DIASTOLIC BLOOD PRESSURE: 70 MMHG | OXYGEN SATURATION: 96 % | SYSTOLIC BLOOD PRESSURE: 125 MMHG | HEIGHT: 63 IN | RESPIRATION RATE: 16 BRPM

## 2019-08-08 NOTE — ED PROVIDER NOTES
Pt is a 64 y.o. female who presents to the ED complaining of pelvic pain and vaginal irritation that started 3 weeks ago and has not improved despite having treatment. Pt states that over the past couple of days the pain has worsening. Pt admits to vaginal pain, mild vaginal DC, and dysuria.  Pt denies fever and chills. Pt states that she was treated for a bacterial UTI and was treated with 7 days worth of Cipro.      On exam,  Cardio: RRR, no murmur  Pulmon: CTAB  Abd: soft, nontender, mild suprapubic tenderness, nml bowel sounds  Pelvic: please see PA's note      Labs reviewed.     Plan: To review CT scan      MD ATTESTATION NOTE  The RANJEET and I have discussed this patient's history, physical exam, and treatment plan.  I have reviewed the documentation and personally had a face to face interaction with the patient. I affirm the documentation and agree with the treatment and plan.  The attached note describes my personal findings.    Documentation assistance provided by david Haywood for Dr. Smith. Information recorded by the david was done at my direction and has been verified and validated by me.     Aniyah Haywood  08/07/19 [id number]       Benitez Smith MD  08/08/19 [id number]

## 2019-08-08 NOTE — ED NOTES
"Pt here for c/o pelvic pain that started about 3 weeks ago - has been on a few antibiotics and steroids, but still has c/o pain. Pt states vaginal area is \"raw\" red and has some white discharge.      Mala Finn RN  08/07/19 2045    "

## 2019-08-08 NOTE — ED PROVIDER NOTES
EMERGENCY DEPARTMENT ENCOUNTER    CHIEF COMPLAINT  Chief Complaint: Pelvic pain  History given by: Patient  History limited by: None  Room Number: HALG/G  PMD: Bryan Vega MD      HPI:  Pt is a 64 y.o. female who presents complaining of constant pelvic pain that began about 3 weeks ago and has not improved. She reports mild pain radiation to her back. Pt reports she was seen at urgent care on 7/30/19, and was diagnosed with UTI symptoms and yeast vaginitis. Pt reports she had a urine culture done that was positive for proteus mirabilis. She was discharged with Rx for Cipro. Pt reports associated vaginal pain and redness with mild white discharge, and dysuria. Pt denies diarrhea or hematuria.    Duration/Onset/Timing: 3 weeks/gradual/constant  Location:   Radiation: none  Quality: pain  Intensity/Severity: moderate  Associated Symptoms: vaginal pain and redness, vaginal discharge  Aggravating or Alleviating Factors: none  Previous Episodes: no      PAST MEDICAL HISTORY  Active Ambulatory Problems     Diagnosis Date Noted   • Cervical radiculopathy 06/27/2016   • Acid reflux 06/27/2016   • Generalized osteoarthritis 06/27/2016   • Gastroesophageal reflux disease 06/27/2016   • Irritable bowel syndrome with constipation 06/27/2016   • Osteoporosis 06/27/2016   • Pruritus 06/27/2016   • Seborrheic eczema 06/27/2016   • Osteoarthritis of cervical spine without myelopathy 06/27/2016   • Eczema 06/27/2016   • DDD (degenerative disc disease), lumbar 06/27/2016   • Somatic dysfunction of sacroiliac joint 05/08/2017   • Bilateral sacroiliitis (CMS/HCC) 05/08/2017   • Strain of left trapezius muscle 05/08/2017   • Lateral femoral cutaneous neuropathy 12/04/2017   • Other chronic pain 01/29/2018   • Lumbar facet arthropathy 02/26/2018   • Pure hypercholesterolemia 06/10/2019     Resolved Ambulatory Problems     Diagnosis Date Noted   • Antinuclear factor positive 06/27/2016   • Anxiety 06/27/2016   • Lichen sclerosus of  female genitalia 06/27/2016     Past Medical History:   Diagnosis Date   • ITZEL positive    • Anxiety    • Bursitis 2016   • Cervical radiculopathy    • Cervical spondylosis    • Degeneration of intervertebral disc    • Eczema    • GERD (gastroesophageal reflux disease)    • Irritable bowel syndrome    • Lichen sclerosus of female genitalia    • Low back pain    • Myalgia and myositis    • Osteoporosis    • Pruritus        PAST SURGICAL HISTORY  Past Surgical History:   Procedure Laterality Date   • BREAST CYST ASPIRATION      10+ years ago   • CHOLECYSTECTOMY     • MOUTH SURGERY         FAMILY HISTORY  Family History   Problem Relation Age of Onset   • Irritable bowel syndrome Mother    • Hypertension Father        SOCIAL HISTORY  Social History     Socioeconomic History   • Marital status:      Spouse name: Not on file   • Number of children: Not on file   • Years of education: Not on file   • Highest education level: Not on file   Occupational History   • Occupation: retired   Tobacco Use   • Smoking status: Never Smoker   • Smokeless tobacco: Never Used   Substance and Sexual Activity   • Alcohol use: Yes     Comment: occationally   • Drug use: No   • Sexual activity: Yes     Partners: Male       ALLERGIES  Sulfa antibiotics    REVIEW OF SYSTEMS  Review of Systems   Constitutional: Negative for fever.   HENT: Negative for sore throat.    Eyes: Negative.    Respiratory: Negative for cough and shortness of breath.    Cardiovascular: Negative for chest pain.   Gastrointestinal: Negative for abdominal pain, diarrhea and vomiting.   Genitourinary: Positive for dysuria, pelvic pain, vaginal discharge and vaginal pain. Negative for hematuria.   Musculoskeletal: Negative for neck pain.   Skin: Negative for rash.   Allergic/Immunologic: Negative.    Neurological: Negative for weakness, numbness and headaches.   Hematological: Negative.    Psychiatric/Behavioral: Negative.    All other systems reviewed and are  negative.      PHYSICAL EXAM  ED Triage Vitals   Temp Heart Rate Resp BP SpO2   08/07/19 1821 08/07/19 1821 08/07/19 1821 08/07/19 1904 08/07/19 1821   98.9 °F (37.2 °C) 92 16 127/78 99 %      Temp src Heart Rate Source Patient Position BP Location FiO2 (%)   08/07/19 1821 08/07/19 1904 08/07/19 1904 08/07/19 1904 --   Tympanic Monitor Sitting Right arm        Physical Exam   Constitutional: She is oriented to person, place, and time. No distress.   HENT:   Head: Normocephalic and atraumatic.   Eyes: EOM are normal. Pupils are equal, round, and reactive to light.   Neck: Normal range of motion. Neck supple.   Cardiovascular: Normal rate, regular rhythm and normal heart sounds. Exam reveals no gallop and no friction rub.   No murmur heard.  Pulmonary/Chest: Effort normal and breath sounds normal. No respiratory distress. She has no wheezes. She has no rales.   Abdominal: Soft. There is tenderness in the suprapubic area. There is no rebound and no guarding.   Genitourinary: Vulva exhibits no tenderness.   Genitourinary Comments: Mild vaginal atrophy, otherwise unremarkable pelvic exam.   Musculoskeletal: Normal range of motion. She exhibits no edema.   Neurological: She is alert and oriented to person, place, and time. She has normal sensation and normal strength.   Skin: Skin is warm and dry. No rash noted.   Psychiatric: Mood and affect normal.   Nursing note and vitals reviewed.      LAB RESULTS  Lab Results (last 24 hours)     Procedure Component Value Units Date/Time    Urinalysis With Microscopic If Indicated (No Culture) - Urine, Clean Catch [[id number]]  (Abnormal) Collected:  08/07/19 1914    Specimen:  Urine, Clean Catch Updated:  08/07/19 1926     Color, UA Yellow     Appearance, UA Turbid     pH, UA 7.5     Specific Gravity, UA 1.007     Glucose, UA Negative     Ketones, UA Negative     Bilirubin, UA Negative     Blood, UA Negative     Protein, UA Negative     Leuk Esterase, UA Negative     Nitrite, UA  Negative     Urobilinogen, UA 0.2 E.U./dL    Narrative:       Urine microscopic not indicated.    CBC & Differential [[id number]] Collected:  08/07/19 1940    Specimen:  Blood Updated:  08/07/19 2014    Narrative:       The following orders were created for panel order CBC & Differential.  Procedure                               Abnormality         Status                     ---------                               -----------         ------                     CBC Auto Differential[[id number]]        Abnormal            Final result                 Please view results for these tests on the individual orders.    Comprehensive Metabolic Panel [[id number]]  (Abnormal) Collected:  08/07/19 1940    Specimen:  Blood Updated:  08/07/19 2010     Glucose 98 mg/dL      BUN 12 mg/dL      Creatinine 0.82 mg/dL      Sodium 138 mmol/L      Potassium 4.1 mmol/L      Chloride 98 mmol/L      CO2 29.6 mmol/L      Calcium 9.1 mg/dL      Total Protein 7.7 g/dL      Albumin 4.90 g/dL      ALT (SGPT) 14 U/L      AST (SGOT) 19 U/L      Alkaline Phosphatase 74 U/L      Total Bilirubin 0.2 mg/dL      eGFR Non African Amer 70 mL/min/1.73      Globulin 2.8 gm/dL      A/G Ratio 1.8 g/dL      BUN/Creatinine Ratio 14.6     Anion Gap 10.4 mmol/L     Narrative:       GFR Normal >60  Chronic Kidney Disease <60  Kidney Failure <15    Lipase [[id number]]  (Normal) Collected:  08/07/19 1940    Specimen:  Blood Updated:  08/07/19 2010     Lipase 30 U/L     CBC Auto Differential [[id number]]  (Abnormal) Collected:  08/07/19 1940    Specimen:  Blood Updated:  08/07/19 2014     WBC 5.64 10*3/mm3      RBC 3.98 10*6/mm3      Hemoglobin 11.9 g/dL      Hematocrit 38.6 %      MCV 97.0 fL      MCH 29.9 pg      MCHC 30.8 g/dL      RDW 11.9 %      RDW-SD 42.4 fl      MPV 10.4 fL      Platelets 316 10*3/mm3      Neutrophil % 58.5 %      Lymphocyte % 27.5 %      Monocyte % 9.2 %      Eosinophil % 3.9 %      Basophil % 0.5 %      Immature Grans % 0.4 %      Neutrophils,  Absolute 3.30 10*3/mm3      Lymphocytes, Absolute 1.55 10*3/mm3      Monocytes, Absolute 0.52 10*3/mm3      Eosinophils, Absolute 0.22 10*3/mm3      Basophils, Absolute 0.03 10*3/mm3      Immature Grans, Absolute 0.02 10*3/mm3      nRBC 0.0 /100 WBC     Wet Prep, Genital - Swab, Vagina [[id number]]  (Normal) Collected:  08/07/19 2217    Specimen:  Swab from Vagina Updated:  08/07/19 2231     YEAST No yeast seen     HYPHAL ELEMENTS No Hyphal elements seen     WBC'S No WBC's seen     Clue Cells, Wet Prep No Clue cells seen     Trichomonas, Wet Prep No Trichomonas seen          I ordered the above labs and reviewed the results    RADIOLOGY  CT Abdomen Pelvis Without Contrast   Final Result       1. The patient does have some distention of the urinary bladder.   Correlation with any symptoms of urinary retention is recommended.   2. Mild increased stool burden may reflect constipation, without   evidence of mechanical bowel obstruction.       Radiation dose reduction techniques were utilized, including automated   exposure control and exposure modulation based on body size.       This report was finalized on 8/7/2019 11:14 PM by Dr. Tahmina Richard M.D.               I ordered the above noted radiological studies. Interpreted by radiologist. Reviewed by me in PACS.       PROCEDURES  Procedures      PROGRESS AND CONSULTS     2055  Toradol ordered for treatment.    2057  CT abdomen pelvis ordered for evaluation.    2250   Reviewed patient's case with Dr. Smith, ER physician. After bedside evaluation, Dr. Smith agrees with the plan of care.     2333  Rechecked the patient. Informed the patient this is likely vaginal atrophy, and discussed the plan to f/u with OBGYN next week as she is scheduled. Will discharge with estrodiol cream for treatment. Pt understands and agrees with the plan, all questions answered.      MEDICAL DECISION MAKING  Results were reviewed/discussed with the patient and they were also made aware of  online access. Pt also made aware that some labs, such as cultures, will not be resulted during ER visit and follow up with PMD is necessary.     MDM  Number of Diagnoses or Management Options     Amount and/or Complexity of Data Reviewed  Clinical lab tests: ordered and reviewed  Decide to obtain previous medical records or to obtain history from someone other than the patient: yes  Review and summarize past medical records: yes  Discuss the patient with other providers: yes (Dr. Sarah MD)    Patient Progress  Patient progress: stable         DIAGNOSIS  Final diagnoses:   Pelvic pain   Vaginal atrophy       DISPOSITION  DISCHARGE    Patient discharged in stable condition.    Reviewed implications of results, diagnosis, meds, responsibility to follow up, warning signs and symptoms of possible worsening, potential complications and reasons to return to ER, including any new or worsening symptoms.    Patient/Family voiced understanding of above instructions.    Discussed plan for discharge, as there is no emergent indication for admission. Patient referred to primary care provider for BP management due to today's BP. Pt/family is agreeable and understands need for follow up and repeat testing.  Pt is aware that discharge does not mean that nothing is wrong but it indicates no emergency is present that requires admission and they must continue care with follow-up as given below or physician of their choice.     FOLLOW-UP  Your Gyn    Go to   For further evaluation and treatment as scheduled.         Medication List      New Prescriptions    estradiol 0.1 MG/GM vaginal cream  Commonly known as:  ESTRACE  Insert 2 g into the vagina 2 (Two) Times a Week.              Latest Documented Vital Signs:  As of 6:17 AM  BP- 125/70 HR- 88 Temp- 98.1 °F (36.7 °C) (Oral) O2 sat- 96%    --  Documentation assistance provided by david Lynch for Bryan Burt PA-C.  Information recorded by the david was done at my direction  and has been verified and validated by me.                                     Dai Lynch  08/08/19 0002       Bryan Burt III, PA  08/08/19 0617

## 2019-08-12 ENCOUNTER — OFFICE VISIT (OUTPATIENT)
Dept: OBSTETRICS AND GYNECOLOGY | Facility: CLINIC | Age: 64
End: 2019-08-12

## 2019-08-12 VITALS
WEIGHT: 127 LBS | HEIGHT: 63 IN | DIASTOLIC BLOOD PRESSURE: 78 MMHG | SYSTOLIC BLOOD PRESSURE: 126 MMHG | BODY MASS INDEX: 22.5 KG/M2

## 2019-08-12 DIAGNOSIS — R33.9 URINARY RETENTION: ICD-10-CM

## 2019-08-12 DIAGNOSIS — N94.9 VAGINAL DISCOMFORT: Primary | ICD-10-CM

## 2019-08-12 DIAGNOSIS — N95.2 SENILE ATROPHIC VAGINITIS: ICD-10-CM

## 2019-08-12 LAB
BILIRUB BLD-MCNC: NEGATIVE MG/DL
CLARITY, POC: CLEAR
COLOR UR: YELLOW
GLUCOSE UR STRIP-MCNC: NEGATIVE MG/DL
KETONES UR QL: NEGATIVE
LEUKOCYTE EST, POC: NEGATIVE
NITRITE UR-MCNC: NEGATIVE MG/ML
PH UR: 6 [PH] (ref 5–8)
PROT UR STRIP-MCNC: NEGATIVE MG/DL
RBC # UR STRIP: NEGATIVE /UL
SP GR UR: 1.01 (ref 1–1.03)
UROBILINOGEN UR QL: NORMAL

## 2019-08-12 PROCEDURE — 81002 URINALYSIS NONAUTO W/O SCOPE: CPT | Performed by: OBSTETRICS & GYNECOLOGY

## 2019-08-12 PROCEDURE — 99213 OFFICE O/P EST LOW 20 MIN: CPT | Performed by: OBSTETRICS & GYNECOLOGY

## 2019-08-12 RX ORDER — NYSTATIN AND TRIAMCINOLONE ACETONIDE 100000; 1 [USP'U]/G; MG/G
OINTMENT TOPICAL 2 TIMES DAILY
Qty: 60 G | Refills: 0 | Status: SHIPPED | OUTPATIENT
Start: 2019-08-12 | End: 2019-08-19

## 2019-08-12 RX ORDER — ESTRADIOL 0.1 MG/G
2 CREAM VAGINAL 2 TIMES WEEKLY
Qty: 42.5 G | Refills: 0 | Status: SHIPPED | OUTPATIENT
Start: 2019-08-12 | End: 2019-09-06 | Stop reason: SDUPTHER

## 2019-08-12 NOTE — PROGRESS NOTES
Patient Care Team:  Bryan Vega MD as PCP - General  Bryan Vega MD as PCP - Family Medicine  Anny Wyman APRN as PCP - Claims Attributed    Patient new to practice? no Patient new to examiner? no     -----------------------------------------------------HISTORY---------------------------------------------------    Chief Complaint:   Chief Complaint   Patient presents with   • Groin Swelling     with pain      WENT TO [facility] ON 08/07/19     New problem to examiner? yes    No LMP recorded. Patient is postmenopausal.    HPI: History of Present Illness      HPI:  1. Location: vaginal introitus  2. Severity:  severe  3.  Quality:  burning  4. Modifying factors:  Worse with urinating  5.  Associated signs/symptoms:  redness  6. Pt denies:  Fever or dysuria.      ROS:  Review of Systems   Constitutional: Negative.    HENT: Negative.    Eyes: Negative.    Respiratory: Negative.    Cardiovascular: Negative.    Gastrointestinal: Negative.    Endocrine: Negative.    Genitourinary:        Genital pain   Musculoskeletal: Negative.    Skin: Negative.    Allergic/Immunologic: Negative.    Neurological: Negative.    Hematological: Negative.    Psychiatric/Behavioral: Negative.    :      [facility]: PAST HISTORY REVIEWED     1.    Past Medical History:   Diagnosis Date   • ITZEL positive     Repeat ITZEL November 2014 negative   • Anxiety    • Bursitis 2016   • Cervical radiculopathy    • Cervical spondylosis    • Degeneration of intervertebral disc    • Eczema    • GERD (gastroesophageal reflux disease)    • Irritable bowel syndrome    • Lichen sclerosus of female genitalia    • Low back pain    • Myalgia and myositis    • Osteoporosis    • Pruritus            Status of: reviewed.      2.   Family History   Problem Relation Age of Onset   • Irritable bowel syndrome Mother    • Hypertension Father        3. Social History: :    Employment/occupation:  no  Smoker: no  Alcohol: no Recreational drugs: no     4.  "  Past Surgical History:   Procedure Laterality Date   • BREAST CYST ASPIRATION      10+ years ago   • CHOLECYSTECTOMY     • MOUTH SURGERY          History of classical Csection?  no    5.   Current Outpatient Medications:   •  acetaminophen (TYLENOL) 650 MG 8 hr tablet, Take 2 tablets by mouth 2 (two) times a day as needed., Disp: , Rfl:   •  estradiol (ESTRACE) 0.1 MG/GM vaginal cream, Insert 2 g into the vagina 2 (Two) Times a Week., Disp: 42.5 g, Rfl: 0  •  hydroxychloroquine (PLAQUENIL) 200 MG tablet, Take 200 mg by mouth 2 (Two) Times a Day., Disp: , Rfl:   •  nystatin-triamcinolone (MYCOLOG) 324603-9.1 UNIT/GM-% ointment, Apply  topically to the appropriate area as directed 2 (Two) Times a Day for 7 days., Disp: 60 g, Rfl: 0  •  omeprazole (priLOSEC) 40 MG capsule, TAKE ONE CAPSULE BY MOUTH DAILY, Disp: 30 capsule, Rfl: 5  •  sulindac (CLINORIL) 200 MG tablet, , Disp: , Rfl:     6.   Allergies   Allergen Reactions   • Sulfa Antibiotics Unknown (See Comments)     Past history, unknown reaction. Was told by mother there was a reaction of some type.                      -----------------------------------------------PHYSICAL EXAM----------------------------------------------    Vital Signs: /78   Ht 160 cm (63\")   Wt 57.6 kg (127 lb)   BMI 22.50 kg/m²    Flowsheet Rows      First Filed Value   Admission Height  160 cm (63\") Documented at 08/12/2019 1452   Admission Weight  57.6 kg (127 lb) Documented at 08/12/2019 1452          Physical Exam   Constitutional: She is oriented to person, place, and time. She appears well-developed and well-nourished. No distress.   HENT:   Head: Normocephalic and atraumatic.   Eyes: EOM are normal.   Neck: Normal range of motion.   Pulmonary/Chest: Effort normal.   Abdominal: Soft. Bowel sounds are normal. She exhibits no distension and no mass. There is no tenderness. There is no rebound and no guarding. No hernia.   Genitourinary:         Genitourinary Comments: Hatched " area is red and injected without lesions   Musculoskeletal: Normal range of motion.   Neurological: She is alert and oriented to person, place, and time.   Skin: Skin is warm and dry. No rash noted. She is not diaphoretic. No erythema. No pallor.   Psychiatric: She has a normal mood and affect. Her behavior is normal. Judgment and thought content normal.   Nursing note and vitals reviewed.                          -----------------------------------------------MEDICAL DECISION MAKING-----------------------------  Risk counseling done:  yes    Results Reviewed:     1.   Lab Results (last 24 hours)     Procedure Component Value Units Date/Time    POC Urinalysis Dipstick [[id number]]  (Normal) Collected:  08/12/19 1502    Specimen:  Urine Updated:  08/12/19 1503     Color Yellow     Clarity, UA Clear     Glucose, UA Negative mg/dL      Bilirubin Negative     Ketones, UA Negative     Specific Gravity  1.010     Blood, UA Negative     pH, Urine 6.0     Protein, POC Negative mg/dL      Urobilinogen, UA Normal     Leukocytes Negative     Nitrite, UA Negative        2.   Imaging Results (last 24 hours)     ** No results found for the last 24 hours. **        3.   ECG/EMG Results (most recent)     None          Old records reviewed?  yes    Diagnoses and/or chronic conditions reviewed with pt:  Shanika was seen today for groin swelling.    Diagnoses and all orders for this visit:    Vaginal discomfort  -     POC Urinalysis Dipstick    Urinary retention  -     Cystometrogram; Future    Senile atrophic vaginitis    Other orders  -     nystatin-triamcinolone (MYCOLOG) 807717-1.1 UNIT/GM-% ointment; Apply  topically to the appropriate area as directed 2 (Two) Times a Day for 7 days.  -     estradiol (ESTRACE) 0.1 MG/GM vaginal cream; Insert 2 g into the vagina 2 (Two) Times a Week.        IMP:  Atrophic vaginitis with burning. New problem.  Mild urinary retention.  New problem.     PLAN: topical treatment  Urodynamics.      Instructions and precautions given.    Labs/imaging ordered: none    RTO Return if symptoms worsen or fail to improve.          Nino Nguyen MD  7:57 PM  08/12/19

## 2019-08-26 ENCOUNTER — OFFICE VISIT (OUTPATIENT)
Dept: PAIN MEDICINE | Facility: CLINIC | Age: 64
End: 2019-08-26

## 2019-08-26 VITALS
HEIGHT: 63 IN | BODY MASS INDEX: 22.5 KG/M2 | WEIGHT: 127 LBS | TEMPERATURE: 98.2 F | DIASTOLIC BLOOD PRESSURE: 78 MMHG | RESPIRATION RATE: 18 BRPM | SYSTOLIC BLOOD PRESSURE: 122 MMHG | OXYGEN SATURATION: 98 % | HEART RATE: 75 BPM

## 2019-08-26 DIAGNOSIS — M51.36 DDD (DEGENERATIVE DISC DISEASE), LUMBAR: ICD-10-CM

## 2019-08-26 DIAGNOSIS — M99.04 SOMATIC DYSFUNCTION OF SACROILIAC JOINT: ICD-10-CM

## 2019-08-26 DIAGNOSIS — M47.816 LUMBAR FACET ARTHROPATHY: ICD-10-CM

## 2019-08-26 DIAGNOSIS — G89.29 OTHER CHRONIC PAIN: Primary | ICD-10-CM

## 2019-08-26 DIAGNOSIS — G57.10 LATERAL FEMORAL CUTANEOUS NEUROPATHY, UNSPECIFIED LATERALITY: ICD-10-CM

## 2019-08-26 DIAGNOSIS — M15.9 GENERALIZED OSTEOARTHRITIS: ICD-10-CM

## 2019-08-26 PROCEDURE — 99214 OFFICE O/P EST MOD 30 MIN: CPT | Performed by: NURSE PRACTITIONER

## 2019-08-26 RX ORDER — POLYETHYLENE GLYCOL 3350 17 G/17G
17 POWDER, FOR SOLUTION ORAL DAILY
COMMUNITY
End: 2020-02-13

## 2019-08-26 NOTE — PROGRESS NOTES
"CHIEF COMPLAINT  Follow-up for back and hip pain.    Subjective   Shanika Hector is a 64 y.o. female  who presents to the office for follow-up of procedure.  She completed a Bilateral Hip injections - lateral approaches   on  8/1/19 performed by Dr. Huerta for management of hip pain. Patient reports 50% ongoing relief from the procedure.     Patient was last evaluated the office by ABHISHEK Forman on 5/17/2019.  At that time she was status post bilateral greater trochanteric bursa injection on 4/25/2019 with Dr. Huerta.  Reported moderate relief for the first 3 days.  Her bilateral hip pain remains her primary complaint.  Has a history of lumbar RFL in the past.  She reports being \"numb for 7 to 8 months.  \"Reports that she had no pain but does not like the numbness.  Has previously been evaluated by Dr. Roland for neurosurgery and told no surgical options.  Obtain x-ray of bilateral hips.  Consider intra-articular hip injections or orthopedic referral.  She had a bilateral intra-articular hip injection performed by Dr. Huerta on 8/1/2019.  Per Dr. Bryan Diaz office visit 6/10/2019--patient has history of bilateral sacroiliitis, lateral femoral cutaneous neuropathy bilaterally and chronic pain syndrome.  Follows with pain management.  Also has generalized also arthritis.  Has a previously positive ITZEL titer that was nonspecific.  Is prescribed Plaquenil by her rheumatologist.    Complains of pain in her low back, hips and thighs. Today her pain is 4/10VAS.  Her thigh pain is her primary complaint. Describes the pain as continuous throbbing and burning. Pain increases with sleeping on side, certain positions, walking; pain decreases with procedures, rest and changing position. ADL\"s by self. Denies any bowel or bladder changes.      Back Pain    This is a chronic problem. The current episode started more than 1 month ago. The problem occurs constantly. The problem has been gradually worsening since onset. " The pain is present in the gluteal, lumbar spine, thoracic spine and sacro-iliac. The quality of the pain is described as aching, shooting and stabbing. The pain radiates to the left thigh and right thigh. The pain is at a severity of 4/10. The pain is the same all the time. The symptoms are aggravated by position, lying down, sitting, standing and twisting. Stiffness is present all day. Associated symptoms include headaches, leg pain, numbness, paresthesias and tingling. Pertinent negatives include no abdominal pain, bladder incontinence, bowel incontinence, chest pain, dysuria, fever, paresis, pelvic pain, perianal numbness, weakness or weight loss. Risk factors include poor posture.      Procedures  -- 8-1-19-- bilateral intra-articular hip injections.  -- 4-25-19-- bilateral GTB- 50% relief for 3 days  -- 11-15-18-- bilateral SI and Greater trochanteric bursa injections  -- 6-7-18-- lateral femoral cutaneous-- 80-90% long-term.  -- 5-24-18-- lateral femoral cutaneous  --3-8-18-- bilateral L4-S3 RFL.    PEG Assessment   What number best describes your pain on average in the past week?4  What number best describes how, during the past week, pain has interfered with your enjoyment of life?6  What number best describes how, during the past week, pain has interfered with your general activity?  5    The following portions of the patient's history were reviewed and updated as appropriate: allergies, current medications, past family history, past medical history, past social history, past surgical history and problem list.    Review of Systems   Constitutional: Positive for fatigue. Negative for fever and weight loss.   HENT: Negative for congestion.    Eyes: Negative for visual disturbance.   Respiratory: Negative for cough, shortness of breath and wheezing.    Cardiovascular: Negative.  Negative for chest pain.   Gastrointestinal: Positive for constipation. Negative for abdominal pain, bowel incontinence and  "diarrhea.   Genitourinary: Negative for bladder incontinence, difficulty urinating, dysuria and pelvic pain.   Musculoskeletal: Positive for arthralgias (bilateral hips). Negative for back pain.   Neurological: Positive for tingling, numbness, headaches and paresthesias. Negative for weakness.   Psychiatric/Behavioral: Positive for sleep disturbance. Negative for suicidal ideas. The patient is not nervous/anxious.      Vitals:    08/26/19 1301   BP: 122/78   Pulse: 75   Resp: 18   Temp: 98.2 °F (36.8 °C)   SpO2: 98%   Weight: 57.6 kg (127 lb)   Height: 160 cm (63\")   PainSc:   4   PainLoc: Leg     Objective   Physical Exam   Constitutional: She is oriented to person, place, and time. She appears well-developed and well-nourished.   HENT:   Head: Normocephalic and atraumatic.   Eyes: Conjunctivae are normal.   Neck: Neck supple.   Cardiovascular: Normal rate.   Pulmonary/Chest: Effort normal.   Musculoskeletal:        Cervical back: She exhibits tenderness (left trapezius spasm), swelling, pain and spasm.        Lumbar back: She exhibits no tenderness and no bony tenderness.        Legs:  Mild to moderate tenderness of Si joint, Mild to moderate  tenderness of bilateral Lumbar facets    Negative SLR bilaterally    Mild to moderate tenderness of bilateral greater trochanteric bursa   Neurological: She is alert and oriented to person, place, and time. Gait normal.   Reflex Scores:       Patellar reflexes are 1+ on the right side and 1+ on the left side.  Psychiatric: She has a normal mood and affect. Her behavior is normal.   Nursing note and vitals reviewed.      Assessment/Plan   Shanika was seen today for back pain.    Diagnoses and all orders for this visit:    Other chronic pain    DDD (degenerative disc disease), lumbar    Somatic dysfunction of sacroiliac joint    Lumbar facet arthropathy    Generalized osteoarthritis    Lateral femoral cutaneous neuropathy, unspecified laterality  -     Case Request      ---repeat " bilateral lateral femoral cutaneous nerve blocks. No blood thinners. Reviewed the procedure at length with the patient.  Included in the review was expectations, complications, risk and benefits.The procedure was described in detail and the risks, benefits and alternatives were discussed with the patient (including but not limited to: bleeding, infection, nerve damage, worsening of pain, inability to perform injection, paralysis, seizures, and death) who agreed to proceed.  Discussed the potential for sedation if warranted/wanted.  The procedure will plan to be performed at [facility] with fluoroscopic guidance(unless ultrasound is indicated). Questions were answered and in a way the patient could understand.  Patient verbalized understanding and wishes to proceed.  This intervention will be ordered.  Discussed with patient that all procedures are part of a multimodal plan of care and include either formal PT or a home exercise program.    --- Compounded pain creme. Discussed medication with the patient.  Included in this discussion was the potential for side effects and adverse events.  Patient verbalized understanding and wished to proceed.  Prescription will be sent to pharmacy.    --- Follow-up after procedure or sooner if needed.       RAMONITA REPORT  RAMONITA report has been reviewed and scanned into the patient's chart.    As the clinician, I personally reviewed the RAMONITA from 8-23-19 while the patient was in the office today.          EMR Dragon/Transcription disclaimer:   Much of this encounter note is an electronic transcription/translation of spoken language to printed text. The electronic translation of spoken language may permit erroneous, or at times, nonsensical words or phrases to be inadvertently transcribed; Although I have reviewed the note for such errors, some may still exist.

## 2019-08-29 NOTE — PROGRESS NOTES
Physical Therapy Daily Progress Note    Time In 1405  Time Out 1500    Shanika Hector reports: back was hurting really bad today and had to take Advil, rubbed Voltaren gel and took Gabapentin with some relief.    Subjective     Objective   See Exercise, Manual, and Modality Logs for complete treatment.       Assessment & Plan     Assessment  Assessment details: Initiated long axis distraction through bilateral LEs today without pain.  Pt continues to respond well to extension-based protocol.  Progressed some prone control/stabilization exercises with increased repetitions.  Prognosis: good      Progress per Plan of Care         Manual Therapy:    5     mins  14527;  Therapeutic Exercise:    23     mins  22571;     Neuromuscular Patsy:    10    mins  01191;    Therapeutic Activity:     -     mins  38057;     Gait Training:      -     mins  52378;     Ultrasound:     -     mins  22534;    Electrical Stimulation:    15     mins  49018 ( );  Dry Needling     -     mins self-pay    Timed Treatment:   38   mins direct  Total Treatment:     55   mins    Paula Sanchez, PT, DPT  Physical Therapist     You can access the FollowMyHealth Patient Portal offered by [facility] by registering at the following website: [email or web address]. By joining [facility]’s FollowMyHealth portal, you will also be able to view your health information using other applications (apps) compatible with our system.

## 2019-09-11 RX ORDER — ESTRADIOL 0.1 MG/G
2 CREAM VAGINAL 2 TIMES WEEKLY
Qty: 42.5 G | Refills: 0 | Status: SHIPPED | OUTPATIENT
Start: 2019-09-12 | End: 2019-09-12 | Stop reason: SDUPTHER

## 2019-09-12 RX ORDER — ESTRADIOL 0.1 MG/G
2 CREAM VAGINAL 2 TIMES WEEKLY
Qty: 42.5 G | Refills: 0 | Status: SHIPPED | OUTPATIENT
Start: 2019-09-12 | End: 2019-10-10 | Stop reason: SDUPTHER

## 2019-09-27 ENCOUNTER — OFFICE VISIT (OUTPATIENT)
Dept: FAMILY MEDICINE CLINIC | Facility: CLINIC | Age: 64
End: 2019-09-27

## 2019-09-27 VITALS
OXYGEN SATURATION: 99 % | RESPIRATION RATE: 18 BRPM | HEART RATE: 83 BPM | BODY MASS INDEX: 22.15 KG/M2 | SYSTOLIC BLOOD PRESSURE: 111 MMHG | DIASTOLIC BLOOD PRESSURE: 71 MMHG | TEMPERATURE: 97.9 F | WEIGHT: 125 LBS | HEIGHT: 63 IN

## 2019-09-27 DIAGNOSIS — Z23 NEED FOR IMMUNIZATION AGAINST INFLUENZA: ICD-10-CM

## 2019-09-27 DIAGNOSIS — R30.9 PAINFUL URINATION: Primary | ICD-10-CM

## 2019-09-27 PROBLEM — S46.812A STRAIN OF LEFT TRAPEZIUS MUSCLE: Status: RESOLVED | Noted: 2017-05-08 | Resolved: 2019-09-27

## 2019-09-27 PROBLEM — M50.90 CERVICAL DISC DISORDER: Status: ACTIVE | Noted: 2019-09-27

## 2019-09-27 PROBLEM — R76.8 OTHER SPECIFIED ABNORMAL IMMUNOLOGICAL FINDINGS IN SERUM: Status: ACTIVE | Noted: 2019-09-27

## 2019-09-27 LAB
BILIRUB BLD-MCNC: NEGATIVE MG/DL
CLARITY, POC: ABNORMAL
COLOR UR: YELLOW
GLUCOSE UR STRIP-MCNC: NEGATIVE MG/DL
KETONES UR QL: ABNORMAL
LEUKOCYTE EST, POC: ABNORMAL
NITRITE UR-MCNC: NEGATIVE MG/ML
PH UR: 7 [PH] (ref 5–8)
PROT UR STRIP-MCNC: ABNORMAL MG/DL
RBC # UR STRIP: ABNORMAL /UL
SP GR UR: 1.02 (ref 1–1.03)
UROBILINOGEN UR QL: NORMAL

## 2019-09-27 PROCEDURE — 99213 OFFICE O/P EST LOW 20 MIN: CPT | Performed by: FAMILY MEDICINE

## 2019-09-27 PROCEDURE — 90653 IIV ADJUVANT VACCINE IM: CPT | Performed by: FAMILY MEDICINE

## 2019-09-27 PROCEDURE — 81003 URINALYSIS AUTO W/O SCOPE: CPT | Performed by: FAMILY MEDICINE

## 2019-09-27 PROCEDURE — G0008 ADMIN INFLUENZA VIRUS VAC: HCPCS | Performed by: FAMILY MEDICINE

## 2019-09-27 RX ORDER — NITROFURANTOIN 25; 75 MG/1; MG/1
100 CAPSULE ORAL 2 TIMES DAILY
Qty: 14 CAPSULE | Refills: 0 | Status: SHIPPED | OUTPATIENT
Start: 2019-09-27 | End: 2019-10-07

## 2019-09-27 RX ORDER — NYSTATIN AND TRIAMCINOLONE ACETONIDE 100000; 1 [USP'U]/G; MG/G
OINTMENT TOPICAL
COMMUNITY
Start: 2019-08-12 | End: 2019-11-12

## 2019-09-27 NOTE — PROGRESS NOTES
"Subjective   Shanika Hector is a 64 y.o. female.     Chief Complaint   Patient presents with   • Urinary Tract Infection        History of Present Illness    Dysuria.  2 days ago.  Very severe on Wednesday.  Frequency, hesitancy, urgency.  No hematuria.  No nausea no vomiting no chills no fever.  No abdominal pain.  Is had trouble with overactive bladder in the past.  She has a chronic pain syndrome and chronic back pain.  She goes to pain management.  She is also followed by gynecology.  They are doing some urological studies soon.  Today she is feeling mostly better.  She could not get in sooner because she was watching a child.    The following portions of the patient's history were reviewed and updated as appropriate: allergies, current medications, past family history, past medical history, past social history, past surgical history and problem list.          Review of Systems   Constitutional: Negative.    Genitourinary: Positive for dysuria.   Musculoskeletal: Positive for back pain.       Objective   Blood pressure 111/71, pulse 83, temperature 97.9 °F (36.6 °C), resp. rate 18, height 160 cm (63\"), weight 56.7 kg (125 lb), SpO2 99 %, not currently breastfeeding.  Physical Exam   Constitutional: No distress.   Cardiovascular: Normal rate.   Pulmonary/Chest: Effort normal.   Abdominal: Soft. She exhibits no distension and no mass. There is no tenderness. There is no guarding.   No CVA tenderness to percussion       Assessment/Plan   Shanika was seen today for urinary tract infection.    Diagnoses and all orders for this visit:    Painful urination  -     POC Urinalysis Dipstick, Automated  -     Urine Culture - Urine, Urine, Clean Catch    Need for immunization against influenza  -     Fluzone High Dose =>65Years    Other orders  -     nitrofurantoin, macrocrystal-monohydrate, (MACROBID) 100 MG capsule; Take 1 capsule by mouth 2 (Two) Times a Day.      Dysuria.  Urinalysis is suggestive of possible UTI, but the " symptoms are improved.  There is no current clinical evidence of pyelonephritis.  At this time I recommend observation and plenty of water.  I am sending a urine culture in case she does get sicker.  If the urinary symptoms return this weekend, start nitrofurantoin 100 mg twice a day.  She will let us know if she is not getting better.  With very severe symptoms such as high fever severe abdominal pain, vomiting medication, or other severe concerns she understands she will get to an emergency room.

## 2019-09-30 LAB
BACTERIA UR CULT: ABNORMAL
BACTERIA UR CULT: ABNORMAL
OTHER ANTIBIOTIC SUSC ISLT: ABNORMAL

## 2019-10-07 ENCOUNTER — OFFICE VISIT (OUTPATIENT)
Dept: FAMILY MEDICINE CLINIC | Facility: CLINIC | Age: 64
End: 2019-10-07

## 2019-10-07 VITALS
HEIGHT: 63 IN | OXYGEN SATURATION: 99 % | TEMPERATURE: 98 F | DIASTOLIC BLOOD PRESSURE: 70 MMHG | SYSTOLIC BLOOD PRESSURE: 119 MMHG | WEIGHT: 123.4 LBS | BODY MASS INDEX: 21.86 KG/M2 | HEART RATE: 91 BPM

## 2019-10-07 DIAGNOSIS — D64.9 ANEMIA, UNSPECIFIED TYPE: ICD-10-CM

## 2019-10-07 DIAGNOSIS — F41.9 ANXIETY: ICD-10-CM

## 2019-10-07 DIAGNOSIS — K21.9 GASTROESOPHAGEAL REFLUX DISEASE WITHOUT ESOPHAGITIS: ICD-10-CM

## 2019-10-07 DIAGNOSIS — G89.4 CHRONIC PAIN SYNDROME: Primary | ICD-10-CM

## 2019-10-07 DIAGNOSIS — M46.1 BILATERAL SACROILIITIS (HCC): ICD-10-CM

## 2019-10-07 PROCEDURE — 99214 OFFICE O/P EST MOD 30 MIN: CPT | Performed by: FAMILY MEDICINE

## 2019-10-07 RX ORDER — DULOXETIN HYDROCHLORIDE 20 MG/1
20 CAPSULE, DELAYED RELEASE ORAL DAILY
Qty: 30 CAPSULE | Refills: 3 | Status: SHIPPED | OUTPATIENT
Start: 2019-10-07 | End: 2019-12-16

## 2019-10-07 RX ORDER — OMEPRAZOLE 40 MG/1
40 CAPSULE, DELAYED RELEASE ORAL DAILY
Qty: 30 CAPSULE | Refills: 5 | Status: ON HOLD | OUTPATIENT
Start: 2019-10-07 | End: 2019-12-16 | Stop reason: SDUPTHER

## 2019-10-07 NOTE — PROGRESS NOTES
"Subjective   Shanika Hector is a 64 y.o. female.     Chief Complaint   Patient presents with   • Fatigue     for a while   • Back Pain     bilateral leg pain, but the worse right leg pain had injections is not worse   • Insomnia        History of Present Illness    Chronic pain syndrome.  Follow-up.  Bilateral sacroiliitis with also known L4-L5 lumbar disc disease.  She is got bilateral radiculopathy.  She is had multiple procedures done.  She was seen by neurosurgery.  No surgery indicated.  She has neck pain is been chronic.  Is a chronic pain altogether about 8 years but worse in the last couple of years.  She took gabapentin once at high doses and she could not tolerate it.  We tried amitriptyline and it caused constipation.  She did not like the sertraline which caused her to feel anxious.  She states she gets very anxious at times but no trouble with depression.  She has good interest in pleasurable activities.  She does have trouble with insomnia.  The pain keeps her awake.  She has a positive ITZEL, with uncertain evidence of a connective tissue disorder.  She states since starting the Plaquenil prescribed by her rheumatologist she is feeling somewhat better.  They also prescribe NSAIDs.  She had borderline anemia a few months back.  With a borderline high M CV in the past.  She has ongoing fatigue.      The following portions of the patient's history were reviewed and updated as appropriate: allergies, current medications, past family history, past medical history, past social history, past surgical history and problem list.          Review of Systems   Constitutional: Positive for fatigue.   Respiratory: Negative.    Cardiovascular: Negative.    Musculoskeletal: Positive for arthralgias, back pain, myalgias and neck pain.       Objective   Blood pressure 119/70, pulse 91, temperature 98 °F (36.7 °C), temperature source Oral, height 160 cm (62.99\"), weight 56 kg (123 lb 6.4 oz), SpO2 99 %, not currently " breastfeeding.  Physical Exam   Constitutional: She appears well-developed and well-nourished. No distress.   Neck: No thyromegaly present.   Cardiovascular: Normal rate, regular rhythm, normal heart sounds and intact distal pulses.   Pulmonary/Chest: Effort normal and breath sounds normal.   Musculoskeletal: She exhibits no edema.   Skin: Skin is warm and dry.   Psychiatric: Her behavior is normal. Judgment and thought content normal.   Affect is full range.  No stephy.  No depression.  Mild anxiety.   Nursing note and vitals reviewed.      Assessment/Plan   Shanika was seen today for fatigue, back pain and insomnia.    Diagnoses and all orders for this visit:    Chronic pain syndrome  -     CBC & Differential  -     Comprehensive Metabolic Panel  -     Vitamin B12  -     Iron and TIBC  -     Ferritin  -     TSH Rfx On Abnormal To Free T4    Bilateral sacroiliitis (CMS/HCC)  -     CBC & Differential    Gastroesophageal reflux disease without esophagitis  -     omeprazole (priLOSEC) 40 MG capsule; Take 1 capsule by mouth Daily.  -     CBC & Differential    Anemia, unspecified type  -     CBC & Differential  -     Vitamin B12  -     Iron and TIBC  -     Ferritin  -     TSH Rfx On Abnormal To Free T4    Anxiety  -     CBC & Differential  -     Comprehensive Metabolic Panel  -     Vitamin B12  -     Iron and TIBC  -     Ferritin  -     TSH Rfx On Abnormal To Free T4    Other orders  -     DULoxetine (CYMBALTA) 20 MG capsule; Take 1 capsule by mouth Daily.      Chronic pain syndrome related to bilateral sacroiliitis and an L4-L5 lumbar disc disease.  Degenerative disc disease.  She has been through pain management evaluations.  She said multiple injections that have helped temporarily.  She did not tolerate gabapentin but it was at very high dose to start with.  She does not need to have surgery according to neurosurgery.  She has anxiety but no major depression.  She has overlapping chronic pain symptoms including neck and  back.  She has insomnia.  Possible fibromyalgia.  I am recommending duloxetine 20 mg a day.  Take every other day for the first 7 to 10 days.  This may exacerbate her anxiety at first.  It should help her constipation.  I will see her back as scheduled in a couple months.  Sooner as needed.  To consider layering gabapentin at nighttime.  She understands to stop the duloxetine with any severe agitation.    Mild anemia.  Borderline.  Rechecking CBC along with other lab work as above.  Her mean cell volume was just slightly high.  Also checking B12.

## 2019-10-08 LAB
ALBUMIN SERPL-MCNC: 4.6 G/DL (ref 3.5–5.2)
ALBUMIN/GLOB SERPL: 1.8 G/DL
ALP SERPL-CCNC: 88 U/L (ref 39–117)
ALT SERPL-CCNC: 13 U/L (ref 1–33)
AST SERPL-CCNC: 20 U/L (ref 1–32)
BASOPHILS # BLD AUTO: 0.05 10*3/MM3 (ref 0–0.2)
BASOPHILS NFR BLD AUTO: 0.9 % (ref 0–1.5)
BILIRUB SERPL-MCNC: 0.4 MG/DL (ref 0.2–1.2)
BUN SERPL-MCNC: 17 MG/DL (ref 8–23)
BUN/CREAT SERPL: 18.3 (ref 7–25)
CALCIUM SERPL-MCNC: 9.3 MG/DL (ref 8.6–10.5)
CHLORIDE SERPL-SCNC: 101 MMOL/L (ref 98–107)
CO2 SERPL-SCNC: 27.2 MMOL/L (ref 22–29)
CREAT SERPL-MCNC: 0.93 MG/DL (ref 0.57–1)
EOSINOPHIL # BLD AUTO: 0.19 10*3/MM3 (ref 0–0.4)
EOSINOPHIL NFR BLD AUTO: 3.5 % (ref 0.3–6.2)
ERYTHROCYTE [DISTWIDTH] IN BLOOD BY AUTOMATED COUNT: 12.7 % (ref 12.3–15.4)
FERRITIN SERPL-MCNC: 37.4 NG/ML (ref 13–150)
GLOBULIN SER CALC-MCNC: 2.6 GM/DL
GLUCOSE SERPL-MCNC: 96 MG/DL (ref 65–99)
HCT VFR BLD AUTO: 36 % (ref 34–46.6)
HGB BLD-MCNC: 11.4 G/DL (ref 12–15.9)
IMM GRANULOCYTES # BLD AUTO: 0.02 10*3/MM3 (ref 0–0.05)
IMM GRANULOCYTES NFR BLD AUTO: 0.4 % (ref 0–0.5)
IRON SATN MFR SERPL: 16 % (ref 20–50)
IRON SERPL-MCNC: 69 MCG/DL (ref 37–145)
LYMPHOCYTES # BLD AUTO: 1.19 10*3/MM3 (ref 0.7–3.1)
LYMPHOCYTES NFR BLD AUTO: 22 % (ref 19.6–45.3)
MCH RBC QN AUTO: 30.2 PG (ref 26.6–33)
MCHC RBC AUTO-ENTMCNC: 31.7 G/DL (ref 31.5–35.7)
MCV RBC AUTO: 95.2 FL (ref 79–97)
MONOCYTES # BLD AUTO: 0.6 10*3/MM3 (ref 0.1–0.9)
MONOCYTES NFR BLD AUTO: 11.1 % (ref 5–12)
NEUTROPHILS # BLD AUTO: 3.36 10*3/MM3 (ref 1.7–7)
NEUTROPHILS NFR BLD AUTO: 62.1 % (ref 42.7–76)
NRBC BLD AUTO-RTO: 0.2 /100 WBC (ref 0–0.2)
PLATELET # BLD AUTO: 376 10*3/MM3 (ref 140–450)
POTASSIUM SERPL-SCNC: 4.8 MMOL/L (ref 3.5–5.2)
PROT SERPL-MCNC: 7.2 G/DL (ref 6–8.5)
RBC # BLD AUTO: 3.78 10*6/MM3 (ref 3.77–5.28)
SODIUM SERPL-SCNC: 140 MMOL/L (ref 136–145)
TIBC SERPL-MCNC: 442 MCG/DL
TSH SERPL DL<=0.005 MIU/L-ACNC: 0.59 UIU/ML (ref 0.27–4.2)
UIBC SERPL-MCNC: 373 MCG/DL (ref 112–346)
VIT B12 SERPL-MCNC: 477 PG/ML (ref 211–946)
WBC # BLD AUTO: 5.41 10*3/MM3 (ref 3.4–10.8)

## 2019-10-08 NOTE — PROGRESS NOTES
The hemoglobin remains just borderline low.  The iron studies are normal.  No evidence of iron deficiency.  We will need to follow in the future.  Otherwise lab work looks good.

## 2019-10-10 RX ORDER — ESTRADIOL 0.1 MG/G
2 CREAM VAGINAL 2 TIMES WEEKLY
Qty: 42.5 G | Refills: 0 | Status: SHIPPED | OUTPATIENT
Start: 2019-10-10 | End: 2019-11-04 | Stop reason: SDUPTHER

## 2019-11-04 RX ORDER — ESTRADIOL 0.1 MG/G
2 CREAM VAGINAL 2 TIMES WEEKLY
Qty: 42.5 G | Refills: 0 | Status: ON HOLD | OUTPATIENT
Start: 2019-11-04 | End: 2021-02-14

## 2019-11-12 ENCOUNTER — OFFICE VISIT (OUTPATIENT)
Dept: GASTROENTEROLOGY | Facility: CLINIC | Age: 64
End: 2019-11-12

## 2019-11-12 VITALS
WEIGHT: 117.6 LBS | BODY MASS INDEX: 20.84 KG/M2 | TEMPERATURE: 98 F | DIASTOLIC BLOOD PRESSURE: 72 MMHG | SYSTOLIC BLOOD PRESSURE: 104 MMHG | HEIGHT: 63 IN

## 2019-11-12 DIAGNOSIS — R10.13 DYSPEPSIA: Primary | ICD-10-CM

## 2019-11-12 DIAGNOSIS — K59.00 CONSTIPATION, UNSPECIFIED CONSTIPATION TYPE: ICD-10-CM

## 2019-11-12 DIAGNOSIS — R10.13 EPIGASTRIC PAIN: ICD-10-CM

## 2019-11-12 PROCEDURE — 99203 OFFICE O/P NEW LOW 30 MIN: CPT | Performed by: INTERNAL MEDICINE

## 2019-11-12 NOTE — PROGRESS NOTES
Subjective   Chief Complaint   Patient presents with   • GI Problem   • Heartburn   • Abdominal Pain       Shanika Hector is a  64 y.o. female here for a follow up visit for heartburn, abdominal pain and constipation.     She was last seen in January 2016.  Previous work-up includes AN EGD in 2015 which showed focal mild active duodenitis, reactive gastropathy-negative for H. pylori..  She had a colonoscopy in 2012 that was notable for internal hemorrhoids.    She has been using miralax with improvement in BM - she is no longer taking fiber.    She has cut out coffee and this helped her heartburn.  She doesn't eat after 5 0'clock.  She has decreased her portion size as well and has lost about 7 lbs.  She doesn't drink much usually.  She takes omeprazole 1-2 times a day.    She takes sulindac for OA BID.    No blood in stool.    HPI  Past Medical History:   Diagnosis Date   • ITZEL positive     Repeat ITZEL November 2014 negative   • Anxiety    • Bursitis 2016   • Cervical radiculopathy    • Cervical spondylosis    • Degeneration of intervertebral disc    • Eczema    • GERD (gastroesophageal reflux disease)    • Heart murmur As a child   • Irritable bowel syndrome    • Lichen sclerosus of female genitalia    • Low back pain    • Myalgia and myositis    • Osteoporosis    • Pruritus      Past Surgical History:   Procedure Laterality Date   • BREAST CYST ASPIRATION      10+ years ago   • CHOLECYSTECTOMY     • COLONOSCOPY  07/30/2012    Dr. Zazueta, normal   • ENDOSCOPY  12/04/2015    Mild active duodenitis, reactive gastropathy suggestive of healing erosion or adjacent ulcer   • MOUTH SURGERY         Current Outpatient Medications:   •  acetaminophen (TYLENOL) 650 MG 8 hr tablet, Take 2 tablets by mouth 2 (two) times a day as needed., Disp: , Rfl:   •  DULoxetine (CYMBALTA) 20 MG capsule, Take 1 capsule by mouth Daily., Disp: 30 capsule, Rfl: 3  •  estradiol (ESTRACE) 0.1 MG/GM vaginal cream, Insert 2 g into the vagina 2 (Two)  Times a Week., Disp: 42.5 g, Rfl: 0  •  hydroxychloroquine (PLAQUENIL) 200 MG tablet, Take 200 mg by mouth 2 (Two) Times a Day., Disp: , Rfl:   •  omeprazole (priLOSEC) 40 MG capsule, Take 1 capsule by mouth Daily., Disp: 30 capsule, Rfl: 5  •  polyethylene glycol (MIRALAX) packet, Take 17 g by mouth Daily., Disp: , Rfl:   •  Probiotic Product (PROBIOTIC DAILY PO), Take  by mouth Daily., Disp: , Rfl:   •  sulindac (CLINORIL) 200 MG tablet, Take 200 mg by mouth 2 (Two) Times a Day., Disp: , Rfl:   •  Unable to find, , Disp: , Rfl:   PRN Meds:.  Allergies   Allergen Reactions   • Other Unknown (See Comments)     perfume   • Sulfa Antibiotics Unknown (See Comments)     Past history, unknown reaction. Was told by mother there was a reaction of some type.      Social History     Socioeconomic History   • Marital status:      Spouse name: Not on file   • Number of children: Not on file   • Years of education: Not on file   • Highest education level: Not on file   Occupational History   • Occupation: retired   Tobacco Use   • Smoking status: Never Smoker   • Smokeless tobacco: Never Used   Substance and Sexual Activity   • Alcohol use: Yes     Comment: occationally   • Drug use: No   • Sexual activity: Yes     Partners: Male     Birth control/protection: None     Family History   Problem Relation Age of Onset   • Irritable bowel syndrome Mother    • Hypertension Mother    • Hypertension Father    • Hearing loss Father    • Alcohol abuse Brother    • Early death Brother    • Heart disease Brother    • Arthritis Sister      Review of Systems   Constitutional: Positive for appetite change. Negative for unexpected weight change.   HENT: Negative for trouble swallowing.    Gastrointestinal: Positive for abdominal pain. Negative for blood in stool and nausea.   Musculoskeletal: Positive for arthralgias.   All other systems reviewed and are negative.    Vitals:    11/12/19 1250   BP: 104/72   Temp: 98 °F (36.7 °C)          11/12/19  1250   Weight: 53.3 kg (117 lb 9.6 oz)       Objective   Physical Exam   Constitutional: She appears well-developed and well-nourished.   HENT:   Head: Normocephalic and atraumatic.   Eyes: No scleral icterus.   Abdominal: Soft. She exhibits no distension. There is no tenderness.   ruq pain   Neurological: She is alert.   Skin: Skin is warm and dry.   Psychiatric: She has a normal mood and affect.     No images are attached to the encounter.    Assessment/Plan   Diagnoses and all orders for this visit:    Dyspepsia  -     Case Request; Standing  -     Case Request    Epigastric pain    Constipation, unspecified constipation type    Other orders  -     Follow Anesthesia Guidelines / Standing Orders; Future  -     Obtain Informed Consent; Future  -     Implement Anesthesia orders day of procedure.; Standing  -     Obtain informed consent; Standing      Plan:  · Continue omeprazole daily (can increase to twice daily if needed)  · Decrease sulindac to one daily  · Add fiber supplement daily - use miralax prn  · Plan for egd for further evaluation

## 2019-11-12 NOTE — PATIENT INSTRUCTIONS
· Continue omeprazole daily (can increase to twice daily if needed)  · Decrease sulindac to one daily  · Add fiber supplement daily - use miralax prn  · Plan for egd for further evaluation

## 2019-12-16 ENCOUNTER — ANESTHESIA (OUTPATIENT)
Dept: GASTROENTEROLOGY | Facility: HOSPITAL | Age: 64
End: 2019-12-16

## 2019-12-16 ENCOUNTER — TELEPHONE (OUTPATIENT)
Dept: GASTROENTEROLOGY | Facility: CLINIC | Age: 64
End: 2019-12-16

## 2019-12-16 ENCOUNTER — OFFICE VISIT (OUTPATIENT)
Dept: FAMILY MEDICINE CLINIC | Facility: CLINIC | Age: 64
End: 2019-12-16

## 2019-12-16 ENCOUNTER — HOSPITAL ENCOUNTER (OUTPATIENT)
Facility: HOSPITAL | Age: 64
Setting detail: HOSPITAL OUTPATIENT SURGERY
Discharge: HOME | End: 2019-12-16
Attending: INTERNAL MEDICINE | Admitting: INTERNAL MEDICINE

## 2019-12-16 ENCOUNTER — ANESTHESIA EVENT (OUTPATIENT)
Dept: GASTROENTEROLOGY | Facility: HOSPITAL | Age: 64
End: 2019-12-16

## 2019-12-16 VITALS
SYSTOLIC BLOOD PRESSURE: 107 MMHG | OXYGEN SATURATION: 99 % | WEIGHT: 113.2 LBS | HEART RATE: 96 BPM | HEIGHT: 63 IN | DIASTOLIC BLOOD PRESSURE: 75 MMHG | TEMPERATURE: 97 F | BODY MASS INDEX: 20.06 KG/M2

## 2019-12-16 VITALS
OXYGEN SATURATION: 99 % | RESPIRATION RATE: 17 BRPM | TEMPERATURE: 98.1 F | WEIGHT: 112.19 LBS | DIASTOLIC BLOOD PRESSURE: 79 MMHG | HEART RATE: 83 BPM | SYSTOLIC BLOOD PRESSURE: 129 MMHG | BODY MASS INDEX: 19.88 KG/M2

## 2019-12-16 DIAGNOSIS — D50.9 IRON DEFICIENCY ANEMIA, UNSPECIFIED IRON DEFICIENCY ANEMIA TYPE: Primary | ICD-10-CM

## 2019-12-16 DIAGNOSIS — M51.36 DDD (DEGENERATIVE DISC DISEASE), LUMBAR: ICD-10-CM

## 2019-12-16 DIAGNOSIS — R10.13 DYSPEPSIA: ICD-10-CM

## 2019-12-16 DIAGNOSIS — K21.9 GASTROESOPHAGEAL REFLUX DISEASE WITHOUT ESOPHAGITIS: ICD-10-CM

## 2019-12-16 DIAGNOSIS — F41.9 ANXIETY: ICD-10-CM

## 2019-12-16 DIAGNOSIS — G89.4 CHRONIC PAIN SYNDROME: Primary | ICD-10-CM

## 2019-12-16 LAB
BASOPHILS # BLD AUTO: 0.03 10*3/MM3 (ref 0–0.2)
BASOPHILS NFR BLD AUTO: 0.6 % (ref 0–1.5)
DEPRECATED RDW RBC AUTO: 38.3 FL (ref 37–54)
EOSINOPHIL # BLD AUTO: 0.08 10*3/MM3 (ref 0–0.4)
EOSINOPHIL NFR BLD AUTO: 1.5 % (ref 0.3–6.2)
ERYTHROCYTE [DISTWIDTH] IN BLOOD BY AUTOMATED COUNT: 11.3 % (ref 12.3–15.4)
HCT VFR BLD AUTO: 33.7 % (ref 34–46.6)
HGB BLD-MCNC: 10.8 G/DL (ref 12–15.9)
IMM GRANULOCYTES # BLD AUTO: 0.01 10*3/MM3 (ref 0–0.05)
IMM GRANULOCYTES NFR BLD AUTO: 0.2 % (ref 0–0.5)
LYMPHOCYTES # BLD AUTO: 1.12 10*3/MM3 (ref 0.7–3.1)
LYMPHOCYTES NFR BLD AUTO: 21 % (ref 19.6–45.3)
MCH RBC QN AUTO: 30.1 PG (ref 26.6–33)
MCHC RBC AUTO-ENTMCNC: 32 G/DL (ref 31.5–35.7)
MCV RBC AUTO: 93.9 FL (ref 79–97)
MONOCYTES # BLD AUTO: 0.55 10*3/MM3 (ref 0.1–0.9)
MONOCYTES NFR BLD AUTO: 10.3 % (ref 5–12)
NEUTROPHILS # BLD AUTO: 3.54 10*3/MM3 (ref 1.7–7)
NEUTROPHILS NFR BLD AUTO: 66.4 % (ref 42.7–76)
NRBC BLD AUTO-RTO: 0 /100 WBC (ref 0–0.2)
PLATELET # BLD AUTO: 303 10*3/MM3 (ref 140–450)
PMV BLD AUTO: 10.2 FL (ref 6–12)
RBC # BLD AUTO: 3.59 10*6/MM3 (ref 3.77–5.28)
WBC NRBC COR # BLD: 5.33 10*3/MM3 (ref 3.4–10.8)

## 2019-12-16 PROCEDURE — 25010000002 MIDAZOLAM PER 1 MG: Performed by: ANESTHESIOLOGY

## 2019-12-16 PROCEDURE — 99214 OFFICE O/P EST MOD 30 MIN: CPT | Performed by: FAMILY MEDICINE

## 2019-12-16 PROCEDURE — 25010000002 PROPOFOL 10 MG/ML EMULSION: Performed by: ANESTHESIOLOGY

## 2019-12-16 PROCEDURE — 43239 EGD BIOPSY SINGLE/MULTIPLE: CPT | Performed by: INTERNAL MEDICINE

## 2019-12-16 PROCEDURE — 85025 COMPLETE CBC W/AUTO DIFF WBC: CPT | Performed by: INTERNAL MEDICINE

## 2019-12-16 PROCEDURE — S0260 H&P FOR SURGERY: HCPCS | Performed by: INTERNAL MEDICINE

## 2019-12-16 PROCEDURE — 88305 TISSUE EXAM BY PATHOLOGIST: CPT | Performed by: INTERNAL MEDICINE

## 2019-12-16 RX ORDER — MIDAZOLAM HYDROCHLORIDE 1 MG/ML
INJECTION INTRAMUSCULAR; INTRAVENOUS AS NEEDED
Status: DISCONTINUED | OUTPATIENT
Start: 2019-12-16 | End: 2019-12-16 | Stop reason: SURG

## 2019-12-16 RX ORDER — PROPOFOL 10 MG/ML
VIAL (ML) INTRAVENOUS CONTINUOUS PRN
Status: DISCONTINUED | OUTPATIENT
Start: 2019-12-16 | End: 2019-12-16 | Stop reason: SURG

## 2019-12-16 RX ORDER — GABAPENTIN 100 MG/1
100 CAPSULE ORAL NIGHTLY
Qty: 30 CAPSULE | Refills: 2 | Status: SHIPPED | OUTPATIENT
Start: 2019-12-16 | End: 2020-03-16 | Stop reason: SDUPTHER

## 2019-12-16 RX ORDER — SODIUM CHLORIDE, SODIUM LACTATE, POTASSIUM CHLORIDE, CALCIUM CHLORIDE 600; 310; 30; 20 MG/100ML; MG/100ML; MG/100ML; MG/100ML
1000 INJECTION, SOLUTION INTRAVENOUS CONTINUOUS
Status: DISCONTINUED | OUTPATIENT
Start: 2019-12-16 | End: 2019-12-16 | Stop reason: HOSPADM

## 2019-12-16 RX ORDER — DULOXETIN HYDROCHLORIDE 30 MG/1
30 CAPSULE, DELAYED RELEASE ORAL DAILY
Qty: 30 CAPSULE | Refills: 5 | Status: SHIPPED | OUTPATIENT
Start: 2019-12-16 | End: 2020-06-15

## 2019-12-16 RX ORDER — SODIUM CHLORIDE 0.9 % (FLUSH) 0.9 %
10 SYRINGE (ML) INJECTION AS NEEDED
Status: DISCONTINUED | OUTPATIENT
Start: 2019-12-16 | End: 2019-12-16 | Stop reason: HOSPADM

## 2019-12-16 RX ORDER — LIDOCAINE HYDROCHLORIDE 20 MG/ML
INJECTION, SOLUTION INFILTRATION; PERINEURAL AS NEEDED
Status: DISCONTINUED | OUTPATIENT
Start: 2019-12-16 | End: 2019-12-16 | Stop reason: SURG

## 2019-12-16 RX ORDER — SUCRALFATE 1 G/1
1 TABLET ORAL 2 TIMES DAILY
Qty: 60 TABLET | Refills: 1 | Status: SHIPPED | OUTPATIENT
Start: 2019-12-16 | End: 2020-02-19

## 2019-12-16 RX ORDER — OMEPRAZOLE 40 MG/1
40 CAPSULE, DELAYED RELEASE ORAL
Qty: 30 CAPSULE | Refills: 5 | Status: SHIPPED | OUTPATIENT
Start: 2019-12-16 | End: 2020-02-14

## 2019-12-16 RX ORDER — LIDOCAINE HYDROCHLORIDE 10 MG/ML
0.5 INJECTION, SOLUTION INFILTRATION; PERINEURAL ONCE AS NEEDED
Status: DISCONTINUED | OUTPATIENT
Start: 2019-12-16 | End: 2019-12-16 | Stop reason: HOSPADM

## 2019-12-16 RX ORDER — PROPOFOL 10 MG/ML
VIAL (ML) INTRAVENOUS AS NEEDED
Status: DISCONTINUED | OUTPATIENT
Start: 2019-12-16 | End: 2019-12-16 | Stop reason: SURG

## 2019-12-16 RX ADMIN — MIDAZOLAM 1 MG: 1 INJECTION INTRAMUSCULAR; INTRAVENOUS at 13:10

## 2019-12-16 RX ADMIN — LIDOCAINE HYDROCHLORIDE 60 MG: 20 INJECTION, SOLUTION INFILTRATION; PERINEURAL at 13:11

## 2019-12-16 RX ADMIN — SODIUM CHLORIDE, POTASSIUM CHLORIDE, SODIUM LACTATE AND CALCIUM CHLORIDE 1000 ML: 600; 310; 30; 20 INJECTION, SOLUTION INTRAVENOUS at 12:43

## 2019-12-16 RX ADMIN — PROPOFOL 100 MCG/KG/MIN: 10 INJECTION, EMULSION INTRAVENOUS at 13:11

## 2019-12-16 RX ADMIN — PROPOFOL 50 MG: 10 INJECTION, EMULSION INTRAVENOUS at 13:11

## 2019-12-16 NOTE — TELEPHONE ENCOUNTER
Hemoglobin down a little more - would take iron supplement - recheck cbc, iron and ferritin 6 weeks - pls send ferrous sulfate 325 mg daily to pharmacy

## 2019-12-16 NOTE — PROGRESS NOTES
"Subjective   Shanika Hector is a 64 y.o. female.     Chief Complaint   Patient presents with   • Hyperlipidemia     follow up labs from oct   • Anxiety     meds was working good for a month then stopped        History of Present Illness    Follow-up chronic pain syndrome.  Degenerative disc disease.  Sacroiliitis.  Possible connective tissue disorder.  Sees rheumatology in [place].  On Plaquenil and sulindac.  Since starting the duloxetine 20 mg a day she felt like she had slept better and less anxious.  And then she is had some increased pressures lately and not helping as much.  Although the discomfort is still improved.  She has no synovitis symptoms.  No swollen red joints.  Her pain in the distal phalanges in the thumbs are more consistent with osteoarthritis.    Anxiety.  Generalized.  Maybe mild depression symptoms.  PHQ 9 questionnaire score of 7, slightly difficult.  Santino 7 anxiety screen, 7, slightly difficult.  Improved with duloxetine initially.    Hyperlipidemia.  High high HDL.  Mildly high LDL.  Diet controlled.  Holding off on statins.    Dyspepsia.  Sulindac may be a contributing factor.  She is having EGD later today with her gastroenterologist.    The following portions of the patient's history were reviewed and updated as appropriate: allergies, current medications, past family history, past medical history, past social history, past surgical history and problem list.          Review of Systems   Constitutional: Negative.    Respiratory: Negative.    Cardiovascular: Negative.    Musculoskeletal: Positive for arthralgias.   Psychiatric/Behavioral: Positive for sleep disturbance. The patient is nervous/anxious.        Objective   Blood pressure 107/75, pulse 96, temperature 97 °F (36.1 °C), temperature source Oral, height 160 cm (62.99\"), weight 51.3 kg (113 lb 3.2 oz), SpO2 99 %, not currently breastfeeding.  Physical Exam   Constitutional: She appears well-developed and well-nourished. No " distress.   Neck: No thyromegaly present.   Cardiovascular: Normal rate, regular rhythm, normal heart sounds and intact distal pulses.   Pulmonary/Chest: Effort normal and breath sounds normal.   Musculoskeletal: She exhibits no edema.   She has some distal interphalangeal joint changes suggestive of osteoarthritis.  No joint swelling or warmth.   Skin: Skin is warm and dry.   Psychiatric: She has a normal mood and affect. Her behavior is normal. Judgment and thought content normal.   Nursing note and vitals reviewed.      Assessment/Plan   Shanika was seen today for hyperlipidemia and anxiety.    Diagnoses and all orders for this visit:    Chronic pain syndrome    Anxiety    DDD (degenerative disc disease), lumbar    Other orders  -     DULoxetine (CYMBALTA) 30 MG capsule; Take 1 capsule by mouth Daily.  -     gabapentin (NEURONTIN) 100 MG capsule; Take 1 capsule by mouth Every Night.      Chronic pain syndrome.  Suggestive of fibromyalgia or CNS pain sensitization.  Increase duloxetine from 20 mg day up to 30 mg a day.  Add gabapentin 100 mg a day.  She has been on this before but at a very high doses which caused sedation.  Previously on 403 times a day by pain doctors in the past.  I am starting 100 mg at nighttime only.  Efficacy discussed.  Patient is aware this is a controlled substance and may cause euphoria at high doses or withdrawal symptoms at high doses.  I will see her back in 3 months.  She will send us a note in 6 weeks.  We may have to increase the duloxetine up to 40 mg a day.  She continues to follow with a rheumatologist.  Hopefully they can reconsider the Plaquenil.    Anxiety.  Improved duloxetine, hopefully will help in the more in the future.  Continue movement and exercise.    Degenerative disc disease.  Sacroiliitis.    Dyspepsia.  Follow with GI.  EGD today.  Recommend limiting of not stopping sulindac

## 2019-12-16 NOTE — ANESTHESIA POSTPROCEDURE EVALUATION
Patient: Shanika Hector    Procedure Summary     Date:  12/16/19 Room / Location:   [facility]    Anesthesia Start:  1304 Anesthesia Stop:  1325    Procedure:  ESOPHAGOGASTRODUODENOSCOPY WITH BIOPSY WITH COLD BIOPSY POLYPECTOMY (N/A Esophagus) Diagnosis:       Dyspepsia      (Dyspepsia [R10.13])    Surgeon:  Lida Garcia MD Provider:  Steven Malone MD    Anesthesia Type:  MAC ASA Status:  2          Anesthesia Type: MAC    Vitals  Vitals Value Taken Time   /91 12/16/2019  1:35 PM   Temp     Pulse 88 12/16/2019  1:35 PM   Resp 16 12/16/2019  1:35 PM   SpO2 99 % 12/16/2019  1:35 PM           Post Anesthesia Care and Evaluation    Patient location during evaluation: bedside  Patient participation: complete - patient participated  Level of consciousness: awake and alert  Pain management: adequate  Airway patency: patent  Anesthetic complications: No anesthetic complications    Cardiovascular status: acceptable  Respiratory status: acceptable  Hydration status: acceptable    Comments: /91   Pulse 88   Temp 36.7 °C (98.1 °F) (Oral)   Resp 16   Wt 50.9 kg (112 lb 3 oz)   SpO2 99%   BMI 19.88 kg/m²

## 2019-12-16 NOTE — DISCHARGE INSTRUCTIONS

## 2019-12-16 NOTE — H&P
[facility]  Pre Procedure History & Physical    Chief Complaint:     Epigastric pain, dyspepsia  Subjective     HPI:   Shanika Hector is a  64 y.o. female here for a follow up visit for heartburn, abdominal pain and constipation.      She was last seen in January 2016.  Previous work-up includes AN EGD in 2015 which showed focal mild active duodenitis, reactive gastropathy-negative for H. pylori..  She had a colonoscopy in 2012 that was notable for internal hemorrhoids.     She has been using miralax with improvement in BM - she is no longer taking fiber.     She has cut out coffee and this helped her heartburn.  She doesn't eat after 5 0'clock.  She has decreased her portion size as well and has lost about 7 lbs.  She doesn't drink much usually.  She takes omeprazole 1-2 times a day.     She takes sulindac for OA BID.     No blood in stool.      Past Medical History:   Past Medical History:   Diagnosis Date   • ITZEL positive     Repeat ITZEL November 2014 negative   • Anxiety    • Bursitis 2016   • Cervical radiculopathy    • Cervical spondylosis    • Degeneration of intervertebral disc    • Eczema    • GERD (gastroesophageal reflux disease)    • Heart murmur As a child   • Irritable bowel syndrome    • Lichen sclerosus of female genitalia    • Low back pain    • Myalgia and myositis    • Osteoporosis    • Pruritus        Past Surgical History:  Past Surgical History:   Procedure Laterality Date   • BREAST CYST ASPIRATION      10+ years ago   • CHOLECYSTECTOMY     • COLONOSCOPY  07/30/2012    Dr. Zazueta, normal   • ENDOSCOPY  12/04/2015    Mild active duodenitis, reactive gastropathy suggestive of healing erosion or adjacent ulcer   • MOUTH SURGERY         Family History:  Family History   Problem Relation Age of Onset   • Irritable bowel syndrome Mother    • Hypertension Mother    • Hypertension Father    • Hearing loss Father    • Alcohol abuse Brother    • Early death Brother    • Heart  disease Brother    • Arthritis Sister        Social History:   reports that she has never smoked. She has never used smokeless tobacco. She reports that she drinks alcohol. She reports that she does not use drugs.    Medications:   Medications Prior to Admission   Medication Sig Dispense Refill Last Dose   • acetaminophen (TYLENOL) 650 MG 8 hr tablet Take 2 tablets by mouth 2 (two) times a day as needed.   Past Week at Unknown time   • DULoxetine (CYMBALTA) 30 MG capsule Take 1 capsule by mouth Daily. 30 capsule 5 12/15/2019 at Unknown time   • estradiol (ESTRACE) 0.1 MG/GM vaginal cream Insert 2 g into the vagina 2 (Two) Times a Week. 42.5 g 0 Past Week at Unknown time   • hydroxychloroquine (PLAQUENIL) 200 MG tablet Take 200 mg by mouth 2 (Two) Times a Day.   12/15/2019 at Unknown time   • omeprazole (priLOSEC) 40 MG capsule Take 1 capsule by mouth Daily. 30 capsule 5 12/15/2019 at Unknown time   • polyethylene glycol (MIRALAX) packet Take 17 g by mouth Daily.   Past Week at Unknown time   • Probiotic Product (PROBIOTIC DAILY PO) Take  by mouth Daily.   Past Week at Unknown time   • sulindac (CLINORIL) 200 MG tablet Take 200 mg by mouth 2 (Two) Times a Day.   12/15/2019 at Unknown time   • gabapentin (NEURONTIN) 100 MG capsule Take 1 capsule by mouth Every Night. 30 capsule 2 Unknown at Unknown time   • Unable to find    More than a month at Unknown time       Allergies:  Other and Sulfa antibiotics    ROS:    Pertinent items are noted in HPI, all other systems reviewed and negative     Objective     Blood pressure 114/75, pulse 82, temperature 98.1 °F (36.7 °C), temperature source Oral, resp. rate 16, weight 50.9 kg (112 lb 3 oz), SpO2 99 %, not currently breastfeeding.    Physical Exam   Constitutional: Pt is oriented to person, place, and time and well-developed, well-nourished, and in no distress.   Mouth/Throat: Oropharynx is clear and moist.   Neck: Normal range of motion.   Cardiovascular: Normal rate,  regular rhythm and normal heart sounds.    Pulmonary/Chest: Effort normal and breath sounds normal.   Abdominal: Soft. Nontender  Skin: Skin is warm and dry.   Psychiatric: Mood, memory, affect and judgment normal.     Assessment/Plan     Diagnosis:  Epigastric pain, dyspepsia    Anticipated Surgical Procedure:  egd    The risks, benefits, and alternatives of this procedure have been discussed with the patient or the responsible party- the patient understands and agrees to proceed.

## 2019-12-16 NOTE — ANESTHESIA PREPROCEDURE EVALUATION
Anesthesia Evaluation     Patient summary reviewed and Nursing notes reviewed   no history of anesthetic complications:  NPO Solid Status: > 8 hours  NPO Liquid Status: > 2 hours           Airway   Mallampati: II  Dental      Pulmonary - negative pulmonary ROS and normal exam   Cardiovascular - normal exam    (+) valvular problems/murmurs murmur, hyperlipidemia,       Neuro/Psych  (+) numbness, psychiatric history Anxiety,     GI/Hepatic/Renal/Endo    (+)  GERD,      Musculoskeletal     Abdominal    Substance History      OB/GYN          Other   arthritis,                      Anesthesia Plan    ASA 2     MAC     intravenous induction     Anesthetic plan, all risks, benefits, and alternatives have been provided, discussed and informed consent has been obtained with: patient.

## 2019-12-17 RX ORDER — FERROUS SULFATE 325(65) MG
325 TABLET ORAL
Qty: 30 TABLET | Refills: 3 | Status: SHIPPED | OUTPATIENT
Start: 2019-12-17 | End: 2021-02-23

## 2019-12-17 NOTE — TELEPHONE ENCOUNTER
Pt called and advised per Dr Garcia that her hgb is down a little more.  She recommends to take iron supp .  Recheck cbc , iron, and ferritin 6 wks.  Also advised we will send ferrous sufate 325mg po daily to her [email or web address] pharmacy    Pt verb understanding and reports that her quantity was incorrect when she pick her omeprazole .  She states it stated to take bid but only 30 pills were given.  Advised will call [facility]  And fix the quantity.  Called [facility] and spoke with pharmacist Carmen and corrected quantity to 60    Called pt and advised of the above. Pt verb understanding and made lab appt for 01/27 at 10a. Labs ordered.

## 2019-12-18 LAB
LAB AP CASE REPORT: NORMAL
PATH REPORT.FINAL DX SPEC: NORMAL
PATH REPORT.GROSS SPEC: NORMAL

## 2020-01-02 ENCOUNTER — TELEPHONE (OUTPATIENT)
Dept: GASTROENTEROLOGY | Facility: CLINIC | Age: 65
End: 2020-01-02

## 2020-01-02 NOTE — TELEPHONE ENCOUNTER
Gastric biopsies were benign and showed inflammation - cont meds as discussed at time of egd - avoid nsaids    F/u with me - I can see her 2/13 at 1148

## 2020-01-03 NOTE — TELEPHONE ENCOUNTER
Call to pt.  Advise per Dr Garcia that gastric bx were benign and showed inflammation..  Cont meds as discussed at time of egd - avoid nsaids.    Verb understanding.  Accepts appt with Dr Garcia on 2/13 @ 3321.  Message to Manager.

## 2020-01-27 LAB
BASOPHILS # BLD AUTO: 0.03 10*3/MM3 (ref 0–0.2)
BASOPHILS NFR BLD AUTO: 0.7 % (ref 0–1.5)
EOSINOPHIL # BLD AUTO: 0.22 10*3/MM3 (ref 0–0.4)
EOSINOPHIL NFR BLD AUTO: 5.3 % (ref 0.3–6.2)
ERYTHROCYTE [DISTWIDTH] IN BLOOD BY AUTOMATED COUNT: 11.4 % (ref 12.3–15.4)
FERRITIN SERPL-MCNC: 24 NG/ML (ref 13–150)
HCT VFR BLD AUTO: 35.6 % (ref 34–46.6)
HGB BLD-MCNC: 11.6 G/DL (ref 12–15.9)
IMM GRANULOCYTES # BLD AUTO: 0.01 10*3/MM3 (ref 0–0.05)
IMM GRANULOCYTES NFR BLD AUTO: 0.2 % (ref 0–0.5)
IRON SERPL-MCNC: 32 MCG/DL (ref 37–145)
LYMPHOCYTES # BLD AUTO: 1.1 10*3/MM3 (ref 0.7–3.1)
LYMPHOCYTES NFR BLD AUTO: 26.6 % (ref 19.6–45.3)
MCH RBC QN AUTO: 29.7 PG (ref 26.6–33)
MCHC RBC AUTO-ENTMCNC: 32.6 G/DL (ref 31.5–35.7)
MCV RBC AUTO: 91 FL (ref 79–97)
MONOCYTES # BLD AUTO: 0.44 10*3/MM3 (ref 0.1–0.9)
MONOCYTES NFR BLD AUTO: 10.7 % (ref 5–12)
NEUTROPHILS # BLD AUTO: 2.33 10*3/MM3 (ref 1.7–7)
NEUTROPHILS NFR BLD AUTO: 56.5 % (ref 42.7–76)
NRBC BLD AUTO-RTO: 0 /100 WBC (ref 0–0.2)
PLATELET # BLD AUTO: 285 10*3/MM3 (ref 140–450)
RBC # BLD AUTO: 3.91 10*6/MM3 (ref 3.77–5.28)
WBC # BLD AUTO: 4.13 10*3/MM3 (ref 3.4–10.8)

## 2020-02-02 ENCOUNTER — TELEPHONE (OUTPATIENT)
Dept: GASTROENTEROLOGY | Facility: CLINIC | Age: 65
End: 2020-02-02

## 2020-02-02 NOTE — TELEPHONE ENCOUNTER
Anemia shows slight improvement but iron stores are still low.  Continue supplementation.  Follow-up as scheduled

## 2020-02-06 NOTE — TELEPHONE ENCOUNTER
Call to pt.  Advise per Dr Garcia that anemia shows slight improvement but iron stores are still low.  Continue supplementation.  F/u as scheduled.  Verb understanding.

## 2020-02-13 ENCOUNTER — OFFICE VISIT (OUTPATIENT)
Dept: GASTROENTEROLOGY | Facility: CLINIC | Age: 65
End: 2020-02-13

## 2020-02-13 VITALS
TEMPERATURE: 98 F | DIASTOLIC BLOOD PRESSURE: 74 MMHG | SYSTOLIC BLOOD PRESSURE: 110 MMHG | BODY MASS INDEX: 19.77 KG/M2 | WEIGHT: 111.6 LBS | HEIGHT: 63 IN

## 2020-02-13 DIAGNOSIS — K21.9 GASTROESOPHAGEAL REFLUX DISEASE WITHOUT ESOPHAGITIS: ICD-10-CM

## 2020-02-13 DIAGNOSIS — D50.0 IRON DEFICIENCY ANEMIA DUE TO CHRONIC BLOOD LOSS: ICD-10-CM

## 2020-02-13 DIAGNOSIS — K25.3 ACUTE GASTRIC ULCER WITHOUT HEMORRHAGE OR PERFORATION: Primary | ICD-10-CM

## 2020-02-13 PROCEDURE — 99213 OFFICE O/P EST LOW 20 MIN: CPT | Performed by: INTERNAL MEDICINE

## 2020-02-13 NOTE — PROGRESS NOTES
Subjective   Chief Complaint   Patient presents with   • Stomach Ulcer       Shanika Hector is a  64 y.o. female here for a follow up visit for gastric ulcer.     She was seen in November 2019 complaining of abdominal pain.  She underwent a colonoscopy on 12/16/2019 notable for cratered gastric antral ulcer.  She was on sulindac twice daily at the time.  She was started on increased dose omeprazole 40 mg twice daily.  Biopsies with inflammation but no H. pylori.  Gastric polyps were benign.  Recent labs show mild improvement in anemia and continued iron deficiency.    She has not had any further abdominal pain.  She has occasional GERD - she takes omeprazole once daily at this point.      She is on plaquenil only for her joint pain at this point.  HPI  Past Medical History:   Diagnosis Date   • ITZEL positive     Repeat ITZEL November 2014 negative   • Anxiety    • Bursitis 2016   • Cervical radiculopathy    • Cervical spondylosis    • Degeneration of intervertebral disc    • Eczema    • GERD (gastroesophageal reflux disease)    • Heart murmur As a child   • Irritable bowel syndrome    • Lichen sclerosus of female genitalia    • Low back pain    • Myalgia and myositis    • Osteoporosis    • Pruritus    • Ulcer 12/2019     Past Surgical History:   Procedure Laterality Date   • BREAST CYST ASPIRATION      10+ years ago   • CHOLECYSTECTOMY     • COLONOSCOPY  07/30/2012    Dr. Zazueta, normal   • ENDOSCOPY  12/04/2015    Mild active duodenitis, reactive gastropathy suggestive of healing erosion or adjacent ulcer   • ENDOSCOPY N/A 12/16/2019    Procedure: ESOPHAGOGASTRODUODENOSCOPY WITH BIOPSY WITH COLD BIOPSY POLYPECTOMY;  Surgeon: Lida Garcia MD;  Location: [facility] END[facility]COPY;  Service: Gastroenterology   • MOUTH SURGERY         Current Outpatient Medications:   •  acetaminophen (TYLENOL) 650 MG 8 hr tablet, Take 2 tablets by mouth 2 (two) times a day as needed., Disp: , Rfl:   •  DULoxetine (CYMBALTA) 30 MG capsule,  Take 1 capsule by mouth Daily., Disp: 30 capsule, Rfl: 5  •  estradiol (ESTRACE) 0.1 MG/GM vaginal cream, Insert 2 g into the vagina 2 (Two) Times a Week., Disp: 42.5 g, Rfl: 0  •  ferrous sulfate 325 (65 FE) MG tablet, Take 1 tablet by mouth Daily With Breakfast., Disp: 30 tablet, Rfl: 3  •  gabapentin (NEURONTIN) 100 MG capsule, Take 1 capsule by mouth Every Night., Disp: 30 capsule, Rfl: 2  •  hydroxychloroquine (PLAQUENIL) 200 MG tablet, Take 200 mg by mouth 2 (Two) Times a Day., Disp: , Rfl:   •  omeprazole (priLOSEC) 40 MG capsule, Take 1 capsule by mouth 2 (Two) Times a Day Before Meals for 60 days., Disp: 30 capsule, Rfl: 5  •  sucralfate (CARAFATE) 1 g tablet, Take 1 tablet by mouth 2 (Two) Times a Day., Disp: 60 tablet, Rfl: 1  •  Wheat Dextrin (BENEFIBER PO), Take  by mouth., Disp: , Rfl:   •  Probiotic Product (PROBIOTIC DAILY PO), Take  by mouth Daily., Disp: , Rfl:   PRN Meds:.  Allergies   Allergen Reactions   • Other Unknown (See Comments)     perfume   • Sulfa Antibiotics Unknown (See Comments)     Past history, unknown reaction. Was told by mother there was a reaction of some type.      Social History     Socioeconomic History   • Marital status:      Spouse name: Not on file   • Number of children: Not on file   • Years of education: Not on file   • Highest education level: Not on file   Occupational History   • Occupation: retired   Tobacco Use   • Smoking status: Never Smoker   • Smokeless tobacco: Never Used   Substance and Sexual Activity   • Alcohol use: Yes     Comment: occationally   • Drug use: No   • Sexual activity: Yes     Partners: Male     Birth control/protection: None     Family History   Problem Relation Age of Onset   • Irritable bowel syndrome Mother    • Hypertension Mother    • Hypertension Father    • Hearing loss Father    • Alcohol abuse Brother    • Early death Brother    • Heart disease Brother    • Arthritis Sister      Review of Systems   Constitutional: Negative  for unexpected weight change.   Gastrointestinal: Negative for abdominal pain and blood in stool.   Musculoskeletal: Positive for arthralgias.   All other systems reviewed and are negative.    Vitals:    02/13/20 1157   BP: 110/74   Temp: 98 °F (36.7 °C)         02/13/20  1157   Weight: 50.6 kg (111 lb 9.6 oz)       Objective   Physical Exam   Constitutional: She appears well-developed and well-nourished.   HENT:   Head: Normocephalic and atraumatic.   Eyes: No scleral icterus.   Pulmonary/Chest: Effort normal.   Abdominal: Soft. She exhibits no distension and no mass.   Neurological: She is alert.   Skin: Skin is warm and dry.   Psychiatric: She has a normal mood and affect.     No radiology results for the last 7 days    Assessment/Plan   Diagnoses and all orders for this visit:    Acute gastric ulcer without hemorrhage or perforation  -     Case Request; Standing  -     Case Request    Gastroesophageal reflux disease without esophagitis    Iron deficiency anemia due to chronic blood loss    Other orders  -     Wheat Dextrin (BENEFIBER PO); Take  by mouth.  -     Follow Anesthesia Guidelines / Standing Orders; Future  -     Obtain Informed Consent; Future  -     Implement Anesthesia orders day of procedure.; Standing  -     Obtain informed consent; Standing      Plan:  · Continue omeprazole once daily at time of EGD  · Avoid nsaids  · Plan for egd to make sure the ulcer has healed  · Continue iron supplement for now - recheck labs in 2-3 months at time of egd

## 2020-02-19 RX ORDER — SUCRALFATE 1 G/1
TABLET ORAL
Qty: 60 TABLET | Refills: 0 | Status: SHIPPED | OUTPATIENT
Start: 2020-02-19 | End: 2020-03-27

## 2020-03-16 ENCOUNTER — OFFICE VISIT (OUTPATIENT)
Dept: FAMILY MEDICINE CLINIC | Facility: CLINIC | Age: 65
End: 2020-03-16

## 2020-03-16 VITALS
HEART RATE: 103 BPM | BODY MASS INDEX: 19.61 KG/M2 | HEIGHT: 63 IN | DIASTOLIC BLOOD PRESSURE: 66 MMHG | WEIGHT: 110.7 LBS | OXYGEN SATURATION: 98 % | TEMPERATURE: 97.4 F | SYSTOLIC BLOOD PRESSURE: 109 MMHG

## 2020-03-16 DIAGNOSIS — G89.4 CHRONIC PAIN SYNDROME: ICD-10-CM

## 2020-03-16 DIAGNOSIS — K21.9 GASTROESOPHAGEAL REFLUX DISEASE WITHOUT ESOPHAGITIS: ICD-10-CM

## 2020-03-16 DIAGNOSIS — M46.1 BILATERAL SACROILIITIS (HCC): ICD-10-CM

## 2020-03-16 DIAGNOSIS — M15.9 GENERALIZED OSTEOARTHRITIS: ICD-10-CM

## 2020-03-16 DIAGNOSIS — M79.7 FIBROMYALGIA: Primary | ICD-10-CM

## 2020-03-16 PROCEDURE — 99214 OFFICE O/P EST MOD 30 MIN: CPT | Performed by: FAMILY MEDICINE

## 2020-03-16 RX ORDER — GABAPENTIN 100 MG/1
CAPSULE ORAL
Qty: 30 CAPSULE | Refills: 2
Start: 2020-03-16 | End: 2020-03-16 | Stop reason: SDUPTHER

## 2020-03-16 RX ORDER — OMEPRAZOLE 40 MG/1
40 CAPSULE, DELAYED RELEASE ORAL DAILY
COMMUNITY
Start: 2020-02-26 | End: 2020-12-04 | Stop reason: SDUPTHER

## 2020-03-16 RX ORDER — GABAPENTIN 100 MG/1
CAPSULE ORAL
Qty: 30 CAPSULE | Refills: 2 | Status: SHIPPED | OUTPATIENT
Start: 2020-03-16 | End: 2020-03-18 | Stop reason: SDUPTHER

## 2020-03-16 RX ORDER — GABAPENTIN 100 MG/1
CAPSULE ORAL
Qty: 30 CAPSULE | Refills: 1 | OUTPATIENT
Start: 2020-03-16

## 2020-03-16 NOTE — PROGRESS NOTES
"Subjective   Shanika Hector is a 64 y.o. female.     Chief Complaint   Patient presents with   • Pain     chronic  pt is fasting    • Insomnia     neurotin helped for about 6 weeks         History of Present Illness    Chronic pain syndrome.  Fibromyalgia.  She was doing better with the gabapentin 100 mg at nighttime.  We also increased her duloxetine up to 30 mg a day last visit 3 months ago.  However she has had more pain in her back and hips.  Also elsewhere.  She was helping her daughter lift up some moldy carpets after her basement flooded.  Patient has no respiratory symptoms.  Since then she has had trouble for the last 6 weeks or so, especially in the last couple of weeks.    Sacroiliitis.  Osteoarthritis.  She follows with a rheumatologist.  She had some reported elevated ITZEL markers but no lupus.  She is on Plaquenil.    Insomnia.  Related to the fibromyalgia.  Worse lately.    GERD.  Continues to follow with her gastroenterologist also.  Continues omeprazole at high dose 40 mg a day.  Also Carafate.      The following portions of the patient's history were reviewed and updated as appropriate: allergies, current medications, past family history, past medical history, past social history, past surgical history and problem list.          Review of Systems   Constitutional: Negative.    Respiratory: Negative.    Cardiovascular: Negative.    Musculoskeletal: Positive for arthralgias and myalgias.   Psychiatric/Behavioral: Positive for sleep disturbance.       Objective   Blood pressure 109/66, pulse 103, temperature 97.4 °F (36.3 °C), temperature source Oral, height 160 cm (62.99\"), weight 50.2 kg (110 lb 11.2 oz), SpO2 98 %, not currently breastfeeding.  Physical Exam   Constitutional: She is oriented to person, place, and time. She appears well-developed and well-nourished. No distress.   Neck: No thyromegaly present.   Cardiovascular: Normal rate, regular rhythm, normal heart sounds and intact distal pulses. "   Pulmonary/Chest: Effort normal and breath sounds normal.   Musculoskeletal: She exhibits no edema.   Neurological: She is alert and oriented to person, place, and time. She exhibits normal muscle tone. Coordination normal.   Skin: Skin is warm and dry.   Psychiatric: She has a normal mood and affect. Her behavior is normal. Judgment and thought content normal.   Nursing note and vitals reviewed.      Assessment/Plan  Answers for HPI/ROS submitted by the patient on 3/14/2020   What is the primary reason for your visit?: Other  Please describe your symptoms.: Follow up... need new press for Gabapentin.  Have you had these symptoms before?: Yes  How long have you been having these symptoms?: Other  Please list any medications you are currently taking for this condition.: 100 mg Gabapentin  Please describe any probable cause for these symptoms. : Pain in legs --sleep issues    Shanika was seen today for pain and insomnia.    Diagnoses and all orders for this visit:    Fibromyalgia    Chronic pain syndrome    Gastroesophageal reflux disease without esophagitis    Generalized osteoarthritis    Bilateral sacroiliitis (CMS/HCC)    Other orders  -     gabapentin (NEURONTIN) 100 MG capsule; 1 cap po qam, 2 cap po qpm        Fibromyalgia.  Chronic pain syndrome.  Continue duloxetine 30 mg a day.  Increase gabapentin up to 200 mg nighttime 100 mg in the morning.  May go higher if needed.  See me in 3 months as scheduled for wellness visit sooner as needed.  She will call with concerns.    Insomnia.  Like related to fibromyalgia.  Should improve.    GERD.  Stable.  She continues the high-dose omeprazole prescribed by gastroenterology.    Generalized osteoarthritis.  Bilateral sacroiliitis.  Followed by rheumatology.  On Plaquenil.  Still not sure of exact indication for the Plaquenil.  She is off the sulfasalazine.  There may be a component of a connective tissue disorder.  She will follow with rheumatologist as  scheduled.

## 2020-03-18 RX ORDER — GABAPENTIN 100 MG/1
CAPSULE ORAL
Qty: 270 CAPSULE | Refills: 2 | Status: SHIPPED | OUTPATIENT
Start: 2020-03-18 | End: 2020-10-12

## 2020-03-25 ENCOUNTER — TELEMEDICINE (OUTPATIENT)
Dept: FAMILY MEDICINE CLINIC | Facility: CLINIC | Age: 65
End: 2020-03-25

## 2020-03-25 ENCOUNTER — TELEPHONE (OUTPATIENT)
Dept: FAMILY MEDICINE CLINIC | Facility: CLINIC | Age: 65
End: 2020-03-25

## 2020-03-25 DIAGNOSIS — J06.9 VIRAL URI: Primary | ICD-10-CM

## 2020-03-25 PROCEDURE — 99213 OFFICE O/P EST LOW 20 MIN: CPT | Performed by: FAMILY MEDICINE

## 2020-03-25 NOTE — PROGRESS NOTES
Subjective   Shanika Hector is a 64 y.o. female.     Chief Complaint   Patient presents with   • URI        History of Present Illness    Telehealth visit, coronavirus pandemic.  Audio and video, good technical quality.    URI symptoms.  5 days.  Sore throat.  Some sinus symptoms.  Some cough at nighttime bothering her.  No shortness of breath.  No myalgias.  No fever.  No sick contacts.  She overall feels fine just a little concerned.  Specially the cough at nighttime.    The following portions of the patient's history were reviewed and updated as appropriate: allergies, current medications, past family history, past medical history, past social history, past surgical history and problem list.          Review of Systems   Constitutional: Negative for fatigue and fever.   Respiratory: Positive for cough.    Gastrointestinal: Negative.    Skin: Negative for rash.       Objective   not currently breastfeeding.  Physical Exam   Constitutional: She is oriented to person, place, and time. She appears well-nourished. No distress.   Good video and audio quality.  Patient appears no acute distress.  Good color.     Neck: Normal range of motion.   Pulmonary/Chest: Effort normal. No respiratory distress.   Patient is not visibly short of breath.  She is able to talk comfortably in complete sentences.   Neurological: She is alert and oriented to person, place, and time.   Skin: No pallor.   Psychiatric: She has a normal mood and affect. Her behavior is normal.       Assessment/Plan   Shanika was seen today for uri.    Diagnoses and all orders for this visit:    Viral URI      Viral URI.  Over-the-counter Robitussin.  Observation.  Call with worsening symptoms.  See patient instructions.

## 2020-03-25 NOTE — PATIENT INSTRUCTIONS
"Per our telehealth conversation.  Robitussin over-the-counter will be fine.  If you are getting sicker later this week, please contact us.  To consider antibiotics for the weekend.    The most important thing for you right now is to stay home as much as possible and practice good hand washing, don't touch your face, and maintain \"social distancing.\"      FYI. I launched a personal website; [email or web address]  It has a list of useful links about COVID-19.     Regards,    Bryan Vega M.D.    "

## 2020-03-25 NOTE — TELEPHONE ENCOUNTER
Patient called in today stating that the last few days, she got a sore throat on Sunday, has a lot of drainage that she can get rid of, has a dry cough, no fever, no shortness of breath, has not traveled any where in the last 14 days, or came in contact with anyone.  Wondering if a medication could be called in for her     Pharmacy confirmed

## 2020-03-27 RX ORDER — SUCRALFATE 1 G/1
TABLET ORAL
Qty: 60 TABLET | Refills: 0 | Status: SHIPPED | OUTPATIENT
Start: 2020-03-27 | End: 2020-07-06

## 2020-06-15 RX ORDER — DULOXETIN HYDROCHLORIDE 30 MG/1
CAPSULE, DELAYED RELEASE ORAL
Qty: 30 CAPSULE | Refills: 4 | Status: SHIPPED | OUTPATIENT
Start: 2020-06-15 | End: 2020-11-20

## 2020-07-06 ENCOUNTER — OFFICE VISIT (OUTPATIENT)
Dept: FAMILY MEDICINE CLINIC | Facility: CLINIC | Age: 65
End: 2020-07-06

## 2020-07-06 VITALS
HEIGHT: 63 IN | HEART RATE: 101 BPM | OXYGEN SATURATION: 99 % | SYSTOLIC BLOOD PRESSURE: 112 MMHG | TEMPERATURE: 98 F | DIASTOLIC BLOOD PRESSURE: 76 MMHG | BODY MASS INDEX: 22.29 KG/M2 | WEIGHT: 125.8 LBS

## 2020-07-06 DIAGNOSIS — M46.1 BILATERAL SACROILIITIS (HCC): ICD-10-CM

## 2020-07-06 DIAGNOSIS — M81.0 AGE-RELATED OSTEOPOROSIS WITHOUT CURRENT PATHOLOGICAL FRACTURE: ICD-10-CM

## 2020-07-06 DIAGNOSIS — M79.7 FIBROMYALGIA: ICD-10-CM

## 2020-07-06 DIAGNOSIS — R05.9 COUGH: ICD-10-CM

## 2020-07-06 DIAGNOSIS — Z00.00 MEDICARE ANNUAL WELLNESS VISIT, SUBSEQUENT: Primary | ICD-10-CM

## 2020-07-06 DIAGNOSIS — M15.9 GENERALIZED OSTEOARTHRITIS: ICD-10-CM

## 2020-07-06 PROCEDURE — G0439 PPPS, SUBSEQ VISIT: HCPCS | Performed by: FAMILY MEDICINE

## 2020-07-06 PROCEDURE — 99214 OFFICE O/P EST MOD 30 MIN: CPT | Performed by: FAMILY MEDICINE

## 2020-07-06 RX ORDER — ALENDRONATE SODIUM 70 MG/1
70 TABLET ORAL
Qty: 12 TABLET | Refills: 1 | Status: SHIPPED | OUTPATIENT
Start: 2020-07-06 | End: 2021-06-25

## 2020-07-06 RX ORDER — HYDROXYCHLOROQUINE SULFATE 200 MG/1
1 TABLET, FILM COATED ORAL EVERY 12 HOURS
COMMUNITY
Start: 2020-03-30 | End: 2021-02-15 | Stop reason: HOSPADM

## 2020-07-06 NOTE — PROGRESS NOTES
"Subjective   Shanika Hector is a 65 y.o. female.     Chief Complaint   Patient presents with   • Medicare Wellness-subsequent     pt is not fasting   • Cough     x wed, and had a sore throat.         History of Present Illness    6 days ago patient had a new  line put in her basement.  There is a lot of dust in the air.  She started having a cough and some sinus symptoms shortly thereafter.  She is had no fever.  No shortness of breath.  No wheezing.  No vomiting or diarrhea.  No loss of sense of smell.  She has felt tired the last few days.  No fevers chills or myalgias.  Overall feeling better.    Fibromyalgia.  Chronic pain syndrome.  Bilateral sacroiliitis.  She continues gabapentin 200 mg at nighttime.  The 100 mg in the morning made no difference to her so she stopped it.  She continues duloxetine 30 mg a day.  She states when she is not physically active the fibromyalgia symptoms get much worse.    Osteoporosis noted on DEXA scan 2017.  She was placed on Fosamax for period time, had no side effects from it.  But then stopped because of some dental work that was done.  She is not taking calcium.      The following portions of the patient's history were reviewed and updated as appropriate: allergies, current medications, past family history, past medical history, past social history, past surgical history and problem list.          Review of Systems   Constitutional: Negative for fatigue and fever.   HENT: Positive for congestion.    Respiratory: Positive for cough.    Gastrointestinal: Negative.    Genitourinary: Negative.    Musculoskeletal: Positive for back pain.   Skin: Negative for rash.       Objective   Blood pressure 112/76, pulse 101, temperature 98 °F (36.7 °C), temperature source Tympanic, height 160 cm (62.99\"), weight 57.1 kg (125 lb 12.8 oz), SpO2 99 %, not currently breastfeeding.  Physical Exam   Constitutional: She is oriented to person, place, and time. No distress.   No acute distress.  " Nontoxic.   HENT:   Right Ear: Tympanic membrane, external ear and ear canal normal.   Left Ear: Tympanic membrane, external ear and ear canal normal.   Nose: Nose normal.   Mouth/Throat: Oropharynx is clear and moist. No oropharyngeal exudate.   Eyes: Conjunctivae are normal. Right eye exhibits no discharge. Left eye exhibits no discharge. No scleral icterus.   Cardiovascular: Normal rate.   Pulmonary/Chest: Effort normal and breath sounds normal. No stridor. No respiratory distress. She has no wheezes. She has no rales.   No tachypnea   Lymphadenopathy:     She has no cervical adenopathy.   Neurological: She is alert and oriented to person, place, and time.   Skin: No rash noted.   Psychiatric: She has a normal mood and affect. Her behavior is normal.   Nursing note and vitals reviewed.      Assessment/Plan   Shanika was seen today for medicare wellness-subsequent and cough.    Diagnoses and all orders for this visit:    Medicare annual wellness visit, subsequent    Bilateral sacroiliitis (CMS/HCC)    Generalized osteoarthritis    Fibromyalgia    Age-related osteoporosis without current pathological fracture  -     DEXA Bone Density Axial    Cough  -     COVID-19,[facility] ROUTINE, NP/OP SWAB IN TRANSPORT MEDIA OR ESWAB 72 HR TAT - Swab, Nasopharynx    Other orders  -     alendronate (Fosamax) 70 MG tablet; Take 1 tablet by mouth Every 7 (Seven) Days.  -     Calcium Carb-Cholecalciferol (CALCIUM-VITAMIN D) 500-200 MG-UNIT per tablet; Take 1 tablet by mouth 2 (Two) Times a Day With Meals.      Cough.  Probable reaction to the basement dust from the new  line.  Less likely SARS-CoV-2 infection.  Mother needs to be ruled out.  Testing has been ordered.    Fibromyalgia.  Continue gabapentin but can go up to 3 mg nighttime if she wishes.  Continue duloxetine.    Osteoporosis.  I want her to restart Fosamax 70 mg weekly.  Take on empty stomach.  Full glass of water.  Do not lie down for 1 to 2 hours.  Patient is aware  of this.  Also start calcium with vitamin D 2 tablets daily.  DEXA scan ordered.  She is overdue for repeat.  I will see her back in 4 months for recheck.         Answers for HPI/ROS submitted by the patient on 7/1/2020   What is the primary reason for your visit?: Other  Please describe your symptoms.: Yearly medicare wellness checkup  Have you had these symptoms before?: Yes  How long have you been having these symptoms?: 1-4 days  Please list any medications you are currently taking for this condition.: As prescribed  Please describe any probable cause for these symptoms. : Wellness check

## 2020-07-06 NOTE — PROGRESS NOTES
The ABCs of the Annual Wellness Visit  Subsequent Medicare Wellness Visit    Chief Complaint   Patient presents with   • Medicare Wellness-subsequent     pt is not fasting   • Cough     x wed, and had a sore throat.        Subjective   History of Present Illness:  Shanika Hector is a 65 y.o. female who presents for a Subsequent Medicare Wellness Visit.    HEALTH RISK ASSESSMENT    Recent Hospitalizations:  No hospitalization(s) within the last year.    Current Medical Providers:  Patient Care Team:  Bryan Vega MD as PCP - General  Bryan Vega MD as PCP - Family Medicine  Bryan Vega MD as PCP - Claims Attributed    Smoking Status:  Social History     Tobacco Use   Smoking Status Never Smoker   Smokeless Tobacco Never Used       Alcohol Consumption:  Social History     Substance and Sexual Activity   Alcohol Use No    Comment: occationally       Depression Screen:   PHQ-2/PHQ-9 Depression Screening 7/6/2020   Little interest or pleasure in doing things 0   Feeling down, depressed, or hopeless 0   Trouble falling or staying asleep, or sleeping too much -   Feeling tired or having little energy -   Poor appetite or overeating -   Feeling bad about yourself - or that you are a failure or have let yourself or your family down -   Trouble concentrating on things, such as reading the newspaper or watching television -   Moving or speaking so slowly that other people could have noticed. Or the opposite - being so fidgety or restless that you have been moving around a lot more than usual -   Thoughts that you would be better off dead, or of hurting yourself in some way -   Total Score 0   If you checked off any problems, how difficult have these problems made it for you to do your work, take care of things at home, or get along with other people? -       Fall Risk Screen:  STEADI Fall Risk Assessment was completed, and patient is at LOW risk for falls.Assessment completed on:7/6/2020    Health Habits and  Functional and Cognitive Screening:  Functional & Cognitive Status 7/6/2020   Do you have difficulty preparing food and eating? No   Do you have difficulty bathing yourself, getting dressed or grooming yourself? No   Do you have difficulty using the toilet? No   Do you have difficulty moving around from place to place? No   Do you have trouble with steps or getting out of a bed or a chair? Yes   Current Diet Unhealthy Diet   Dental Exam Up to date   Eye Exam Up to date   Exercise (times per week) 3 times per week   Current Exercise Activities Include Walking   Do you need help using the phone?  -   Are you deaf or do you have serious difficulty hearing?  Yes   Do you need help with transportation? No   Do you need help shopping? No   Do you need help preparing meals?  No   Do you need help with housework?  No   Do you need help with laundry? No   Do you need help taking your medications? No   Do you need help managing money? No   Do you ever drive or ride in a car without wearing a seat belt? No   Have you felt unusual stress, anger or loneliness in the last month? Yes   Who do you live with? Spouse   If you need help, do you have trouble finding someone available to you? No   Have you been bothered in the last four weeks by sexual problems? No   Do you have difficulty concentrating, remembering or making decisions? No         Does the patient have evidence of cognitive impairment? No    Asprin use counseling:Does not need ASA (and currently is not on it)    Age-appropriate Screening Schedule:  Refer to the list below for future screening recommendations based on patient's age, sex and/or medical conditions. Orders for these recommended tests are listed in the plan section. The patient has been provided with a written plan.    Health Maintenance   Topic Date Due   • LIPID PANEL  11/05/2019   • DXA SCAN  11/24/2019   • INFLUENZA VACCINE  08/01/2020   • MAMMOGRAM  05/13/2021   • COLONOSCOPY  06/30/2022   • TDAP/TD  VACCINES (2 - Td) 11/13/2027   • ZOSTER VACCINE  Discontinued          The following portions of the patient's history were reviewed and updated as appropriate: allergies, current medications, past family history, past medical history, past social history, past surgical history and problem list.    Outpatient Medications Prior to Visit   Medication Sig Dispense Refill   • acetaminophen (TYLENOL) 650 MG 8 hr tablet Take 2 tablets by mouth 2 (two) times a day as needed.     • DULoxetine (CYMBALTA) 30 MG capsule TAKE ONE CAPSULE BY MOUTH DAILY 30 capsule 4   • estradiol (ESTRACE) 0.1 MG/GM vaginal cream Insert 2 g into the vagina 2 (Two) Times a Week. 42.5 g 0   • ferrous sulfate 325 (65 FE) MG tablet Take 1 tablet by mouth Daily With Breakfast. 30 tablet 3   • gabapentin (NEURONTIN) 100 MG capsule 1 cap po qam, 2 cap po qpm 270 capsule 2   • hydroxychloroquine (Plaquenil) 200 MG tablet Take 1 tablet by mouth Every 12 (Twelve) Hours.     • omeprazole (priLOSEC) 40 MG capsule Take 40 mg by mouth Daily.     • Probiotic Product (PROBIOTIC DAILY PO) Take  by mouth Daily.     • Wheat Dextrin (BENEFIBER PO) Take  by mouth.     • hydroxychloroquine (PLAQUENIL) 200 MG tablet Take 200 mg by mouth 2 (Two) Times a Day.     • sucralfate (CARAFATE) 1 g tablet TAKE ONE TABLET BY MOUTH TWICE A DAY 60 tablet 0     No facility-administered medications prior to visit.        Patient Active Problem List   Diagnosis   • Cervical radiculopathy   • Acid reflux   • Generalized osteoarthritis   • Gastroesophageal reflux disease   • Irritable bowel syndrome with constipation   • Osteoporosis   • Pruritus   • Seborrheic eczema   • Osteoarthritis of cervical spine without myelopathy   • Eczema   • DDD (degenerative disc disease), lumbar   • Somatic dysfunction of sacroiliac joint   • Bilateral sacroiliitis (CMS/HCC)   • Lateral femoral cutaneous neuropathy   • Other chronic pain   • Lumbar facet arthropathy   • Pure hypercholesterolemia   •  "Senile atrophic vaginitis   • Urinary retention   • Vaginal discomfort   • Other specified abnormal immunological findings in serum   • Cervical disc disorder, unspecified, unspecified cervical region   • Chronic pain syndrome   • Dyspepsia   • Acute gastric ulcer without hemorrhage or perforation   • Fibromyalgia       Advanced Care Planning:  ACP discussion was held with the patient during this visit. Patient has an advance directive (not in EMR), copy requested.    Review of Systems    Compared to one year ago, the patient feels her physical health is the same.  Compared to one year ago, the patient feels her mental health is the same.    Reviewed chart for potential of high risk medication in the elderly: yes  Reviewed chart for potential of harmful drug interactions in the elderly:yes    Objective         Vitals:    07/06/20 1052   BP: 112/76   Pulse: 101   Temp: 98 °F (36.7 °C)   TempSrc: Tympanic   SpO2: 99%   Weight: 57.1 kg (125 lb 12.8 oz)   Height: 160 cm (62.99\")       Body mass index is 22.29 kg/m².  Discussed the patient's BMI with her. The BMI is in the acceptable range.    Physical Exam          Assessment/Plan   Medicare Risks and Personalized Health Plan  CMS Preventative Services Quick Reference  Advance Directive Discussion  Chronic Pain   Osteoprorosis Risk    The above risks/problems have been discussed with the patient.  Pertinent information has been shared with the patient in the After Visit Summary.  Follow up plans and orders are seen below in the Assessment/Plan Section.    Diagnoses and all orders for this visit:    1. Medicare annual wellness visit, subsequent (Primary)    2. Bilateral sacroiliitis (CMS/[facility])    3. Generalized osteoarthritis    4. Fibromyalgia    Other orders  -     DEXA Bone Density Axial      Follow Up:  No follow-ups on file.     An After Visit Summary and PPPS were given to the patient.             "

## 2020-07-07 ENCOUNTER — TRANSCRIBE ORDERS (OUTPATIENT)
Dept: SLEEP MEDICINE | Facility: HOSPITAL | Age: 65
End: 2020-07-07

## 2020-07-07 DIAGNOSIS — Z01.818 OTHER SPECIFIED PRE-OPERATIVE EXAMINATION: Primary | ICD-10-CM

## 2020-07-10 ENCOUNTER — LAB (OUTPATIENT)
Dept: LAB | Facility: HOSPITAL | Age: 65
End: 2020-07-10

## 2020-07-10 DIAGNOSIS — Z01.818 OTHER SPECIFIED PRE-OPERATIVE EXAMINATION: ICD-10-CM

## 2020-07-10 PROCEDURE — C9803 HOPD COVID-19 SPEC COLLECT: HCPCS

## 2020-07-10 PROCEDURE — U0004 COV-19 TEST NON-CDC HGH THRU: HCPCS

## 2020-07-11 LAB
REF LAB TEST METHOD: NORMAL
SARS-COV-2 RNA RESP QL NAA+PROBE: NOT DETECTED

## 2020-07-13 ENCOUNTER — ANESTHESIA (OUTPATIENT)
Dept: GASTROENTEROLOGY | Facility: HOSPITAL | Age: 65
End: 2020-07-13

## 2020-07-13 ENCOUNTER — HOSPITAL ENCOUNTER (OUTPATIENT)
Facility: HOSPITAL | Age: 65
Setting detail: HOSPITAL OUTPATIENT SURGERY
Discharge: HOME | End: 2020-07-13
Attending: INTERNAL MEDICINE | Admitting: INTERNAL MEDICINE

## 2020-07-13 ENCOUNTER — ANESTHESIA EVENT (OUTPATIENT)
Dept: GASTROENTEROLOGY | Facility: HOSPITAL | Age: 65
End: 2020-07-13

## 2020-07-13 VITALS
BODY MASS INDEX: 22.56 KG/M2 | OXYGEN SATURATION: 98 % | SYSTOLIC BLOOD PRESSURE: 136 MMHG | DIASTOLIC BLOOD PRESSURE: 79 MMHG | TEMPERATURE: 98.4 F | HEART RATE: 89 BPM | RESPIRATION RATE: 16 BRPM | HEIGHT: 63 IN | WEIGHT: 127.31 LBS

## 2020-07-13 DIAGNOSIS — K25.3 ACUTE GASTRIC ULCER WITHOUT HEMORRHAGE OR PERFORATION: ICD-10-CM

## 2020-07-13 LAB
FERRITIN SERPL-MCNC: 19.9 NG/ML (ref 13–150)
HCT VFR BLD AUTO: 33.8 % (ref 34–46.6)
HGB BLD-MCNC: 11.2 G/DL (ref 12–15.9)
IRON 24H UR-MRATE: 55 MCG/DL (ref 37–145)
IRON SATN MFR SERPL: 12 % (ref 20–50)
TIBC SERPL-MCNC: 443 MCG/DL (ref 298–536)
TRANSFERRIN SERPL-MCNC: 297 MG/DL (ref 200–360)

## 2020-07-13 PROCEDURE — 83540 ASSAY OF IRON: CPT | Performed by: INTERNAL MEDICINE

## 2020-07-13 PROCEDURE — 85018 HEMOGLOBIN: CPT | Performed by: INTERNAL MEDICINE

## 2020-07-13 PROCEDURE — 88305 TISSUE EXAM BY PATHOLOGIST: CPT | Performed by: INTERNAL MEDICINE

## 2020-07-13 PROCEDURE — 82728 ASSAY OF FERRITIN: CPT | Performed by: INTERNAL MEDICINE

## 2020-07-13 PROCEDURE — 84466 ASSAY OF TRANSFERRIN: CPT | Performed by: INTERNAL MEDICINE

## 2020-07-13 PROCEDURE — 43239 EGD BIOPSY SINGLE/MULTIPLE: CPT | Performed by: INTERNAL MEDICINE

## 2020-07-13 PROCEDURE — 85014 HEMATOCRIT: CPT | Performed by: INTERNAL MEDICINE

## 2020-07-13 PROCEDURE — 25010000002 PROPOFOL 10 MG/ML EMULSION: Performed by: NURSE ANESTHETIST, CERTIFIED REGISTERED

## 2020-07-13 PROCEDURE — S0260 H&P FOR SURGERY: HCPCS | Performed by: INTERNAL MEDICINE

## 2020-07-13 RX ORDER — PROPOFOL 10 MG/ML
VIAL (ML) INTRAVENOUS AS NEEDED
Status: DISCONTINUED | OUTPATIENT
Start: 2020-07-13 | End: 2020-07-13 | Stop reason: SURG

## 2020-07-13 RX ORDER — CETIRIZINE HYDROCHLORIDE 10 MG/1
10 TABLET ORAL DAILY
Status: ON HOLD | COMMUNITY
End: 2021-02-14

## 2020-07-13 RX ORDER — PROPOFOL 10 MG/ML
VIAL (ML) INTRAVENOUS CONTINUOUS PRN
Status: DISCONTINUED | OUTPATIENT
Start: 2020-07-13 | End: 2020-07-13 | Stop reason: SURG

## 2020-07-13 RX ORDER — LIDOCAINE HYDROCHLORIDE 20 MG/ML
INJECTION, SOLUTION INFILTRATION; PERINEURAL AS NEEDED
Status: DISCONTINUED | OUTPATIENT
Start: 2020-07-13 | End: 2020-07-13 | Stop reason: SURG

## 2020-07-13 RX ORDER — SODIUM CHLORIDE, SODIUM LACTATE, POTASSIUM CHLORIDE, CALCIUM CHLORIDE 600; 310; 30; 20 MG/100ML; MG/100ML; MG/100ML; MG/100ML
30 INJECTION, SOLUTION INTRAVENOUS CONTINUOUS PRN
Status: DISCONTINUED | OUTPATIENT
Start: 2020-07-13 | End: 2020-07-13 | Stop reason: HOSPADM

## 2020-07-13 RX ADMIN — PROPOFOL 200 MCG/KG/MIN: 10 INJECTION, EMULSION INTRAVENOUS at 15:31

## 2020-07-13 RX ADMIN — PROPOFOL 60 MG: 10 INJECTION, EMULSION INTRAVENOUS at 15:31

## 2020-07-13 RX ADMIN — SODIUM CHLORIDE, POTASSIUM CHLORIDE, SODIUM LACTATE AND CALCIUM CHLORIDE 30 ML/HR: 600; 310; 30; 20 INJECTION, SOLUTION INTRAVENOUS at 14:11

## 2020-07-13 RX ADMIN — LIDOCAINE HYDROCHLORIDE 60 MG: 20 INJECTION, SOLUTION INFILTRATION; PERINEURAL at 15:31

## 2020-07-13 NOTE — ANESTHESIA PREPROCEDURE EVALUATION
Anesthesia Evaluation     Patient summary reviewed and Nursing notes reviewed                Airway   Mallampati: I  TM distance: >3 FB  Neck ROM: full  No difficulty expected  Dental - normal exam     Pulmonary - negative pulmonary ROS and normal exam   Cardiovascular - normal exam    (+) valvular problems/murmurs murmur, hyperlipidemia,       Neuro/Psych  (+) numbness, psychiatric history Anxiety,     GI/Hepatic/Renal/Endo    (+)  GERD, PUD, GI bleeding ,     Musculoskeletal     Abdominal  - normal exam    Bowel sounds: normal.   Substance History - negative use     OB/GYN negative ob/gyn ROS         Other   arthritis,                      Anesthesia Plan    ASA 2     MAC       Anesthetic plan, all risks, benefits, and alternatives have been provided, discussed and informed consent has been obtained with: patient.

## 2020-07-13 NOTE — H&P
[facility]  Pre Procedure History & Physical    Chief Complaint:   F/u gastric ulcer    Subjective     HPI:   66 yo for egd to assess healing of gastric ulcer found 12/19 - she had been on sulindac at that time.  She denies any abdominal pain, nausea, blood in the stool or melena.    Past Medical History:   Past Medical History:   Diagnosis Date   • ITZEL positive     Repeat ITZEL November 2014 negative   • Anxiety    • Bursitis 2016   • Cervical radiculopathy    • Cervical spondylosis    • Degeneration of intervertebral disc    • Eczema    • GERD (gastroesophageal reflux disease)    • Heart murmur As a child   • Irritable bowel syndrome    • Lichen sclerosus of female genitalia    • Low back pain    • Myalgia and myositis    • Osteoporosis    • Peptic ulceration Dec 2019   • Pruritus    • Ulcer 12/2019       Past Surgical History:  Past Surgical History:   Procedure Laterality Date   • BREAST CYST ASPIRATION      10+ years ago   • CHOLECYSTECTOMY     • COLONOSCOPY  07/30/2012    Dr. Zazueta, normal   • ENDOSCOPY  12/04/2015    Mild active duodenitis, reactive gastropathy suggestive of healing erosion or adjacent ulcer   • ENDOSCOPY N/A 12/16/2019    Procedure: ESOPHAGOGASTRODUODENOSCOPY WITH BIOPSY WITH COLD BIOPSY POLYPECTOMY;  Surgeon: Lida Garcia MD;  Location: [facility] ENDOSCOPY;  Service: Gastroenterology   • MOUTH SURGERY         Family History:  Family History   Problem Relation Age of Onset   • Irritable bowel syndrome Mother    • Hypertension Mother    • Hypertension Father    • Hearing loss Father    • Alcohol abuse Brother    • Early death Brother    • Heart disease Brother    • Arthritis Sister        Social History:   reports that she has never smoked. She has never used smokeless tobacco. She reports that she does not drink alcohol or use drugs.    Medications:   Medications Prior to Admission   Medication Sig Dispense Refill Last Dose   • acetaminophen (TYLENOL) 650 MG 8 hr  "tablet Take 2 tablets by mouth 2 (two) times a day as needed.   7/12/2020 at Unknown time   • alendronate (Fosamax) 70 MG tablet Take 1 tablet by mouth Every 7 (Seven) Days. 12 tablet 1 Past Week at Unknown time   • Calcium Carb-Cholecalciferol (CALCIUM-VITAMIN D) 500-200 MG-UNIT per tablet Take 1 tablet by mouth 2 (Two) Times a Day With Meals.   Past Week at Unknown time   • cetirizine (ZyrTEC Allergy) 10 MG tablet Take 10 mg by mouth Daily.   7/13/2020 at Unknown time   • DULoxetine (CYMBALTA) 30 MG capsule TAKE ONE CAPSULE BY MOUTH DAILY 30 capsule 4 7/13/2020 at Unknown time   • ferrous sulfate 325 (65 FE) MG tablet Take 1 tablet by mouth Daily With Breakfast. 30 tablet 3 Past Month at Unknown time   • gabapentin (NEURONTIN) 100 MG capsule 1 cap po qam, 2 cap po qpm 270 capsule 2 7/12/2020 at Unknown time   • hydroxychloroquine (Plaquenil) 200 MG tablet Take 1 tablet by mouth Every 12 (Twelve) Hours.   7/12/2020 at Unknown time   • omeprazole (priLOSEC) 40 MG capsule Take 40 mg by mouth Daily.   7/13/2020 at Unknown time   • Wheat Dextrin (BENEFIBER PO) Take  by mouth.   7/13/2020 at Unknown time   • estradiol (ESTRACE) 0.1 MG/GM vaginal cream Insert 2 g into the vagina 2 (Two) Times a Week. 42.5 g 0 Taking   • Probiotic Product (PROBIOTIC DAILY PO) Take  by mouth Daily.   Taking       Allergies:  Other and Sulfa antibiotics    ROS:    Pertinent items are noted in HPI, all other systems reviewed and negative     Objective     Blood pressure 118/75, pulse 90, temperature 98.4 °F (36.9 °C), temperature source Oral, resp. rate 17, height 160 cm (63\"), weight 57.7 kg (127 lb 5 oz), SpO2 97 %, not currently breastfeeding.    Physical Exam   Constitutional: Pt is oriented to person, place, and time and well-developed, well-nourished, and in no distress.   Mouth/Throat: Oropharynx is clear and moist.   Neck: Normal range of motion.   Cardiovascular: Normal rate, regular rhythm    Pulmonary/Chest: Effort normal  "   Abdominal: Soft. Nontender  Skin: Skin is warm and dry.   Psychiatric: Mood, memory, affect and judgment normal.     Assessment/Plan     Diagnosis:  F/u gastric ulcer    Anticipated Surgical Procedure:  egd with possible interventions    The risks, benefits, and alternatives of this procedure have been discussed with the patient or the responsible party- the patient understands and agrees to proceed.

## 2020-07-13 NOTE — ANESTHESIA POSTPROCEDURE EVALUATION
"Patient: Shanika Hector    Procedure Summary     Date:  07/13/20 Room / Location:   [facility]    Anesthesia Start:  1526 Anesthesia Stop:  1542    Procedure:  ESOPHAGOGASTRODUODENOSCOPY WITH COLD BIOPSY POLYPECTOMY (N/A Esophagus) Diagnosis:       Acute gastric ulcer without hemorrhage or perforation      (Acute gastric ulcer without hemorrhage or perforation [K25.3])    Surgeon:  Lida Garcia MD Provider:  Oscar Donis MD    Anesthesia Type:  MAC ASA Status:  2          Anesthesia Type: MAC    Vitals  Vitals Value Taken Time   /79 7/13/2020  4:01 PM   Temp     Pulse 89 7/13/2020  4:01 PM   Resp 16 7/13/2020  4:01 PM   SpO2 98 % 7/13/2020  4:01 PM           Post Anesthesia Care and Evaluation    Patient location during evaluation: bedside  Patient participation: waiting for patient participation  Level of consciousness: sleepy but conscious and responsive to physical stimuli  Pain management: adequate  Airway patency: patent  Anesthetic complications: No anesthetic complications  PONV Status: NA  Cardiovascular status: acceptable and hemodynamically stable  Respiratory status: acceptable  Hydration status: acceptable    Comments: /79 (BP Location: Left arm, Patient Position: Lying)   Pulse 89   Temp 36.9 °C (98.4 °F) (Oral)   Resp 16   Ht 160 cm (63\")   Wt 57.7 kg (127 lb 5 oz)   SpO2 98%   BMI 22.55 kg/m²       "

## 2020-07-15 ENCOUNTER — TELEPHONE (OUTPATIENT)
Dept: GASTROENTEROLOGY | Facility: CLINIC | Age: 65
End: 2020-07-15

## 2020-07-15 DIAGNOSIS — D50.9 IRON DEFICIENCY ANEMIA, UNSPECIFIED IRON DEFICIENCY ANEMIA TYPE: Primary | ICD-10-CM

## 2020-07-15 LAB
CYTO UR: NORMAL
LAB AP CASE REPORT: NORMAL
PATH REPORT.FINAL DX SPEC: NORMAL
PATH REPORT.GROSS SPEC: NORMAL

## 2020-07-15 NOTE — TELEPHONE ENCOUNTER
Still with mild anemia and iron def - increase iron to bid with meals - repeat h/h, tibc, ferritin 6 weeks.    Gastric bx showed benign chronic inflammation and benign fundic gland polyp - findings not concerning

## 2020-07-20 ENCOUNTER — HOSPITAL ENCOUNTER (OUTPATIENT)
Dept: BONE DENSITY | Facility: HOSPITAL | Age: 65
Discharge: HOME | End: 2020-07-20
Admitting: FAMILY MEDICINE

## 2020-07-20 PROCEDURE — 77080 DXA BONE DENSITY AXIAL: CPT

## 2020-07-20 NOTE — PROGRESS NOTES
The DEXA scan showed osteoporosis.  I do recommend you continue the Fosamax as we discussed last visit.

## 2020-07-21 NOTE — TELEPHONE ENCOUNTER
Called pt and advised of Dr Dr Garcia's note. Pt verb understanding and made lab appt for 08/24 at 10a.  Labs ordered.

## 2020-08-24 ENCOUNTER — LAB (OUTPATIENT)
Dept: GASTROENTEROLOGY | Facility: CLINIC | Age: 65
End: 2020-08-24

## 2020-08-25 ENCOUNTER — TELEPHONE (OUTPATIENT)
Dept: GASTROENTEROLOGY | Facility: CLINIC | Age: 65
End: 2020-08-25

## 2020-08-25 DIAGNOSIS — D50.9 IRON DEFICIENCY ANEMIA, UNSPECIFIED IRON DEFICIENCY ANEMIA TYPE: Primary | ICD-10-CM

## 2020-08-25 LAB
FERRITIN SERPL-MCNC: 35 NG/ML (ref 15–150)
HCT VFR BLD AUTO: 37.2 % (ref 34–46.6)
HGB BLD-MCNC: 12.4 G/DL (ref 11.1–15.9)
IRON SATN MFR SERPL: 36 % (ref 15–55)
IRON SERPL-MCNC: 116 UG/DL (ref 27–139)
TIBC SERPL-MCNC: 319 UG/DL (ref 250–450)
UIBC SERPL-MCNC: 203 UG/DL (ref 118–369)

## 2020-10-01 DIAGNOSIS — E78.00 PURE HYPERCHOLESTEROLEMIA: Primary | ICD-10-CM

## 2020-10-02 LAB
ALBUMIN SERPL-MCNC: 4.7 G/DL (ref 3.5–5.2)
ALBUMIN/GLOB SERPL: 2.4 G/DL
ALP SERPL-CCNC: 68 U/L (ref 39–117)
ALT SERPL-CCNC: 10 U/L (ref 1–33)
AST SERPL-CCNC: 17 U/L (ref 1–32)
BILIRUB SERPL-MCNC: 0.4 MG/DL (ref 0–1.2)
BUN SERPL-MCNC: 17 MG/DL (ref 8–23)
BUN/CREAT SERPL: 16 (ref 7–25)
CALCIUM SERPL-MCNC: 9.2 MG/DL (ref 8.6–10.5)
CHLORIDE SERPL-SCNC: 104 MMOL/L (ref 98–107)
CHOLEST SERPL-MCNC: 205 MG/DL (ref 0–200)
CO2 SERPL-SCNC: 31.5 MMOL/L (ref 22–29)
CREAT SERPL-MCNC: 1.06 MG/DL (ref 0.57–1)
GLOBULIN SER CALC-MCNC: 2 GM/DL
GLUCOSE SERPL-MCNC: 98 MG/DL (ref 65–99)
HDLC SERPL-MCNC: 82 MG/DL (ref 40–60)
LDLC SERPL CALC-MCNC: 113 MG/DL (ref 0–100)
POTASSIUM SERPL-SCNC: 4.5 MMOL/L (ref 3.5–5.2)
PROT SERPL-MCNC: 6.7 G/DL (ref 6–8.5)
SODIUM SERPL-SCNC: 144 MMOL/L (ref 136–145)
TRIGL SERPL-MCNC: 50 MG/DL (ref 0–150)
VLDLC SERPL CALC-MCNC: 10 MG/DL

## 2020-10-04 NOTE — PROGRESS NOTES
The kidney function is slightly diminished compared to a year ago.  At this point not alarming.  We will discuss more at upcoming visit.  Cholesterol panel looks good.

## 2020-10-12 RX ORDER — GABAPENTIN 100 MG/1
CAPSULE ORAL
Qty: 270 CAPSULE | Refills: 0 | Status: SHIPPED | OUTPATIENT
Start: 2020-10-12 | End: 2021-01-11

## 2020-10-19 ENCOUNTER — RESULTS ENCOUNTER (OUTPATIENT)
Dept: GASTROENTEROLOGY | Facility: CLINIC | Age: 65
End: 2020-10-19

## 2020-10-19 DIAGNOSIS — D50.9 IRON DEFICIENCY ANEMIA, UNSPECIFIED IRON DEFICIENCY ANEMIA TYPE: ICD-10-CM

## 2020-11-02 ENCOUNTER — LAB (OUTPATIENT)
Dept: GASTROENTEROLOGY | Facility: CLINIC | Age: 65
End: 2020-11-02

## 2020-11-02 LAB
BASOPHILS # BLD AUTO: 0.04 10*3/MM3 (ref 0–0.2)
BASOPHILS NFR BLD AUTO: 0.8 % (ref 0–1.5)
EOSINOPHIL # BLD AUTO: 0.35 10*3/MM3 (ref 0–0.4)
EOSINOPHIL NFR BLD AUTO: 7.4 % (ref 0.3–6.2)
ERYTHROCYTE [DISTWIDTH] IN BLOOD BY AUTOMATED COUNT: 12 % (ref 12.3–15.4)
FERRITIN SERPL-MCNC: 32.7 NG/ML (ref 13–150)
HCT VFR BLD AUTO: 36.3 % (ref 34–46.6)
HGB BLD-MCNC: 12.2 G/DL (ref 12–15.9)
IMM GRANULOCYTES # BLD AUTO: 0.01 10*3/MM3 (ref 0–0.05)
IMM GRANULOCYTES NFR BLD AUTO: 0.2 % (ref 0–0.5)
IRON SATN MFR SERPL: 20 % (ref 20–50)
IRON SERPL-MCNC: 75 MCG/DL (ref 37–145)
LYMPHOCYTES # BLD AUTO: 1.22 10*3/MM3 (ref 0.7–3.1)
LYMPHOCYTES NFR BLD AUTO: 25.9 % (ref 19.6–45.3)
MCH RBC QN AUTO: 29.8 PG (ref 26.6–33)
MCHC RBC AUTO-ENTMCNC: 33.6 G/DL (ref 31.5–35.7)
MCV RBC AUTO: 88.5 FL (ref 79–97)
MONOCYTES # BLD AUTO: 0.53 10*3/MM3 (ref 0.1–0.9)
MONOCYTES NFR BLD AUTO: 11.3 % (ref 5–12)
NEUTROPHILS # BLD AUTO: 2.56 10*3/MM3 (ref 1.7–7)
NEUTROPHILS NFR BLD AUTO: 54.4 % (ref 42.7–76)
NRBC BLD AUTO-RTO: 0 /100 WBC (ref 0–0.2)
PLATELET # BLD AUTO: 262 10*3/MM3 (ref 140–450)
RBC # BLD AUTO: 4.1 10*6/MM3 (ref 3.77–5.28)
TIBC SERPL-MCNC: 373 MCG/DL
UIBC SERPL-MCNC: 298 MCG/DL (ref 112–346)
WBC # BLD AUTO: 4.71 10*3/MM3 (ref 3.4–10.8)

## 2020-11-03 ENCOUNTER — TELEPHONE (OUTPATIENT)
Dept: GASTROENTEROLOGY | Facility: CLINIC | Age: 65
End: 2020-11-03

## 2020-11-03 NOTE — TELEPHONE ENCOUNTER
Labs stable - hb and iron levels normal and relatively unchanged.  Ok to resume routine testing with pcp

## 2020-11-20 RX ORDER — DULOXETIN HYDROCHLORIDE 30 MG/1
CAPSULE, DELAYED RELEASE ORAL
Qty: 30 CAPSULE | Refills: 3 | Status: SHIPPED | OUTPATIENT
Start: 2020-11-20 | End: 2020-12-04

## 2020-12-04 ENCOUNTER — OFFICE VISIT (OUTPATIENT)
Dept: FAMILY MEDICINE CLINIC | Facility: CLINIC | Age: 65
End: 2020-12-04

## 2020-12-04 VITALS
OXYGEN SATURATION: 99 % | SYSTOLIC BLOOD PRESSURE: 118 MMHG | BODY MASS INDEX: 22.29 KG/M2 | WEIGHT: 125.8 LBS | HEART RATE: 86 BPM | TEMPERATURE: 97.3 F | DIASTOLIC BLOOD PRESSURE: 76 MMHG | HEIGHT: 63 IN

## 2020-12-04 DIAGNOSIS — F33.0 MILD EPISODE OF RECURRENT MAJOR DEPRESSIVE DISORDER (HCC): ICD-10-CM

## 2020-12-04 DIAGNOSIS — M81.0 AGE-RELATED OSTEOPOROSIS WITHOUT CURRENT PATHOLOGICAL FRACTURE: ICD-10-CM

## 2020-12-04 DIAGNOSIS — Z23 NEED FOR VACCINATION: ICD-10-CM

## 2020-12-04 DIAGNOSIS — K25.3 ACUTE GASTRIC ULCER WITHOUT HEMORRHAGE OR PERFORATION: ICD-10-CM

## 2020-12-04 DIAGNOSIS — M46.1 BILATERAL SACROILIITIS (HCC): ICD-10-CM

## 2020-12-04 DIAGNOSIS — M79.7 FIBROMYALGIA: Primary | ICD-10-CM

## 2020-12-04 PROCEDURE — G0008 ADMIN INFLUENZA VIRUS VAC: HCPCS | Performed by: FAMILY MEDICINE

## 2020-12-04 PROCEDURE — 99214 OFFICE O/P EST MOD 30 MIN: CPT | Performed by: FAMILY MEDICINE

## 2020-12-04 PROCEDURE — 90694 VACC AIIV4 NO PRSRV 0.5ML IM: CPT | Performed by: FAMILY MEDICINE

## 2020-12-04 RX ORDER — DULOXETINE 40 MG/1
40 CAPSULE, DELAYED RELEASE ORAL DAILY
Qty: 90 CAPSULE | Refills: 1 | Status: SHIPPED | OUTPATIENT
Start: 2020-12-04 | End: 2021-06-08

## 2020-12-04 RX ORDER — OMEPRAZOLE 40 MG/1
40 CAPSULE, DELAYED RELEASE ORAL DAILY
Qty: 90 CAPSULE | Refills: 1 | Status: SHIPPED | OUTPATIENT
Start: 2020-12-04 | End: 2021-06-06

## 2020-12-04 RX ORDER — CLOBETASOL PROPIONATE 0.5 MG/G
CREAM TOPICAL
Status: ON HOLD | COMMUNITY
Start: 2020-09-03 | End: 2021-02-14

## 2020-12-04 NOTE — PROGRESS NOTES
Subjective  Answers for HPI/ROS submitted by the patient on 11/27/2020   What is the primary reason for your visit?: Other  Please describe your symptoms.: Follow up to review meds.  Need flu shot too  Have you had these symptoms before?: Yes  How long have you been having these symptoms?: Greater than 2 weeks  Please list any medications you are currently taking for this condition.: Gabapentin  Please describe any probable cause for these symptoms. : Ongoing    Shanika Hector is a 65 y.o. female.     Chief Complaint   Patient presents with   • Fibromyalgia     follow up         History of Present Illness    Follow-up fibromyalgia.  She is taking gabapentin 300 mg at nighttime.  She is taking duloxetine 30 mg a day.  She is complaining of depression symptoms.  Mild.  Feeling somewhat sad and also the coronavirus social distancing is causing issues for her.  She is having a little bit worsening fibromyalgia symptoms at times.  No severe depression symptoms.    GERD.  Previous gastric ulcer.  That has healed.  She has been released by her gastroenterologist.  She continues on 40 mg omeprazole daily.  The anemia is resolved.  The iron supplementation was stopped a number of weeks ago.  The ferritin level and iron levels are normal.    Osteoarthritis of multiple joints.  Sacroiliitis.  She is followed by a rheumatologist in [place].  Possible rheumatoid negative rheumatoid arthritis.  She is on hydroxychloroquine.  She is taking now just once a day.  Previously twice a day.  She states it has helped her severe hand arthritis symptoms.          The following portions of the patient's history were reviewed and updated as appropriate: allergies, current medications, past family history, past medical history, past social history, past surgical history and problem list.          Review of Systems   Constitutional: Negative.    Respiratory: Negative.    Cardiovascular: Negative.    Musculoskeletal: Positive for arthralgias.  "Negative for joint swelling.   Psychiatric/Behavioral: Positive for dysphoric mood.       Objective   Blood pressure 118/76, pulse 86, temperature 97.3 °F (36.3 °C), temperature source Temporal, height 160 cm (62.99\"), weight 57.1 kg (125 lb 12.8 oz), SpO2 99 %, not currently breastfeeding.  Physical Exam  Constitutional:       Appearance: Normal appearance.   Cardiovascular:      Rate and Rhythm: Normal rate.      Pulses: Normal pulses.   Pulmonary:      Effort: Pulmonary effort is normal.   Musculoskeletal:      Comments: Good range of motion of the wrist and fingers without synovitis.   Neurological:      Mental Status: She is alert.   Psychiatric:      Comments: Affect slightly flat.         Assessment/Plan   Diagnoses and all orders for this visit:    1. Fibromyalgia (Primary)    2. Need for vaccination  -     Fluad Quad 65+ yrs ([id number])    3. Age-related osteoporosis without current pathological fracture    4. Bilateral sacroiliitis (CMS/HCC)    5. Acute gastric ulcer without hemorrhage or perforation  -     CBC & Differential; Future  -     Comprehensive Metabolic Panel; Future    6. Mild episode of recurrent major depressive disorder (CMS/HCC)    Other orders  -     DULoxetine 40 MG capsule delayed-release particles; Take 1 capsule by mouth Daily.  Dispense: 90 capsule; Refill: 1  -     omeprazole (priLOSEC) 40 MG capsule; Take 1 capsule by mouth Daily.  Dispense: 90 capsule; Refill: 1      Fibromyalgia.  Continue gabapentin 300 mg at nighttime.  I am increasing the duloxetine from 30 mg up to 40 mg a day.  Side effects discussed.  With agitation or irritability, decrease the duloxetine back down to 30 mg let us know.    Major depression.  Mild.  Duloxetine as above.  Counseling given.    Sacroiliitis with osteoarthritis and possible rheumatoid negative rheumatoid arthritis??.  She is followed by aRheumatologist in [place].  I reviewed the most recent notes.  There is no mention of reason for the " hydroxychloroquine.  It is prescribed by them.    Osteoporosis.  Continues Fosamax.  I will see her back in 3 months for recheck.

## 2021-01-11 RX ORDER — GABAPENTIN 100 MG/1
CAPSULE ORAL
Qty: 270 CAPSULE | Refills: 1 | Status: SHIPPED | OUTPATIENT
Start: 2021-01-11 | End: 2021-09-07

## 2021-02-04 NOTE — PROGRESS NOTES
Device: Port  Central Line Site: Left  Central Line Site Appearance: scabbed area at insertion site  Implanted port accessed using a 20 gauge non-coring needle primed with 0.9% sodium chloride. Good blood return obtained.  Blood obtained and sent to lab.  Site Care: Aseptic site care per protocol and sensative dressing   Central line flushed with: 20 ml 0.9 normal saline  Central line removed: no  Harvey Needle: Harvey needle left in place     GYN Annual Exam     CC- Here for annual exam.     Pt new to practice? no  Pt new to me? no     HPI: History of Present Illness      Shanika Hector is a 63 y.o. female who presents for annual well woman exam. No LMP recorded. Patient is postmenopausal.    MAMMOGRAM UP TO DATE IF AGE APPROPRIATE?  no    COLONOSCOPY UP TO DATE IF AGE APPROPRIATE? yes    Fhx breast cancer? no    Fhx ovarian cancer? no    Fhx colon cancer? no    Invitae testing offered? no      PMHX:  Patient Active Problem List   Diagnosis   • Cervical radiculopathy   • Acid reflux   • Generalized osteoarthritis   • Gastroesophageal reflux disease   • Irritable bowel syndrome with constipation   • Osteoporosis   • Pruritus   • Seborrheic eczema   • Osteoarthritis of cervical spine without myelopathy   • Eczema   • DDD (degenerative disc disease), lumbar   • Somatic dysfunction of sacroiliac joint   • Bilateral sacroiliitis (CMS/HCC)   • Strain of left trapezius muscle   • Lateral femoral cutaneous neuropathy   • Other chronic pain   • Lumbar facet arthropathy   ; otherwise none    OB History      Para Term  AB Living    2 2 2          SAB TAB Ectopic Molar Multiple Live Births                           Past Medical History:   Diagnosis Date   • ITZEL positive     Repeat ITZEL 2014 negative   • Anxiety    • Bursitis    • Cervical radiculopathy    • Cervical spondylosis    • Degeneration of intervertebral disc    • Eczema    • GERD (gastroesophageal reflux disease)    • Irritable bowel syndrome    • Lichen sclerosus of female genitalia    • Low back pain    • Myalgia and myositis    • Osteoporosis    • Pruritus        Past Surgical History:   Procedure Laterality Date   • BREAST CYST ASPIRATION      10+ years ago   • CHOLECYSTECTOMY     • MOUTH SURGERY           Current Outpatient Medications:   •  acetaminophen (TYLENOL) 650 MG 8 hr tablet, Take 2 tablets by mouth 2 (two) times a day as needed., Disp: , Rfl:   •  hydroxychloroquine  "(PLAQUENIL) 200 MG tablet, Take 200 mg by mouth 2 (Two) Times a Day., Disp: , Rfl:   •  methocarbamol (ROBAXIN) 500 MG tablet, , Disp: , Rfl:   •  omeprazole (priLOSEC) 40 MG capsule, TAKE ONE CAPSULE BY MOUTH DAILY, Disp: 30 capsule, Rfl: 5  •  polyethylene glycol (MIRALAX) packet, Take 17 g by mouth Daily., Disp: , Rfl:   •  sulindac (CLINORIL) 200 MG tablet, , Disp: , Rfl:   •  Cyanocobalamin (VITAMIN B 12 PO), Take  by mouth., Disp: , Rfl:     Allergies   Allergen Reactions   • Sulfa Antibiotics Unknown (See Comments)     Past history, unknown reaction. Was told by mother there was a reaction of some type.        Social History     Tobacco Use   • Smoking status: Never Smoker   • Smokeless tobacco: Never Used   Substance Use Topics   • Alcohol use: Yes     Comment: occationally   • Drug use: No       I advised Shanika of the risks of continuing to use tobacco, and I provided her with tobacco cessation educational materials in the After Visit Summary.           Family History   Problem Relation Age of Onset   • Irritable bowel syndrome Mother    • Hypertension Father        Review of Systems        EXAM:  /82   Ht 160 cm (63\")   Wt 57.2 kg (126 lb)   BMI 22.32 kg/m²     Physical Exam   Constitutional: She is oriented to person, place, and time. She appears well-developed and well-nourished.   HENT:   Head: Normocephalic and atraumatic.   Eyes: EOM are normal.   Neck: Normal range of motion. Neck supple. No thyromegaly present.   Cardiovascular: Normal rate and regular rhythm.   Pulmonary/Chest: Effort normal and breath sounds normal. Right breast exhibits no mass and no nipple discharge. Left breast exhibits no mass and no nipple discharge. Breasts are symmetrical. There is no breast swelling.   Abdominal: Soft. Bowel sounds are normal. She exhibits no distension and no mass. There is no tenderness. There is no rebound and no guarding.   Genitourinary: Vagina normal and uterus normal. No breast tenderness, " discharge or bleeding. Pelvic exam was performed with patient prone. There is no lesion on the right labia. There is no lesion on the left labia. Cervix exhibits no motion tenderness and no discharge. Right adnexum displays no mass. Left adnexum displays no mass.   Genitourinary Comments: cx wnl, no pap   Musculoskeletal: Normal range of motion. She exhibits no edema.   Neurological: She is alert and oriented to person, place, and time.   Skin: Skin is warm and dry.   Breasts wnl bilaterally   Psychiatric: She has a normal mood and affect. Her behavior is normal. Judgment and thought content normal.   Nursing note and vitals reviewed.         As part of wellness and prevention, the following topics were discussed with the patient:  []  Nutrition  []  Physical activity/regular exercise   [x]  Healthy weight  []  Injury prevention  [x]  Substance misuse/abuse  []  Sexual behavior  []  STD prevention  []  Contaception  []  Dental health  [x]  Mental health  []  Immunization  [x]  Encouraged SBE     Counseling and guidance done:  Nutrition, physical activity, healthy weight, injury prevention, misuse of tobacco, alcohol and drugs, sexual behavior and STDs, contraception, dental health, mental health, immunizations breast cancer screening and exams.    Assessment     1) GYN annual well woman exam.   2) PAP done today? no  3) problems addressed: yes       Plan       Follow up prn and one year.    Shanika was seen today for gynecologic exam.    Diagnoses and all orders for this visit:    Well woman exam  -     POC Urinalysis Dipstick  -     Mammo Screening Digital Tomosynthesis Bilateral With CAD; Future        RTO Return in about 1 year (around 5/6/2020) for Annual physical.        Nino Nguyen MD  [unfilled]  9:52 AM

## 2021-02-13 ENCOUNTER — APPOINTMENT (OUTPATIENT)
Dept: CT IMAGING | Facility: HOSPITAL | Age: 66
End: 2021-02-13

## 2021-02-13 ENCOUNTER — HOSPITAL ENCOUNTER (INPATIENT)
Facility: HOSPITAL | Age: 66
LOS: 2 days | Discharge: HOME | End: 2021-02-15
Attending: EMERGENCY MEDICINE | Admitting: INTERNAL MEDICINE

## 2021-02-13 ENCOUNTER — APPOINTMENT (OUTPATIENT)
Dept: GENERAL RADIOLOGY | Facility: HOSPITAL | Age: 66
End: 2021-02-13

## 2021-02-13 DIAGNOSIS — I50.9 ACUTE CONGESTIVE HEART FAILURE, UNSPECIFIED HEART FAILURE TYPE (HCC): Primary | ICD-10-CM

## 2021-02-13 DIAGNOSIS — I50.21 ACUTE SYSTOLIC CONGESTIVE HEART FAILURE (HCC): ICD-10-CM

## 2021-02-13 DIAGNOSIS — J90 PLEURAL EFFUSION, BILATERAL: ICD-10-CM

## 2021-02-13 LAB
ALBUMIN SERPL-MCNC: 3.9 G/DL (ref 3.5–5.2)
ALBUMIN/GLOB SERPL: 1.4 G/DL
ALP SERPL-CCNC: 69 U/L (ref 39–117)
ALT SERPL W P-5'-P-CCNC: 8 U/L (ref 1–33)
ANION GAP SERPL CALCULATED.3IONS-SCNC: 11 MMOL/L (ref 5–15)
AST SERPL-CCNC: 12 U/L (ref 1–32)
B PARAPERT DNA SPEC QL NAA+PROBE: NOT DETECTED
B PERT DNA SPEC QL NAA+PROBE: NOT DETECTED
BASOPHILS # BLD AUTO: 0.04 10*3/MM3 (ref 0–0.2)
BASOPHILS NFR BLD AUTO: 0.6 % (ref 0–1.5)
BILIRUB SERPL-MCNC: 0.3 MG/DL (ref 0–1.2)
BUN SERPL-MCNC: 20 MG/DL (ref 8–23)
BUN/CREAT SERPL: 25.6 (ref 7–25)
C PNEUM DNA NPH QL NAA+NON-PROBE: NOT DETECTED
CALCIUM SPEC-SCNC: 8.7 MG/DL (ref 8.6–10.5)
CHLORIDE SERPL-SCNC: 104 MMOL/L (ref 98–107)
CO2 SERPL-SCNC: 25 MMOL/L (ref 22–29)
CREAT SERPL-MCNC: 0.78 MG/DL (ref 0.57–1)
DEPRECATED RDW RBC AUTO: 43.4 FL (ref 37–54)
EOSINOPHIL # BLD AUTO: 0.3 10*3/MM3 (ref 0–0.4)
EOSINOPHIL NFR BLD AUTO: 4.8 % (ref 0.3–6.2)
ERYTHROCYTE [DISTWIDTH] IN BLOOD BY AUTOMATED COUNT: 12.4 % (ref 12.3–15.4)
FLUAV SUBTYP SPEC NAA+PROBE: NOT DETECTED
FLUBV RNA ISLT QL NAA+PROBE: NOT DETECTED
GFR SERPL CREATININE-BSD FRML MDRD: 74 ML/MIN/1.73
GLOBULIN UR ELPH-MCNC: 2.7 GM/DL
GLUCOSE SERPL-MCNC: 97 MG/DL (ref 65–99)
HADV DNA SPEC NAA+PROBE: NOT DETECTED
HCOV 229E RNA SPEC QL NAA+PROBE: NOT DETECTED
HCOV HKU1 RNA SPEC QL NAA+PROBE: NOT DETECTED
HCOV NL63 RNA SPEC QL NAA+PROBE: NOT DETECTED
HCOV OC43 RNA SPEC QL NAA+PROBE: NOT DETECTED
HCT VFR BLD AUTO: 37.2 % (ref 34–46.6)
HGB BLD-MCNC: 11.8 G/DL (ref 12–15.9)
HMPV RNA NPH QL NAA+NON-PROBE: NOT DETECTED
HPIV1 RNA SPEC QL NAA+PROBE: NOT DETECTED
HPIV2 RNA SPEC QL NAA+PROBE: NOT DETECTED
HPIV3 RNA NPH QL NAA+PROBE: NOT DETECTED
HPIV4 P GENE NPH QL NAA+PROBE: NOT DETECTED
IMM GRANULOCYTES # BLD AUTO: 0.01 10*3/MM3 (ref 0–0.05)
IMM GRANULOCYTES NFR BLD AUTO: 0.2 % (ref 0–0.5)
LYMPHOCYTES # BLD AUTO: 1.24 10*3/MM3 (ref 0.7–3.1)
LYMPHOCYTES NFR BLD AUTO: 20 % (ref 19.6–45.3)
M PNEUMO IGG SER IA-ACNC: NOT DETECTED
MCH RBC QN AUTO: 29.9 PG (ref 26.6–33)
MCHC RBC AUTO-ENTMCNC: 31.7 G/DL (ref 31.5–35.7)
MCV RBC AUTO: 94.2 FL (ref 79–97)
MONOCYTES # BLD AUTO: 0.5 10*3/MM3 (ref 0.1–0.9)
MONOCYTES NFR BLD AUTO: 8.1 % (ref 5–12)
NEUTROPHILS NFR BLD AUTO: 4.11 10*3/MM3 (ref 1.7–7)
NEUTROPHILS NFR BLD AUTO: 66.3 % (ref 42.7–76)
NRBC BLD AUTO-RTO: 0 /100 WBC (ref 0–0.2)
NT-PROBNP SERPL-MCNC: 3587 PG/ML (ref 0–900)
PLATELET # BLD AUTO: 310 10*3/MM3 (ref 140–450)
PMV BLD AUTO: 10.4 FL (ref 6–12)
POTASSIUM SERPL-SCNC: 4.3 MMOL/L (ref 3.5–5.2)
PROT SERPL-MCNC: 6.6 G/DL (ref 6–8.5)
RBC # BLD AUTO: 3.95 10*6/MM3 (ref 3.77–5.28)
RHINOVIRUS RNA SPEC NAA+PROBE: NOT DETECTED
RSV RNA NPH QL NAA+NON-PROBE: NOT DETECTED
SARS-COV-2 RNA NPH QL NAA+NON-PROBE: NOT DETECTED
SODIUM SERPL-SCNC: 140 MMOL/L (ref 136–145)
TROPONIN T SERPL-MCNC: <0.01 NG/ML (ref 0–0.03)
TSH SERPL DL<=0.05 MIU/L-ACNC: 0.59 UIU/ML (ref 0.27–4.2)
WBC # BLD AUTO: 6.2 10*3/MM3 (ref 3.4–10.8)

## 2021-02-13 PROCEDURE — 0202U NFCT DS 22 TRGT SARS-COV-2: CPT | Performed by: EMERGENCY MEDICINE

## 2021-02-13 PROCEDURE — 71046 X-RAY EXAM CHEST 2 VIEWS: CPT | Performed by: FAMILY MEDICINE

## 2021-02-13 PROCEDURE — 80053 COMPREHEN METABOLIC PANEL: CPT | Performed by: EMERGENCY MEDICINE

## 2021-02-13 PROCEDURE — 25010000002 FUROSEMIDE PER 20 MG: Performed by: EMERGENCY MEDICINE

## 2021-02-13 PROCEDURE — 93005 ELECTROCARDIOGRAM TRACING: CPT | Performed by: EMERGENCY MEDICINE

## 2021-02-13 PROCEDURE — 93010 ELECTROCARDIOGRAM REPORT: CPT | Performed by: INTERNAL MEDICINE

## 2021-02-13 PROCEDURE — 84443 ASSAY THYROID STIM HORMONE: CPT | Performed by: EMERGENCY MEDICINE

## 2021-02-13 PROCEDURE — 85025 COMPLETE CBC W/AUTO DIFF WBC: CPT | Performed by: EMERGENCY MEDICINE

## 2021-02-13 PROCEDURE — 84484 ASSAY OF TROPONIN QUANT: CPT | Performed by: EMERGENCY MEDICINE

## 2021-02-13 PROCEDURE — 99284 EMERGENCY DEPT VISIT MOD MDM: CPT

## 2021-02-13 PROCEDURE — 83880 ASSAY OF NATRIURETIC PEPTIDE: CPT | Performed by: EMERGENCY MEDICINE

## 2021-02-13 PROCEDURE — 0 IOPAMIDOL PER 1 ML: Performed by: EMERGENCY MEDICINE

## 2021-02-13 PROCEDURE — 71275 CT ANGIOGRAPHY CHEST: CPT

## 2021-02-13 RX ORDER — LORAZEPAM 2 MG/ML
INJECTION INTRAMUSCULAR
Status: DISCONTINUED
Start: 2021-02-13 | End: 2021-02-13 | Stop reason: WASHOUT

## 2021-02-13 RX ORDER — FUROSEMIDE 10 MG/ML
40 INJECTION INTRAMUSCULAR; INTRAVENOUS ONCE
Status: COMPLETED | OUTPATIENT
Start: 2021-02-13 | End: 2021-02-13

## 2021-02-13 RX ADMIN — SODIUM CHLORIDE 500 ML: 9 INJECTION, SOLUTION INTRAVENOUS at 20:23

## 2021-02-13 RX ADMIN — FUROSEMIDE 40 MG: 10 INJECTION, SOLUTION INTRAMUSCULAR; INTRAVENOUS at 22:08

## 2021-02-13 RX ADMIN — METOROPROLOL TARTRATE 5 MG: 5 INJECTION, SOLUTION INTRAVENOUS at 19:54

## 2021-02-13 RX ADMIN — IOPAMIDOL 95 ML: 755 INJECTION, SOLUTION INTRAVENOUS at 21:06

## 2021-02-13 NOTE — ED TRIAGE NOTES
Pt reports SOA and dry cough since Monday, denies fever or known COVID exposure. Patient masked in first look triage. I was wearing mask and goggles.

## 2021-02-13 NOTE — ED PROVIDER NOTES
EMERGENCY DEPARTMENT ENCOUNTER    Room Number:  15/15  PCP: Bryan Vega MD  Historian: Patient  History Limited By: Nothing      HPI  Chief Complaint: Cough shortness of breath  Context: Shanika Hector is a 65 y.o. female who presents to the ED c/o cough shortness of breath.  Patient states symptoms started on Monday and that she was not feeling well.  States she has had increasing fatigue and headache over the last few days.  Increasing shortness of breath.  Patient has had some cough but no fevers.  Patient was seen in urgent care and told she might have congestive heart failure.  Patient has had no lower extremity swelling.  No diarrhea.  No loss of smell or taste.      Location: Shortness of breath  Radiation: N/A  Character: Nonproductive cough  Duration: Since Monday  Severity: Moderate  Progression: Not improving  Aggravating Factors: Exertion  Alleviating Factors: Rest        MEDICAL RECORD REVIEW    Patient went to urgent care and chest x-ray shows some cardiomegaly with possible congestive heart failure          PAST MEDICAL HISTORY  Active Ambulatory Problems     Diagnosis Date Noted   • Cervical radiculopathy 06/27/2016   • Acid reflux 06/27/2016   • Generalized osteoarthritis 06/27/2016   • Gastroesophageal reflux disease 06/27/2016   • Irritable bowel syndrome with constipation 06/27/2016   • Osteoporosis 06/27/2016   • Pruritus 06/27/2016   • Seborrheic eczema 06/27/2016   • Osteoarthritis of cervical spine without myelopathy 06/27/2016   • Eczema 06/27/2016   • DDD (degenerative disc disease), lumbar 06/27/2016   • Somatic dysfunction of sacroiliac joint 05/08/2017   • Bilateral sacroiliitis (CMS/HCC) 05/08/2017   • Lateral femoral cutaneous neuropathy 12/04/2017   • Other chronic pain 01/29/2018   • Lumbar facet arthropathy 02/26/2018   • Pure hypercholesterolemia 06/10/2019   • Senile atrophic vaginitis 08/12/2019   • Urinary retention 08/12/2019   • Vaginal discomfort 08/12/2019   • Other  specified abnormal immunological findings in serum 09/27/2019   • Cervical disc disorder, unspecified, unspecified cervical region 09/27/2019   • Chronic pain syndrome 10/07/2019   • Dyspepsia 11/12/2019   • Acute gastric ulcer without hemorrhage or perforation 02/13/2020   • Fibromyalgia 03/16/2020   • Mild episode of recurrent major depressive disorder (CMS/HCC) 12/04/2020     Resolved Ambulatory Problems     Diagnosis Date Noted   • Antinuclear factor positive 06/27/2016   • Anxiety 06/27/2016   • Lichen sclerosus of female genitalia 06/27/2016   • Strain of left trapezius muscle 05/08/2017     Past Medical History:   Diagnosis Date   • ITZEL positive    • Bursitis 2016   • Cervical spondylosis    • Degeneration of intervertebral disc    • GERD (gastroesophageal reflux disease)    • Heart murmur As a child   • Irritable bowel syndrome    • Low back pain    • Myalgia and myositis    • Peptic ulceration Dec 2019   • Ulcer 12/2019         PAST SURGICAL HISTORY  Past Surgical History:   Procedure Laterality Date   • BREAST CYST ASPIRATION      10+ years ago   • CHOLECYSTECTOMY     • COLONOSCOPY  07/30/2012    Dr. Zazueta, normal   • ENDOSCOPY  12/04/2015    Mild active duodenitis, reactive gastropathy suggestive of healing erosion or adjacent ulcer   • ENDOSCOPY N/A 12/16/2019    Procedure: ESOPHAGOGASTRODUODENOSCOPY WITH BIOPSY WITH COLD BIOPSY POLYPECTOMY;  Surgeon: Lida Garcia MD;  Location: [facility] ENDOSCOPY;  Service: Gastroenterology   • ENDOSCOPY N/A 7/13/2020    Procedure: ESOPHAGOGASTRODUODENOSCOPY WITH COLD BIOPSY POLYPECTOMY;  Surgeon: Lida Garcia MD;  Location: [facility] ENDOSCOPY;  Service: Gastroenterology;  Laterality: N/A;  pre: history of gastric ulcer  post: healed gastric ulcer and gastric polyp   • MOUTH SURGERY           FAMILY HISTORY  Family History   Problem Relation Age of Onset   • Irritable bowel syndrome Mother    • Hypertension Mother    • Hypertension Father    • Hearing loss  Father    • Alcohol abuse Brother    • Early death Brother    • Heart disease Brother    • Arthritis Sister          SOCIAL HISTORY  Social History     Socioeconomic History   • Marital status:      Spouse name: Not on file   • Number of children: Not on file   • Years of education: Not on file   • Highest education level: Not on file   Occupational History   • Occupation: retired   Tobacco Use   • Smoking status: Never Smoker   • Smokeless tobacco: Never Used   Substance and Sexual Activity   • Alcohol use: No   • Drug use: No   • Sexual activity: Defer     Birth control/protection: None         ALLERGIES  Other and Sulfa antibiotics        REVIEW OF SYSTEMS  Review of Systems   Constitutional: Positive for fatigue. Negative for fever.   HENT: Negative for sore throat.    Eyes: Negative.    Respiratory: Positive for cough and shortness of breath.    Cardiovascular: Negative for chest pain.   Gastrointestinal: Negative for abdominal pain, diarrhea and vomiting.   Genitourinary: Negative for dysuria.   Musculoskeletal: Negative for neck pain.   Skin: Negative for rash.   Allergic/Immunologic: Negative.    Neurological: Positive for headaches. Negative for weakness and numbness.   Hematological: Negative.    Psychiatric/Behavioral: Negative.    All other systems reviewed and are negative.           PHYSICAL EXAM  ED Triage Vitals [02/13/21 1729]   Temp Heart Rate Resp BP SpO2   97.2 °F (36.2 °C) 67 18 -- 94 %      Temp src Heart Rate Source Patient Position BP Location FiO2 (%)   Tympanic Monitor -- -- --       Physical Exam   Constitutional: She is oriented to person, place, and time. No distress.   HENT:   Head: Normocephalic and atraumatic.   Eyes: Pupils are equal, round, and reactive to light. EOM are normal.   Neck: Normal range of motion. Neck supple.   Cardiovascular: Normal rate, regular rhythm and normal heart sounds.   Pulmonary/Chest: Effort normal and breath sounds normal. No respiratory distress.    Abdominal: Soft. There is no abdominal tenderness. There is no rebound and no guarding.   Musculoskeletal: Normal range of motion.         General: No edema.   Neurological: She is alert and oriented to person, place, and time. She has normal sensation and normal strength.   Skin: Skin is warm and dry. No rash noted.   Psychiatric: Mood and affect normal.   Nursing note and vitals reviewed.    Patient was wearing a face mask when I entered the room and they continued to wear a mask throughout their stay in the ED.  I wore PPE, including a gown, gloves, face mask with shield or face mask with goggles whenever I was in the room with patient. N95 worn.      LAB RESULTS  Recent Results (from the past 24 hour(s))   Comprehensive Metabolic Panel    Collection Time: 02/13/21  5:56 PM    Specimen: Blood   Result Value Ref Range    Glucose 97 65 - 99 mg/dL    BUN 20 8 - 23 mg/dL    Creatinine 0.78 0.57 - 1.00 mg/dL    Sodium 140 136 - 145 mmol/L    Potassium 4.3 3.5 - 5.2 mmol/L    Chloride 104 98 - 107 mmol/L    CO2 25.0 22.0 - 29.0 mmol/L    Calcium 8.7 8.6 - 10.5 mg/dL    Total Protein 6.6 6.0 - 8.5 g/dL    Albumin 3.90 3.50 - 5.20 g/dL    ALT (SGPT) 8 1 - 33 U/L    AST (SGOT) 12 1 - 32 U/L    Alkaline Phosphatase 69 39 - 117 U/L    Total Bilirubin 0.3 0.0 - 1.2 mg/dL    eGFR Non African Amer 74 >60 mL/min/1.73    Globulin 2.7 gm/dL    A/G Ratio 1.4 g/dL    BUN/Creatinine Ratio 25.6 (H) 7.0 - 25.0    Anion Gap 11.0 5.0 - 15.0 mmol/L   BNP    Collection Time: 02/13/21  5:56 PM    Specimen: Blood   Result Value Ref Range    proBNP 3,587.0 (H) 0.0 - 900.0 pg/mL   Troponin    Collection Time: 02/13/21  5:56 PM    Specimen: Blood   Result Value Ref Range    Troponin T <0.010 0.000 - 0.030 ng/mL   Respiratory Panel PCR w/COVID-19(SARS-CoV-2) PATRICIA/DONNELL/MISSY/PAD/COR/MAD/MARÍA ELENA In-House, NP Swab in UTM/VTM, 3-4 HR TAT - Swab, Nasopharynx    Collection Time: 02/13/21  5:56 PM    Specimen: Nasopharynx; Swab   Result Value Ref Range     ADENOVIRUS, PCR Not Detected Not Detected    Coronavirus 229E Not Detected Not Detected    Coronavirus HKU1 Not Detected Not Detected    Coronavirus NL63 Not Detected Not Detected    Coronavirus OC43 Not Detected Not Detected    COVID19 Not Detected Not Detected - Ref. Range    Human Metapneumovirus Not Detected Not Detected    Human Rhinovirus/Enterovirus Not Detected Not Detected    Influenza A PCR Not Detected Not Detected    Influenza B PCR Not Detected Not Detected    Parainfluenza Virus 1 Not Detected Not Detected    Parainfluenza Virus 2 Not Detected Not Detected    Parainfluenza Virus 3 Not Detected Not Detected    Parainfluenza Virus 4 Not Detected Not Detected    RSV, PCR Not Detected Not Detected    Bordetella pertussis pcr Not Detected Not Detected    Bordetella parapertussis PCR Not Detected Not Detected    Chlamydophila pneumoniae PCR Not Detected Not Detected    Mycoplasma pneumo by PCR Not Detected Not Detected   CBC Auto Differential    Collection Time: 02/13/21  5:56 PM    Specimen: Blood   Result Value Ref Range    WBC 6.20 3.40 - 10.80 10*3/mm3    RBC 3.95 3.77 - 5.28 10*6/mm3    Hemoglobin 11.8 (L) 12.0 - 15.9 g/dL    Hematocrit 37.2 34.0 - 46.6 %    MCV 94.2 79.0 - 97.0 fL    MCH 29.9 26.6 - 33.0 pg    MCHC 31.7 31.5 - 35.7 g/dL    RDW 12.4 12.3 - 15.4 %    RDW-SD 43.4 37.0 - 54.0 fl    MPV 10.4 6.0 - 12.0 fL    Platelets 310 140 - 450 10*3/mm3    Neutrophil % 66.3 42.7 - 76.0 %    Lymphocyte % 20.0 19.6 - 45.3 %    Monocyte % 8.1 5.0 - 12.0 %    Eosinophil % 4.8 0.3 - 6.2 %    Basophil % 0.6 0.0 - 1.5 %    Immature Grans % 0.2 0.0 - 0.5 %    Neutrophils, Absolute 4.11 1.70 - 7.00 10*3/mm3    Lymphocytes, Absolute 1.24 0.70 - 3.10 10*3/mm3    Monocytes, Absolute 0.50 0.10 - 0.90 10*3/mm3    Eosinophils, Absolute 0.30 0.00 - 0.40 10*3/mm3    Basophils, Absolute 0.04 0.00 - 0.20 10*3/mm3    Immature Grans, Absolute 0.01 0.00 - 0.05 10*3/mm3    nRBC 0.0 0.0 - 0.2 /100 WBC   TSH    Collection Time:  02/13/21  5:56 PM    Specimen: Blood   Result Value Ref Range    TSH 0.592 0.270 - 4.200 uIU/mL   ECG 12 Lead    Collection Time: 02/13/21  6:28 PM   Result Value Ref Range    QT Interval 356 ms       Ordered the above labs and reviewed the results.        RADIOLOGY  CT Angiogram Chest   Final Result         Electronically signed by Brandyn Villegas MD on 02-13-21 at 2126           Ordered the above noted radiological studies. Reviewed by me in PACS.        PROCEDURES  Procedures      EKG:          EKG time: 1828  Rhythm/Rate: Sinus tachycardia 109  P waves and [place]: Normal P waves  QRS, axis: Normal QRS  ST and T waves: T wave inversions laterally    Interpreted Contemporaneously by me, independently viewed  Changed compared to prior T wave inversions are new    EKG          EKG time: 1936  Rhythm/Rate: Sinus tachycardia 131  P waves and [place]: Normal P waves  QRS, axis: Normal QRS  ST and T waves: T waves are flattened    Interpreted Contemporaneously by me, independently viewed            MEDICATIONS GIVEN IN ER  Medications   metoprolol tartrate (LOPRESSOR) injection 5 mg (5 mg Intravenous Given 2/13/21 1954)   sodium chloride 0.9 % bolus 500 mL (0 mL Intravenous Stopped 2/13/21 2053)   iopamidol (ISOVUE-370) 76 % injection 100 mL (95 mL Intravenous Given by Other 2/13/21 2106)   furosemide (LASIX) injection 40 mg (40 mg Intravenous Given 2/13/21 2208)             PROGRESS AND CONSULTS  ED Course as of Feb 13 2230   Sat Feb 13, 2021 2201 22:01 EST  Patient presents for evaluation of shortness of breath.  Patient's Covid test is negative and patient appears to be in congestive heart failure.  Patient's BNP elevated and CT scan shows no PE but does have bilateral pleural effusions with congestive heart failure.  Patient has been given Lasix here.  Patient discussed with Dr. Bateman and will be admitted.    [SL]      ED Course User Index  [SL] Prasad Arguelles MD           MEDICAL DECISION MAKING      MDM  Number of  Diagnoses or Management Options  Acute congestive heart failure, unspecified heart failure type (CMS/HCC):   Pleural effusion, bilateral:      Amount and/or Complexity of Data Reviewed  Clinical lab tests: reviewed and ordered (BNP 3700)  Tests in the radiology section of CPT®: reviewed and ordered (CT negative for PE but positive for congestive heart failure)  Discuss the patient with other providers: yes (Discussed with Dr. Bateman who will admit)               DIAGNOSIS  Final diagnoses:   Acute congestive heart failure, unspecified heart failure type (CMS/HCC)   Pleural effusion, bilateral           DISPOSITION  admit        Latest Documented Vital Signs:  As of 22:30 EST  BP- 105/78 HR- 118 Temp- 98.8 °F (37.1 °C) (Oral) O2 sat- 97%                     Prasad Arguelles MD  02/13/21 0489

## 2021-02-14 ENCOUNTER — APPOINTMENT (OUTPATIENT)
Dept: CARDIOLOGY | Facility: HOSPITAL | Age: 66
End: 2021-02-14

## 2021-02-14 PROBLEM — I34.0 NONRHEUMATIC MITRAL VALVE REGURGITATION: Status: ACTIVE | Noted: 2021-02-14

## 2021-02-14 PROBLEM — K25.3 ACUTE GASTRIC ULCER WITHOUT HEMORRHAGE OR PERFORATION: Status: RESOLVED | Noted: 2020-02-13 | Resolved: 2021-02-14

## 2021-02-14 PROBLEM — I42.0 CARDIOMYOPATHY, DILATED (HCC): Status: ACTIVE | Noted: 2021-02-14

## 2021-02-14 PROBLEM — N94.9 VAGINAL DISCOMFORT: Status: RESOLVED | Noted: 2019-08-12 | Resolved: 2021-02-14

## 2021-02-14 PROBLEM — M99.04 SOMATIC DYSFUNCTION OF SACROILIAC JOINT: Status: RESOLVED | Noted: 2017-05-08 | Resolved: 2021-02-14

## 2021-02-14 PROBLEM — R33.9 URINARY RETENTION: Status: RESOLVED | Noted: 2019-08-12 | Resolved: 2021-02-14

## 2021-02-14 PROBLEM — I50.21 ACUTE SYSTOLIC CONGESTIVE HEART FAILURE (HCC): Status: ACTIVE | Noted: 2021-02-13

## 2021-02-14 LAB
AORTIC DIMENSIONLESS INDEX: 0.5 (DI)
ASCENDING AORTA: 2.9 CM
BH CV ECHO MEAS - ACS: 1.7 CM
BH CV ECHO MEAS - AO MAX PG (FULL): 2.7 MMHG
BH CV ECHO MEAS - AO MAX PG: 3.9 MMHG
BH CV ECHO MEAS - AO MEAN PG (FULL): 1 MMHG
BH CV ECHO MEAS - AO MEAN PG: 2 MMHG
BH CV ECHO MEAS - AO ROOT AREA (BSA CORRECTED): 1.5
BH CV ECHO MEAS - AO ROOT AREA: 4.9 CM^2
BH CV ECHO MEAS - AO ROOT DIAM: 2.5 CM
BH CV ECHO MEAS - AO V2 MAX: 99.1 CM/SEC
BH CV ECHO MEAS - AO V2 MEAN: 68.1 CM/SEC
BH CV ECHO MEAS - AO V2 VTI: 15.1 CM
BH CV ECHO MEAS - ASC AORTA: 2.9 CM
BH CV ECHO MEAS - AVA(I,A): 1.4 CM^2
BH CV ECHO MEAS - AVA(I,D): 1.4 CM^2
BH CV ECHO MEAS - AVA(V,A): 1.4 CM^2
BH CV ECHO MEAS - AVA(V,D): 1.4 CM^2
BH CV ECHO MEAS - BSA(HAYCOCK): 1.6 M^2
BH CV ECHO MEAS - BSA: 1.6 M^2
BH CV ECHO MEAS - BZI_BMI: 23.6 KILOGRAMS/M^2
BH CV ECHO MEAS - BZI_METRIC_HEIGHT: 160 CM
BH CV ECHO MEAS - BZI_METRIC_WEIGHT: 60.3 KG
BH CV ECHO MEAS - EDV(CUBED): 185.2 ML
BH CV ECHO MEAS - EDV(MOD-SP2): 148 ML
BH CV ECHO MEAS - EDV(MOD-SP4): 186 ML
BH CV ECHO MEAS - EDV(TEICH): 160 ML
BH CV ECHO MEAS - EF(CUBED): 15 %
BH CV ECHO MEAS - EF(MOD-BP): 17 %
BH CV ECHO MEAS - EF(MOD-SP2): 13.5 %
BH CV ECHO MEAS - EF(MOD-SP4): 19.9 %
BH CV ECHO MEAS - EF(TEICH): 11.7 %
BH CV ECHO MEAS - ESV(CUBED): 157.5 ML
BH CV ECHO MEAS - ESV(MOD-SP2): 128 ML
BH CV ECHO MEAS - ESV(MOD-SP4): 149 ML
BH CV ECHO MEAS - ESV(TEICH): 141.3 ML
BH CV ECHO MEAS - FS: 5.3 %
BH CV ECHO MEAS - IVS/LVPW: 1.1
BH CV ECHO MEAS - IVSD: 0.9 CM
BH CV ECHO MEAS - LAT PEAK E' VEL: 6.2 CM/SEC
BH CV ECHO MEAS - LV DIASTOLIC VOL/BSA (35-75): 114.4 ML/M^2
BH CV ECHO MEAS - LV MASS(C)D: 183.7 GRAMS
BH CV ECHO MEAS - LV MASS(C)DI: 113 GRAMS/M^2
BH CV ECHO MEAS - LV MAX PG: 1.2 MMHG
BH CV ECHO MEAS - LV MEAN PG: 1 MMHG
BH CV ECHO MEAS - LV SYSTOLIC VOL/BSA (12-30): 91.6 ML/M^2
BH CV ECHO MEAS - LV V1 MAX: 55.5 CM/SEC
BH CV ECHO MEAS - LV V1 MEAN: 35.9 CM/SEC
BH CV ECHO MEAS - LV V1 VTI: 8.1 CM
BH CV ECHO MEAS - LVIDD: 5.7 CM
BH CV ECHO MEAS - LVIDS: 5.4 CM
BH CV ECHO MEAS - LVLD AP2: 8 CM
BH CV ECHO MEAS - LVLD AP4: 8.5 CM
BH CV ECHO MEAS - LVLS AP2: 8.1 CM
BH CV ECHO MEAS - LVLS AP4: 7.7 CM
BH CV ECHO MEAS - LVOT AREA (M): 2.5 CM^2
BH CV ECHO MEAS - LVOT AREA: 2.5 CM^2
BH CV ECHO MEAS - LVOT DIAM: 1.8 CM
BH CV ECHO MEAS - LVPWD: 0.8 CM
BH CV ECHO MEAS - MED PEAK E' VEL: 3.7 CM/SEC
BH CV ECHO MEAS - MR MAX PG: 74.3 MMHG
BH CV ECHO MEAS - MR MAX VEL: 431 CM/SEC
BH CV ECHO MEAS - MR MEAN PG: 48 MMHG
BH CV ECHO MEAS - MR MEAN VEL: 329 CM/SEC
BH CV ECHO MEAS - MR VTI: 124 CM
BH CV ECHO MEAS - MV A DUR: 0.17 SEC
BH CV ECHO MEAS - MV A MAX VEL: 54 CM/SEC
BH CV ECHO MEAS - MV DEC SLOPE: 482 CM/SEC^2
BH CV ECHO MEAS - MV DEC TIME: 166 SEC
BH CV ECHO MEAS - MV E MAX VEL: 93 CM/SEC
BH CV ECHO MEAS - MV E/A: 1.7
BH CV ECHO MEAS - MV MAX PG: 4.2 MMHG
BH CV ECHO MEAS - MV MEAN PG: 2 MMHG
BH CV ECHO MEAS - MV P1/2T MAX VEL: 113 CM/SEC
BH CV ECHO MEAS - MV P1/2T: 68.7 MSEC
BH CV ECHO MEAS - MV V2 MAX: 103 CM/SEC
BH CV ECHO MEAS - MV V2 MEAN: 68.8 CM/SEC
BH CV ECHO MEAS - MV V2 VTI: 20.7 CM
BH CV ECHO MEAS - MVA P1/2T LCG: 1.9 CM^2
BH CV ECHO MEAS - MVA(P1/2T): 3.2 CM^2
BH CV ECHO MEAS - MVA(VTI): 0.99 CM^2
BH CV ECHO MEAS - PA ACC TIME: 0.1 SEC
BH CV ECHO MEAS - PA MAX PG (FULL): 0.99 MMHG
BH CV ECHO MEAS - PA MAX PG: 1.7 MMHG
BH CV ECHO MEAS - PA PR(ACCEL): 34 MMHG
BH CV ECHO MEAS - PA V2 MAX: 65.9 CM/SEC
BH CV ECHO MEAS - PULM A REVS DUR: 0.11 SEC
BH CV ECHO MEAS - PULM A REVS VEL: 24.9 CM/SEC
BH CV ECHO MEAS - PULM DIAS VEL: 64.7 CM/SEC
BH CV ECHO MEAS - PULM S/D: 0.72
BH CV ECHO MEAS - PULM SYS VEL: 46.3 CM/SEC
BH CV ECHO MEAS - PVA(V,A): 2.5 CM^2
BH CV ECHO MEAS - PVA(V,D): 2.5 CM^2
BH CV ECHO MEAS - QP/QS: 1.1
BH CV ECHO MEAS - RAP SYSTOLE: 3 MMHG
BH CV ECHO MEAS - RV MAX PG: 0.74 MMHG
BH CV ECHO MEAS - RV MEAN PG: 0 MMHG
BH CV ECHO MEAS - RV V1 MAX: 43.1 CM/SEC
BH CV ECHO MEAS - RV V1 MEAN: 25.1 CM/SEC
BH CV ECHO MEAS - RV V1 VTI: 6.2 CM
BH CV ECHO MEAS - RVOT AREA: 3.8 CM^2
BH CV ECHO MEAS - RVOT DIAM: 2.2 CM
BH CV ECHO MEAS - SI(AO): 45.6 ML/M^2
BH CV ECHO MEAS - SI(CUBED): 17.1 ML/M^2
BH CV ECHO MEAS - SI(LVOT): 12.6 ML/M^2
BH CV ECHO MEAS - SI(MOD-SP2): 12.3 ML/M^2
BH CV ECHO MEAS - SI(MOD-SP4): 22.8 ML/M^2
BH CV ECHO MEAS - SI(TEICH): 11.5 ML/M^2
BH CV ECHO MEAS - SV(AO): 74.1 ML
BH CV ECHO MEAS - SV(CUBED): 27.7 ML
BH CV ECHO MEAS - SV(LVOT): 20.5 ML
BH CV ECHO MEAS - SV(MOD-SP2): 20 ML
BH CV ECHO MEAS - SV(MOD-SP4): 37 ML
BH CV ECHO MEAS - SV(RVOT): 23.4 ML
BH CV ECHO MEAS - SV(TEICH): 18.7 ML
BH CV ECHO MEAS - TAPSE (>1.6): 1.4 CM
BH CV ECHO MEASUREMENTS AVERAGE E/E' RATIO: 18.79
BH CV VAS BP RIGHT ARM: NORMAL MMHG
BH CV XLRA - RV BASE: 3 CM
BH CV XLRA - RV LENGTH: 6.2 CM
BH CV XLRA - RV MID: 1.8 CM
BH CV XLRA - TDI S': 9.6 CM/SEC
LEFT ATRIUM VOLUME INDEX: 52 ML/M2
MAXIMAL PREDICTED HEART RATE: 153 BPM
QT INTERVAL: 329 MS
QT INTERVAL: 356 MS
SINUS: 2.6 CM
STJ: 2.4 CM
STRESS TARGET HR: 130 BPM

## 2021-02-14 PROCEDURE — 25010000002 ENOXAPARIN PER 10 MG: Performed by: INTERNAL MEDICINE

## 2021-02-14 PROCEDURE — 93306 TTE W/DOPPLER COMPLETE: CPT | Performed by: INTERNAL MEDICINE

## 2021-02-14 PROCEDURE — 25010000002 PERFLUTREN (DEFINITY) 8.476 MG IN SODIUM CHLORIDE (PF) 0.9 % 10 ML INJECTION: Performed by: INTERNAL MEDICINE

## 2021-02-14 PROCEDURE — 25010000002 DIGOXIN PER 500 MCG: Performed by: INTERNAL MEDICINE

## 2021-02-14 PROCEDURE — 93306 TTE W/DOPPLER COMPLETE: CPT

## 2021-02-14 PROCEDURE — 99223 1ST HOSP IP/OBS HIGH 75: CPT | Performed by: INTERNAL MEDICINE

## 2021-02-14 RX ORDER — SODIUM CHLORIDE 0.9 % (FLUSH) 0.9 %
10 SYRINGE (ML) INJECTION EVERY 12 HOURS SCHEDULED
Status: DISCONTINUED | OUTPATIENT
Start: 2021-02-14 | End: 2021-02-15 | Stop reason: HOSPADM

## 2021-02-14 RX ORDER — NITROGLYCERIN 0.4 MG/1
0.4 TABLET SUBLINGUAL
Status: DISCONTINUED | OUTPATIENT
Start: 2021-02-14 | End: 2021-02-15 | Stop reason: HOSPADM

## 2021-02-14 RX ORDER — GABAPENTIN 300 MG/1
300 CAPSULE ORAL NIGHTLY
Status: DISCONTINUED | OUTPATIENT
Start: 2021-02-14 | End: 2021-02-15 | Stop reason: HOSPADM

## 2021-02-14 RX ORDER — HYDROXYCHLOROQUINE SULFATE 200 MG/1
200 TABLET, FILM COATED ORAL EVERY 12 HOURS SCHEDULED
Status: DISCONTINUED | OUTPATIENT
Start: 2021-02-14 | End: 2021-02-14

## 2021-02-14 RX ORDER — GABAPENTIN 100 MG/1
200 CAPSULE ORAL NIGHTLY
Status: DISCONTINUED | OUTPATIENT
Start: 2021-02-14 | End: 2021-02-14

## 2021-02-14 RX ORDER — DULOXETIN HYDROCHLORIDE 20 MG/1
40 CAPSULE, DELAYED RELEASE ORAL DAILY
Status: DISCONTINUED | OUTPATIENT
Start: 2021-02-14 | End: 2021-02-15 | Stop reason: HOSPADM

## 2021-02-14 RX ORDER — PANTOPRAZOLE SODIUM 40 MG/1
40 TABLET, DELAYED RELEASE ORAL EVERY MORNING
Status: DISCONTINUED | OUTPATIENT
Start: 2021-02-14 | End: 2021-02-15 | Stop reason: HOSPADM

## 2021-02-14 RX ORDER — SODIUM CHLORIDE 0.9 % (FLUSH) 0.9 %
10 SYRINGE (ML) INJECTION AS NEEDED
Status: DISCONTINUED | OUTPATIENT
Start: 2021-02-14 | End: 2021-02-15 | Stop reason: HOSPADM

## 2021-02-14 RX ORDER — DIGOXIN 0.25 MG/ML
250 INJECTION INTRAMUSCULAR; INTRAVENOUS EVERY 6 HOURS
Status: COMPLETED | OUTPATIENT
Start: 2021-02-14 | End: 2021-02-15

## 2021-02-14 RX ORDER — ACETAMINOPHEN 500 MG
500 TABLET ORAL EVERY 6 HOURS PRN
Status: DISCONTINUED | OUTPATIENT
Start: 2021-02-14 | End: 2021-02-15

## 2021-02-14 RX ORDER — FERROUS SULFATE 325(65) MG
325 TABLET ORAL
Status: DISCONTINUED | OUTPATIENT
Start: 2021-02-14 | End: 2021-02-14

## 2021-02-14 RX ORDER — GABAPENTIN 100 MG/1
100 CAPSULE ORAL DAILY
Status: DISCONTINUED | OUTPATIENT
Start: 2021-02-14 | End: 2021-02-14

## 2021-02-14 RX ADMIN — DULOXETINE HYDROCHLORIDE 40 MG: 20 CAPSULE, DELAYED RELEASE ORAL at 13:24

## 2021-02-14 RX ADMIN — DIGOXIN 250 MCG: 0.25 INJECTION INTRAMUSCULAR; INTRAVENOUS at 23:42

## 2021-02-14 RX ADMIN — ENOXAPARIN SODIUM 40 MG: 40 INJECTION SUBCUTANEOUS at 13:23

## 2021-02-14 RX ADMIN — PANTOPRAZOLE SODIUM 40 MG: 40 TABLET, DELAYED RELEASE ORAL at 13:25

## 2021-02-14 RX ADMIN — GABAPENTIN 300 MG: 300 CAPSULE ORAL at 20:23

## 2021-02-14 RX ADMIN — Medication 10 ML: at 20:24

## 2021-02-14 RX ADMIN — HYDROXYCHLOROQUINE SULFATE 200 MG: 200 TABLET, FILM COATED ORAL at 13:25

## 2021-02-14 RX ADMIN — PERFLUTREN 2 ML: 6.52 INJECTION, SUSPENSION INTRAVENOUS at 13:15

## 2021-02-14 RX ADMIN — DIGOXIN 250 MCG: 0.25 INJECTION INTRAMUSCULAR; INTRAVENOUS at 17:42

## 2021-02-14 NOTE — H&P
Date of Hospital Visit: 21  Encounter Provider: Darrion Bateman MD  Place of Service: [facility] CARDIOLOGY  Patient Name: Shanika Hector  :1955  Referral Provider: Darrion Bateman MD    Chief complaint: Shortness of breath/cough    History of Present Illness    Ms. Hector is a 64 yo woman with OA, fibromyalgia, IBD/constipation who presents with dyspnea.    She is thin and active.  She normally has no limitations. She has never seen a cardiologist, and denies any history of HTN, hyperlipidemia, or DM.  For the last six days, she has developed progressive exertional dyspnea, orthopnea, expiratory wheeze, and abdominal distention.  She has had a bit of mild lower chest tightness.  She feels like her heart is beating fast but not irregular. She denies leg swelling or PND.  She became lightheaded last night after receiving IV furosemide, but this has not been a previous issue for her.      Her parents had CHF but lived until their 90s.  Her brother  of cardiomyopathy in his 40s, but was a heavy drinker.     Currently she is resting at 30 degrees and her breathing is fine at rest.  Her POx is 93% on 2L.  Her Tn was normal; her pro BNP was 3600.  Her CXR showed mild vascular congestion.  A CT was negative for PE and confirmed mild pulmonary edema.     Past Medical History:   Diagnosis Date   • ITZEL positive     Repeat ITZEL 2014 negative   • Anxiety    • Bilateral sacroiliitis (CMS/HCC) 2017   • Bursitis 2016   • Cervical radiculopathy    • Cervical spondylosis    • Degeneration of intervertebral disc    • Eczema    • Fibromyalgia 3/16/2020   • GERD (gastroesophageal reflux disease)    • History of cardiac murmur as a child    • Irritable bowel syndrome    • Lichen sclerosus of female genitalia    • Low back pain    • Myalgia and myositis    • Osteoporosis    • Peptic ulceration Dec 2019   • Pruritus        Past Surgical History:   Procedure Laterality Date   • BREAST  CYST ASPIRATION      10+ years ago   • CHOLECYSTECTOMY     • COLONOSCOPY  07/30/2012    Dr. Zazueta, normal   • ENDOSCOPY  12/04/2015    Mild active duodenitis, reactive gastropathy suggestive of healing erosion or adjacent ulcer   • ENDOSCOPY N/A 12/16/2019    Procedure: ESOPHAGOGASTRODUODENOSCOPY WITH BIOPSY WITH COLD BIOPSY POLYPECTOMY;  Surgeon: Lida Garcia MD;  Location:  [facility];  Service: Gastroenterology   • ENDOSCOPY N/A 7/13/2020    Procedure: ESOPHAGOGASTRODUODENOSCOPY WITH COLD BIOPSY POLYPECTOMY;  Surgeon: Lida Garcia MD;  Location:  [facility];  Service: Gastroenterology;  Laterality: N/A;  pre: history of gastric ulcer  post: healed gastric ulcer and gastric polyp   • MOUTH SURGERY         Prior to Admission medications    Medication Sig Start Date End Date Taking? Authorizing Provider   acetaminophen (TYLENOL) 650 MG 8 hr tablet Take 2 tablets by mouth 2 (two) times a day as needed. 12/1/14  Yes Naomi Pratt MA   alendronate (Fosamax) 70 MG tablet Take 1 tablet by mouth Every 7 (Seven) Days. 7/6/20  Yes Bryan Vega MD   DULoxetine 40 MG capsule delayed-release particles Take 1 capsule by mouth Daily. 12/4/20  Yes Byran Vega MD   hydroxychloroquine (Plaquenil) 200 MG tablet Take 1 tablet by mouth Every 12 (Twelve) Hours. 3/30/20  Yes Marizol Dozier MD   omeprazole (priLOSEC) 40 MG capsule Take 1 capsule by mouth Daily. 12/4/20  Yes Bryan Vega MD   Calcium Carb-Cholecalciferol (CALCIUM-VITAMIN D) 500-200 MG-UNIT per tablet Take 1 tablet by mouth 2 (Two) Times a Day With Meals. 7/6/20   Bryan Vega MD   cetirizine (ZyrTEC Allergy) 10 MG tablet Take 10 mg by mouth Daily.    Marizol Dozier MD   clobetasol (TEMOVATE) 0.05 % cream  9/3/20   ProviderMarizol MD   estradiol (ESTRACE) 0.1 MG/GM vaginal cream Insert 2 g into the vagina 2 (Two) Times a Week. 11/4/19   Marlena Sy MD   ferrous sulfate 325 (65 FE) MG tablet Take 1  tablet by mouth Daily With Breakfast. 12/17/19   Lida Garcia MD   gabapentin (NEURONTIN) 100 MG capsule TAKE ONE CAPSULE BY MOUTH EVERY MORNING AND TAKE TWO CAPSULES BY MOUTH EVERY EVENING 1/11/21   Bryan Vega MD   Probiotic Product (PROBIOTIC DAILY PO) Take  by mouth Daily.    ProviderMarizol MD   Wheat Dextrin (BENEFIBER PO) Take  by mouth.    Provider, MD Marizol       Social History     Socioeconomic History   • Marital status:      Spouse name: Not on file   • Number of children: Not on file   • Years of education: Not on file   • Highest education level: Not on file   Occupational History   • Occupation: retired   Tobacco Use   • Smoking status: Never Smoker   • Smokeless tobacco: Never Used   Substance and Sexual Activity   • Alcohol use: No   • Drug use: No   • Sexual activity: Defer     Birth control/protection: None       Family History   Problem Relation Age of Onset   • Irritable bowel syndrome Mother    • Hypertension Mother    • Hypertension Father    • Hearing loss Father    • Alcohol abuse Brother    • Early death Brother    • Heart disease Brother    • Arthritis Sister        Review of Systems   Respiratory: Positive for cough, shortness of breath and wheezing.    Gastrointestinal: Positive for abdominal distention, constipation and nausea.        Heartburn, indigestion, gas   Musculoskeletal: Positive for arthralgias and myalgias.   Neurological: Positive for light-headedness.   All other systems reviewed and are negative.       Objective:     Vitals:    02/14/21 0053 02/14/21 0347 02/14/21 0636 02/14/21 0715   BP: 100/73 92/71  95/70   BP Location: Left arm Left arm  Left arm   Patient Position: Lying Lying  Lying   Pulse: 98 89  96   Resp: 18 18  18   Temp: 98.6 °F (37 °C) 98 °F (36.7 °C)  98.5 °F (36.9 °C)   TempSrc: Oral Oral  Oral   SpO2: 93% 96%  93%   Weight:   60.6 kg (133 lb 11.2 oz)    Height:         Body mass index is 23.68 kg/m².    Last Weight and  "Admission Weight        02/14/21  0636   Weight: 60.6 kg (133 lb 11.2 oz)     Flowsheet Rows      First Filed Value   Admission Height  160 cm (63\") Documented at 02/13/2021 1729   Admission Weight  56.7 kg (125 lb) Documented at 02/13/2021 1737            Intake/Output Summary (Last 24 hours) at 2/14/2021 1039  Last data filed at 2/14/2021 0300  Gross per 24 hour   Intake 620 ml   Output --   Net 620 ml         Physical Exam  Constitutional:       Comments: Thin   HENT:      Head: Normocephalic.      Nose: Nose normal.      Mouth/Throat:      Mouth: Mucous membranes are moist.   Eyes:      Conjunctiva/sclera: Conjunctivae normal.   Neck:      Musculoskeletal: Normal range of motion.      Vascular: JVD present.   Cardiovascular:      Rate and Rhythm: Regular rhythm. Tachycardia present.      Pulses: Normal pulses.      Heart sounds: Murmur present. Systolic murmur present with a grade of 1/6.   Pulmonary:      Effort: Pulmonary effort is normal.      Breath sounds: Examination of the right-lower field reveals rales. Rales present.   Abdominal:      Palpations: Abdomen is soft.      Tenderness: There is no abdominal tenderness.   Musculoskeletal:      Right lower leg: No edema.      Left lower leg: No edema.   Skin:     General: Skin is warm and dry.   Neurological:      General: No focal deficit present.      Mental Status: She is alert and oriented to person, place, and time.   Psychiatric:         Mood and Affect: Mood normal.         Behavior: Behavior normal.                 Lab Review:                Results from last 7 days   Lab Units 02/13/21  1756   SODIUM mmol/L 140   POTASSIUM mmol/L 4.3   CHLORIDE mmol/L 104   CO2 mmol/L 25.0   BUN mg/dL 20   CREATININE mg/dL 0.78   GLUCOSE mg/dL 97   CALCIUM mg/dL 8.7     Results from last 7 days   Lab Units 02/13/21  1756   TROPONIN T ng/mL <0.010     Results from last 7 days   Lab Units 02/13/21  1756   WBC 10*3/mm3 6.20   HEMOGLOBIN g/dL 11.8*   HEMATOCRIT % 37.2 "   PLATELETS 10*3/mm3 310                       EKG 2/13/2021      Prior EKG 2/13/2021        I personally viewed and interpreted the patient's EKG/Telemetry data -- sinus tach, LVH with strain, no prior for comparison    Assessment/Plan:     1.  Acute congestive heart failure, unknown type    Her EKG shows sinus tach with LVH/strain.  Her heart size is dilated on CXR. I'm worried she has a dilated cardiomyopathy.  She has mild vascular congestion/hypoxia, but her BP is too low for continued diuresis at this point.  I am going to get an echocardiogram to start, and likely load her with digoxin for inotropy/rate.  Depending on the results of the echo, she is going to require an ischemic evaluation.    I'm not sure why she takes plaquenil; she has fibromyalgia but I don't see a diagnosis of RA or Sjogrens or MCTD or lupus.  We should contact her rheumatologist Dr Christina Roland in [place] tomorrow; plaquenil can lead to CHF.

## 2021-02-14 NOTE — PLAN OF CARE
Goal Outcome Evaluation:  Plan of Care Reviewed With: patient  Progress: no change  Outcome Summary: Echo completed. Denies any pain. Cath tomorrow.

## 2021-02-14 NOTE — PROGRESS NOTES
As suspected, TTE shows severe LVE and severe LV systolic dysfunction, EF < 20%, with mod-severe MR.      I will stop hydroxychloroquine for now.  I will arrange R+L in the AM and start IV digoxin for inotropy.  Hopefully her HR will come down and her BP will come up and we can start carvedilol.      I s/w the patient by phone.

## 2021-02-14 NOTE — ED NOTES
.  Nursing report ED to floor  Shanika Hector  65 y.o.  female    HPI (triage note):   Chief Complaint   Patient presents with   • Shortness of Breath   • Cough       Admitting doctor:   Darrion Bateman MD    Admitting diagnosis:   The primary encounter diagnosis was Acute congestive heart failure, unspecified heart failure type (CMS/HCC). A diagnosis of Pleural effusion, bilateral was also pertinent to this visit.    Code status:   Current Code Status     Date Active Code Status Order ID Comments User Context       Not on file    Advance Care Planning Activity          Allergies:   Other and Sulfa antibiotics    Weight:       02/13/21  1737   Weight: 56.7 kg (125 lb)       Most recent vitals:   Vitals:    02/13/21 2023 02/13/21 2027 02/13/21 2208 02/13/21 2225   BP:   105/78 100/72   Pulse:  (!) 132 118 109   Resp:       Temp: 98.8 °F (37.1 °C)      TempSrc: Oral      SpO2:  97%  96%   Weight:       Height:           Active LDAs/IV Access:   Lines, Drains & Airways    Active LDAs     Name:   Placement date:   Placement time:   Site:   Days:    Peripheral IV 02/13/21 1804 Right Antecubital   02/13/21 1804    Antecubital   less than 1                Labs (abnormal labs have a star):   Labs Reviewed   COMPREHENSIVE METABOLIC PANEL - Abnormal; Notable for the following components:       Result Value    BUN/Creatinine Ratio 25.6 (*)     All other components within normal limits    Narrative:     GFR Normal >60  Chronic Kidney Disease <60  Kidney Failure <15     BNP (IN-HOUSE) - Abnormal; Notable for the following components:    proBNP 3,587.0 (*)     All other components within normal limits    Narrative:     Among patients with dyspnea, NT-proBNP is highly sensitive for the detection of acute congestive heart failure. In addition NT-proBNP of <300 pg/ml effectively rules out acute congestive heart failure with 99% negative predictive value.    Results may be falsely decreased if patient taking Biotin.     CBC WITH AUTO  DIFFERENTIAL - Abnormal; Notable for the following components:    Hemoglobin 11.8 (*)     All other components within normal limits   RESPIRATORY PANEL PCR W/ COVID-19 (SARS-COV-2) [facility] IN-HOUSE, NP SWAB IN UTM/VTP, 3-4 HR TAT - Normal    Narrative:     Fact sheet for providers: https://docs.[employer]/wp-content/uploads/OSG0014-5649-LU4.1-EUA-Provider-Fact-Sheet-3.pdf    Fact sheet for patients: https://docs.[employer]/wp-content/uploads/DOA2467-3263-DV8.1-EUA-Patient-Fact-Sheet-1.pdf    Test performed by PCR.   TROPONIN (IN-HOUSE) - Normal    Narrative:     Troponin T Reference Range:  <= 0.03 ng/mL-   Negative for AMI  >0.03 ng/mL-     Abnormal for myocardial necrosis.  Clinicians would have to utilize clinical acumen, EKG, Troponin and serial changes to determine if it is an Acute Myocardial Infarction or myocardial injury due to an underlying chronic condition.       Results may be falsely decreased if patient taking Biotin.     TSH - Normal   CBC AND DIFFERENTIAL    Narrative:     The following orders were created for panel order CBC & Differential.  Procedure                               Abnormality         Status                     ---------                               -----------         ------                     CBC Auto Differential[[id number]]        Abnormal            Final result                 Please view results for these tests on the individual orders.       EKG:   ECG 12 Lead   Preliminary Result   HEART RATE= 109  bpm   RR Interval= 552  ms   NC Interval= 135  ms   P Horizontal Axis= -13  deg   P Front Axis= 55  deg   QRSD Interval= 94  ms   QT Interval= 356  ms   QRS Axis= -40  deg   T Wave Axis= 110  deg   - ABNORMAL ECG -   Sinus tachycardia with irregular rate   Probable left atrial enlargement   Left axis deviation   Nonspecific T abnormalities, lateral leads   Electronically Signed By:    Date and Time of Study: 2021-02-13 18:28:11      ECG 12 Lead     (Results Pending)       Meds given in ED:   Medications   metoprolol tartrate (LOPRESSOR) injection 5 mg (5 mg Intravenous Given 2/13/21 1954)   sodium chloride 0.9 % bolus 500 mL (0 mL Intravenous Stopped 2/13/21 2053)   iopamidol (ISOVUE-370) 76 % injection 100 mL (95 mL Intravenous Given by Other 2/13/21 2106)   furosemide (LASIX) injection 40 mg (40 mg Intravenous Given 2/13/21 2208)       Imaging results:  Xr Chest 2 View    Result Date: 2/13/2021  No focal pulmonary consolidation. Cardiomegaly with slight prominence of vascular and interstitial markings and minimal pleural effusions, raising suspicion for congestive heart failure.  This report was finalized on 2/13/2021 4:54 PM by Dr. Meir Domínguez M.D.      Ct Angiogram Chest    Result Date: 2/13/2021  Electronically signed by Brandyn Villegas MD on 02-13-21 at 2126      Ambulatory status:   - up ad lili      Social issues:   Social History     Socioeconomic History   • Marital status:      Spouse name: Not on file   • Number of children: Not on file   • Years of education: Not on file   • Highest education level: Not on file   Occupational History   • Occupation: retired   Tobacco Use   • Smoking status: Never Smoker   • Smokeless tobacco: Never Used   Substance and Sexual Activity   • Alcohol use: No   • Drug use: No   • Sexual activity: Defer     Birth control/protection: None    Nursing report ED to floor       Geni Bray, RN  02/14/21 0001

## 2021-02-15 ENCOUNTER — READMISSION MANAGEMENT (OUTPATIENT)
Dept: CALL CENTER | Facility: HOSPITAL | Age: 66
End: 2021-02-15

## 2021-02-15 VITALS
HEIGHT: 63 IN | WEIGHT: 126 LBS | TEMPERATURE: 98.3 F | OXYGEN SATURATION: 93 % | HEART RATE: 85 BPM | RESPIRATION RATE: 16 BRPM | BODY MASS INDEX: 22.32 KG/M2 | SYSTOLIC BLOOD PRESSURE: 111 MMHG | DIASTOLIC BLOOD PRESSURE: 73 MMHG

## 2021-02-15 LAB
ANION GAP SERPL CALCULATED.3IONS-SCNC: 9.6 MMOL/L (ref 5–15)
BUN SERPL-MCNC: 21 MG/DL (ref 8–23)
BUN/CREAT SERPL: 21.6 (ref 7–25)
CALCIUM SPEC-SCNC: 9.1 MG/DL (ref 8.6–10.5)
CHLORIDE SERPL-SCNC: 103 MMOL/L (ref 98–107)
CO2 SERPL-SCNC: 25.4 MMOL/L (ref 22–29)
CREAT SERPL-MCNC: 0.97 MG/DL (ref 0.57–1)
DEPRECATED RDW RBC AUTO: 40.3 FL (ref 37–54)
ERYTHROCYTE [DISTWIDTH] IN BLOOD BY AUTOMATED COUNT: 12.1 % (ref 12.3–15.4)
GFR SERPL CREATININE-BSD FRML MDRD: 57 ML/MIN/1.73
GLUCOSE SERPL-MCNC: 87 MG/DL (ref 65–99)
HCT VFR BLD AUTO: 38.8 % (ref 34–46.6)
HCT VFR BLDA CALC: 36 % (ref 38–51)
HCT VFR BLDA CALC: 36 % (ref 38–51)
HGB BLD-MCNC: 12.3 G/DL (ref 12–15.9)
HGB BLDA-MCNC: 12.2 G/DL (ref 12–17)
HGB BLDA-MCNC: 12.2 G/DL (ref 12–17)
MCH RBC QN AUTO: 29.1 PG (ref 26.6–33)
MCHC RBC AUTO-ENTMCNC: 31.7 G/DL (ref 31.5–35.7)
MCV RBC AUTO: 91.9 FL (ref 79–97)
PLATELET # BLD AUTO: 286 10*3/MM3 (ref 140–450)
PMV BLD AUTO: 11 FL (ref 6–12)
POTASSIUM SERPL-SCNC: 4.3 MMOL/L (ref 3.5–5.2)
QT INTERVAL: 392 MS
RBC # BLD AUTO: 4.22 10*6/MM3 (ref 3.77–5.28)
SAO2 % BLDA: 72 % (ref 95–98)
SAO2 % BLDA: 94 % (ref 95–98)
SODIUM SERPL-SCNC: 138 MMOL/L (ref 136–145)
WBC # BLD AUTO: 4.44 10*3/MM3 (ref 3.4–10.8)

## 2021-02-15 PROCEDURE — 99238 HOSP IP/OBS DSCHRG MGMT 30/<: CPT | Performed by: NURSE PRACTITIONER

## 2021-02-15 PROCEDURE — 93005 ELECTROCARDIOGRAM TRACING: CPT | Performed by: INTERNAL MEDICINE

## 2021-02-15 PROCEDURE — 0 IOPAMIDOL PER 1 ML: Performed by: INTERNAL MEDICINE

## 2021-02-15 PROCEDURE — 25010000002 HEPARIN (PORCINE) PER 1000 UNITS: Performed by: INTERNAL MEDICINE

## 2021-02-15 PROCEDURE — 4A023N8 MEASUREMENT OF CARDIAC SAMPLING AND PRESSURE, BILATERAL, PERCUTANEOUS APPROACH: ICD-10-PCS | Performed by: INTERNAL MEDICINE

## 2021-02-15 PROCEDURE — 93460 R&L HRT ART/VENTRICLE ANGIO: CPT | Performed by: INTERNAL MEDICINE

## 2021-02-15 PROCEDURE — 25010000002 FENTANYL CITRATE (PF) 100 MCG/2ML SOLUTION: Performed by: INTERNAL MEDICINE

## 2021-02-15 PROCEDURE — 80048 BASIC METABOLIC PNL TOTAL CA: CPT | Performed by: INTERNAL MEDICINE

## 2021-02-15 PROCEDURE — 85027 COMPLETE CBC AUTOMATED: CPT | Performed by: INTERNAL MEDICINE

## 2021-02-15 PROCEDURE — C1894 INTRO/SHEATH, NON-LASER: HCPCS | Performed by: INTERNAL MEDICINE

## 2021-02-15 PROCEDURE — C1769 GUIDE WIRE: HCPCS | Performed by: INTERNAL MEDICINE

## 2021-02-15 PROCEDURE — 85014 HEMATOCRIT: CPT

## 2021-02-15 PROCEDURE — 25010000002 MIDAZOLAM PER 1 MG: Performed by: INTERNAL MEDICINE

## 2021-02-15 PROCEDURE — 25010000002 DIGOXIN PER 500 MCG: Performed by: INTERNAL MEDICINE

## 2021-02-15 PROCEDURE — B2151ZZ FLUOROSCOPY OF LEFT HEART USING LOW OSMOLAR CONTRAST: ICD-10-PCS | Performed by: INTERNAL MEDICINE

## 2021-02-15 PROCEDURE — 85018 HEMOGLOBIN: CPT

## 2021-02-15 RX ORDER — HYDROCODONE BITARTRATE AND ACETAMINOPHEN 5; 325 MG/1; MG/1
1 TABLET ORAL EVERY 4 HOURS PRN
Status: DISCONTINUED | OUTPATIENT
Start: 2021-02-15 | End: 2021-02-15 | Stop reason: HOSPADM

## 2021-02-15 RX ORDER — METOPROLOL SUCCINATE 25 MG/1
12.5 TABLET, EXTENDED RELEASE ORAL
Status: DISCONTINUED | OUTPATIENT
Start: 2021-02-15 | End: 2021-02-15 | Stop reason: HOSPADM

## 2021-02-15 RX ORDER — LIDOCAINE HYDROCHLORIDE 20 MG/ML
INJECTION, SOLUTION INFILTRATION; PERINEURAL AS NEEDED
Status: DISCONTINUED | OUTPATIENT
Start: 2021-02-15 | End: 2021-02-15 | Stop reason: HOSPADM

## 2021-02-15 RX ORDER — METOPROLOL SUCCINATE 25 MG/1
12.5 TABLET, EXTENDED RELEASE ORAL DAILY
Qty: 15 TABLET | Refills: 3 | Status: SHIPPED | OUTPATIENT
Start: 2021-02-15 | End: 2021-02-23 | Stop reason: SDUPTHER

## 2021-02-15 RX ORDER — FENTANYL CITRATE 50 UG/ML
INJECTION, SOLUTION INTRAMUSCULAR; INTRAVENOUS AS NEEDED
Status: DISCONTINUED | OUTPATIENT
Start: 2021-02-15 | End: 2021-02-15 | Stop reason: HOSPADM

## 2021-02-15 RX ORDER — NALOXONE HCL 0.4 MG/ML
0.4 VIAL (ML) INJECTION
Status: DISCONTINUED | OUTPATIENT
Start: 2021-02-15 | End: 2021-02-15 | Stop reason: HOSPADM

## 2021-02-15 RX ORDER — SODIUM CHLORIDE 9 MG/ML
50 INJECTION, SOLUTION INTRAVENOUS CONTINUOUS
Status: ACTIVE | OUTPATIENT
Start: 2021-02-15 | End: 2021-02-15

## 2021-02-15 RX ORDER — ONDANSETRON 4 MG/1
4 TABLET, FILM COATED ORAL EVERY 6 HOURS PRN
Status: DISCONTINUED | OUTPATIENT
Start: 2021-02-15 | End: 2021-02-15 | Stop reason: HOSPADM

## 2021-02-15 RX ORDER — ONDANSETRON 2 MG/ML
4 INJECTION INTRAMUSCULAR; INTRAVENOUS EVERY 6 HOURS PRN
Status: DISCONTINUED | OUTPATIENT
Start: 2021-02-15 | End: 2021-02-15 | Stop reason: HOSPADM

## 2021-02-15 RX ORDER — ACETAMINOPHEN 325 MG/1
650 TABLET ORAL EVERY 4 HOURS PRN
Status: DISCONTINUED | OUTPATIENT
Start: 2021-02-15 | End: 2021-02-15 | Stop reason: HOSPADM

## 2021-02-15 RX ORDER — MORPHINE SULFATE 2 MG/ML
1 INJECTION, SOLUTION INTRAMUSCULAR; INTRAVENOUS EVERY 4 HOURS PRN
Status: DISCONTINUED | OUTPATIENT
Start: 2021-02-15 | End: 2021-02-15 | Stop reason: HOSPADM

## 2021-02-15 RX ORDER — MIDAZOLAM HYDROCHLORIDE 1 MG/ML
INJECTION INTRAMUSCULAR; INTRAVENOUS AS NEEDED
Status: DISCONTINUED | OUTPATIENT
Start: 2021-02-15 | End: 2021-02-15 | Stop reason: HOSPADM

## 2021-02-15 RX ORDER — SODIUM CHLORIDE 9 MG/ML
INJECTION, SOLUTION INTRAVENOUS CONTINUOUS PRN
Status: COMPLETED | OUTPATIENT
Start: 2021-02-15 | End: 2021-02-15

## 2021-02-15 RX ADMIN — DULOXETINE HYDROCHLORIDE 40 MG: 20 CAPSULE, DELAYED RELEASE ORAL at 09:55

## 2021-02-15 RX ADMIN — PANTOPRAZOLE SODIUM 40 MG: 40 TABLET, DELAYED RELEASE ORAL at 06:03

## 2021-02-15 RX ADMIN — DIGOXIN 250 MCG: 0.25 INJECTION INTRAMUSCULAR; INTRAVENOUS at 05:58

## 2021-02-15 RX ADMIN — Medication 10 ML: at 09:57

## 2021-02-15 RX ADMIN — SODIUM CHLORIDE 50 ML/HR: 9 INJECTION, SOLUTION INTRAVENOUS at 09:56

## 2021-02-15 RX ADMIN — METOPROLOL SUCCINATE 12.5 MG: 25 TABLET, EXTENDED RELEASE ORAL at 10:52

## 2021-02-15 NOTE — DISCHARGE SUMMARY
Hospital Discharge    Patient Name: Shanika Hector  Age/Sex: 65 y.o. female  : 1955  MRN: [id number]    Encounter Provider: ABHISHEK Min  Referring Provider: Darrion Bateman MD  Place of Service: [facility] CARDIOLOGY  Patient Care Team:  Bryan Vega MD as PCP - General  Bryan Vega MD as PCP - Family Medicine         Date of Discharge:  2/15/2021   Date of Admit: 2021    Discharge Condition: Stable  Discharge Diagnosis:    Acute systolic congestive heart failure (CMS/HCC)    Cardiomyopathy, dilated (CMS/HCC)    Nonrheumatic mitral valve regurgitation      Hospital Course:   Sahnika Hector is a 65 y.o. female who presented with shortness of breath and mild lower chest tightness. EKG revealed sinus tachycardia with LVH/strain.  Echocardiogram revealed severely decreased LV function with an EF of 17%, grade 2 diastolic dysfunction, and moderate to severe mitral regurgitation.  Due to elevated heart rates and low blood pressures, she was given IV digoxin. She follows with a rheumatologist, Dr. Christina Roland, in [place] and has been on Plaquenil. This was held upon admission. She underwent a right left heart catheterization revealing a nonischemic cardiomyopathy with an EF of 20%, normal pulmonary pressures, normal LV filling pressures, and 2+ mitral regurgitation. Medical therapy of her nonischemic cardiomyopathy was recommended. Based on the findings of the heart catheterization, diuretic therapy was not indicated. She was started on low-dose Toprol which she seems to be tolerating. Hopefully we can optimize her medical therapy at follow-up. This morning she is resting comfortably with no complaints of chest pain or shortness of breath. Per the patient's report, she takes the Plaquenil for osteoarthritis. However, she does not take it religiously and reports she has never noticed much of an improvement in her OA anyways. She understands that we are  discontinuing this medication for now. This should be followed up on next week in follow-up. She is stable and ready for discharge. She will follow-up in our office in 1 week with Roxann Hong and in 1 month with Dr. Bateman.    Objective:  Temp:  [98.2 °F (36.8 °C)-98.4 °F (36.9 °C)] 98.3 °F (36.8 °C)  Heart Rate:  [] 85  Resp:  [16-18] 16  BP: ()/(42-74) 111/73    Intake/Output Summary (Last 24 hours) at 2/15/2021 1236  Last data filed at 2/15/2021 0836  Gross per 24 hour   Intake 100 ml   Output --   Net 100 ml     Body mass index is 22.32 kg/m².      02/14/21  0636 02/14/21  1217 02/15/21  0721   Weight: 60.6 kg (133 lb 11.2 oz) 60.3 kg (133 lb) 57.2 kg (126 lb)     Weight change: 3.629 kg (8 lb)    Physical Exam:  Constitutional:       General: Not in acute distress.     Appearance: Well-developed. Not diaphoretic.   Eyes:      Pupils: Pupils are equal, round, and reactive to light.   HENT:      Head: Normocephalic and atraumatic.   Neck:      Thyroid: No thyromegaly.      Vascular: No JVD.   Pulmonary:      Effort: Pulmonary effort is normal. No respiratory distress.      Breath sounds: Normal breath sounds.   Cardiovascular:      Normal rate. Regular rhythm.   Pulses:     Intact distal pulses.   Edema:     Peripheral edema absent.   Abdominal:      General: Bowel sounds are normal. There is no distension.      Palpations: Abdomen is soft. There is no hepatomegaly or splenomegaly.      Tenderness: There is no abdominal tenderness.   Musculoskeletal: Normal range of motion.   Skin:     General: Skin is warm and dry.      Findings: No erythema.   Neurological:      Mental Status: Alert and oriented to person, place, and time.   Psychiatric:         Behavior: Behavior normal.         Judgment: Judgment normal.           Procedures Performed  Procedure(s):  Left Heart Cath  Left ventriculography  Coronary angiography  Right Heart Cath        Conclusion    1.  Nonischemic cardiomyopathy  2.  Normal  pulmonary pressures  3.  Normal left ventricular filling pressures  4.  Mitral regurgitation   Recommendations    •     Medical management of nonischemic cardiomyopathy.  Blood pressures may be too low to start beta-blocker therapy at this time.  May need to consider to starting with oral digoxin until it appears her blood pressures will tolerate a low-dose beta-blocker therapy.  Otherwise there is no evidence of elevated filling pressures and there is no indication for diuretic therapy at this time.   Procedure Narrative    Right heart catheterization was performed using a 5 Cameroonian balloontipped [place]-Leena catheter.  Arterial samples drawn from the pulmonary artery for saturation measurement.  Following completion of the right heart catheterization the Merrifield-Leena catheter was removed.    RIGHT HEART HEMODYNAMICS:  1.  Pulmonary wedge pressure: 12/8, 7  2.  Pulmonary artery pressure: 26/3, 14  3.  Right ventricular pressure: 25/1, 1  4.  Right atrial pressure: 3/1, 1  5.  Pulmonary artery saturation: 72% on 3 L nasal cannula  6.  Right radial artery saturation: 94% on 3 L nasal cannula  7.  Calculated cardiac output 4.27 L/min, cardiac index 2.63 L/min/m²    Following the right heart catheterization proceeded with selective coronary angiograms using a 5 Cameroonian FL 3.5 and a 5 Cameroonian FL 4 diagnostic catheters.  A left heart catheterization and a left ventricular angiogram perform using a 5 Cameroonian radial pigtail catheter.    LEFT VENTRICULAR HEMODYNAMICS:  1.  Left ventricular pressure: 105/5  2.  Aortic pressure: 99/58    LEFT VENTRICULAR ANGIOGRAM:  Severely depressed left ventricular systolic function with severe global hypokinesis and a visually estimated ejection fraction of less than 20%.  There appears to be at least 2+ mitral regurgitation.Risks, benefits and alternatives were discussed with the patient and/or family.  Plan is for moderate sedation and patient is in agreement.  An immediate assessment was done  prior to the administration of moderate sedation.  Less than 20 mL of estimated blood loss during the case. No specimen was collected during the case.         Echocardiogram 2/14/2021  · Calculated left ventricular EF = 17% Estimated left ventricular EF was in agreement with the calculated left ventricular EF. Left ventricular systolic function is severely decreased. Normal left ventricular wall thickness noted. The left ventricular cavity is severely dilated. There is left ventricular global hypokinesis noted. Left ventricular diastolic function is consistent with (grade II w/high LAP) pseudonormalization.  · There is a small left pleural effusion.        Consults:  Consults     Date and Time Order Name Status Description    2/13/2021 4686 LCG (on-call MD unless specified) Completed           Pertinent Test Results:  Results from last 7 days   Lab Units 02/15/21  0244 02/13/21  1756   SODIUM mmol/L 138 140   POTASSIUM mmol/L 4.3 4.3   CHLORIDE mmol/L 103 104   CO2 mmol/L 25.4 25.0   BUN mg/dL 21 20   CREATININE mg/dL 0.97 0.78   GLUCOSE mg/dL 87 97   CALCIUM mg/dL 9.1 8.7   AST (SGOT) U/L  --  12   ALT (SGPT) U/L  --  8     Results from last 7 days   Lab Units 02/13/21  1756   TROPONIN T ng/mL <0.010     Results from last 7 days   Lab Units 02/15/21  0829 02/15/21  0825 02/15/21  0244 02/13/21  1756   WBC 10*3/mm3  --   --  4.44 6.20   HEMOGLOBIN g/dL  --   --  12.3 11.8*   HEMOGLOBIN, POC g/dL 12.2 12.2  --   --    HEMATOCRIT %  --   --  38.8 37.2   HEMATOCRIT POC % 36* 36*  --   --    PLATELETS 10*3/mm3  --   --  286 310                   Invalid input(s): LDLCALC  Results from last 7 days   Lab Units 02/13/21  1756   PROBNP pg/mL 3,587.0*     Results from last 7 days   Lab Units 02/13/21  1756   TSH uIU/mL 0.592       Discharge Medications     Discharge Medications      New Medications      Instructions Start Date   metoprolol succinate XL 25 MG 24 hr tablet  Commonly known as: TOPROL-XL   12.5 mg, Oral, Daily          Continue These Medications      Instructions Start Date   acetaminophen 650 MG 8 hr tablet  Commonly known as: TYLENOL   2 tablets, Oral, 2 Times Daily PRN      alendronate 70 MG tablet  Commonly known as: Fosamax   70 mg, Oral, Every 7 Days      DULoxetine HCl 40 MG capsule delayed-release particles   40 mg, Oral, Daily      ferrous sulfate 325 (65 FE) MG tablet   325 mg, Oral, Daily With Breakfast      gabapentin 100 MG capsule  Commonly known as: NEURONTIN   TAKE ONE CAPSULE BY MOUTH EVERY MORNING AND TAKE TWO CAPSULES BY MOUTH EVERY EVENING      omeprazole 40 MG capsule  Commonly known as: priLOSEC   40 mg, Oral, Daily         Stop These Medications    Plaquenil 200 MG tablet  Generic drug: hydroxychloroquine            Discharge Diet:    Dietary Orders (From admission, onward)     Start     Ordered    02/15/21 0855  Diet Regular; Consistent Carbohydrate, Cardiac  Diet Effective Now     Question Answer Comment   Diet Texture / Consistency Regular    Common Modifiers Consistent Carbohydrate    Common Modifiers Cardiac        02/15/21 0854                Activity at Discharge:  As tolerated    Discharge disposition: home     Discharge Instructions and Follow ups:  Future Appointments   Date Time Provider Department Center   2/23/2021  9:30 AM Roxann Roberts APRN [facility] None   3/5/2021 10:00 AM [facility]   3/8/2021  8:45 AM Bryan Vega MD [facility]   3/22/2021 10:15 AM Darrion Bateman MD [facility] None     Follow-up Information     Darrion Bateman MD Follow up in 1 month(s).    Specialty: Cardiology  Why: NP in 1 week  Contact information:  [address]  [facility] [address]  [facility] [id number] [phone]             Bryan Vega MD .    Specialty: Family Medicine  Contact information:  [address]  [facility] [address]  [facility] [id number] [phone]                   Test Results Pending at Discharge:      ABHISHEK Min  02/15/21  12:36 EST    Time:  Discharge < 30 min

## 2021-02-15 NOTE — OUTREACH NOTE
Prep Survey      Responses   [facility] facility patient discharged from?  [place]   Is LACE score < 7 ?  No   Emergency Room discharge w/ pulse ox?  No   Eligibility  [facility]   Date of Admission  02/13/21   Date of Discharge  02/15/21   Discharge Disposition  Home or Self Care   Discharge diagnosis  Acute CHF, cardiomyopathy, nonrheumatic mitral regurgitation   Does the patient have one of the following disease processes/diagnoses(primary or secondary)?  CHF   Does the patient have Home health ordered?  No   Is there a DME ordered?  No   Prep survey completed?  Yes          Mayda Soto RN

## 2021-02-16 ENCOUNTER — TRANSITIONAL CARE MANAGEMENT TELEPHONE ENCOUNTER (OUTPATIENT)
Dept: CALL CENTER | Facility: HOSPITAL | Age: 66
End: 2021-02-16

## 2021-02-16 NOTE — OUTREACH NOTE
Call Center TCM Note      Responses   [facility] patient discharged from?  [place]   Does the patient have one of the following disease processes/diagnoses(primary or secondary)?  CHF   TCM attempt successful?  Yes   Call start time  1620   Call end time  1629   Discharge diagnosis  Acute CHF, cardiomyopathy, nonrheumatic mitral regurgitation   Is patient permission given to speak with other caregiver?  No   Meds reviewed with patient/caregiver?  Yes   Is the patient having any side effects they believe may be caused by any medication additions or changes?  No   Does the patient have all medications ordered at discharge?  Yes   Is the patient taking all medications as directed (includes completed medication regime)?  Yes   Comments regarding appointments  Hospital Follow Up with ABHISHEK Stone CARDIOLOGY Tuesday Feb 23, 2021 9:30 AM   Does the patient have a primary care provider?   Yes   Does the patient have an appointment with their PCP within 7 days of discharge?  Greater than 7 days   Comments regarding PCP  PCP Dr Bryan Vega. Patient has previously scheduled follow up office visit for 3/8/21 that she wishes to keep. Declines earlier appt.    Nursing Interventions  Verified appointment date/time/provider   Has the patient kept scheduled appointments due by today?  N/A   Has home health visited the patient within 72 hours of discharge?  N/A   Pulse Ox monitoring  None   Psychosocial issues?  No   Comments  Patient is monitoring home b/p and HR. Advised to keep log to report to provider with f/u appts.    Did the patient receive a copy of their discharge instructions?  Yes   Nursing interventions  Reviewed instructions with patient   What is the patient's perception of their health status since discharge?  Improving   Nursing interventions  Nurse provided patient education   Is the patient weighing daily?  No   Does the patient have scales?  Yes   Daily weight interventions  Education provided  on importance of daily weight   Is the patient able to teach back Heart Failure diet management?  Yes [low sodium ]   Is the patient able to teach back signs and symptoms of worsening condition? (i.e. weight gain, shortness of air, etc.)  Yes   If the patient is a current smoker, are they able to teach back resources for cessation?  Not a smoker   Is the patient/caregiver able to teach back the hierarchy of who to call/visit for symptoms/problems? PCP, Specialist, Home health nurse, Urgent Care, ED, 911  Yes   TCM call completed?  Yes           Shanika Pulido RN    2/16/2021, 16:30 EST

## 2021-02-22 ENCOUNTER — READMISSION MANAGEMENT (OUTPATIENT)
Dept: CALL CENTER | Facility: HOSPITAL | Age: 66
End: 2021-02-22

## 2021-02-22 NOTE — OUTREACH NOTE
CHF Week 2 Survey      Responses   [facility] patient discharged from?  [place]   Does the patient have one of the following disease processes/diagnoses(primary or secondary)?  CHF   Week 2 attempt successful?  Yes   Call start time  1636   Call end time  1640   Discharge diagnosis  Acute CHF, cardiomyopathy, nonrheumatic mitral regurgitation   Meds reviewed with patient/caregiver?  Yes   Is the patient having any side effects they believe may be caused by any medication additions or changes?  No   Is the patient taking all medications as directed (includes completed medication regime)?  Yes   Comments regarding appointments  Hospital Follow Up with ABHISHEK Stone CARDIOLOGY Tuesday Feb 23, 2021 9:30 AM   Has the patient kept scheduled appointments due by today?  N/A   Pulse Ox monitoring  None   Comments  Pt reports no edema denies SOA but still has decreased stamina. She reports ocass palpitation.    What is the patient's perception of their health status since discharge?  Improving   Nursing interventions  Nurse provided patient education   Is the patient weighing daily?  Yes   Is the patient able to teach back Heart Failure diet management?  Yes   Is the patient able to teach back Heart Failure Zones?  Yes   Is the patient/caregiver able to teach back the hierarchy of who to call/visit for symptoms/problems? PCP, Specialist, Home health nurse, Urgent Care, ED, 911  Yes   CHF Week 2 call completed?  Yes          Katy Foley RN

## 2021-02-23 ENCOUNTER — OFFICE VISIT (OUTPATIENT)
Dept: CARDIOLOGY | Facility: CLINIC | Age: 66
End: 2021-02-23

## 2021-02-23 VITALS
HEART RATE: 84 BPM | DIASTOLIC BLOOD PRESSURE: 68 MMHG | SYSTOLIC BLOOD PRESSURE: 92 MMHG | BODY MASS INDEX: 22.18 KG/M2 | WEIGHT: 125.2 LBS | HEIGHT: 63 IN

## 2021-02-23 DIAGNOSIS — I95.2 HYPOTENSION DUE TO DRUGS: ICD-10-CM

## 2021-02-23 DIAGNOSIS — I34.0 NONRHEUMATIC MITRAL VALVE REGURGITATION: ICD-10-CM

## 2021-02-23 DIAGNOSIS — I50.21 ACUTE SYSTOLIC CONGESTIVE HEART FAILURE (HCC): ICD-10-CM

## 2021-02-23 DIAGNOSIS — R94.31 ABNORMAL EKG: ICD-10-CM

## 2021-02-23 DIAGNOSIS — I49.3 PVC'S (PREMATURE VENTRICULAR CONTRACTIONS): ICD-10-CM

## 2021-02-23 DIAGNOSIS — I42.0 CARDIOMYOPATHY, DILATED (HCC): Primary | ICD-10-CM

## 2021-02-23 PROCEDURE — 99214 OFFICE O/P EST MOD 30 MIN: CPT | Performed by: NURSE PRACTITIONER

## 2021-02-23 PROCEDURE — 93000 ELECTROCARDIOGRAM COMPLETE: CPT | Performed by: NURSE PRACTITIONER

## 2021-02-23 RX ORDER — PHENOL 1.4 %
AEROSOL, SPRAY (ML) MUCOUS MEMBRANE NIGHTLY
COMMUNITY
End: 2021-03-22

## 2021-02-23 RX ORDER — METOPROLOL SUCCINATE 25 MG/1
25 TABLET, EXTENDED RELEASE ORAL DAILY
Qty: 90 TABLET | Refills: 0 | Status: SHIPPED | OUTPATIENT
Start: 2021-02-23 | End: 2021-05-26

## 2021-02-23 NOTE — PROGRESS NOTES
Date of Office Visit: 2021  Encounter Provider: ABHISHEK Stone  Place of Service: [facility] CARDIOLOGY  Patient Name: Shanika Hector  :1955  Primary Cardiologist: Dr. Darrion Bateman     Chief Complaint   Patient presents with   • Cardiomyopathy   • Congestive Heart Failure   • Follow-up   :     HPI: Shanika Hector is a pleasant 65 y.o. female who presents today for hospital follow up visit.  She is a new patient to me.  I have reviewed prior notes/records and there are no changes, except for any new updates described below. I have also reviewed any information entered into the medical record by the patient or by ancillary staff.      On 2020 she presented to the [facility], [place], operated by [facility] ED with shortness of breath, palpitations, orthopnea, abdominal distention, and chest tightness.  EKG was abnormal showing sinus tachycardia with left ventricular hypertrophy heart strain.  CTA of the chest was negative for pulmonary embolism, bilateral pleural effusions, and showed mild pulmonary edema.     Dr. Bateman consulted on her.  Echocardiogram revealed severely decreased LV function with an EF of 17%, grade 2 diastolic dysfunction, and moderate to severe mitral regurgitation.  She underwent right and left heart catheterization which revealed nonischemic cardiomyopathy with an EF of 20%, normal pulmonary pressures, normal LV filling pressures, +2 mitral regurgitation, and normal coronary arteries.  She was started on metoprolol succinate 12.5 mg daily.  She was taking her Plaquenil for osteoarthritis on an as-needed basis and really did not feel that it had improved her symptoms.  Plaquenil was discontinued on the discharge summary.    She presents today for hospital follow-up visit.  Since leaving the hospital she is feeling much better.  Her palpitations that she was having before hospitalization have improved.  She thinks that she was experiencing orthopnea because she started to plant her  "pillow instead of just sleeping flat.  Currently she denies chest pain, shortness of air, PND, orthopnea, cough, edema, dizziness, syncope, or bleeding.  She has a mild headache at times.  Her blood pressure is running /60s and is currently 92/68 today in the office.  She says she is trying to eat much healthier and describes her previous self as \"a junk food junky\".  She has been diagnosed with irritable bowel syndrome and had diarrhea the first 2 days that she got home and it is now resolved.  We do long discussion about her hospitalization, cardiac testing, and new diagnosis.    Past Medical History:   Diagnosis Date   • Acute systolic (congestive) heart failure (CMS/HCC)    • ITZEL positive     Repeat ITZEL November 2014 negative   • Anxiety    • Bilateral sacroiliitis (CMS/HCC) 5/8/2017   • Bursitis 2016   • Cardiomyopathy, dilated (CMS/HCC)    • Cervical radiculopathy    • Cervical spondylosis    • Degeneration of intervertebral disc    • Eczema    • Fibromyalgia 3/16/2020   • GERD (gastroesophageal reflux disease)    • History of cardiac murmur as a child    • Irritable bowel syndrome    • Lichen sclerosus of female genitalia    • Low back pain    • Myalgia and myositis    • Nonrheumatic mitral valve regurgitation    • Osteoporosis    • Peptic ulceration Dec 2019   • Pruritus    • PVC's (premature ventricular contractions)        Past Surgical History:   Procedure Laterality Date   • BREAST CYST ASPIRATION      10+ years ago   • CARDIAC CATHETERIZATION N/A 2/15/2021    Procedure: Left Heart Cath;  Surgeon: Zee Rojas MD;  Location:  [facility];  Service: Cardiovascular;  Laterality: N/A;   • CARDIAC CATHETERIZATION N/A 2/15/2021    Procedure: Left ventriculography;  Surgeon: Zee Rojas MD;  Location:  [facility];  Service: Cardiovascular;  Laterality: N/A;   • CARDIAC CATHETERIZATION N/A 2/15/2021    Procedure: Coronary angiography;  Surgeon: Zee Rojas" MD;  Location:  [facility];  Service: Cardiovascular;  Laterality: N/A;   • CARDIAC CATHETERIZATION N/A 2/15/2021    Procedure: Right Heart Cath;  Surgeon: Zee Rojas MD;  Location[facility] C[facility];  Service: Cardiovascular;  Laterality: N/A;   • CHOLECYSTECTOMY     • COLONOSCOPY  07/30/2012    Dr. Zazueta, normal   • ENDOSCOPY  12/04/2015    Mild active duodenitis, reactive gastropathy suggestive of healing erosion or adjacent ulcer   • ENDOSCOPY N/A 12/16/2019    Procedure: ESOPHAGOGASTRODUODENOSCOPY WITH BIOPSY WITH COLD BIOPSY POLYPECTOMY;  Surgeon: Lida Garcia MD;  Location:  [facility] ENDOSCOPY;  Service: Gastroenterology   • ENDOSCOPY N/A 7/13/2020    Procedure: ESOPHAGOGASTRODUODENOSCOPY WITH COLD BIOPSY POLYPECTOMY;  Surgeon: Lida Garcia MD;  [facility];  Service: Gastroenterology;  Laterality: N/A;  pre: history of gastric ulcer  post: healed gastric ulcer and gastric polyp   • MOUTH SURGERY         Social History     Socioeconomic History   • Marital status:      Spouse name: Not on file   • Number of children: Not on file   • Years of education: Not on file   • Highest education level: Not on file   Occupational History   • Occupation: retired   Tobacco Use   • Smoking status: Never Smoker   • Smokeless tobacco: Never Used   Substance and Sexual Activity   • Alcohol use: No     Comment: caffeine- 2 cups daily   • Drug use: No   • Sexual activity: Defer     Birth control/protection: None       Family History   Problem Relation Age of Onset   • Irritable bowel syndrome Mother    • Hypertension Mother    • Hypertension Father    • Hearing loss Father    • Alcohol abuse Brother    • Early death Brother    • Heart disease Brother    • Arthritis Sister        The following portion of the patient's history were reviewed and updated as appropriate: past medical history, past surgical history, past social history, past family history, allergies,  "current medications, and problem list.    Review of Systems   Constitution: Positive for malaise/fatigue.   Cardiovascular: Negative.    Respiratory: Negative.    Hematologic/Lymphatic: Negative.    Skin: Positive for itching.   Musculoskeletal: Positive for joint pain.   Neurological: Positive for headaches and light-headedness.       Allergies   Allergen Reactions   • Other Unknown (See Comments)     perfume   • Sulfa Antibiotics Unknown (See Comments)     Past history, unknown reaction. Was told by mother there was a reaction of some type.    • Adhesive Tape Itching         Current Outpatient Medications:   •  acetaminophen (TYLENOL) 650 MG 8 hr tablet, Take 2 tablets by mouth 2 (two) times a day as needed., Disp: , Rfl:   •  alendronate (Fosamax) 70 MG tablet, Take 1 tablet by mouth Every 7 (Seven) Days., Disp: 12 tablet, Rfl: 1  •  DULoxetine 40 MG capsule delayed-release particles, Take 1 capsule by mouth Daily., Disp: 90 capsule, Rfl: 1  •  gabapentin (NEURONTIN) 100 MG capsule, TAKE ONE CAPSULE BY MOUTH EVERY MORNING AND TAKE TWO CAPSULES BY MOUTH EVERY EVENING, Disp: 270 capsule, Rfl: 1  •  Melatonin 10 MG tablet, Take  by mouth Every Night., Disp: , Rfl:   •  metoprolol succinate XL (TOPROL-XL) 25 MG 24 hr tablet, Take 1 tablet by mouth Daily., Disp: 90 tablet, Rfl: 0  •  omeprazole (priLOSEC) 40 MG capsule, Take 1 capsule by mouth Daily., Disp: 90 capsule, Rfl: 1        Objective:     Vitals:    02/23/21 0934 02/23/21 0935   BP: 92/68 92/68   BP Location: Left arm Right arm   Pulse: 84    Weight: 56.8 kg (125 lb 3.2 oz)    Height: 160 cm (63\")      Body mass index is 22.18 kg/m².    PHYSICAL EXAM:    Vitals Reviewed.   General Appearance: No acute distress, well developed and well nourished. Thin.   Eyes: Conjunctiva and lids: No erythema, swelling, or discharge. Sclera non-icteric. Glasses.   HENT: Atraumatic, normocephalic. External eyes, ears, and nose normal. No hearing loss noted. Mucous membranes " normal. Lips not cyanotic. Neck supple with no tenderness.  Respiratory: No signs of respiratory distress. Respiration rhythm and depth normal.   Clear to auscultation. No rales, crackles, rhonchi, or wheezing auscultated.   Cardiovascular:  Jugular Venous Pressure: Normal  Heart Rate and Rhythm: Normal rhythm.  Ectopy noted.  Heart Sounds: Normal S1 and S2. No S3 or S4 noted.  Murmurs: No murmurs noted. No rubs, thrills, or gallops.   Lower Extremities: No edema noted.  Gastrointestinal:  Abdomen soft, non-distended, non-tender. Normal bowel sounds.    Musculoskeletal: Normal movement of extremities  Skin and Nails: General appearance normal. No pallor, cyanosis, diaphoresis. Skin temperature normal. No clubbing of fingernails.   Psychiatric: Patient alert and oriented to person, place, and time. Speech and behavior appropriate. Normal mood and affect.       ECG 12 Lead    Date/Time: 2/23/2021 9:22 AM  Performed by: Roxann Roberts APRN  Authorized by: Roxann Roberts APRN   Comparison: compared with previous ECG from 2/15/2021  Similar to previous ECG  Rhythm: sinus rhythm  Ectopy: multifocal PVCs  Rate: normal  BPM: 84  Conduction: left anterior fascicular block  T inversion: V3, V4, V5 and V6  QRS axis: normal  Other findings: non-specific ST-T wave changes and left ventricular hypertrophy    Clinical impression: abnormal EKG              Assessment:       Diagnosis Plan   1. Cardiomyopathy, dilated (CMS/HCC)  ECG 12 Lead   2. Acute systolic congestive heart failure (CMS/HCC)  ECG 12 Lead   3. Hypotension due to drugs     4. Nonrheumatic mitral valve regurgitation     5. PVC's (premature ventricular contractions)  ECG 12 Lead   6. Abnormal EKG  ECG 12 Lead          Plan:       1/2.  Nonischemic Cardiomyopathy and Acute Heart Failure: EF 17-20%.  NYHA class III symptoms upon presentation and now class II.  Continue metoprolol succinate and will try to titrate to 25 mg 1 tablet at nighttime.  I told her she  may not be able to tolerate this with her blood pressure and if she notices her blood pressure running any lower than it is now or she symptomatic to call me.  I am very hesitant to add ACE/ARB or spironolactone to her regimen.  She does not require furosemide.    3.  Hypertension: Continue to monitor.    4.  Mitral Regurgitation: Moderate to severe per echocardiogram and moderate per catheterization.    5.  PVCs: Multifocal and noted on today's EKG.  She is on beta-blocker therapy and is asymptomatic.  She feels that she was having a lot of palpitations before her hospitalization and they have improved.  She may benefit from a Holter monitor and I will further discuss with Dr. Bateman.    6.  Abnormal EKG: Noted to have deep T wave inversion consistent with left ventricular hypertrophy and LV strain.    7.  Plaquenil: She has been taking Plaquenil as needed for osteoporosis.  In the hospital it was noted the patient did not feel that the Plaquenil was really helping her symptoms.  The plan was to reach out to her rheumatologist in [place] to discuss the Plaquenil.  I will further discuss with Dr. Bateman if this was completed and her recommendations on the Plaquenil which is currently on hold.    8.  She is currently scheduled to follow-up with Dr. Darrion Bateman in 4 weeks and we will keep that appointment.    ADDENDUM 2/25/2021:  · I discussed the plan of care with Dr. Darrion Bateman.  She said we do not need a Holter monitor.  Monitor patient on the increase of the beta-blocker because we not know if she will tolerate it.  No Plaquenil in case it caused her cardiomyopathy.  Dr. Bateman recommended a cardiac MRI.  Patient informed.      As always, it has been a pleasure to participate in your patient's care. Thank you.       Sincerely,       ABHISHEK Tejeda  [facility] Cardiology      · COVID-19 Precautions - Patient was compliant in wearing a mask. When I saw the patient, I used appropriate personal  protective equipment (PPE) including mask and eye shield (standard procedure).  Additionally, I used gown and gloves if indicated.  Hand hygiene was completed before and after seeing the patient.  · Dictated utilizing Dragon Dictation

## 2021-02-25 ENCOUNTER — TELEPHONE (OUTPATIENT)
Dept: CARDIOLOGY | Facility: CLINIC | Age: 66
End: 2021-02-25

## 2021-02-25 NOTE — PROGRESS NOTES
No Holter needed.    I don't know if she'll tolerate the increase in BB.    No HCQ in case it's caused her CMP.    She needs a cardiac MRI; will you order?    moe

## 2021-03-01 ENCOUNTER — TRANSCRIBE ORDERS (OUTPATIENT)
Dept: CARDIOLOGY | Facility: CLINIC | Age: 66
End: 2021-03-01

## 2021-03-01 ENCOUNTER — TELEPHONE (OUTPATIENT)
Dept: CARDIOLOGY | Facility: CLINIC | Age: 66
End: 2021-03-01

## 2021-03-01 DIAGNOSIS — F40.240 CLAUSTROPHOBIA: Primary | ICD-10-CM

## 2021-03-01 DIAGNOSIS — Z13.6 SCREENING FOR CARDIOVASCULAR CONDITION: Primary | ICD-10-CM

## 2021-03-01 DIAGNOSIS — Z01.810 PREPROCEDURAL CARDIOVASCULAR EXAMINATION: ICD-10-CM

## 2021-03-01 RX ORDER — FUROSEMIDE 20 MG/1
20 TABLET ORAL DAILY
Qty: 35 TABLET | Refills: 1 | Status: SHIPPED | OUTPATIENT
Start: 2021-03-01 | End: 2021-03-04 | Stop reason: SDUPTHER

## 2021-03-01 NOTE — TELEPHONE ENCOUNTER
----- Message from Lara Quiñonez MA sent at 3/1/2021  7:54 AM EST -----  Regarding: FW: Visit Follow-Up Question  Contact: [phone]    ----- Message -----  From: Shanika Hector  Sent: 2/27/2021   7:56 PM EST  To: [facility]  Subject: Visit Follow-Up Question                         When I talked to u Thursday afternoon all was good. Friday and Saturday I've felt off. Headaches and now today I had a slight tightness in my chest and abdomen.  When I eat it feels like it's backed up my throat. Coughing more today and a very slight wheeze mostly if I lay down. Gained a pound on Friday but stable weight today. BP running 89-90/57-59 with pulse 81-94. Do u need to see me in office again?      Abnormal ecg what does this mean in terms of diagnosis??      Shanika Hector [phone].

## 2021-03-01 NOTE — TELEPHONE ENCOUNTER
Pt is claustrophobic and would like for medication to be ordered for MRI scheduled 4/9.    Thank you    Jaswant HEAD

## 2021-03-01 NOTE — TELEPHONE ENCOUNTER
"  I spoke with patient via phone. She feels that she is retaining fluid and can hear wheezing. Her chest feels tight and she is SOA. When she exhales she feels like she need to force the air out. She feels like she did before she went to hospital, but not as badly. It was worse on Saturday and better today.   She also felt her heart \"jumping around today\". Her pulse today was 91/57 and HR 83.     She will call me Wednesday to let me know how she feels.       "

## 2021-03-01 NOTE — TELEPHONE ENCOUNTER
Please let Jaswant Soto know if you are ok with that. I cannot prescribe anything that would help with claustrophobia. Thanks.

## 2021-03-02 RX ORDER — DIAZEPAM 10 MG/1
10 TABLET ORAL ONCE
Qty: 1 TABLET | Refills: 0 | Status: SHIPPED | OUTPATIENT
Start: 2021-03-02 | End: 2021-03-02

## 2021-03-02 RX ORDER — DIAZEPAM 10 MG/1
10 TABLET ORAL ONCE
Qty: 1 TABLET | Refills: 0 | Status: SHIPPED | OUTPATIENT
Start: 2021-03-02 | End: 2021-03-02 | Stop reason: SDUPTHER

## 2021-03-02 NOTE — TELEPHONE ENCOUNTER
I printed a prescription for a one-time dose of diazepam.  Someone will have to drive her to the MRI, wait for her, drive her home, and stay with her for at least 6 hours after the procedure.  I printed it while I was in [place] and it printed to the main office.  I will sign it on March 3 and we will mail it to her.    CC'd to Dr Vega as an FYI.

## 2021-03-03 ENCOUNTER — READMISSION MANAGEMENT (OUTPATIENT)
Dept: CALL CENTER | Facility: HOSPITAL | Age: 66
End: 2021-03-03

## 2021-03-03 NOTE — TELEPHONE ENCOUNTER
Pt called stated she was following up with you on the lasix that you prescribed. She states she took one Monday and Tuesday and is going to take two today but she states she lost 2/3 of a lb and her breathing has gotten better.

## 2021-03-03 NOTE — OUTREACH NOTE
CHF Week 3 Survey      Responses   [facility] patient discharged from?  [place]   Does the patient have one of the following disease processes/diagnoses(primary or secondary)?  CHF   Week 3 attempt successful?  Yes   Call start time  1655   Call end time  1658   Discharge diagnosis  Acute CHF, cardiomyopathy, nonrheumatic mitral regurgitation   Meds reviewed with patient/caregiver?  Yes   Is the patient taking all medications as directed (includes completed medication regime)?  Yes   Has the patient kept scheduled appointments due by today?  Yes   Pulse Ox monitoring  None   What is the patient's perception of their health status since discharge?  Improving   Is the patient weighing daily?  Yes   Is the patient able to teach back Heart Failure Zones?  Yes   CHF Week 3 call completed?  Yes   Revoked  No further contact(revokes)-requires comment   Is the patient interested in additional calls from an ambulatory ?  NOTE:  applies to high risk patients requiring additional follow-up.  No   Graduated/Revoked comments  Pt verbalized she's doing well. She's followed up with cardiology.           Ramila Campos RN

## 2021-03-04 RX ORDER — FUROSEMIDE 40 MG/1
40 TABLET ORAL DAILY
Qty: 90 TABLET | Refills: 1 | Status: SHIPPED | OUTPATIENT
Start: 2021-03-04 | End: 2021-06-25 | Stop reason: SDUPTHER

## 2021-03-04 NOTE — TELEPHONE ENCOUNTER
I spoke with the patient.  She says she is feeling better on furosemide 40 mg daily.  Working to continue with that dosage.  When she comes in for her testing and she will have a repeat BMP at the hospital.  I explained that Dr. Bateman said it was fine for her to have diazepam and we went over her directions.    She is going to pick it up the prescription at our office on 3/22.

## 2021-03-05 ENCOUNTER — LAB (OUTPATIENT)
Dept: LAB | Facility: HOSPITAL | Age: 66
End: 2021-03-05

## 2021-03-05 ENCOUNTER — TRANSCRIBE ORDERS (OUTPATIENT)
Dept: ADMINISTRATIVE | Facility: HOSPITAL | Age: 66
End: 2021-03-05

## 2021-03-05 DIAGNOSIS — M25.50 ARTHRALGIA, UNSPECIFIED JOINT: ICD-10-CM

## 2021-03-05 DIAGNOSIS — M25.50 ARTHRALGIA, UNSPECIFIED JOINT: Primary | ICD-10-CM

## 2021-03-05 DIAGNOSIS — M25.559 PAIN IN HIP: ICD-10-CM

## 2021-03-05 DIAGNOSIS — M50.90 CERVICAL DISC DISORDER, UNSPECIFIED, UNSPECIFIED CERVICAL REGION: ICD-10-CM

## 2021-03-05 DIAGNOSIS — M54.2 CERVICALGIA: ICD-10-CM

## 2021-03-05 DIAGNOSIS — R21 RASH AND OTHER NONSPECIFIC SKIN ERUPTION: ICD-10-CM

## 2021-03-05 LAB
ALBUMIN SERPL-MCNC: 4.5 G/DL (ref 3.5–5.2)
ALBUMIN/GLOB SERPL: 1.4 G/DL
ALP SERPL-CCNC: 71 U/L (ref 39–117)
ALT SERPL W P-5'-P-CCNC: 7 U/L (ref 1–33)
ANION GAP SERPL CALCULATED.3IONS-SCNC: 10.9 MMOL/L (ref 5–15)
AST SERPL-CCNC: 16 U/L (ref 1–32)
BASOPHILS # BLD AUTO: 0.05 10*3/MM3 (ref 0–0.2)
BASOPHILS NFR BLD AUTO: 0.9 % (ref 0–1.5)
BILIRUB SERPL-MCNC: 0.6 MG/DL (ref 0–1.2)
BUN SERPL-MCNC: 21 MG/DL (ref 8–23)
BUN/CREAT SERPL: 18.3 (ref 7–25)
CALCIUM SPEC-SCNC: 9.5 MG/DL (ref 8.6–10.5)
CHLORIDE SERPL-SCNC: 96 MMOL/L (ref 98–107)
CO2 SERPL-SCNC: 32.1 MMOL/L (ref 22–29)
CREAT SERPL-MCNC: 1.15 MG/DL (ref 0.57–1)
DEPRECATED RDW RBC AUTO: 42.2 FL (ref 37–54)
EOSINOPHIL # BLD AUTO: 0.48 10*3/MM3 (ref 0–0.4)
EOSINOPHIL NFR BLD AUTO: 8.5 % (ref 0.3–6.2)
ERYTHROCYTE [DISTWIDTH] IN BLOOD BY AUTOMATED COUNT: 12.1 % (ref 12.3–15.4)
GFR SERPL CREATININE-BSD FRML MDRD: 47 ML/MIN/1.73
GLOBULIN UR ELPH-MCNC: 3.2 GM/DL
GLUCOSE SERPL-MCNC: 107 MG/DL (ref 65–99)
HCT VFR BLD AUTO: 43.5 % (ref 34–46.6)
HGB BLD-MCNC: 13.8 G/DL (ref 12–15.9)
IMM GRANULOCYTES # BLD AUTO: 0.01 10*3/MM3 (ref 0–0.05)
IMM GRANULOCYTES NFR BLD AUTO: 0.2 % (ref 0–0.5)
LYMPHOCYTES # BLD AUTO: 1.55 10*3/MM3 (ref 0.7–3.1)
LYMPHOCYTES NFR BLD AUTO: 27.4 % (ref 19.6–45.3)
MCH RBC QN AUTO: 29.7 PG (ref 26.6–33)
MCHC RBC AUTO-ENTMCNC: 31.7 G/DL (ref 31.5–35.7)
MCV RBC AUTO: 93.8 FL (ref 79–97)
MONOCYTES # BLD AUTO: 0.67 10*3/MM3 (ref 0.1–0.9)
MONOCYTES NFR BLD AUTO: 11.8 % (ref 5–12)
NEUTROPHILS NFR BLD AUTO: 2.9 10*3/MM3 (ref 1.7–7)
NEUTROPHILS NFR BLD AUTO: 51.2 % (ref 42.7–76)
NRBC BLD AUTO-RTO: 0 /100 WBC (ref 0–0.2)
PLATELET # BLD AUTO: 324 10*3/MM3 (ref 140–450)
PMV BLD AUTO: 11.2 FL (ref 6–12)
POTASSIUM SERPL-SCNC: 3.9 MMOL/L (ref 3.5–5.2)
PROT SERPL-MCNC: 7.7 G/DL (ref 6–8.5)
RBC # BLD AUTO: 4.64 10*6/MM3 (ref 3.77–5.28)
SODIUM SERPL-SCNC: 139 MMOL/L (ref 136–145)
WBC # BLD AUTO: 5.66 10*3/MM3 (ref 3.4–10.8)

## 2021-03-05 PROCEDURE — 85025 COMPLETE CBC W/AUTO DIFF WBC: CPT

## 2021-03-05 PROCEDURE — 36415 COLL VENOUS BLD VENIPUNCTURE: CPT

## 2021-03-05 PROCEDURE — 80053 COMPREHEN METABOLIC PANEL: CPT

## 2021-03-05 NOTE — PROGRESS NOTES
The kidney function is just slightly lower compared to last check. Could be related to dehydration. We will continue to monitor. Discuss more at upcoming visit.

## 2021-03-08 ENCOUNTER — OFFICE VISIT (OUTPATIENT)
Dept: FAMILY MEDICINE CLINIC | Facility: CLINIC | Age: 66
End: 2021-03-08

## 2021-03-08 VITALS
BODY MASS INDEX: 21.88 KG/M2 | WEIGHT: 123.5 LBS | SYSTOLIC BLOOD PRESSURE: 94 MMHG | DIASTOLIC BLOOD PRESSURE: 65 MMHG | OXYGEN SATURATION: 99 % | HEART RATE: 79 BPM | TEMPERATURE: 97.3 F | HEIGHT: 63 IN

## 2021-03-08 DIAGNOSIS — F33.0 MILD EPISODE OF RECURRENT MAJOR DEPRESSIVE DISORDER (HCC): ICD-10-CM

## 2021-03-08 DIAGNOSIS — Z09 HOSPITAL DISCHARGE FOLLOW-UP: ICD-10-CM

## 2021-03-08 DIAGNOSIS — M79.7 FIBROMYALGIA: ICD-10-CM

## 2021-03-08 DIAGNOSIS — I42.0 CARDIOMYOPATHY, DILATED (HCC): Primary | ICD-10-CM

## 2021-03-08 DIAGNOSIS — I34.0 NONRHEUMATIC MITRAL VALVE REGURGITATION: ICD-10-CM

## 2021-03-08 PROCEDURE — 99214 OFFICE O/P EST MOD 30 MIN: CPT | Performed by: FAMILY MEDICINE

## 2021-03-08 NOTE — PROGRESS NOTES
"Chief Complaint  Fibromyalgia (follow up)    Subjective          Shanika Hector presents to [facility]  History of Present Illness    Hospital discharge follow-up.  Patient was admitted a couple of days earlier last month for dilated nonischemic cardiomyopathy with mitral regurgitation.  Cause was mostly unknown but thought related to the Plaquenil that she was taking for arthritis symptoms.  This had been question in the past by me.  It was prescribed by a [facility] rheumatologist.  For what sounded like osteoarthritis.  In either case the Plaquenil has stopped.  She is on metoprolol succinate 25 mg daily standard release.  She is also taking Lasix 40 mg a day.  She has appointment coming up in a week and a half with her cardiologist.  They have scheduled a cardiac MRI.  She states her shortness of breath is much improved.  No longer has orthopnea.  No longer has ankle or abdominal swelling.    Major depression.  Mostly in remission.  She has had some trouble dealing with hospitalization and the thought of having cardiomyopathy.  She continues on duloxetine 40 mg daily.    Fibromyalgia.  She continues gabapentin 200 mg at nighttime.  She is also taking duloxetine.      Objective   Vital Signs:   BP 94/65   Pulse 79   Temp 97.3 °F (36.3 °C) (Temporal)   Ht 160 cm (62.99\")   Wt 56 kg (123 lb 8 oz)   SpO2 99%   BMI 21.88 kg/m²     Physical Exam  Vitals and nursing note reviewed.   Constitutional:       General: She is not in acute distress.     Appearance: She is well-developed.   Neck:      Thyroid: No thyromegaly.   Cardiovascular:      Rate and Rhythm: Normal rate and regular rhythm.      Pulses: Normal pulses.      Heart sounds: Normal heart sounds. No murmur. No friction rub. No gallop.       Comments: Patient has mitral regurgitation but I do not hear a murmur today.  Pulmonary:      Effort: Pulmonary effort is normal.      Breath sounds: Normal breath sounds. "   Musculoskeletal:      Right lower leg: No edema.      Left lower leg: No edema.   Skin:     General: Skin is warm and dry.   Psychiatric:         Behavior: Behavior normal.         Thought Content: Thought content normal.         Judgment: Judgment normal.        Result Review :   The following data was reviewed by: Bryan Vega MD on 03/08/2021:  Common labs    Common Labsle 2/13/21 2/13/21 2/15/21 2/15/21 2/15/21 2/15/21 3/5/21 3/5/21    1756 1756 0244 0244 0825 0829 0918 0918   Glucose  97  87    107 (A)   BUN  20  21    21   Creatinine  0.78  0.97    1.15 (A)   eGFR Non  Am  74  57 (A)    47 (A)   Sodium  140  138    139   Potassium  4.3  4.3    3.9   Chloride  104  103    96 (A)   Calcium  8.7  9.1    9.5   Albumin  3.90      4.50   Total Bilirubin  0.3      0.6   Alkaline Phosphatase  69      71   AST (SGOT)  12      16   ALT (SGPT)  8      7   WBC 6.20  4.44    5.66    Hemoglobin 11.8 (A)  12.3  12.2 12.2 13.8    Hematocrit 37.2  38.8  36 (A) 36 (A) 43.5    Platelets 310  286    324    (A) Abnormal value            Data reviewed: Cardiology studies and consults notes reveiwed.           Assessment and Plan    Diagnoses and all orders for this visit:    1. Cardiomyopathy, dilated (CMS/HCC) (Primary)    2. Hospital discharge follow-up    3. Nonrheumatic mitral valve regurgitation    4. Fibromyalgia    5. Mild episode of recurrent major depressive disorder (CMS/HCC)      Hospital discharge follow-up for nonischemic cardiomyopathy.  Negative right and left heart cath at that time for coronary disease of significance.  She will continue her furosemide and her metoprolol.  Her blood pressure stable.  Recent creatinine and potassium normal.  She will follow up with her cardiologist.  She is not taking the Plaquenil.  Medications have been reconciled.    Fibromyalgia.  Stable.     Major depression.  Slight exacerbation since hospitalization.  Counseling given today.  I will see her back in July for  annual wellness visit and recheck      Follow Up   No follow-ups on file.  Patient was given instructions and counseling regarding her condition or for health maintenance advice. Please see specific information pulled into the AVS if appropriate.       Answers for HPI/ROS submitted by the patient on 3/4/2021  Please describe your symptoms.: Check up, Discuss recent hospital visit and heart issues  Have you had these symptoms before?: No  How long have you been having these symptoms?: 1-2 weeks  Please list any medications you are currently taking for this condition.: Metoprolol succ er 25 mg per day, Furosemide 40 mg per day  Please describe any probable cause for these symptoms. : Recent diagnois congestive heart failure with weakening of heart muscle  What is the primary reason for your visit?: Other

## 2021-03-22 ENCOUNTER — LAB (OUTPATIENT)
Dept: LAB | Facility: HOSPITAL | Age: 66
End: 2021-03-22

## 2021-03-22 ENCOUNTER — OFFICE VISIT (OUTPATIENT)
Dept: CARDIOLOGY | Facility: CLINIC | Age: 66
End: 2021-03-22

## 2021-03-22 VITALS
DIASTOLIC BLOOD PRESSURE: 64 MMHG | WEIGHT: 126.2 LBS | BODY MASS INDEX: 22.36 KG/M2 | SYSTOLIC BLOOD PRESSURE: 96 MMHG | HEIGHT: 63 IN | HEART RATE: 81 BPM

## 2021-03-22 DIAGNOSIS — I49.3 PVCS (PREMATURE VENTRICULAR CONTRACTIONS): ICD-10-CM

## 2021-03-22 DIAGNOSIS — I42.0 CARDIOMYOPATHY, DILATED (HCC): Primary | ICD-10-CM

## 2021-03-22 DIAGNOSIS — I34.0 NONRHEUMATIC MITRAL VALVE REGURGITATION: ICD-10-CM

## 2021-03-22 DIAGNOSIS — Z13.6 SCREENING FOR CARDIOVASCULAR CONDITION: ICD-10-CM

## 2021-03-22 DIAGNOSIS — Z01.810 PREPROCEDURAL CARDIOVASCULAR EXAMINATION: ICD-10-CM

## 2021-03-22 DIAGNOSIS — I50.22 CHRONIC SYSTOLIC CONGESTIVE HEART FAILURE (HCC): ICD-10-CM

## 2021-03-22 LAB
ANION GAP SERPL CALCULATED.3IONS-SCNC: 9.9 MMOL/L (ref 5–15)
BUN SERPL-MCNC: 13 MG/DL (ref 8–23)
BUN/CREAT SERPL: 13.1 (ref 7–25)
CALCIUM SPEC-SCNC: 9.1 MG/DL (ref 8.6–10.5)
CHLORIDE SERPL-SCNC: 101 MMOL/L (ref 98–107)
CO2 SERPL-SCNC: 30.1 MMOL/L (ref 22–29)
CREAT SERPL-MCNC: 0.99 MG/DL (ref 0.57–1)
GFR SERPL CREATININE-BSD FRML MDRD: 56 ML/MIN/1.73
GLUCOSE SERPL-MCNC: 101 MG/DL (ref 65–99)
POTASSIUM SERPL-SCNC: 4.1 MMOL/L (ref 3.5–5.2)
SODIUM SERPL-SCNC: 141 MMOL/L (ref 136–145)

## 2021-03-22 PROCEDURE — 99214 OFFICE O/P EST MOD 30 MIN: CPT | Performed by: INTERNAL MEDICINE

## 2021-03-22 PROCEDURE — 93000 ELECTROCARDIOGRAM COMPLETE: CPT | Performed by: INTERNAL MEDICINE

## 2021-03-22 PROCEDURE — 80048 BASIC METABOLIC PNL TOTAL CA: CPT

## 2021-03-22 PROCEDURE — 36415 COLL VENOUS BLD VENIPUNCTURE: CPT

## 2021-03-22 RX ORDER — DIGOXIN 125 MCG
125 TABLET ORAL DAILY
Qty: 30 TABLET | Refills: 11 | Status: SHIPPED | OUTPATIENT
Start: 2021-03-22 | End: 2021-03-24 | Stop reason: SDUPTHER

## 2021-03-22 NOTE — PROGRESS NOTES
Date of Office Visit: 21  Encounter Provider: Darrion Bateman MD  Place of Service: [facility] CARDIOLOGY  Patient Name: Shanika Hector  :1955    Chief Complaint   Patient presents with   • Cardiomyopathy   :     HPI: Ms. Hector is 65 y.o. and presents today to follow up.     She presented to [facility] on 2020 with dyspnea, palpitations, orthopnea, and abdominal distention.  Her EKG showed sinus tachycardia and LVH/strain. CTA of the chest was negative for pulmonary embolism, bilateral pleural effusions, and showed mild pulmonary edema.  She was noted to have PVCs on telemetry.     An echo revealed an LVEF of 20% and moderate-severe MR.  She underwent right and left heart catheterization; her coronaries were normal, as were her pulmonary pressures and LVEDP.     She was started on metoprolol succinate; her BP was relatively low, an additional medications could not be added.  She had been taking hydroxychloroquine for erosive osteoarthritis, and it was discontinued.      She checks her vitals at home; her BP is consistnetly /60mm Hg.  Her weight is stable.  Her pulse is consistently in the 80-90s. She does note that her rate is fast with exertion.  She has NYHA IIB exertional dyspnea, but does not have edema or orthopnea.     Past Medical History:   Diagnosis Date   • Acute systolic congestive heart failure (CMS/HCC) 2021   • ITZEL positive     Repeat ITZEL 2014 negative   • Anxiety    • Bilateral sacroiliitis (CMS/HCC) 2017   • Bursitis 2016   • Cervical radiculopathy    • Cervical spondylosis    • Degeneration of intervertebral disc    • Eczema    • Fibromyalgia 3/16/2020   • GERD (gastroesophageal reflux disease)    • History of cardiac murmur as a child    • Irritable bowel syndrome    • Lichen sclerosus of female genitalia    • Low back pain    • Myalgia and myositis    • Nonischemic cardiomyopathy (CMS/HCC)     2021, EF 20%, normal cors   •  Nonrheumatic mitral valve regurgitation    • Osteoporosis    • Peptic ulceration Dec 2019   • Pruritus    • Pure hypercholesterolemia 6/10/2019   • PVCs (premature ventricular contractions) 3/22/2021       Past Surgical History:   Procedure Laterality Date   • BREAST CYST ASPIRATION      10+ years ago   • CARDIAC CATHETERIZATION N/A 2/15/2021    Procedure: Left Heart Cath;  Surgeon: Zee Rojas MD;  Location: [facility] CATH INVASIVE LOCATION;  Service: Cardiovascular;  Laterality: N/A;   • CARDIAC CATHETERIZATION N/A 2/15/2021    Procedure: Left ventriculography;  Surgeon: Zee Rojas MD;  Location:  [facility] CATH INVASIVE LOCATION;  Service: Cardiovascular;  Laterality: N/A;   • CARDIAC CATHETERIZATION N/A 2/15/2021    Procedure: Coronary angiography;  Surgeon: Zee Rojas MD;  Location:  [facility] CATH INVASIVE LOCATION;  Service: Cardiovascular;  Laterality: N/A;   • CARDIAC CATHETERIZATION N/A 2/15/2021    Procedure: Right Heart Cath;  Surgeon: Zee Rojas MD;  Location: [facility] CATH INVASIVE LOCATION;  Service: Cardiovascular;  Laterality: N/A;   • CHOLECYSTECTOMY     • COLONOSCOPY  07/30/2012    Dr. Zazueta, normal   • ENDOSCOPY  12/04/2015    Mild active duodenitis, reactive gastropathy suggestive of healing erosion or adjacent ulcer   • ENDOSCOPY N/A 12/16/2019    Procedure: ESOPHAGOGASTRODUODENOSCOPY WITH BIOPSY WITH COLD BIOPSY POLYPECTOMY;  Surgeon: Lida Garcia MD;  Location: [facility] ENDOSCOPY;  Service: Gastroenterology   • ENDOSCOPY N/A 7/13/2020    Procedure: ESOPHAGOGASTRODUODENOSCOPY WITH COLD BIOPSY POLYPECTOMY;  Surgeon: Lida Garcia MD;  Location: [facility] ENDOSCOPY;  Service: Gastroenterology;  Laterality: N/A;  pre: history of gastric ulcer  post: healed gastric ulcer and gastric polyp   • MOUTH SURGERY         Social History     Socioeconomic History   • Marital status:      Spouse name: Not on file   • Number of children: Not on file   • Years of education: Not on file    • Highest education level: Not on file   Tobacco Use   • Smoking status: Never Smoker   • Smokeless tobacco: Never Used   Substance and Sexual Activity   • Alcohol use: No     Comment: caffeine- 2 cups daily   • Drug use: No   • Sexual activity: Defer     Birth control/protection: None       Family History   Problem Relation Age of Onset   • Irritable bowel syndrome Mother    • Hypertension Mother    • Hypertension Father    • Hearing loss Father    • Alcohol abuse Brother    • Early death Brother    • Heart disease Brother    • Arthritis Sister        Review of Systems   Respiratory: Positive for cough.    Musculoskeletal: Positive for joint pain.   Neurological: Positive for excessive daytime sleepiness.       Allergies   Allergen Reactions   • Other Unknown (See Comments)     perfume   • Sulfa Antibiotics Unknown (See Comments)     Past history, unknown reaction. Was told by mother there was a reaction of some type.    • Adhesive Tape Itching         Current Outpatient Medications:   •  acetaminophen (TYLENOL) 650 MG 8 hr tablet, Take 2 tablets by mouth 2 (two) times a day as needed., Disp: , Rfl:   •  alendronate (Fosamax) 70 MG tablet, Take 1 tablet by mouth Every 7 (Seven) Days., Disp: 12 tablet, Rfl: 1  •  DULoxetine 40 MG capsule delayed-release particles, Take 1 capsule by mouth Daily., Disp: 90 capsule, Rfl: 1  •  furosemide (LASIX) 40 MG tablet, Take 1 tablet by mouth Daily., Disp: 90 tablet, Rfl: 1  •  gabapentin (NEURONTIN) 100 MG capsule, TAKE ONE CAPSULE BY MOUTH EVERY MORNING AND TAKE TWO CAPSULES BY MOUTH EVERY EVENING, Disp: 270 capsule, Rfl: 1  •  metoprolol succinate XL (TOPROL-XL) 25 MG 24 hr tablet, Take 1 tablet by mouth Daily., Disp: 90 tablet, Rfl: 0  •  omeprazole (priLOSEC) 40 MG capsule, Take 1 capsule by mouth Daily., Disp: 90 capsule, Rfl: 1      Objective:     Vitals:    03/22/21 1034   BP: 96/64   BP Location: Right arm   Pulse: 81   Weight: 57.2 kg (126 lb 3.2 oz)   Height: 160 cm  "(62.99\")     Body mass index is 22.36 kg/m².    Constitutional:       Appearance: Healthy appearance. Not in distress.   Eyes:      Conjunctiva/sclera: Conjunctivae normal.   HENT:         Comments: Masked  Neck:      Vascular: JVD normal.   Pulmonary:      Effort: Increased respiratory effort.   Cardiovascular:      Normal rate. Frequent ectopic beats.      Murmurs: There is no murmur.   Pulses:     Intact distal pulses.   Edema:     Peripheral edema absent.   Abdominal:      Palpations: Abdomen is soft. There is no abdominal mass.   Musculoskeletal: Normal range of motion.      Cervical back: Normal range of motion. Skin:     General: Skin is warm and dry.   Neurological:      General: No focal deficit present.      Mental Status: Alert and oriented to person, place and time.           ECG 12 Lead    Date/Time: 3/22/2021 10:50 AM  Performed by: Darrion Bateman MD  Authorized by: Darrion Bateman MD   Comparison: compared with previous ECG   Similar to previous ECG  Rhythm: sinus rhythm  Ectopy: unifocal PVCs and atrial premature contractions  Conduction: conduction normal  QRS axis: left  Other findings: left ventricular hypertrophy with strain    Clinical impression: abnormal EKG              Assessment:       Diagnosis Plan   1. Cardiomyopathy, dilated (CMS/HCC)     2. Chronic systolic congestive heart failure (CMS/HCC)     3. Nonrheumatic mitral valve regurgitation     4. PVCs (premature ventricular contractions)            Plan:       She has NICM and NYHA IIB RUIZ.  She does not have enough BP for ACE/ARB/spironolactone.  I do think she would benefit symptomatically with digoxin; I'll start 125mcg daily.  She has a cardiac MRI scheduled for April 9.     She continues to have PVCs but they seem less frequent. I'll get a Holter.     The MRI will re-evaluate her EF/MR.    Sincerely,       Darrion Bateman MD                "

## 2021-03-24 RX ORDER — DIGOXIN 125 MCG
125 TABLET ORAL DAILY
Qty: 30 TABLET | Refills: 3 | Status: SHIPPED | OUTPATIENT
Start: 2021-03-24 | End: 2021-06-25 | Stop reason: SDUPTHER

## 2021-04-09 ENCOUNTER — HOSPITAL ENCOUNTER (OUTPATIENT)
Dept: MRI IMAGING | Facility: HOSPITAL | Age: 66
Discharge: HOME | End: 2021-04-09
Admitting: NURSE PRACTITIONER

## 2021-04-09 DIAGNOSIS — I50.21 ACUTE SYSTOLIC CONGESTIVE HEART FAILURE (HCC): ICD-10-CM

## 2021-04-09 DIAGNOSIS — I42.0 CARDIOMYOPATHY, DILATED (HCC): ICD-10-CM

## 2021-04-09 PROCEDURE — 75561 CARDIAC MRI FOR MORPH W/DYE: CPT

## 2021-04-09 PROCEDURE — 0 GADOBENATE DIMEGLUMINE 529 MG/ML SOLUTION: Performed by: NURSE PRACTITIONER

## 2021-04-09 PROCEDURE — 75561 CARDIAC MRI FOR MORPH W/DYE: CPT | Performed by: INTERNAL MEDICINE

## 2021-04-09 PROCEDURE — A9577 INJ MULTIHANCE: HCPCS | Performed by: NURSE PRACTITIONER

## 2021-04-09 RX ADMIN — GADOBENATE DIMEGLUMINE 11 ML: 529 INJECTION, SOLUTION INTRAVENOUS at 10:10

## 2021-04-16 ENCOUNTER — TELEPHONE (OUTPATIENT)
Dept: CARDIOLOGY | Facility: CLINIC | Age: 66
End: 2021-04-16

## 2021-04-19 ENCOUNTER — IMMUNIZATION (OUTPATIENT)
Dept: VACCINE CLINIC | Facility: HOSPITAL | Age: 66
End: 2021-04-19

## 2021-04-19 PROCEDURE — 0001A: CPT | Performed by: INTERNAL MEDICINE

## 2021-04-19 PROCEDURE — 91300 HC SARSCOV02 VAC 30MCG/0.3ML IM: CPT | Performed by: INTERNAL MEDICINE

## 2021-05-10 ENCOUNTER — IMMUNIZATION (OUTPATIENT)
Dept: VACCINE CLINIC | Facility: HOSPITAL | Age: 66
End: 2021-05-10

## 2021-05-10 PROCEDURE — 0002A: CPT | Performed by: INTERNAL MEDICINE

## 2021-05-10 PROCEDURE — 91300 HC SARSCOV02 VAC 30MCG/0.3ML IM: CPT | Performed by: INTERNAL MEDICINE

## 2021-05-26 RX ORDER — METOPROLOL SUCCINATE 25 MG/1
TABLET, EXTENDED RELEASE ORAL
Qty: 90 TABLET | Refills: 3 | Status: SHIPPED | OUTPATIENT
Start: 2021-05-26 | End: 2021-12-03

## 2021-06-06 RX ORDER — OMEPRAZOLE 40 MG/1
CAPSULE, DELAYED RELEASE ORAL
Qty: 90 CAPSULE | Refills: 1 | Status: SHIPPED | OUTPATIENT
Start: 2021-06-06 | End: 2021-06-08

## 2021-06-08 RX ORDER — DULOXETINE 40 MG/1
CAPSULE, DELAYED RELEASE ORAL
Qty: 90 CAPSULE | Refills: 0 | Status: SHIPPED | OUTPATIENT
Start: 2021-06-08 | End: 2021-07-09

## 2021-06-08 RX ORDER — OMEPRAZOLE 40 MG/1
CAPSULE, DELAYED RELEASE ORAL
Qty: 90 CAPSULE | Refills: 0 | Status: SHIPPED | OUTPATIENT
Start: 2021-06-08 | End: 2021-12-30

## 2021-06-25 ENCOUNTER — TELEPHONE (OUTPATIENT)
Dept: CARDIOLOGY | Facility: CLINIC | Age: 66
End: 2021-06-25

## 2021-06-25 ENCOUNTER — OFFICE VISIT (OUTPATIENT)
Dept: CARDIOLOGY | Facility: CLINIC | Age: 66
End: 2021-06-25

## 2021-06-25 VITALS
BODY MASS INDEX: 22.71 KG/M2 | SYSTOLIC BLOOD PRESSURE: 116 MMHG | WEIGHT: 128.2 LBS | HEART RATE: 59 BPM | HEIGHT: 63 IN | DIASTOLIC BLOOD PRESSURE: 78 MMHG

## 2021-06-25 DIAGNOSIS — I34.0 NONRHEUMATIC MITRAL VALVE REGURGITATION: ICD-10-CM

## 2021-06-25 DIAGNOSIS — R21 RASH: ICD-10-CM

## 2021-06-25 DIAGNOSIS — I49.3 PVCS (PREMATURE VENTRICULAR CONTRACTIONS): ICD-10-CM

## 2021-06-25 DIAGNOSIS — I42.0 CARDIOMYOPATHY, DILATED (HCC): Primary | ICD-10-CM

## 2021-06-25 DIAGNOSIS — I50.22 CHRONIC SYSTOLIC CONGESTIVE HEART FAILURE (HCC): ICD-10-CM

## 2021-06-25 DIAGNOSIS — R94.31 ABNORMAL EKG: ICD-10-CM

## 2021-06-25 PROCEDURE — 93000 ELECTROCARDIOGRAM COMPLETE: CPT | Performed by: NURSE PRACTITIONER

## 2021-06-25 PROCEDURE — 99213 OFFICE O/P EST LOW 20 MIN: CPT | Performed by: NURSE PRACTITIONER

## 2021-06-25 RX ORDER — DIGOXIN 125 MCG
125 TABLET ORAL DAILY
Qty: 90 TABLET | Refills: 3 | Status: SHIPPED | OUTPATIENT
Start: 2021-06-25 | End: 2021-08-19 | Stop reason: SDUPTHER

## 2021-06-25 RX ORDER — FUROSEMIDE 40 MG/1
40 TABLET ORAL DAILY
Qty: 90 TABLET | Refills: 3 | Status: SHIPPED | OUTPATIENT
Start: 2021-06-25 | End: 2021-07-22

## 2021-06-25 NOTE — TELEPHONE ENCOUNTER
Just let me know when the patient calls back and send me a message in epic and then I’ll call her to talk about it.    [id number]

## 2021-06-25 NOTE — TELEPHONE ENCOUNTER
Please advise. ABHISHEK Tejeda is out of office this week. If clinically indicated, please send to triage RN. Thank you.

## 2021-06-25 NOTE — PROGRESS NOTES
Date of Office Visit: 2021  Encounter Provider: ABHISHEK Stone  Place of Service: [facility] CARDIOLOGY  Patient Name: Shanika Hector  :1955  Primary Cardiologist: Dr. Darrion Bateman     Chief Complaint   Patient presents with   • Cardiomyopathy   :     HPI: Shanika Hector is a pleasant 66 y.o. female who presents today for follow-up on cardiomyopathy and heart failure.  I have reviewed her medical records.    In 2020, she was diagnosed with nonischemic cardiomyopathy with an initial EF of 20% and moderate to severe mitral regurgitation.  She underwent right and left heart catheterization which showed normal coronary arteries, normal pulmonary pressures and LVEDP.  She was started on metoprolol succinate.  We were unable to initially add medications because of hypotension.    In 2021, she had a cardiac MRI completed EF 31%, no delayed enhancement of the myocardium, no evidence of infiltrative heart disease or scar, left ventricle and left atrium both dilated, normal perfusion of the myocardium, mitral valve leaflet tips were thickened.  Dr. Bateman recently started her on digoxin.    She presents today for follow-up visit.  She has been doing very well overall.  She said that she is no longer babysitting her grandchildren as her granddaughter is moving on to  in August.  She denies chest pain, shortness of air, palpitations, edema, syncope, or bleeding.  She reports some occasional dizziness with bending over.  She does not drink much water.  Her blood pressure and heart rates have both been stable at home.  She has little red rash to her legs and it does not itch.  She said she was recently in the [place] with her son waqas.    Past Medical History:   Diagnosis Date   • Acute systolic congestive heart failure (CMS/HCC) 2021   • ITZEL positive     Repeat ITZEL 2014 negative   • Anxiety    • Bilateral sacroiliitis (CMS/HCC) 2017   •  Bursitis 2016   • Cervical radiculopathy    • Cervical spondylosis    • Degeneration of intervertebral disc    • Eczema    • Fibromyalgia 3/16/2020   • GERD (gastroesophageal reflux disease)    • History of cardiac murmur as a child    • Irritable bowel syndrome    • Lichen sclerosus of female genitalia    • Low back pain    • Myalgia and myositis    • Nonischemic cardiomyopathy (CMS/HCC)     2/2021, EF 20%, normal cors   • Nonrheumatic mitral valve regurgitation    • Osteoporosis    • Peptic ulceration Dec 2019   • Pruritus    • Pure hypercholesterolemia 6/10/2019   • PVCs (premature ventricular contractions) 3/22/2021       Past Surgical History:   Procedure Laterality Date   • BREAST CYST ASPIRATION      10+ years ago   • CARDIAC CATHETERIZATION N/A 2/15/2021    Procedure: Left Heart Cath;  Surgeon: Zee Rojas MD;  Location:  [facility] CATH INVASIVE LOCATION;  Service: Cardiovascular;  Laterality: N/A;   • CARDIAC CATHETERIZATION N/A 2/15/2021    Procedure: Left ventriculography;  Surgeon: Zee Rojas MD;  Location:  [facility] CATH INVASIVE LOCATION;  Service: Cardiovascular;  Laterality: N/A;   • CARDIAC CATHETERIZATION N/A 2/15/2021    Procedure: Coronary angiography;  Surgeon: Zee Rojas MD;  Location:  [facility] CATH INVASIVE LOCATION;  Service: Cardiovascular;  Laterality: N/A;   • CARDIAC CATHETERIZATION N/A 2/15/2021    Procedure: Right Heart Cath;  Surgeon: Zee Rojas MD;  Location:  [facility] CATH INVASIVE LOCATION;  Service: Cardiovascular;  Laterality: N/A;   • CHOLECYSTECTOMY     • COLONOSCOPY  07/30/2012    Dr. Zazueta, matt   • ENDOSCOPY  12/04/2015    Mild active duodenitis, reactive gastropathy suggestive of healing erosion or adjacent ulcer   • ENDOSCOPY N/A 12/16/2019    Procedure: ESOPHAGOGASTRODUODENOSCOPY WITH BIOPSY WITH COLD BIOPSY POLYPECTOMY;  Surgeon: Lida Garcia MD;  Location: [facility] ENDOSCOPY;  Service: Gastroenterology   • ENDOSCOPY N/A 7/13/2020    Procedure:  ESOPHAGOGASTRODUODENOSCOPY WITH COLD BIOPSY POLYPECTOMY;  Surgeon: Lida Garcia MD;  Location: [facility] ENDOSCOPY;  Service: Gastroenterology;  Laterality: N/A;  pre: history of gastric ulcer  post: healed gastric ulcer and gastric polyp   • MOUTH SURGERY         Social History     Socioeconomic History   • Marital status:      Spouse name: Not on file   • Number of children: Not on file   • Years of education: Not on file   • Highest education level: Not on file   Tobacco Use   • Smoking status: Never Smoker   • Smokeless tobacco: Never Used   Substance and Sexual Activity   • Alcohol use: Yes     Alcohol/week: 1.0 standard drinks     Types: 1 Glasses of wine per week     Comment: caffeine- 2 cups daily, occasional   • Drug use: No   • Sexual activity: Defer     Birth control/protection: None       Family History   Problem Relation Age of Onset   • Irritable bowel syndrome Mother    • Hypertension Mother    • Hypertension Father    • Hearing loss Father    • Alcohol abuse Brother    • Early death Brother    • Heart disease Brother    • Arthritis Sister        The following portion of the patient's history were reviewed and updated as appropriate: past medical history, past surgical history, past social history, past family history, allergies, current medications, and problem list.    Review of Systems   Constitutional: Negative.   Cardiovascular: Negative.    Respiratory: Negative.    Hematologic/Lymphatic: Negative.    Neurological: Positive for light-headedness.       Allergies   Allergen Reactions   • Other Unknown (See Comments)     perfume   • Sulfa Antibiotics Unknown (See Comments)     Past history, unknown reaction. Was told by mother there was a reaction of some type.    • Adhesive Tape Itching         Current Outpatient Medications:   •  acetaminophen (TYLENOL) 650 MG 8 hr tablet, Take 2 tablets by mouth 2 (two) times a day as needed., Disp: , Rfl:   •  digoxin (LANOXIN) 125 MCG tablet, Take 1  "tablet by mouth Daily., Disp: 90 tablet, Rfl: 3  •  DULoxetine HCl 40 MG capsule delayed-release particles, TAKE ONE CAPSULE BY MOUTH DAILY, Disp: 90 capsule, Rfl: 0  •  furosemide (LASIX) 40 MG tablet, Take 1 tablet by mouth Daily., Disp: 90 tablet, Rfl: 3  •  gabapentin (NEURONTIN) 100 MG capsule, TAKE ONE CAPSULE BY MOUTH EVERY MORNING AND TAKE TWO CAPSULES BY MOUTH EVERY EVENING, Disp: 270 capsule, Rfl: 1  •  metoprolol succinate XL (TOPROL-XL) 25 MG 24 hr tablet, TAKE ONE TABLET BY MOUTH DAILY, Disp: 90 tablet, Rfl: 3  •  omeprazole (priLOSEC) 40 MG capsule, TAKE ONE CAPSULE BY MOUTH DAILY, Disp: 90 capsule, Rfl: 0        Objective:     Vitals:    06/25/21 0803 06/25/21 0808   BP: 116/78 116/78   BP Location: Left arm Right arm   Pulse: 59    Weight: 58.2 kg (128 lb 3.2 oz)    Height: 160 cm (63\")      Body mass index is 22.71 kg/m².    PHYSICAL EXAM:    Vitals Reviewed.   General Appearance: No acute distress, well developed and well nourished.   Eyes: Conjunctiva and lids: No erythema, swelling, or discharge. Sclera non-icteric.   HENT: Atraumatic, normocephalic. External eyes, ears, and nose normal. No hearing loss noted. Mucous membranes normal. Lips not cyanotic. Neck supple with no tenderness.  Respiratory: No signs of respiratory distress. Respiration rhythm and depth normal.   Clear to auscultation. No rales, crackles, rhonchi, or wheezing auscultated.   Cardiovascular:  Jugular Venous Pressure: Normal  Heart Rate and Rhythm: Normal, Heart Sounds: Normal S1 and S2. No S3 or S4 noted.  Murmurs: No murmurs noted. No rubs, thrills, or gallops.   Arterial Pulses: Carotid pulses normal. No carotid bruit noted. Posterior tibialis and dorsalis pedis pulses normal.   Lower Extremities: No edema noted.  Gastrointestinal:  Abdomen soft, non-distended, non-tender. Normal bowel sounds.    Musculoskeletal: Normal movement of extremities  Skin and Nails: General appearance normal. No pallor, cyanosis, diaphoresis. " Skin temperature normal. No clubbing of fingernails. Rash on legs bilaterally.   Psychiatric: Patient alert and oriented to person, place, and time. Speech and behavior appropriate. Normal mood and affect.       ECG 12 Lead    Date/Time: 6/25/2021 8:03 AM  Performed by: Roxann Roberts APRN  Authorized by: Roxann Roberts APRN   Comparison: compared with previous ECG from 3/22/2021  Similar to previous ECG  Rhythm: sinus rhythm  Rate: bradycardic  BPM: 59  Conduction: conduction normal  ST Segments: ST segments normal  T Waves: T waves normal  T inversion: V3, V4, V5 and V6  QRS axis: left  Other findings: left ventricular hypertrophy    Clinical impression: non-specific ECG              Assessment:       Diagnosis Plan   1. Cardiomyopathy, dilated (CMS/HCC)     2. Chronic systolic congestive heart failure (CMS/HCC)     3. Nonrheumatic mitral valve regurgitation     4. PVCs (premature ventricular contractions)     5. Abnormal EKG     6. Rash            Plan:       1/2.  Dilated Cardiomyopathy: NYHA class I.  EF now 31%.  We have been unable to add ACE/ARB therapy, sacubitril/valsartan, and spironolactone to her regimen because of hypotension.  She recently had a cardiac MRI.  We will continue with her metoprolol succinate for goal-directed medical therapy.  I will discuss with Dr. Bateman if we need to refer her to EP team for consideration of ICD placement.    3.  Mitral Regurgitation: Moderate to severe in the past per Holter monitor.  MRI showed thickened mitral valve.    4.  PVCs: Asymptomatic at this time.    5.  Abnormal EKG: Related to LVH.    6.  Rash: I recommended that she follow-up with her PCP.    7.  I recommended that she drink 64 ounces of water daily.    8.  Further recommendations to follow after my discussion with Dr. Darrion Bateman.  I recommended a 6-month follow-up visit.  I have refilled her cardiac medications.      ADDENDUM:  · Dr. Bateman has asked that I refer the patient to our EP team for  shared decision making on ICD.  Patient was not given education as she had already left the office.  We will call the patient for an appointment.    As always, it has been a pleasure to participate in your patient's care. Thank you.       Sincerely,       ABHISHEK Tejead  [facility]      · COVID-19 Precautions - Patient was compliant in wearing a mask. When I saw the patient, I used appropriate personal protective equipment (PPE) including mask and eye shield (standard procedure).  Additionally, I used gown and gloves if indicated.  Hand hygiene was completed before and after seeing the patient.  · Dictated utilizing Dragon Dictation  I spent 20 minutes reviewing her medical records/testing/previous office notes/labs, face-to-face interaction with patient, physical examination, formulating the plan of care, and discussion of plan of care with patient.  ·

## 2021-06-25 NOTE — TELEPHONE ENCOUNTER
Dr. Bateman has asked that the patient be referred to our EP team for shared decision making on ICD.    I left a message for patient to return my call to discuss.  If not, please defer to Dr. Bateman.  Thank you

## 2021-07-01 DIAGNOSIS — E78.00 PURE HYPERCHOLESTEROLEMIA: Primary | ICD-10-CM

## 2021-07-02 LAB
ALBUMIN SERPL-MCNC: 4.6 G/DL (ref 3.5–5.2)
ALBUMIN/GLOB SERPL: 1.8 G/DL
ALP SERPL-CCNC: 88 U/L (ref 39–117)
ALT SERPL-CCNC: 8 U/L (ref 1–33)
AST SERPL-CCNC: 14 U/L (ref 1–32)
BASOPHILS # BLD AUTO: 0.05 10*3/MM3 (ref 0–0.2)
BASOPHILS NFR BLD AUTO: 0.9 % (ref 0–1.5)
BILIRUB SERPL-MCNC: 0.4 MG/DL (ref 0–1.2)
BUN SERPL-MCNC: 13 MG/DL (ref 8–23)
BUN/CREAT SERPL: 14.3 (ref 7–25)
CALCIUM SERPL-MCNC: 9.8 MG/DL (ref 8.6–10.5)
CHLORIDE SERPL-SCNC: 103 MMOL/L (ref 98–107)
CHOLEST SERPL-MCNC: 224 MG/DL (ref 0–200)
CO2 SERPL-SCNC: 30.7 MMOL/L (ref 22–29)
CREAT SERPL-MCNC: 0.91 MG/DL (ref 0.57–1)
EOSINOPHIL # BLD AUTO: 0.32 10*3/MM3 (ref 0–0.4)
EOSINOPHIL NFR BLD AUTO: 6 % (ref 0.3–6.2)
ERYTHROCYTE [DISTWIDTH] IN BLOOD BY AUTOMATED COUNT: 13.1 % (ref 12.3–15.4)
GLOBULIN SER CALC-MCNC: 2.5 GM/DL
GLUCOSE SERPL-MCNC: 95 MG/DL (ref 65–99)
HCT VFR BLD AUTO: 38 % (ref 34–46.6)
HDLC SERPL-MCNC: 54 MG/DL (ref 40–60)
HGB BLD-MCNC: 12.5 G/DL (ref 12–15.9)
IMM GRANULOCYTES # BLD AUTO: 0.01 10*3/MM3 (ref 0–0.05)
IMM GRANULOCYTES NFR BLD AUTO: 0.2 % (ref 0–0.5)
LDLC SERPL CALC-MCNC: 151 MG/DL (ref 0–100)
LYMPHOCYTES # BLD AUTO: 1.08 10*3/MM3 (ref 0.7–3.1)
LYMPHOCYTES NFR BLD AUTO: 20.2 % (ref 19.6–45.3)
MCH RBC QN AUTO: 30.5 PG (ref 26.6–33)
MCHC RBC AUTO-ENTMCNC: 32.9 G/DL (ref 31.5–35.7)
MCV RBC AUTO: 92.7 FL (ref 79–97)
MONOCYTES # BLD AUTO: 0.56 10*3/MM3 (ref 0.1–0.9)
MONOCYTES NFR BLD AUTO: 10.5 % (ref 5–12)
NEUTROPHILS # BLD AUTO: 3.33 10*3/MM3 (ref 1.7–7)
NEUTROPHILS NFR BLD AUTO: 62.2 % (ref 42.7–76)
NRBC BLD AUTO-RTO: 0 /100 WBC (ref 0–0.2)
PLATELET # BLD AUTO: 315 10*3/MM3 (ref 140–450)
POTASSIUM SERPL-SCNC: 4.8 MMOL/L (ref 3.5–5.2)
PROT SERPL-MCNC: 7.1 G/DL (ref 6–8.5)
RBC # BLD AUTO: 4.1 10*6/MM3 (ref 3.77–5.28)
SODIUM SERPL-SCNC: 144 MMOL/L (ref 136–145)
TRIGL SERPL-MCNC: 107 MG/DL (ref 0–150)
VLDLC SERPL CALC-MCNC: 19 MG/DL (ref 5–40)
WBC # BLD AUTO: 5.35 10*3/MM3 (ref 3.4–10.8)

## 2021-07-09 ENCOUNTER — OFFICE VISIT (OUTPATIENT)
Dept: FAMILY MEDICINE CLINIC | Facility: CLINIC | Age: 66
End: 2021-07-09

## 2021-07-09 VITALS
WEIGHT: 130.7 LBS | HEIGHT: 63 IN | DIASTOLIC BLOOD PRESSURE: 70 MMHG | OXYGEN SATURATION: 99 % | BODY MASS INDEX: 23.16 KG/M2 | TEMPERATURE: 97.3 F | SYSTOLIC BLOOD PRESSURE: 106 MMHG | HEART RATE: 64 BPM

## 2021-07-09 DIAGNOSIS — Z12.31 VISIT FOR SCREENING MAMMOGRAM: ICD-10-CM

## 2021-07-09 DIAGNOSIS — I42.0 CARDIOMYOPATHY, DILATED (HCC): ICD-10-CM

## 2021-07-09 DIAGNOSIS — Z00.00 MEDICARE ANNUAL WELLNESS VISIT, SUBSEQUENT: Primary | ICD-10-CM

## 2021-07-09 DIAGNOSIS — G89.4 CHRONIC PAIN SYNDROME: ICD-10-CM

## 2021-07-09 DIAGNOSIS — F33.0 MILD EPISODE OF RECURRENT MAJOR DEPRESSIVE DISORDER (HCC): ICD-10-CM

## 2021-07-09 DIAGNOSIS — M79.7 FIBROMYALGIA: ICD-10-CM

## 2021-07-09 PROBLEM — I50.22 CHRONIC SYSTOLIC CONGESTIVE HEART FAILURE: Chronic | Status: ACTIVE | Noted: 2021-02-13

## 2021-07-09 PROBLEM — I34.0 NONRHEUMATIC MITRAL VALVE REGURGITATION: Chronic | Status: ACTIVE | Noted: 2021-02-14

## 2021-07-09 PROBLEM — E78.00 PURE HYPERCHOLESTEROLEMIA: Chronic | Status: ACTIVE | Noted: 2019-06-10

## 2021-07-09 PROCEDURE — G0439 PPPS, SUBSEQ VISIT: HCPCS | Performed by: FAMILY MEDICINE

## 2021-07-09 PROCEDURE — 99214 OFFICE O/P EST MOD 30 MIN: CPT | Performed by: FAMILY MEDICINE

## 2021-07-09 RX ORDER — WHEAT DEXTRIN 3 G/3.8 G
POWDER (GRAM) ORAL
COMMUNITY
End: 2022-05-10

## 2021-07-09 RX ORDER — DULOXETIN HYDROCHLORIDE 20 MG/1
20 CAPSULE, DELAYED RELEASE ORAL DAILY
Qty: 90 CAPSULE | Refills: 1 | Status: SHIPPED | OUTPATIENT
Start: 2021-07-09 | End: 2022-01-02

## 2021-07-09 NOTE — PROGRESS NOTES
"Chief Complaint  Medicare Wellness-subsequent    Subjective          Shanika Hector presents to [facility]  History of Present Illness     Chronic pain syndrome/fibromyalgia.  She stopped taking her duloxetine a couple months ago.  She was on the 40 mg capsule became too expensive for her.  She feels little more anxious now.  She is not sure if it was helping her fibromyalgia but she is having exacerbation of symptoms in recent weeks.  Including some bilateral sacroiliac discomfort right greater than left with no sciatica symptoms.  She has been able to do some walking.  Her stress levels bit higher she is thinking about moving.    Major depression.  Mild and ongoing.  With anxiety features.    Dilated cardiomyopathy.  She is going to be seeing electrophysiologist soon, possible implanted defibrillator needing.  She does have palpitations but no syncopal episodes.  She is exercising without dyspnea.      Objective   Vital Signs:   /70   Pulse 64   Temp 97.3 °F (36.3 °C) (Temporal)   Ht 160 cm (62.99\")   Wt 59.3 kg (130 lb 11.2 oz)   SpO2 99%   BMI 23.16 kg/m²     Physical Exam  Vitals and nursing note reviewed.   Constitutional:       General: She is not in acute distress.     Appearance: She is well-developed.   Cardiovascular:      Rate and Rhythm: Normal rate and regular rhythm.      Heart sounds: Normal heart sounds.      Comments: Regular rhythm today.  Pulmonary:      Effort: Pulmonary effort is normal.      Breath sounds: Normal breath sounds.   Musculoskeletal:      Comments: Good bilateral range of motion of the hips with excellent internal and external rotation with minimal discomfort.  She has pain to palpation at the bilateral medial buttock/sacroiliac areas right greater than left.   Skin:     General: Skin is warm and dry.   Psychiatric:         Behavior: Behavior normal.         Thought Content: Thought content normal.         Judgment: Judgment normal.      " Comments: Mood mildly depressed        Result Review :   The following data was reviewed by: Bryan Vega MD on 07/09/2021:  Common labs    Common Labsle 3/5/21 3/5/21 3/22/21 7/2/21 7/2/21 7/2/21    0918 0918  0954 0954 0954   Glucose  107 (A) 101 (A)      Glucose     95    BUN  21 13  13    Creatinine  1.15 (A) 0.99  0.91    eGFR Non  Am  47 (A) 56 (A)  61    eGFR  Am     75    Sodium  139 141  144    Potassium  3.9 4.1  4.8    Chloride  96 (A) 101  103    Calcium  9.5 9.1  9.8    Total Protein     7.1    Albumin  4.50   4.60    Total Bilirubin  0.6   0.4    Alkaline Phosphatase  71   88    AST (SGOT)  16   14    ALT (SGPT)  7   8    WBC 5.66   5.35     Hemoglobin 13.8   12.5     Hematocrit 43.5   38.0     Platelets 324   315     Total Cholesterol      224 (A)   Triglycerides      107   HDL Cholesterol      54   LDL Cholesterol       151 (A)   (A) Abnormal value       Comments are available for some flowsheets but are not being displayed.                     Assessment and Plan    Diagnoses and all orders for this visit:    1. Medicare annual wellness visit, subsequent (Primary)    2. Chronic pain syndrome    3. Fibromyalgia    4. Mild episode of recurrent major depressive disorder (CMS/HCC)    5. Cardiomyopathy, dilated (CMS/HCC)    6. Visit for screening mammogram  -     Mammo Screening Bilateral With CAD; Future    Other orders  -     DULoxetine (CYMBALTA) 20 MG capsule; Take 1 capsule by mouth Daily.  Dispense: 90 capsule; Refill: 1      Fibromyalgia/chronic pain syndrome.  Continue gabapentin.  Restart duloxetine at a lower dose of 20 mg a day and we will start ramping it back up.  I will see her back in 3 months.  I believe the 40 mg capsules were too expensive but the 20 and 30 mg capsule should be less expensive hopefully.  If still having trouble with finances, she will let us know.    Major depression.  Mild and ongoing.  Restart duloxetine.    Cardiomyopathy.  No dyspnea on exertion.   Pulmonary and cardiac exam today is unremarkable.  She will be seeing her electrophysiologist soon, they are considering an implanted defibrillator.          Follow Up   No follow-ups on file.  Patient was given instructions and counseling regarding her condition or for health maintenance advice. Please see specific information pulled into the AVS if appropriate.

## 2021-07-09 NOTE — PROGRESS NOTES
The ABCs of the Annual Wellness Visit  Subsequent Medicare Wellness Visit    Chief Complaint   Patient presents with   • Medicare Wellness-subsequent       Subjective   History of Present Illness:  Shanika Hector is a 66 y.o. female who presents for a Subsequent Medicare Wellness Visit.    HEALTH RISK ASSESSMENT    Recent Hospitalizations:  No hospitalization(s) within the last year.    Current Medical Providers:  Patient Care Team:  Bryan Vega MD as PCP - General  rByan Vega MD as PCP - Family Medicine  Darrion Bateman MD as Consulting Physician (Cardiology)    Smoking Status:  Social History     Tobacco Use   Smoking Status Never Smoker   Smokeless Tobacco Never Used       Alcohol Consumption:  Social History     Substance and Sexual Activity   Alcohol Use Yes   • Alcohol/week: 1.0 standard drinks   • Types: 1 Glasses of wine per week    Comment: caffeine- 2 cups daily, occasional       Depression Screen:   PHQ-2/PHQ-9 Depression Screening 7/9/2021   Little interest or pleasure in doing things 0   Feeling down, depressed, or hopeless 0   Trouble falling or staying asleep, or sleeping too much -   Feeling tired or having little energy -   Poor appetite or overeating -   Feeling bad about yourself - or that you are a failure or have let yourself or your family down -   Trouble concentrating on things, such as reading the newspaper or watching television -   Moving or speaking so slowly that other people could have noticed. Or the opposite - being so fidgety or restless that you have been moving around a lot more than usual -   Thoughts that you would be better off dead, or of hurting yourself in some way -   Total Score 0   If you checked off any problems, how difficult have these problems made it for you to do your work, take care of things at home, or get along with other people? -       Fall Risk Screen:  STEADI Fall Risk Assessment was completed, and patient is at LOW risk for falls.Assessment  completed on:7/9/2021    Health Habits and Functional and Cognitive Screening:  Functional & Cognitive Status 7/9/2021   Do you have difficulty preparing food and eating? No   Do you have difficulty bathing yourself, getting dressed or grooming yourself? No   Do you have difficulty using the toilet? No   Do you have difficulty moving around from place to place? Yes   Do you have trouble with steps or getting out of a bed or a chair? Yes   Current Diet Unhealthy Diet   Dental Exam Up to date   Eye Exam Up to date   Exercise (times per week) 5 times per week   Current Exercises Include Walking   Current Exercise Activities Include -   Do you need help using the phone?  No   Are you deaf or do you have serious difficulty hearing?  No   Do you need help with transportation? No   Do you need help shopping? No   Do you need help preparing meals?  No   Do you need help with housework?  No   Do you need help with laundry? No   Do you need help taking your medications? No   Do you need help managing money? No   Do you ever drive or ride in a car without wearing a seat belt? No   Have you felt unusual stress, anger or loneliness in the last month? Yes   Who do you live with? Spouse   If you need help, do you have trouble finding someone available to you? No   Have you been bothered in the last four weeks by sexual problems? No   Do you have difficulty concentrating, remembering or making decisions? No         Does the patient have evidence of cognitive impairment? No    Asprin use counseling:Does not need ASA (and currently is not on it)    Age-appropriate Screening Schedule:  Refer to the list below for future screening recommendations based on patient's age, sex and/or medical conditions. Orders for these recommended tests are listed in the plan section. The patient has been provided with a written plan.    Health Maintenance   Topic Date Due   • PAP SMEAR  11/13/2020   • MAMMOGRAM  05/13/2021   • INFLUENZA VACCINE   08/01/2021   • LIPID PANEL  07/02/2022   • DXA SCAN  07/20/2022   • TDAP/TD VACCINES (2 - Td or Tdap) 11/13/2027   • ZOSTER VACCINE  Discontinued          The following portions of the patient's history were reviewed and updated as appropriate: allergies, current medications, past family history, past medical history, past social history, past surgical history and problem list.    Outpatient Medications Prior to Visit   Medication Sig Dispense Refill   • acetaminophen (TYLENOL) 650 MG 8 hr tablet Take 2 tablets by mouth 2 (two) times a day as needed.     • digoxin (LANOXIN) 125 MCG tablet Take 1 tablet by mouth Daily. 90 tablet 3   • furosemide (LASIX) 40 MG tablet Take 1 tablet by mouth Daily. 90 tablet 3   • gabapentin (NEURONTIN) 100 MG capsule TAKE ONE CAPSULE BY MOUTH EVERY MORNING AND TAKE TWO CAPSULES BY MOUTH EVERY EVENING 270 capsule 1   • metoprolol succinate XL (TOPROL-XL) 25 MG 24 hr tablet TAKE ONE TABLET BY MOUTH DAILY 90 tablet 3   • omeprazole (priLOSEC) 40 MG capsule TAKE ONE CAPSULE BY MOUTH DAILY 90 capsule 0   • Wheat Dextrin (Benefiber) powder qd-bid     • DULoxetine HCl 40 MG capsule delayed-release particles TAKE ONE CAPSULE BY MOUTH DAILY 90 capsule 0     No facility-administered medications prior to visit.       Patient Active Problem List   Diagnosis   • Cervical radiculopathy   • Acid reflux   • Generalized osteoarthritis   • Gastroesophageal reflux disease   • Irritable bowel syndrome with constipation   • Osteoporosis   • Seborrheic eczema   • Osteoarthritis of cervical spine without myelopathy   • Eczema   • DDD (degenerative disc disease), lumbar   • Bilateral sacroiliitis (CMS/HCC)   • Lateral femoral cutaneous neuropathy   • Other chronic pain   • Lumbar facet arthropathy   • Pure hypercholesterolemia   • Senile atrophic vaginitis   • Other specified abnormal immunological findings in serum   • Cervical disc disorder, unspecified, unspecified cervical region   • Chronic pain syndrome  "  • Dyspepsia   • Fibromyalgia   • Mild episode of recurrent major depressive disorder (CMS/HCC)   • Chronic systolic congestive heart failure (CMS/HCC)   • Cardiomyopathy, dilated (CMS/HCC)   • Nonrheumatic mitral valve regurgitation   • PVCs (premature ventricular contractions)       Advanced Care Planning:  ACP discussion was held with the patient during this visit. Patient has an advance directive (not in EMR), copy requested.    Review of Systems    Compared to one year ago, the patient feels her physical health is the same.  Compared to one year ago, the patient feels her mental health is the same.    Reviewed chart for potential of high risk medication in the elderly: yes  Reviewed chart for potential of harmful drug interactions in the elderly:yes    Objective         Vitals:    07/09/21 0750   BP: 106/70   Pulse: 64   Temp: 97.3 °F (36.3 °C)   TempSrc: Temporal   SpO2: 99%   Weight: 59.3 kg (130 lb 11.2 oz)   Height: 160 cm (62.99\")       Body mass index is 23.16 kg/m².  Discussed the patient's BMI with her. The BMI is in the acceptable range.    Physical Exam    Lab Results   Component Value Date    GLU 95 07/02/2021    CHLPL 224 (H) 07/02/2021    TRIG 107 07/02/2021    HDL 54 07/02/2021     (H) 07/02/2021    VLDL 19 07/02/2021        Assessment/Plan   Medicare Risks and Personalized Health Plan  CMS Preventative Services Quick Reference  Advance Directive Discussion  Breast Cancer/Mammogram Screening  Chronic Pain     The above risks/problems have been discussed with the patient.  Pertinent information has been shared with the patient in the After Visit Summary.  Follow up plans and orders are seen below in the Assessment/Plan Section.    Diagnoses and all orders for this visit:    1. Medicare annual wellness visit, subsequent (Primary)    Other orders  -     DULoxetine (CYMBALTA) 20 MG capsule; Take 1 capsule by mouth Daily.  Dispense: 90 capsule; Refill: 1      Follow Up:  No follow-ups on file. "     An After Visit Summary and PPPS were given to the patient.

## 2021-07-13 ENCOUNTER — TRANSCRIBE ORDERS (OUTPATIENT)
Dept: ADMINISTRATIVE | Facility: HOSPITAL | Age: 66
End: 2021-07-13

## 2021-07-13 DIAGNOSIS — Z12.31 VISIT FOR SCREENING MAMMOGRAM: Primary | ICD-10-CM

## 2021-07-22 ENCOUNTER — OFFICE VISIT (OUTPATIENT)
Dept: CARDIOLOGY | Facility: CLINIC | Age: 66
End: 2021-07-22

## 2021-07-22 VITALS
DIASTOLIC BLOOD PRESSURE: 68 MMHG | BODY MASS INDEX: 22.5 KG/M2 | SYSTOLIC BLOOD PRESSURE: 120 MMHG | HEART RATE: 60 BPM | WEIGHT: 127 LBS | HEIGHT: 63 IN

## 2021-07-22 DIAGNOSIS — I42.0 CARDIOMYOPATHY, DILATED (HCC): Primary | ICD-10-CM

## 2021-07-22 PROCEDURE — 99214 OFFICE O/P EST MOD 30 MIN: CPT | Performed by: INTERNAL MEDICINE

## 2021-07-22 PROCEDURE — 93000 ELECTROCARDIOGRAM COMPLETE: CPT | Performed by: INTERNAL MEDICINE

## 2021-07-22 RX ORDER — LISINOPRIL 2.5 MG/1
2.5 TABLET ORAL DAILY
Qty: 30 TABLET | Refills: 3 | Status: SHIPPED | OUTPATIENT
Start: 2021-07-22 | End: 2021-11-19 | Stop reason: SDUPTHER

## 2021-07-22 NOTE — PROGRESS NOTES
Date of Office Visit: 2021  Encounter Provider: Mati Toscano MD  Place of Service: [facility] CARDIOLOGY  Patient Name: Shanika Hector  :1955    Chief Complaint   Patient presents with   • Cardiomyopathy     New Patient Consult   • chronic systolic CHF   :     HPI: Shanika Hector is a 66 y.o. female who presents today for consideration of primary prevention ICD.     She presented to the hospital in 2021 with complaint of shortness of breath.      She is currently on metoprolol succinate.     She feels fatigue after a couple of hours of light house work. She could climb a couple of flights of stairs, but reports that she would be slower.     She has a Holter monitor with 1 % PVC    There has not been attempt with ACE due to hypotension, fear of hypotension.          Past Medical History:   Diagnosis Date   • Acute systolic congestive heart failure (CMS/HCC) 2021   • ITZEL positive     Repeat ITZEL 2014 negative   • Anxiety    • Bilateral sacroiliitis (CMS/HCC) 2017   • Bursitis    • Cervical radiculopathy    • Cervical spondylosis    • Degeneration of intervertebral disc    • Eczema    • Fibromyalgia 3/16/2020   • GERD (gastroesophageal reflux disease)    • History of cardiac murmur as a child    • Irritable bowel syndrome    • Lichen sclerosus of female genitalia    • Low back pain    • Myalgia and myositis    • Nonischemic cardiomyopathy (CMS/HCC)     2021, EF 20%, normal cors   • Nonrheumatic mitral valve regurgitation    • Osteoporosis    • Peptic ulceration Dec 2019   • Pruritus    • Pure hypercholesterolemia 6/10/2019   • PVCs (premature ventricular contractions) 3/22/2021       Past Surgical History:   Procedure Laterality Date   • BREAST CYST ASPIRATION      10+ years ago   • CARDIAC CATHETERIZATION N/A 2/15/2021    Procedure: Left Heart Cath;  Surgeon: Zee Rojas MD;  Location: [facility] CATH INVASIVE LOCATION;  Service:  Cardiovascular;  Laterality: N/A;   • CARDIAC CATHETERIZATION N/A 2/15/2021    Procedure: Left ventriculography;  Surgeon: Zee Rojas MD;  Location:  [facility] CATH INVASIVE LOCATION;  Service: Cardiovascular;  Laterality: N/A;   • CARDIAC CATHETERIZATION N/A 2/15/2021    Procedure: Coronary angiography;  Surgeon: Zee Rojas MD;  Location:  [facility] CATH INVASIVE LOCATION;  Service: Cardiovascular;  Laterality: N/A;   • CARDIAC CATHETERIZATION N/A 2/15/2021    Procedure: Right Heart Cath;  Surgeon: Zee Rojas MD;  Location:  [facility] CATH INVASIVE LOCATION;  Service: Cardiovascular;  Laterality: N/A;   • CHOLECYSTECTOMY     • COLONOSCOPY  07/30/2012    matt Lo   • ENDOSCOPY  12/04/2015    Mild active duodenitis, reactive gastropathy suggestive of healing erosion or adjacent ulcer   • ENDOSCOPY N/A 12/16/2019    Procedure: ESOPHAGOGASTRODUODENOSCOPY WITH BIOPSY WITH COLD BIOPSY POLYPECTOMY;  Surgeon: Lida Garcia MD;  Location: [facility] ENDOSCOPY;  Service: Gastroenterology   • ENDOSCOPY N/A 7/13/2020    Procedure: ESOPHAGOGASTRODUODENOSCOPY WITH COLD BIOPSY POLYPECTOMY;  Surgeon: Lida Garcia MD;  Location: [facility] ENDOSCOPY;  Service: Gastroenterology;  Laterality: N/A;  pre: history of gastric ulcer  post: healed gastric ulcer and gastric polyp   • MOUTH SURGERY         Social History     Socioeconomic History   • Marital status:      Spouse name: Not on file   • Number of children: Not on file   • Years of education: Not on file   • Highest education level: Not on file   Tobacco Use   • Smoking status: Never Smoker   • Smokeless tobacco: Never Used   Vaping Use   • Vaping Use: Never used   Substance and Sexual Activity   • Alcohol use: Yes     Alcohol/week: 1.0 standard drinks     Types: 1 Glasses of wine per week     Comment: caffeine- 2 cups daily, occasional   • Drug use: No   • Sexual activity: Defer     Birth control/protection: None       Family History   Problem Relation  "Age of Onset   • Irritable bowel syndrome Mother    • Hypertension Mother    • Hypertension Father    • Hearing loss Father    • Alcohol abuse Brother    • Early death Brother    • Heart disease Brother    • Arthritis Sister        Review of Systems   Constitutional: Negative.   Cardiovascular: Negative.    Respiratory: Negative.    Gastrointestinal: Negative.        Allergies   Allergen Reactions   • Other Unknown (See Comments)     perfume   • Sulfa Antibiotics Unknown (See Comments)     Past history, unknown reaction. Was told by mother there was a reaction of some type.    • Adhesive Tape Itching         Current Outpatient Medications:   •  acetaminophen (TYLENOL) 650 MG 8 hr tablet, Take 2 tablets by mouth 2 (two) times a day as needed., Disp: , Rfl:   •  digoxin (LANOXIN) 125 MCG tablet, Take 1 tablet by mouth Daily., Disp: 90 tablet, Rfl: 3  •  DULoxetine (CYMBALTA) 20 MG capsule, Take 1 capsule by mouth Daily., Disp: 90 capsule, Rfl: 1  •  gabapentin (NEURONTIN) 100 MG capsule, TAKE ONE CAPSULE BY MOUTH EVERY MORNING AND TAKE TWO CAPSULES BY MOUTH EVERY EVENING, Disp: 270 capsule, Rfl: 1  •  metoprolol succinate XL (TOPROL-XL) 25 MG 24 hr tablet, TAKE ONE TABLET BY MOUTH DAILY, Disp: 90 tablet, Rfl: 3  •  omeprazole (priLOSEC) 40 MG capsule, TAKE ONE CAPSULE BY MOUTH DAILY, Disp: 90 capsule, Rfl: 0  •  Wheat Dextrin (Benefiber) powder, qd-bid, Disp: , Rfl:   •  furosemide (LASIX) 40 MG tablet, Take 1 tablet by mouth Daily., Disp: 90 tablet, Rfl: 3  •  lisinopril (PRINIVIL,ZESTRIL) 2.5 MG tablet, Take 1 tablet by mouth Daily. Take at night, Disp: 30 tablet, Rfl: 3      Objective:     Vitals:    07/22/21 0830   BP: 120/68   Pulse: 60   Weight: 57.6 kg (127 lb)   Height: 160 cm (63\")     Body mass index is 22.5 kg/m².    PHYSICAL EXAM:    Vitals and nursing note reviewed.   Constitutional:       Appearance: Healthy appearance.   Eyes:      General: Lids are normal.   HENT:      Head: Normocephalic and " atraumatic.   Pulmonary:      Effort: Pulmonary effort is normal.   Cardiovascular:      Normal rate. Regular rhythm.   Edema:     Peripheral edema absent.   Neurological:      Mental Status: Alert, oriented to person, place, and time and oriented to person, place and time.   Psychiatric:         Attention and Perception: Attention and perception normal.             ECG 12 Lead    Date/Time: 7/22/2021 9:54 AM  Performed by: Mati Toscano MD  Authorized by: Mati Toscano MD   Comparison: compared with previous ECG from 6/25/2021  Rhythm: sinus rhythm        I have reviewed her MRI report.  Ejection fraction 31%, no delayed enhancement.    I reviewed her 7-21 comprehensive metabolic panel.  He had normal renal function.      Assessment:       Diagnosis Plan   1. Cardiomyopathy, dilated (CMS/HCC)  Basic Metabolic Panel          Plan:       I met with Ms. Hector and discussed ICD therapy for primary prevention with her with the aid of a shared decision-making tool.    We discussed the following is a better case.  She has a nonischemic cardiomyopathy, that is of relatively recent diagnosis.  Ejection fraction has improved significantly.  She currently has NYHA class I/II symptoms.    She has not tried an ACE inhibitor due to hypotension, but blood pressures look better today.  I question her intensively, and she has not had any clearly symptomatic hypotension.    I have recommended that we trial low-dose ACE inhibitor, and will start lisinopril 2.5 mg.  She is going to take it at night.  We are going  check a BMP in 2 weeks.    I asked her to call me with any dizziness, lightheadedness, or syncope.  When I have her return in 3 months, or sooner if she experiences issues.    As always, it has been a pleasure to participate in your patient's care.      Sincerely,         Mati Toscano MD

## 2021-07-30 ENCOUNTER — LAB (OUTPATIENT)
Dept: LAB | Facility: HOSPITAL | Age: 66
End: 2021-07-30

## 2021-07-30 DIAGNOSIS — I42.0 CARDIOMYOPATHY, DILATED (HCC): ICD-10-CM

## 2021-07-30 LAB
ANION GAP SERPL CALCULATED.3IONS-SCNC: 8 MMOL/L (ref 5–15)
BUN SERPL-MCNC: 17 MG/DL (ref 8–23)
BUN/CREAT SERPL: 18.3 (ref 7–25)
CALCIUM SPEC-SCNC: 9.3 MG/DL (ref 8.6–10.5)
CHLORIDE SERPL-SCNC: 102 MMOL/L (ref 98–107)
CO2 SERPL-SCNC: 31 MMOL/L (ref 22–29)
CREAT SERPL-MCNC: 0.93 MG/DL (ref 0.57–1)
GFR SERPL CREATININE-BSD FRML MDRD: 60 ML/MIN/1.73
GLUCOSE SERPL-MCNC: 105 MG/DL (ref 65–99)
POTASSIUM SERPL-SCNC: 5.1 MMOL/L (ref 3.5–5.2)
SODIUM SERPL-SCNC: 141 MMOL/L (ref 136–145)

## 2021-07-30 PROCEDURE — 80048 BASIC METABOLIC PNL TOTAL CA: CPT

## 2021-07-30 PROCEDURE — 36415 COLL VENOUS BLD VENIPUNCTURE: CPT

## 2021-08-19 RX ORDER — DIGOXIN 125 MCG
125 TABLET ORAL DAILY
Qty: 90 TABLET | Refills: 3 | Status: SHIPPED | OUTPATIENT
Start: 2021-08-19 | End: 2022-06-27

## 2021-09-07 RX ORDER — GABAPENTIN 100 MG/1
CAPSULE ORAL
Qty: 270 CAPSULE | Refills: 0 | Status: SHIPPED | OUTPATIENT
Start: 2021-09-07 | End: 2021-12-28 | Stop reason: SDUPTHER

## 2021-09-07 NOTE — TELEPHONE ENCOUNTER
Rx Refill Note  Requested Prescriptions     Pending Prescriptions Disp Refills   • gabapentin (NEURONTIN) 100 MG capsule [Pharmacy Med Name: GABAPENTIN 100 MG CAPSULE] 270 capsule      Sig: TAKE ONE CAPSULE BY MOUTH EVERY MORNING AND TAKE TWO CAPSULES BY MOUTH EVERY EVENING      Last office visit with prescribing clinician: 7/9/2021      Next office visit with prescribing clinician: 10/15/2021            Terrie Mir MA  09/07/21, 07:19 EDT

## 2021-09-20 ENCOUNTER — HOSPITAL ENCOUNTER (OUTPATIENT)
Dept: MAMMOGRAPHY | Facility: HOSPITAL | Age: 66
Discharge: HOME | End: 2021-09-20
Admitting: FAMILY MEDICINE

## 2021-09-20 DIAGNOSIS — Z12.31 VISIT FOR SCREENING MAMMOGRAM: ICD-10-CM

## 2021-09-20 PROCEDURE — 77067 SCR MAMMO BI INCL CAD: CPT

## 2021-09-20 PROCEDURE — 77063 BREAST TOMOSYNTHESIS BI: CPT

## 2021-11-02 ENCOUNTER — OFFICE VISIT (OUTPATIENT)
Dept: FAMILY MEDICINE CLINIC | Facility: CLINIC | Age: 66
End: 2021-11-02

## 2021-11-02 VITALS
DIASTOLIC BLOOD PRESSURE: 70 MMHG | TEMPERATURE: 97.5 F | HEART RATE: 63 BPM | OXYGEN SATURATION: 99 % | RESPIRATION RATE: 16 BRPM | SYSTOLIC BLOOD PRESSURE: 108 MMHG | WEIGHT: 119.5 LBS | HEIGHT: 63 IN | BODY MASS INDEX: 21.17 KG/M2

## 2021-11-02 DIAGNOSIS — M79.7 FIBROMYALGIA: Primary | Chronic | ICD-10-CM

## 2021-11-02 DIAGNOSIS — Z23 NEED FOR VACCINATION: ICD-10-CM

## 2021-11-02 PROCEDURE — 90662 IIV NO PRSV INCREASED AG IM: CPT | Performed by: FAMILY MEDICINE

## 2021-11-02 PROCEDURE — 99213 OFFICE O/P EST LOW 20 MIN: CPT | Performed by: FAMILY MEDICINE

## 2021-11-02 PROCEDURE — G0008 ADMIN INFLUENZA VIRUS VAC: HCPCS | Performed by: FAMILY MEDICINE

## 2021-11-02 NOTE — PROGRESS NOTES
"Chief Complaint  Fibromyalgia (f/u)    Subjective          Shanika Hector presents to [facility]  History of Present Illness    Follow-up fibromyalgia.  We restarted her duloxetine 20 mg a day last visit a few months ago.  Previously she was on a higher dose and did not tolerate it.  She continues her gabapentin 2 tablets daily.  Overall she is feeling improved.  She is able to mow her lawn.  She moved to [place].  She has 2 cats.  Some stressors but she is coping.  She continues to follow with her cardiologist.  They started lisinopril recently.  Blood pressure today is not hypotensive.    Objective   Vital Signs:   /70   Pulse 63   Temp 97.5 °F (36.4 °C) (Temporal)   Resp 16   Ht 160 cm (63\")   Wt 54.2 kg (119 lb 8 oz)   SpO2 99%   BMI 21.17 kg/m²     Physical Exam  Vitals and nursing note reviewed.   Constitutional:       General: She is not in acute distress.     Appearance: She is well-developed.   Cardiovascular:      Rate and Rhythm: Normal rate and regular rhythm.      Heart sounds: Normal heart sounds.   Pulmonary:      Effort: Pulmonary effort is normal.      Breath sounds: Normal breath sounds.   Skin:     General: Skin is warm and dry.   Psychiatric:         Behavior: Behavior normal.         Thought Content: Thought content normal.         Judgment: Judgment normal.        Result Review :   The following data was reviewed by: Bryan Vega MD on 11/02/2021:  Common labs    Common Labsle 3/22/21 7/2/21 7/2/21 7/2/21 7/30/21     [id number]    Glucose 101 (A)  95  105 (A)   BUN 13  13  17   Creatinine 0.99  0.91  0.93   eGFR Non  Am 56 (A)  61  60 (A)   eGFR African Am   75     Sodium 141  144  141   Potassium 4.1  4.8  5.1   Chloride 101  103  102   Calcium 9.1  9.8  9.3   Total Protein   7.1     Albumin   4.60     Total Bilirubin   0.4     Alkaline Phosphatase   88     AST (SGOT)   14     ALT (SGPT)   8     WBC  5.35      Hemoglobin  12.5    "   Hematocrit  38.0      Platelets  315      Total Cholesterol    224 (A)    Triglycerides    107    HDL Cholesterol    54    LDL Cholesterol     151 (A)    (A) Abnormal value       Comments are available for some flowsheets but are not being displayed.                     Assessment and Plan    Diagnoses and all orders for this visit:    1. Fibromyalgia (Primary)    2. Need for vaccination  -     Fluzone High-Dose 65+yrs ([id number])      Follow-up fibromyalgia.  Despite her stressors she is doing remarkably well.  She is continuing the duloxetine 20 mg a day.  She is doing well on this lower dose.  Previously higher dose and did not tolerate it.  Continue gabapentin.  Prescribers agreement today.  I will see her back in 6 or 7 months for annual Medicare wellness visit and physical examination with lab work prior.      Follow Up   No follow-ups on file.  Patient was given instructions and counseling regarding her condition or for health maintenance advice. Please see specific information pulled into the AVS if appropriate.       Answers for HPI/ROS submitted by the patient on 10/27/2021  Please describe your symptoms.: Follow up  Have you had these symptoms before?: Yes  How long have you been having these symptoms?: Greater than 2 weeks  Please list any medications you are currently taking for this condition.: They r on file  Please describe any probable cause for these symptoms. : Ongoing  What is the primary reason for your visit?: Other

## 2021-11-16 ENCOUNTER — OFFICE VISIT (OUTPATIENT)
Dept: CARDIOLOGY | Facility: CLINIC | Age: 66
End: 2021-11-16

## 2021-11-16 VITALS
HEIGHT: 63 IN | WEIGHT: 118 LBS | DIASTOLIC BLOOD PRESSURE: 70 MMHG | SYSTOLIC BLOOD PRESSURE: 116 MMHG | HEART RATE: 67 BPM | BODY MASS INDEX: 20.91 KG/M2

## 2021-11-16 DIAGNOSIS — I49.3 PVCS (PREMATURE VENTRICULAR CONTRACTIONS): ICD-10-CM

## 2021-11-16 DIAGNOSIS — I50.22 CHRONIC SYSTOLIC CONGESTIVE HEART FAILURE (HCC): Chronic | ICD-10-CM

## 2021-11-16 DIAGNOSIS — I42.0 CARDIOMYOPATHY, DILATED (HCC): Primary | ICD-10-CM

## 2021-11-16 PROCEDURE — 93000 ELECTROCARDIOGRAM COMPLETE: CPT | Performed by: NURSE PRACTITIONER

## 2021-11-16 PROCEDURE — 99214 OFFICE O/P EST MOD 30 MIN: CPT | Performed by: NURSE PRACTITIONER

## 2021-11-16 RX ORDER — FUROSEMIDE 40 MG/1
40 TABLET ORAL DAILY
COMMUNITY
End: 2022-05-10

## 2021-11-16 NOTE — PROGRESS NOTES
Date of Office Visit: 2021  Encounter Provider: ABHISHEK Franks  Place of Service: [facility] CARDIOLOGY  Patient Name: Shanika Hector  :1955    Chief Complaint   Patient presents with   • Cardiomyopathy     3 month f/u    • PVCs   • chronic systolic heart failure   :     HPI: Shanika Hector is a 66 y.o. female who is a patient of Dr. Bateman who was referred to Dr. Toscano for evaluation for primary prevention ICD.    She has nonischemic cardiomyopathy, chronic systolic heart failure and PVCs.    She saw Dr. Toscano for the first time in July, she was on metoprolol succinate that and diuretic at that time but had not been on an ACE/ARB secondary to concerns from hypotension, she was agreeable to try it and he started her on lisinopril and she presents today for follow-up.    Her echocardiogram and cath in February of this year showed an EF of around 20%, she had a cardiac MRI in April which showed improvement with an EF of 31%.    Today she reports that she is feeling well.  She says that she felt improvement a couple of weeks after starting the lisinopril.    She denies chest pain, dyspnea, PND, orthopnea or edema.  She has palpitations which she says are not new and unchanged.  She is known to have PVCs.  Monitor in May showed a 1% ventricular arrhythmia burden (triplets, couplets and single PVCs).     She has not had any dizziness or lightheadedness, her blood pressure today was 116/70 and as far she knows she has not had any problems with hypotension.    She and her  moved to [place] in early October, they now live on 2 acres, the first week they were there he fell and broke his ankle and has been nonweightbearing so she has had to do the majority of the work which is included mowing grass and she says she has been able to do this without much difficulty.       Past Medical History:   Diagnosis Date   • Acute systolic congestive heart failure (HCC) 2021   •  ITZEL positive     Repeat ITZEL November 2014 negative   • Anxiety    • Bilateral sacroiliitis (HCC) 5/8/2017   • Bursitis 2016   • Cardiomyopathy, dilated (HCC)    • Cervical radiculopathy    • Cervical spondylosis    • Degeneration of intervertebral disc    • Eczema    • Fibromyalgia 3/16/2020   • GERD (gastroesophageal reflux disease)    • Heart murmur Preteen   • History of cardiac murmur as a child    • Irritable bowel syndrome    • Lichen sclerosus of female genitalia    • Low back pain    • Myalgia and myositis    • Nonischemic cardiomyopathy (HCC)     2/2021, EF 20%, normal cors   • Nonrheumatic mitral valve regurgitation    • Osteoporosis    • Peptic ulceration Dec 2019   • Pruritus    • Pure hypercholesterolemia 6/10/2019   • PVCs (premature ventricular contractions) 3/22/2021       Past Surgical History:   Procedure Laterality Date   • BREAST CYST ASPIRATION      10+ years ago   • CARDIAC CATHETERIZATION N/A 2/15/2021    Procedure: Left Heart Cath;  Surgeon: Zee Rojas MD;  Location: [facility] CATH INVASIVE LOCATION;  Service: Cardiovascular;  Laterality: N/A;   • CARDIAC CATHETERIZATION N/A 2/15/2021    Procedure: Left ventriculography;  Surgeon: Zee Rojas MD;  Location:  [facility] CATH INVASIVE LOCATION;  Service: Cardiovascular;  Laterality: N/A;   • CARDIAC CATHETERIZATION N/A 2/15/2021    Procedure: Coronary angiography;  Surgeon: Zee Rojas MD;  Location:  [facility] CATH INVASIVE LOCATION;  Service: Cardiovascular;  Laterality: N/A;   • CARDIAC CATHETERIZATION N/A 2/15/2021    Procedure: Right Heart Cath;  Surgeon: Zee Rojas MD;  Location:  [facility] CATH INVASIVE LOCATION;  Service: Cardiovascular;  Laterality: N/A;   • CHOLECYSTECTOMY     • COLONOSCOPY  07/30/2012    Dr. Zazueta, normal   • ENDOSCOPY  12/04/2015    Mild active duodenitis, reactive gastropathy suggestive of healing erosion or adjacent ulcer   • ENDOSCOPY N/A 12/16/2019    Procedure: ESOPHAGOGASTRODUODENOSCOPY WITH BIOPSY WITH  COLD BIOPSY POLYPECTOMY;  Surgeon: Lida Garcia MD;  Location: [facility] ENDOSCOPY;  Service: Gastroenterology   • ENDOSCOPY N/A 7/13/2020    Procedure: ESOPHAGOGASTRODUODENOSCOPY WITH COLD BIOPSY POLYPECTOMY;  Surgeon: Lida Garcia MD;  Location: [facility] ENDOSCOPY;  Service: Gastroenterology;  Laterality: N/A;  pre: history of gastric ulcer  post: healed gastric ulcer and gastric polyp   • EYE SURGERY  April2021    Cararact surgury both eyes   • MOUTH SURGERY         Social History     Socioeconomic History   • Marital status:    Tobacco Use   • Smoking status: Never Smoker   • Smokeless tobacco: Never Used   Vaping Use   • Vaping Use: Never used   Substance and Sexual Activity   • Alcohol use: Yes     Alcohol/week: 1.0 standard drink     Types: 1 Glasses of wine per week     Comment: caffeine- 2 cups daily, occasional   • Drug use: No   • Sexual activity: Yes     Partners: Male     Birth control/protection: None       Family History   Problem Relation Age of Onset   • Irritable bowel syndrome Mother    • Hypertension Mother    • Hypertension Father    • Hearing loss Father    • Alcohol abuse Brother    • Early death Brother    • Heart disease Brother    • Arthritis Sister        Review of Systems   Constitutional: Negative for chills, fever and malaise/fatigue.   Cardiovascular: Negative for chest pain, dyspnea on exertion, leg swelling, near-syncope, orthopnea, palpitations, paroxysmal nocturnal dyspnea and syncope.   Respiratory: Negative for cough and shortness of breath.    Musculoskeletal: Negative for joint pain, joint swelling and myalgias.   Gastrointestinal: Negative for abdominal pain, diarrhea, melena, nausea and vomiting.   Genitourinary: Negative for frequency and hematuria.   Neurological: Negative for light-headedness, numbness, paresthesias and seizures.   Allergic/Immunologic: Negative.    All other systems reviewed and are negative.      Allergies   Allergen Reactions   • Other  "Unknown (See Comments)     perfume   • Sulfa Antibiotics Unknown (See Comments)     Past history, unknown reaction. Was told by mother there was a reaction of some type.    • Adhesive Tape Itching         Current Outpatient Medications:   •  acetaminophen (TYLENOL) 650 MG 8 hr tablet, Take 2 tablets by mouth 2 (two) times a day as needed., Disp: , Rfl:   •  digoxin (LANOXIN) 125 MCG tablet, Take 1 tablet by mouth Daily., Disp: 90 tablet, Rfl: 3  •  DULoxetine (CYMBALTA) 20 MG capsule, Take 1 capsule by mouth Daily., Disp: 90 capsule, Rfl: 1  •  furosemide (LASIX) 40 MG tablet, Take 40 mg by mouth Daily., Disp: , Rfl:   •  gabapentin (NEURONTIN) 100 MG capsule, TAKE ONE CAPSULE BY MOUTH EVERY MORNING AND TAKE TWO CAPSULES BY MOUTH EVERY EVENING, Disp: 270 capsule, Rfl: 0  •  lisinopril (PRINIVIL,ZESTRIL) 2.5 MG tablet, Take 1 tablet by mouth Daily. Take at night, Disp: 30 tablet, Rfl: 3  •  metoprolol succinate XL (TOPROL-XL) 25 MG 24 hr tablet, TAKE ONE TABLET BY MOUTH DAILY, Disp: 90 tablet, Rfl: 3  •  omeprazole (priLOSEC) 40 MG capsule, TAKE ONE CAPSULE BY MOUTH DAILY, Disp: 90 capsule, Rfl: 0  •  Wheat Dextrin (Benefiber) powder, qd-bid, Disp: , Rfl:       Objective:     Vitals:    11/16/21 1021   BP: 116/70   Pulse: 67   Weight: 53.5 kg (118 lb)   Height: 160 cm (63\")     Body mass index is 20.9 kg/m².    PHYSICAL EXAM:    Vitals Reviewed.   General Appearance: No acute distress, thin female.  Eyes: Conjunctiva and lids: No erythema, swelling, or discharge. Sclera non-icteric.   HENT: Atraumatic, normocephalic. External eyes, ears, and nose normal.   Respiratory: No signs of respiratory distress. Respiration rhythm and depth normal.   Clear to auscultation. No rales, crackles, rhonchi, or wheezing auscultated.   Cardiovascular:  Jugular Venous Pressure: Normal  Heart Rate and Rhythm: Normal, Heart Sounds: Normal S1 and S2. No S3 or S4 noted.  Murmurs: No murmurs noted. No rubs, thrills, or gallops.   Arterial " Pulses:  Posterior tibialis and dorsalis pedis pulses normal.   Lower Extremities: No edema noted.  Gastrointestinal:  Abdomen soft, non-distended, non-tender.   Musculoskeletal: Normal movement of extremities  Skin and Nails: General appearance normal. No pallor, cyanosis, diaphoresis. Skin temperature normal. No clubbing of fingernails.   Psychiatric: Patient alert and oriented to person, place, and time. Speech and behavior appropriate. Normal mood and affect.       ECG 12 Lead    Date/Time: 11/16/2021 10:57 AM  Performed by: Joelle Trujillo APRN  Authorized by: Joelle Trujillo APRN   Comparison: compared with previous ECG   Similar to previous ECG  Rhythm: sinus rhythm  Ectopy: unifocal PVCs  BPM: 67  Other findings: T wave abnormality              Assessment:       Diagnosis Plan   1. Cardiomyopathy, dilated (HCC)  Adult Transthoracic Echo Complete w/ Color, Spectral and Contrast if Necessary Per Protocol   2. Chronic systolic congestive heart failure (HCC)     3. PVCs (premature ventricular contractions)            Plan:       1.-2.  Nonischemic cardiomyopathy, chronic systolic heart failure.  She is euvolemic by exam today, she is tolerating the lisinopril well.  Her blood pressure is 116/70 and get a recheck an echocardiogram.  We could consider trying to increase this dose although on follow-up BMP after starting the lisinopril her potassium was 5.1 so the upper ends of normal despite the fact that she is on furosemide--so hesitant to increase ACE, MRA will likely not be tolerated with K+ running on the higher side, could consider [id number]---jysm see what echo shows.    3. PVC's---stable, unchanged. 1% burden on monitor in May.     Will call with echo results when available and determine further follow up at that time.     As always, it has been a pleasure to participate in your patient's care.      Sincerely,         ABHISHEK Lopez

## 2021-11-19 RX ORDER — LISINOPRIL 2.5 MG/1
2.5 TABLET ORAL DAILY
Qty: 90 TABLET | Refills: 3 | Status: SHIPPED | OUTPATIENT
Start: 2021-11-19 | End: 2022-09-13 | Stop reason: SDUPTHER

## 2021-11-29 ENCOUNTER — HOSPITAL ENCOUNTER (OUTPATIENT)
Dept: CARDIOLOGY | Facility: HOSPITAL | Age: 66
Discharge: HOME | End: 2021-11-29
Admitting: NURSE PRACTITIONER

## 2021-11-29 VITALS
WEIGHT: 118 LBS | HEIGHT: 63 IN | OXYGEN SATURATION: 98 % | BODY MASS INDEX: 20.91 KG/M2 | DIASTOLIC BLOOD PRESSURE: 70 MMHG | SYSTOLIC BLOOD PRESSURE: 110 MMHG | HEART RATE: 77 BPM

## 2021-11-29 DIAGNOSIS — I42.0 CARDIOMYOPATHY, DILATED (HCC): ICD-10-CM

## 2021-11-29 PROCEDURE — 93306 TTE W/DOPPLER COMPLETE: CPT | Performed by: INTERNAL MEDICINE

## 2021-11-29 PROCEDURE — 25010000002 PERFLUTREN (DEFINITY) 8.476 MG IN SODIUM CHLORIDE (PF) 0.9 % 10 ML INJECTION: Performed by: NURSE PRACTITIONER

## 2021-11-29 PROCEDURE — 93306 TTE W/DOPPLER COMPLETE: CPT

## 2021-11-29 RX ADMIN — PERFLUTREN 1.5 ML: 6.52 INJECTION, SUSPENSION INTRAVENOUS at 10:13

## 2021-11-30 LAB
ASCENDING AORTA: 2.9 CM
BH CV ECHO MEAS - ACS: 1.8 CM
BH CV ECHO MEAS - AO ARCH DIAM (PROXIMAL TRANS.): 2.6 CM
BH CV ECHO MEAS - AO MAX PG (FULL): 9.2 MMHG
BH CV ECHO MEAS - AO MAX PG: 10.3 MMHG
BH CV ECHO MEAS - AO MEAN PG (FULL): 4.9 MMHG
BH CV ECHO MEAS - AO MEAN PG: 5.5 MMHG
BH CV ECHO MEAS - AO ROOT AREA (BSA CORRECTED): 2
BH CV ECHO MEAS - AO ROOT AREA: 7.3 CM^2
BH CV ECHO MEAS - AO ROOT DIAM: 3.1 CM
BH CV ECHO MEAS - AO V2 MAX: 160.2 CM/SEC
BH CV ECHO MEAS - AO V2 MEAN: 109 CM/SEC
BH CV ECHO MEAS - AO V2 VTI: 31.5 CM
BH CV ECHO MEAS - ASC AORTA: 2.9 CM
BH CV ECHO MEAS - AVA(I,A): 0.77 CM^2
BH CV ECHO MEAS - AVA(I,D): 0.77 CM^2
BH CV ECHO MEAS - AVA(V,A): 0.88 CM^2
BH CV ECHO MEAS - AVA(V,D): 0.88 CM^2
BH CV ECHO MEAS - BSA(HAYCOCK): 1.5 M^2
BH CV ECHO MEAS - BSA: 1.5 M^2
BH CV ECHO MEAS - BZI_BMI: 20.9 KILOGRAMS/M^2
BH CV ECHO MEAS - BZI_METRIC_HEIGHT: 160 CM
BH CV ECHO MEAS - BZI_METRIC_WEIGHT: 53.5 KG
BH CV ECHO MEAS - EDV(MOD-SP2): 108 ML
BH CV ECHO MEAS - EDV(MOD-SP4): 112 ML
BH CV ECHO MEAS - EDV(TEICH): 159.8 ML
BH CV ECHO MEAS - EF(CUBED): 56.6 %
BH CV ECHO MEAS - EF(MOD-BP): 38.1 %
BH CV ECHO MEAS - EF(MOD-SP2): 38.9 %
BH CV ECHO MEAS - EF(MOD-SP4): 37.5 %
BH CV ECHO MEAS - EF(TEICH): 47.7 %
BH CV ECHO MEAS - ESV(MOD-SP2): 66 ML
BH CV ECHO MEAS - ESV(MOD-SP4): 70 ML
BH CV ECHO MEAS - ESV(TEICH): 83.6 ML
BH CV ECHO MEAS - FS: 24.3 %
BH CV ECHO MEAS - IVS/LVPW: 1.2
BH CV ECHO MEAS - IVSD: 1.2 CM
BH CV ECHO MEAS - LAT PEAK E' VEL: 9 CM/SEC
BH CV ECHO MEAS - LV DIASTOLIC VOL/BSA (35-75): 72.5 ML/M^2
BH CV ECHO MEAS - LV MASS(C)D: 249.6 GRAMS
BH CV ECHO MEAS - LV MASS(C)DI: 161.5 GRAMS/M^2
BH CV ECHO MEAS - LV MAX PG: 1 MMHG
BH CV ECHO MEAS - LV MEAN PG: 0.62 MMHG
BH CV ECHO MEAS - LV SYSTOLIC VOL/BSA (12-30): 45.3 ML/M^2
BH CV ECHO MEAS - LV V1 MAX: 51 CM/SEC
BH CV ECHO MEAS - LV V1 MEAN: 37.4 CM/SEC
BH CV ECHO MEAS - LV V1 VTI: 8.7 CM
BH CV ECHO MEAS - LVIDD: 5.7 CM
BH CV ECHO MEAS - LVIDS: 4.3 CM
BH CV ECHO MEAS - LVLD AP2: 6.7 CM
BH CV ECHO MEAS - LVLD AP4: 6.8 CM
BH CV ECHO MEAS - LVLS AP2: 6.3 CM
BH CV ECHO MEAS - LVLS AP4: 6.3 CM
BH CV ECHO MEAS - LVOT AREA (M): 2.8 CM^2
BH CV ECHO MEAS - LVOT AREA: 2.8 CM^2
BH CV ECHO MEAS - LVOT DIAM: 1.9 CM
BH CV ECHO MEAS - LVPWD: 0.98 CM
BH CV ECHO MEAS - MED PEAK E' VEL: 4.7 CM/SEC
BH CV ECHO MEAS - MV A DUR: 0.1 SEC
BH CV ECHO MEAS - MV A MAX VEL: 53.6 CM/SEC
BH CV ECHO MEAS - MV DEC SLOPE: 140 CM/SEC^2
BH CV ECHO MEAS - MV DEC TIME: 0.27 SEC
BH CV ECHO MEAS - MV E MAX VEL: 39.8 CM/SEC
BH CV ECHO MEAS - MV E/A: 0.74
BH CV ECHO MEAS - MV MAX PG: 2.1 MMHG
BH CV ECHO MEAS - MV MEAN PG: 0.86 MMHG
BH CV ECHO MEAS - MV P1/2T MAX VEL: 48.3 CM/SEC
BH CV ECHO MEAS - MV P1/2T: 101 MSEC
BH CV ECHO MEAS - MV V2 MAX: 71.8 CM/SEC
BH CV ECHO MEAS - MV V2 MEAN: 43.7 CM/SEC
BH CV ECHO MEAS - MV V2 VTI: 23.7 CM
BH CV ECHO MEAS - MVA P1/2T LCG: 4.6 CM^2
BH CV ECHO MEAS - MVA(P1/2T): 2.2 CM^2
BH CV ECHO MEAS - MVA(VTI): 1 CM^2
BH CV ECHO MEAS - PA ACC TIME: 0.08 SEC
BH CV ECHO MEAS - PA MAX PG (FULL): 1.7 MMHG
BH CV ECHO MEAS - PA MAX PG: 3.7 MMHG
BH CV ECHO MEAS - PA PR(ACCEL): 41 MMHG
BH CV ECHO MEAS - PA V2 MAX: 96.4 CM/SEC
BH CV ECHO MEAS - PULM A REVS DUR: 0.12 SEC
BH CV ECHO MEAS - PULM A REVS VEL: 29.6 CM/SEC
BH CV ECHO MEAS - PULM DIAS VEL: 30.4 CM/SEC
BH CV ECHO MEAS - PULM S/D: 1.5
BH CV ECHO MEAS - PULM SYS VEL: 45 CM/SEC
BH CV ECHO MEAS - PVA(V,A): 2.3 CM^2
BH CV ECHO MEAS - PVA(V,D): 2.3 CM^2
BH CV ECHO MEAS - QP/QS: 2
BH CV ECHO MEAS - RAP SYSTOLE: 3 MMHG
BH CV ECHO MEAS - RV MAX PG: 2 MMHG
BH CV ECHO MEAS - RV MEAN PG: 1.2 MMHG
BH CV ECHO MEAS - RV V1 MAX: 71.1 CM/SEC
BH CV ECHO MEAS - RV V1 MEAN: 50.2 CM/SEC
BH CV ECHO MEAS - RV V1 VTI: 15.2 CM
BH CV ECHO MEAS - RVOT AREA: 3.1 CM^2
BH CV ECHO MEAS - RVOT DIAM: 2 CM
BH CV ECHO MEAS - RVSP: 19.1 MMHG
BH CV ECHO MEAS - SI(AO): 148.9 ML/M^2
BH CV ECHO MEAS - SI(CUBED): 67.7 ML/M^2
BH CV ECHO MEAS - SI(LVOT): 15.6 ML/M^2
BH CV ECHO MEAS - SI(MOD-SP2): 27.2 ML/M^2
BH CV ECHO MEAS - SI(MOD-SP4): 27.2 ML/M^2
BH CV ECHO MEAS - SI(TEICH): 49.3 ML/M^2
BH CV ECHO MEAS - SV(AO): 230.2 ML
BH CV ECHO MEAS - SV(CUBED): 104.6 ML
BH CV ECHO MEAS - SV(LVOT): 24.1 ML
BH CV ECHO MEAS - SV(MOD-SP2): 42 ML
BH CV ECHO MEAS - SV(MOD-SP4): 42 ML
BH CV ECHO MEAS - SV(RVOT): 47.3 ML
BH CV ECHO MEAS - SV(TEICH): 76.1 ML
BH CV ECHO MEAS - TAPSE (>1.6): 2 CM
BH CV ECHO MEAS - TR MAX VEL: 200.9 CM/SEC
BH CV ECHO MEASUREMENTS AVERAGE E/E' RATIO: 5.81
BH CV XLRA - RV BASE: 2.8 CM
BH CV XLRA - RV LENGTH: 6.5 CM
BH CV XLRA - RV MID: 2.8 CM
BH CV XLRA - TDI S': 10.3 CM/SEC
LEFT ATRIUM VOLUME INDEX: 19 ML/M2
MAXIMAL PREDICTED HEART RATE: 152 BPM
SINUS: 2.7 CM
STJ: 2.7 CM
STRESS TARGET HR: 129 BPM

## 2021-12-02 ENCOUNTER — TELEPHONE (OUTPATIENT)
Dept: CARDIOLOGY | Facility: CLINIC | Age: 66
End: 2021-12-02

## 2021-12-02 NOTE — TELEPHONE ENCOUNTER
Since she is NYHA 0, I see no reason to augment her meds given her low BP.  No changes. She has an upcoming appt with me.    moe

## 2021-12-02 NOTE — TELEPHONE ENCOUNTER
----- Message from ABHISHEK Franks sent at 12/2/2021 11:26 AM EST -----  Regarding: Echo results  You sent her to  for consideration of primary prevention ICD---he saw her in July    She was on Toprol---lisinopril was added--she had problems with low b/p but it had improved apparently    I saw her 11/16 for follow up and she reported significant improvement in how she felt a few weeks after starting lisinopril.    He did BMP after starting lisinopril---K+ was 5.1 so did not think she would tolerate increase or MRA    I did a repeat echo EF 38%---so no ICD at this point but thought about trying to add SGLT2---your thoughts? She has appt with you in January.     Thanks  Joelle

## 2021-12-03 RX ORDER — METOPROLOL SUCCINATE 25 MG/1
TABLET, EXTENDED RELEASE ORAL
Qty: 90 TABLET | Refills: 1 | Status: SHIPPED | OUTPATIENT
Start: 2021-12-03 | End: 2022-01-10 | Stop reason: SDUPTHER

## 2021-12-28 RX ORDER — GABAPENTIN 100 MG/1
CAPSULE ORAL
Qty: 270 CAPSULE | Refills: 0 | Status: SHIPPED | OUTPATIENT
Start: 2021-12-28 | End: 2022-01-26 | Stop reason: SDUPTHER

## 2021-12-28 NOTE — TELEPHONE ENCOUNTER
Rx Refill Note  Requested Prescriptions     Pending Prescriptions Disp Refills   • gabapentin (NEURONTIN) 100 MG capsule 270 capsule 0     Sig: Take 1 capsule by mouth every morning and then take 2 capsules by mouth every evening      Last office visit with prescribing clinician: 11/2/2021      Next office visit with prescribing clinician: 7/12/2022            Terrie Mir MA  12/28/21, 09:50 EST

## 2022-01-02 RX ORDER — DULOXETIN HYDROCHLORIDE 20 MG/1
CAPSULE, DELAYED RELEASE ORAL
Qty: 90 CAPSULE | Refills: 1 | Status: SHIPPED | OUTPATIENT
Start: 2022-01-02 | End: 2022-01-06

## 2022-01-02 RX ORDER — OMEPRAZOLE 40 MG/1
CAPSULE, DELAYED RELEASE ORAL
Qty: 90 CAPSULE | Refills: 1 | Status: SHIPPED | OUTPATIENT
Start: 2022-01-02 | End: 2022-06-27

## 2022-01-03 ENCOUNTER — OFFICE VISIT (OUTPATIENT)
Dept: FAMILY MEDICINE CLINIC | Facility: CLINIC | Age: 67
End: 2022-01-03

## 2022-01-03 VITALS
HEART RATE: 84 BPM | DIASTOLIC BLOOD PRESSURE: 62 MMHG | WEIGHT: 117 LBS | SYSTOLIC BLOOD PRESSURE: 86 MMHG | OXYGEN SATURATION: 99 % | BODY MASS INDEX: 20.73 KG/M2 | HEIGHT: 63 IN

## 2022-01-03 DIAGNOSIS — R43.2 LOSS OF TASTE: ICD-10-CM

## 2022-01-03 DIAGNOSIS — J06.9 UPPER RESPIRATORY TRACT INFECTION, UNSPECIFIED TYPE: Primary | ICD-10-CM

## 2022-01-03 PROCEDURE — 99213 OFFICE O/P EST LOW 20 MIN: CPT | Performed by: FAMILY MEDICINE

## 2022-01-03 NOTE — PROGRESS NOTES
"Chief Complaint  Loss Of Smell (C/O S/S X 7 DAYS but lost her taste and smell on saturday ), Loss Of Taste, Hx of Fever, Sore Throat, and Cough    Subjective          Shanika Hector presents to [facility] PRIMARY CARE  History of Present Illness     Day 6 of URI symptoms.  Possibly exposed to Covid over [place].  She lost her sense of taste and smell few days ago.  The cough was better at first but now a bit better.  She does not feel winded or shortness of breath more than normal.  She has a history of cardiomyopathy which has been stable.  No fevers.  No chills.  A little bit of diarrhea.  No urinary changes.  She is not coughing up anything.  The cough seems better today.  No COVID-19 testing as of this date.  She had her initial series of COVID-19 immunizations but did not have a booster.    Objective   Vital Signs:   BP (!) 86/62   Pulse 84   Ht 160 cm (63\")   Wt 53.1 kg (117 lb)   SpO2 99%   BMI 20.73 kg/m²     Physical Exam  Vitals and nursing note reviewed.   Constitutional:       General: She is not in acute distress.     Comments: No acute distress.  Nontoxic.   HENT:      Right Ear: Tympanic membrane, ear canal and external ear normal.      Left Ear: Tympanic membrane, ear canal and external ear normal.      Nose: Nose normal.      Mouth/Throat:      Pharynx: No oropharyngeal exudate.   Eyes:      General: No scleral icterus.        Right eye: No discharge.         Left eye: No discharge.      Conjunctiva/sclera: Conjunctivae normal.   Cardiovascular:      Rate and Rhythm: Normal rate.   Pulmonary:      Effort: Pulmonary effort is normal. No respiratory distress.      Breath sounds: Normal breath sounds. No stridor. No wheezing or rales.   Lymphadenopathy:      Cervical: No cervical adenopathy.   Skin:     Findings: No rash.   Neurological:      Mental Status: She is alert.        Result Review :                 Assessment and Plan    Diagnoses and all orders for this visit:    1. " Upper respiratory tract infection, unspecified type (Primary)    2. Loss of taste  -     COVID-19,[facility] ROUTINE, NP/OP SWAB IN TRANSPORT MEDIA OR ESWAB 72 HR TAT - Swab, Nasopharynx      URI syndrome with loss of taste and smell.  Cough improving.  Suspicious for COVID-19/endocrine.  At this time as of tomorrow will be too late for monoclonal antibody infusion or [facility] remdesivir or outpatient protocol.  Right now they are booking out the end of the week.  In either case she is not toxic and symptoms are improving.  She has had her initial set of immunizations.  I do recommend booster in the near future.  With severe symptoms she will seek medical attention.  A PCR swab is pending.      Follow Up   No follow-ups on file.  Patient was given instructions and counseling regarding her condition or for health maintenance advice. Please see specific information pulled into the AVS if appropriate.

## 2022-01-04 LAB
LABCORP SARS-COV-2, NAA 2 DAY TAT: NORMAL
SARS-COV-2 RNA RESP QL NAA+PROBE: DETECTED

## 2022-01-06 RX ORDER — DULOXETIN HYDROCHLORIDE 20 MG/1
CAPSULE, DELAYED RELEASE ORAL
Qty: 90 CAPSULE | Refills: 1 | Status: SHIPPED | OUTPATIENT
Start: 2022-01-06 | End: 2022-06-27

## 2022-01-06 NOTE — TELEPHONE ENCOUNTER
Rx Refill Note  Requested Prescriptions     Pending Prescriptions Disp Refills   • DULoxetine (CYMBALTA) 20 MG capsule [Pharmacy Med Name: DULoxetine HCL DR 20 MG CAPSULE] 90 capsule 1     Sig: TAKE ONE CAPSULE BY MOUTH DAILY      Last office visit with prescribing clinician: 1/3/2022      Next office visit with prescribing clinician: 7/12/2022            Terrie Mir MA  01/06/22, 08:20 EST

## 2022-01-10 ENCOUNTER — OFFICE VISIT (OUTPATIENT)
Dept: CARDIOLOGY | Facility: CLINIC | Age: 67
End: 2022-01-10

## 2022-01-10 VITALS
HEIGHT: 63 IN | BODY MASS INDEX: 20.66 KG/M2 | SYSTOLIC BLOOD PRESSURE: 104 MMHG | HEART RATE: 77 BPM | DIASTOLIC BLOOD PRESSURE: 62 MMHG | WEIGHT: 116.6 LBS

## 2022-01-10 DIAGNOSIS — I50.22 CHRONIC SYSTOLIC CONGESTIVE HEART FAILURE: Chronic | ICD-10-CM

## 2022-01-10 DIAGNOSIS — I42.0 CARDIOMYOPATHY, DILATED: Primary | Chronic | ICD-10-CM

## 2022-01-10 DIAGNOSIS — I49.3 PVCS (PREMATURE VENTRICULAR CONTRACTIONS): ICD-10-CM

## 2022-01-10 PROBLEM — I34.0 NONRHEUMATIC MITRAL VALVE REGURGITATION: Chronic | Status: RESOLVED | Noted: 2021-02-14 | Resolved: 2022-01-10

## 2022-01-10 PROCEDURE — 99214 OFFICE O/P EST MOD 30 MIN: CPT | Performed by: INTERNAL MEDICINE

## 2022-01-10 PROCEDURE — 93000 ELECTROCARDIOGRAM COMPLETE: CPT | Performed by: INTERNAL MEDICINE

## 2022-01-10 RX ORDER — METOPROLOL SUCCINATE 25 MG/1
25 TABLET, EXTENDED RELEASE ORAL DAILY
Qty: 90 TABLET | Refills: 2 | Status: SHIPPED | OUTPATIENT
Start: 2022-01-10 | End: 2022-09-13 | Stop reason: SDUPTHER

## 2022-01-10 NOTE — PROGRESS NOTES
Date of Office Visit: 01/10/22  Encounter Provider: Darrion Bateman MD  Place of Service: [facility] CARDIOLOGY  Patient Name: Shanika Hector  :1955    Chief Complaint   Patient presents with   • Cardiomyopathy   :     HPI: Ms. Hector is 66 y.o. and presents today to follow up.     She presented to [facility] on 2020 with dyspnea, palpitations, orthopnea, and abdominal distention.  Her EKG showed sinus tachycardia and LVH/strain. CTA of the chest was negative for pulmonary embolism, bilateral pleural effusions, and showed mild pulmonary edema.  She was noted to have PVCs on telemetry.  An echo revealed an LVEF of 20% and moderate-severe MR.  She underwent right and left heart catheterization; her coronaries were normal, as were her pulmonary pressures and LVEDP.  She was started on metoprolol succinate; her BP was relatively low, an additional medications could not be added.  She had been taking hydroxychloroquine for erosive osteoarthritis, and it was discontinued.     A cardiac MRI in 2020 showed an EF of 30%, and was otherwise normal.  A follow up echo in November showed improvement in her LVEF to 35-40%.    She tested positive for COVID on January 3, but had been experiencing symptoms for six days prior to that. She is still fatigued but is otherwise well.   She checks her vitals at home; her BP is consistently /60mm Hg.  Her weight is stable.  She denies chest pain, dyspnea, leg swelling, orthopnea, or syncope.     Past Medical History:   Diagnosis Date   • Acute systolic congestive heart failure (HCC) 2021   • ITZEL positive     Repeat ITZEL 2014 negative   • Anxiety    • Bilateral sacroiliitis (HCC) 2017   • Bursitis 2016   • Cardiomyopathy, dilated (HCC)    • Cervical radiculopathy    • Cervical spondylosis    • Degeneration of intervertebral disc    • Eczema    • Fibromyalgia 3/16/2020   • GERD (gastroesophageal reflux disease)    • Heart  murmur Preteen   • History of cardiac murmur as a child    • Irritable bowel syndrome    • Lichen sclerosus of female genitalia    • Low back pain    • Myalgia and myositis    • Nonischemic cardiomyopathy (HCC)     2/2021, EF 20%, normal cors   • Nonrheumatic mitral valve regurgitation    • Osteoporosis    • Peptic ulceration Dec 2019   • Pruritus    • Pure hypercholesterolemia 6/10/2019   • PVCs (premature ventricular contractions) 3/22/2021       Past Surgical History:   Procedure Laterality Date   • BREAST CYST ASPIRATION      10+ years ago   • CARDIAC CATHETERIZATION N/A 2/15/2021    Procedure: Left Heart Cath;  Surgeon: Zee Rojas MD;  Location: [facility] CATH INVASIVE LOCATION;  Service: Cardiovascular;  Laterality: N/A;   • CARDIAC CATHETERIZATION N/A 2/15/2021    Procedure: Left ventriculography;  Surgeon: Zee Rojas MD;  Location: [facility] CATH INVASIVE LOCATION;  Service: Cardiovascular;  Laterality: N/A;   • CARDIAC CATHETERIZATION N/A 2/15/2021    Procedure: Coronary angiography;  Surgeon: Zee Rojas MD;  [facility] C[facility] INVASIVE [facility];  Service: Cardiovascular;  Laterality: N/A;   • CARDIAC CATHETERIZATION N/A 2/15/2021    Procedure: Right Heart Cath;  Surgeon: Zee Rojas MD;  Location: [facility] CATH INVASIVE LOCATION;  Service: Cardiovascular;  Laterality: N/A;   • CHOLECYSTECTOMY     • COLONOSCOPY  07/30/2012    Dr. Zazueta, normal   • ENDOSCOPY  12/04/2015    Mild active duodenitis, reactive gastropathy suggestive of healing erosion or adjacent ulcer   • ENDOSCOPY N/A 12/16/2019    Procedure: ESOPHAGOGASTRODUODENOSCOPY WITH BIOPSY WITH COLD BIOPSY POLYPECTOMY;  Surgeon: Lida Garcia MD;  Location: [facility] ENDOSCOPY;  Service: Gastroenterology   • ENDOSCOPY N/A 7/13/2020    Procedure: ESOPHAGOGASTRODUODENOSCOPY WITH COLD BIOPSY POLYPECTOMY;  Surgeon: Lida Garcia MD;  Location: [facility] ENDOSCOPY;  Service: Gastroenterology;  Laterality: N/A;  pre: history of gastric  ulcer  post: healed gastric ulcer and gastric polyp   • EYE SURGERY  April2021    Cararact surgury both eyes   • MOUTH SURGERY         Social History     Socioeconomic History   • Marital status:    Tobacco Use   • Smoking status: Never Smoker   • Smokeless tobacco: Never Used   Vaping Use   • Vaping Use: Never used   Substance and Sexual Activity   • Alcohol use: Not Currently     Alcohol/week: 1.0 standard drink     Types: 1 Glasses of wine per week     Comment: caffeine- 3 cups daily, occasional   • Drug use: No   • Sexual activity: Yes     Partners: Male     Birth control/protection: None       Family History   Problem Relation Age of Onset   • Irritable bowel syndrome Mother    • Hypertension Mother    • Hypertension Father    • Hearing loss Father    • Alcohol abuse Brother    • Early death Brother    • Heart disease Brother    • Arthritis Sister        Review of Systems   Constitutional: Positive for malaise/fatigue.   Respiratory: Positive for cough.    Musculoskeletal: Positive for joint pain.   Neurological: Positive for excessive daytime sleepiness.   All other systems reviewed and are negative.      Allergies   Allergen Reactions   • Other Unknown (See Comments)     perfume   • Sulfa Antibiotics Unknown (See Comments)     Past history, unknown reaction. Was told by mother there was a reaction of some type.    • Adhesive Tape Itching         Current Outpatient Medications:   •  acetaminophen (TYLENOL) 650 MG 8 hr tablet, Take 2 tablets by mouth 2 (two) times a day as needed., Disp: , Rfl:   •  digoxin (LANOXIN) 125 MCG tablet, Take 1 tablet by mouth Daily., Disp: 90 tablet, Rfl: 3  •  DULoxetine (CYMBALTA) 20 MG capsule, TAKE ONE CAPSULE BY MOUTH DAILY, Disp: 90 capsule, Rfl: 1  •  furosemide (LASIX) 40 MG tablet, Take 40 mg by mouth Daily., Disp: , Rfl:   •  gabapentin (NEURONTIN) 100 MG capsule, Take 1 capsule by mouth every morning and then take 2 capsules by mouth every evening, Disp: 270  "capsule, Rfl: 0  •  lisinopril (PRINIVIL,ZESTRIL) 2.5 MG tablet, Take 1 tablet by mouth Daily. Take at night, Disp: 90 tablet, Rfl: 3  •  metoprolol succinate XL (TOPROL-XL) 25 MG 24 hr tablet, TAKE ONE TABLET BY MOUTH DAILY, Disp: 90 tablet, Rfl: 1  •  omeprazole (priLOSEC) 40 MG capsule, TAKE ONE CAPSULE BY MOUTH DAILY, Disp: 90 capsule, Rfl: 1  •  Wheat Dextrin (Benefiber) powder, qd-bid, Disp: , Rfl:       Objective:     Vitals:    01/10/22 0944   BP: 104/62   BP Location: Left arm   Pulse: 77   Weight: 52.9 kg (116 lb 9.6 oz)   Height: 160 cm (63\")     Body mass index is 20.65 kg/m².    Constitutional:       Appearance: Healthy appearance. Not in distress.   Eyes:      Conjunctiva/sclera: Conjunctivae normal.   HENT:         Comments: Masked  Neck:      Vascular: JVD normal.   Pulmonary:      Effort: Increased respiratory effort.   Cardiovascular:      Normal rate. Frequent ectopic beats.      Murmurs: There is no murmur.   Pulses:     Intact distal pulses.   Edema:     Peripheral edema absent.   Abdominal:      Palpations: Abdomen is soft.      Tenderness: There is no abdominal tenderness.   Musculoskeletal: Normal range of motion.      Cervical back: Normal range of motion. Skin:     General: Skin is warm and dry.   Neurological:      General: No focal deficit present.      Mental Status: Alert and oriented to person, place and time.           ECG 12 Lead    Date/Time: 1/10/2022 10:05 AM  Performed by: Darrion Bateman MD  Authorized by: Darrion Bateman MD   Comparison: compared with previous ECG   Similar to previous ECG  Rhythm: sinus rhythm  Ectopy: unifocal PVCs  Conduction: conduction normal  QRS axis: left  Other findings: left ventricular hypertrophy with strain    Clinical impression: abnormal EKG              Assessment:       Diagnosis Plan   1. Cardiomyopathy, dilated (HCC)     2. Chronic systolic congestive heart failure (HCC)     3. PVCs (premature ventricular contractions)          Plan:       She " has NICM. Her EF has improved from 20% to 35-40%. She is NYHA class 1. She's on metoprolol and lisinopril. She does not have enough BP for spironolactone.  She's on digoxin for symptom relief; it has helped. She's euvolemic on exam.  She had an MRI that was negative for infiltration or fibrosis.     She has known asymptomatic monomorphic PVCs. These could be contributing to her low EF. A Holter in May 2021 showed infrequent PVCs but she's in bigeminy today. I'll get a 72 hour monitor.     Sincerely,       Darrion Bateman MD

## 2022-01-26 RX ORDER — GABAPENTIN 100 MG/1
CAPSULE ORAL
Qty: 270 CAPSULE | Refills: 1 | Status: SHIPPED | OUTPATIENT
Start: 2022-01-26 | End: 2022-06-27

## 2022-01-26 NOTE — TELEPHONE ENCOUNTER
Rx Refill Note  Requested Prescriptions     Pending Prescriptions Disp Refills   • gabapentin (NEURONTIN) 100 MG capsule 270 capsule 0     Sig: Take 1 capsule by mouth every morning and then take 2 capsules by mouth every evening      Last office visit with prescribing clinician: 1/3/2022      Next office visit with prescribing clinician: 7/12/2022            Terrie Mir MA  01/26/22, 09:55 EST

## 2022-02-04 ENCOUNTER — TELEPHONE (OUTPATIENT)
Dept: CARDIOLOGY | Facility: CLINIC | Age: 67
End: 2022-02-04

## 2022-02-22 ENCOUNTER — OFFICE VISIT (OUTPATIENT)
Dept: CARDIOLOGY | Facility: CLINIC | Age: 67
End: 2022-02-22

## 2022-02-22 VITALS
SYSTOLIC BLOOD PRESSURE: 104 MMHG | BODY MASS INDEX: 21.44 KG/M2 | WEIGHT: 121 LBS | HEIGHT: 63 IN | HEART RATE: 76 BPM | DIASTOLIC BLOOD PRESSURE: 70 MMHG

## 2022-02-22 DIAGNOSIS — I49.3 PVCS (PREMATURE VENTRICULAR CONTRACTIONS): ICD-10-CM

## 2022-02-22 DIAGNOSIS — I50.22 CHRONIC SYSTOLIC CONGESTIVE HEART FAILURE: Chronic | ICD-10-CM

## 2022-02-22 DIAGNOSIS — I49.3 PVC (PREMATURE VENTRICULAR CONTRACTION): Primary | ICD-10-CM

## 2022-02-22 PROCEDURE — 93000 ELECTROCARDIOGRAM COMPLETE: CPT | Performed by: INTERNAL MEDICINE

## 2022-02-22 PROCEDURE — 99214 OFFICE O/P EST MOD 30 MIN: CPT | Performed by: INTERNAL MEDICINE

## 2022-02-23 NOTE — PROGRESS NOTES
Date of Office Visit: 2022  Encounter Provider: Mati Toscano MD  Place of Service: [facility] CARDIOLOGY  Patient Name: Shanika Hector  :1955    Chief Complaint   Patient presents with   • PVCs   • NSVT   • Cardiomyopathy   :     HPI: Shanika Hector is a 66 y.o. female who presents today for follow-up of premature ventricular contractions.     She presents today with complaints of palpitations and shortness of breath.  She have been present and basically constant for months.     She is in bigeminy today.      Her ejection fraction was measured at 38% on last echocardiogram.  Recent Zio patch with 11.4 % PVC          Past Medical History:   Diagnosis Date   • Acute systolic congestive heart failure (HCC) 2021   • ITZEL positive     Repeat ITZEL 2014 negative   • Anxiety    • Bilateral sacroiliitis (HCC) 2017   • Bursitis 2016   • Cardiomyopathy, dilated (HCC)    • Cervical radiculopathy    • Cervical spondylosis    • Degeneration of intervertebral disc    • Eczema    • Fibromyalgia 3/16/2020   • GERD (gastroesophageal reflux disease)    • Heart murmur Preteen   • History of cardiac murmur as a child    • Irritable bowel syndrome    • Lichen sclerosus of female genitalia    • Low back pain    • Myalgia and myositis    • Nonischemic cardiomyopathy (HCC)     2021, EF 20%, normal cors   • Nonrheumatic mitral valve regurgitation    • Osteoporosis    • Peptic ulceration Dec 2019   • Pruritus    • Pure hypercholesterolemia 6/10/2019   • PVCs (premature ventricular contractions) 3/22/2021       Past Surgical History:   Procedure Laterality Date   • BREAST CYST ASPIRATION      10+ years ago   • CARDIAC CATHETERIZATION N/A 2/15/2021    Procedure: Left Heart Cath;  Surgeon: Zee Rojas MD;  Location: [facility] CATH INVASIVE LOCATION;  Service: Cardiovascular;  Laterality: N/A;   • CARDIAC CATHETERIZATION N/A 2/15/2021    Procedure: Left ventriculography;  Surgeon:  Zee Rojas MD;  Location:  [facility] CATH INVASIVE LOCATION;  Service: Cardiovascular;  Laterality: N/A;   • CARDIAC CATHETERIZATION N/A 2/15/2021    Procedure: Coronary angiography;  Surgeon: Zee Rojas MD;  Location:  [facility] CATH INVASIVE LOCATION;  Service: Cardiovascular;  Laterality: N/A;   • CARDIAC CATHETERIZATION N/A 2/15/2021    Procedure: Right Heart Cath;  Surgeon: Zee Rojas MD;  Location:  [facility] CATH INVASIVE LOCATION;  Service: Cardiovascular;  Laterality: N/A;   • CHOLECYSTECTOMY     • COLONOSCOPY  07/30/2012    Dr. Zazueta, normal   • ENDOSCOPY  12/04/2015    Mild active duodenitis, reactive gastropathy suggestive of healing erosion or adjacent ulcer   • ENDOSCOPY N/A 12/16/2019    Procedure: ESOPHAGOGASTRODUODENOSCOPY WITH BIOPSY WITH COLD BIOPSY POLYPECTOMY;  Surgeon: Lida Garcia MD;  Location: [facility] ENDOSCOPY;  Service: Gastroenterology   • ENDOSCOPY N/A 7/13/2020    Procedure: ESOPHAGOGASTRODUODENOSCOPY WITH COLD BIOPSY POLYPECTOMY;  Surgeon: Lida Garcia MD;  Location: [facility] ENDOSCOPY;  Service: Gastroenterology;  Laterality: N/A;  pre: history of gastric ulcer  post: healed gastric ulcer and gastric polyp   • EYE SURGERY  April2021    Cararact surgury both eyes   • MOUTH SURGERY         Social History     Socioeconomic History   • Marital status:    Tobacco Use   • Smoking status: Never Smoker   • Smokeless tobacco: Never Used   Vaping Use   • Vaping Use: Never used   Substance and Sexual Activity   • Alcohol use: Not Currently     Alcohol/week: 1.0 standard drink     Types: 1 Glasses of wine per week     Comment: caffeine- 3 cups daily, occasional   • Drug use: No   • Sexual activity: Yes     Partners: Male     Birth control/protection: None       Family History   Problem Relation Age of Onset   • Irritable bowel syndrome Mother    • Hypertension Mother    • Hypertension Father    • Hearing loss Father    • Alcohol abuse Brother    • Early death Brother    •  "Heart disease Brother    • Arthritis Sister        Review of Systems   Constitutional: Negative.   Cardiovascular: Positive for palpitations.   Respiratory: Positive for shortness of breath.    Gastrointestinal: Negative.        Allergies   Allergen Reactions   • Other Unknown (See Comments)     perfume   • Sulfa Antibiotics Unknown (See Comments)     Past history, unknown reaction. Was told by mother there was a reaction of some type.    • Adhesive Tape Itching         Current Outpatient Medications:   •  acetaminophen (TYLENOL) 650 MG 8 hr tablet, Take 2 tablets by mouth 2 (two) times a day as needed., Disp: , Rfl:   •  digoxin (LANOXIN) 125 MCG tablet, Take 1 tablet by mouth Daily., Disp: 90 tablet, Rfl: 3  •  DULoxetine (CYMBALTA) 20 MG capsule, TAKE ONE CAPSULE BY MOUTH DAILY, Disp: 90 capsule, Rfl: 1  •  furosemide (LASIX) 40 MG tablet, Take 40 mg by mouth Daily., Disp: , Rfl:   •  gabapentin (NEURONTIN) 100 MG capsule, Take 1 capsule by mouth every morning and then take 2 capsules by mouth every evening, Disp: 270 capsule, Rfl: 1  •  lisinopril (PRINIVIL,ZESTRIL) 2.5 MG tablet, Take 1 tablet by mouth Daily. Take at night, Disp: 90 tablet, Rfl: 3  •  metoprolol succinate XL (TOPROL-XL) 25 MG 24 hr tablet, Take 1 tablet by mouth Daily., Disp: 90 tablet, Rfl: 2  •  omeprazole (priLOSEC) 40 MG capsule, TAKE ONE CAPSULE BY MOUTH DAILY, Disp: 90 capsule, Rfl: 1  •  Wheat Dextrin (Benefiber) powder, qd-bid, Disp: , Rfl:       Objective:     Vitals:    02/22/22 1259   BP: 104/70   Pulse: 76   Weight: 54.9 kg (121 lb)   Height: 160 cm (63\")     Body mass index is 21.43 kg/m².    PHYSICAL EXAM:    Vitals and nursing note reviewed.   Constitutional:       General: Not in acute distress.     Appearance: Normal and healthy appearance.   HENT:      Head: Normocephalic.    Mouth/Throat:      Pharynx: Oropharynx is clear.   Pulmonary:      Effort: Pulmonary effort is normal. No respiratory distress.      Breath sounds: " Normal breath sounds.   Cardiovascular:      Normal rate. Frequent ectopic beats. Regular rhythm.   Edema:     Peripheral edema absent.   Skin:     General: Skin is warm and dry.   Neurological:      General: No focal deficit present.      Mental Status: Alert and oriented to person, place, and time.   Psychiatric:         Attention and Perception: Attention and perception normal.         Mood and Affect: Mood and affect normal.         Behavior: Behavior normal. Behavior is cooperative.         Thought Content: Thought content normal.         Judgment: Judgment normal.             ECG 12 Lead    Date/Time: 2/22/2022 9:09 PM  Performed by: Mati Toscano MD  Authorized by: Mati Toscano MD   Comparison: compared with previous ECG from 1/10/2022  Rhythm: sinus rhythm  Ectopy: unifocal PVCs        PVCs with RBBB, leftward, superior axis occurring in pattern of bigeminy.       Assessment:       Diagnosis Plan   1. PVC (premature ventricular contraction)  Case Request EP Lab: Ablation PVC   2. Chronic systolic congestive heart failure (HCC)     3. PVCs (premature ventricular contractions)            Plan:       Symptomatic premature ventricular contractions with associated systolic dysfunction.  We discussed management with ablation vs antiarrhythmic therapy.  We discussed risk of perforation, stroke.  She would like to proceed with ablation.      As always, it has been a pleasure to participate in your patient's care.      Sincerely,         Mati Toscano MD

## 2022-03-01 ENCOUNTER — TRANSCRIBE ORDERS (OUTPATIENT)
Dept: CARDIOLOGY | Facility: CLINIC | Age: 67
End: 2022-03-01

## 2022-03-01 ENCOUNTER — TRANSCRIBE ORDERS (OUTPATIENT)
Dept: ADMINISTRATIVE | Facility: HOSPITAL | Age: 67
End: 2022-03-01

## 2022-03-01 DIAGNOSIS — Z01.818 OTHER SPECIFIED PRE-OPERATIVE EXAMINATION: Primary | ICD-10-CM

## 2022-03-01 DIAGNOSIS — Z01.818 OTHER SPECIFIED PRE-OPERATIVE EXAMINATION: ICD-10-CM

## 2022-03-01 DIAGNOSIS — Z01.810 PREPROCEDURAL CARDIOVASCULAR EXAMINATION: ICD-10-CM

## 2022-03-01 DIAGNOSIS — Z13.6 SCREENING FOR CARDIOVASCULAR CONDITION: Primary | ICD-10-CM

## 2022-03-07 ENCOUNTER — LAB (OUTPATIENT)
Dept: LAB | Facility: HOSPITAL | Age: 67
End: 2022-03-07

## 2022-03-07 DIAGNOSIS — Z01.818 OTHER SPECIFIED PRE-OPERATIVE EXAMINATION: ICD-10-CM

## 2022-03-07 DIAGNOSIS — Z01.810 PREPROCEDURAL CARDIOVASCULAR EXAMINATION: ICD-10-CM

## 2022-03-07 DIAGNOSIS — Z13.6 SCREENING FOR CARDIOVASCULAR CONDITION: ICD-10-CM

## 2022-03-07 LAB
ANION GAP SERPL CALCULATED.3IONS-SCNC: 11.7 MMOL/L (ref 5–15)
BASOPHILS # BLD AUTO: 0.04 10*3/MM3 (ref 0–0.2)
BASOPHILS NFR BLD AUTO: 0.8 % (ref 0–1.5)
BUN SERPL-MCNC: 19 MG/DL (ref 8–23)
BUN/CREAT SERPL: 21.1 (ref 7–25)
CALCIUM SPEC-SCNC: 9.6 MG/DL (ref 8.6–10.5)
CHLORIDE SERPL-SCNC: 101 MMOL/L (ref 98–107)
CO2 SERPL-SCNC: 29.3 MMOL/L (ref 22–29)
CREAT SERPL-MCNC: 0.9 MG/DL (ref 0.57–1)
DEPRECATED RDW RBC AUTO: 40 FL (ref 37–54)
EGFRCR SERPLBLD CKD-EPI 2021: 69.8 ML/MIN/1.73
EOSINOPHIL # BLD AUTO: 0.53 10*3/MM3 (ref 0–0.4)
EOSINOPHIL NFR BLD AUTO: 10 % (ref 0.3–6.2)
ERYTHROCYTE [DISTWIDTH] IN BLOOD BY AUTOMATED COUNT: 12.4 % (ref 12.3–15.4)
GLUCOSE SERPL-MCNC: 96 MG/DL (ref 65–99)
HCT VFR BLD AUTO: 35.6 % (ref 34–46.6)
HGB BLD-MCNC: 11.7 G/DL (ref 12–15.9)
IMM GRANULOCYTES # BLD AUTO: 0.01 10*3/MM3 (ref 0–0.05)
IMM GRANULOCYTES NFR BLD AUTO: 0.2 % (ref 0–0.5)
LYMPHOCYTES # BLD AUTO: 1.38 10*3/MM3 (ref 0.7–3.1)
LYMPHOCYTES NFR BLD AUTO: 26 % (ref 19.6–45.3)
MCH RBC QN AUTO: 29.5 PG (ref 26.6–33)
MCHC RBC AUTO-ENTMCNC: 32.9 G/DL (ref 31.5–35.7)
MCV RBC AUTO: 89.9 FL (ref 79–97)
MONOCYTES # BLD AUTO: 0.53 10*3/MM3 (ref 0.1–0.9)
MONOCYTES NFR BLD AUTO: 10 % (ref 5–12)
NEUTROPHILS NFR BLD AUTO: 2.81 10*3/MM3 (ref 1.7–7)
NEUTROPHILS NFR BLD AUTO: 53 % (ref 42.7–76)
NRBC BLD AUTO-RTO: 0 /100 WBC (ref 0–0.2)
PLATELET # BLD AUTO: 315 10*3/MM3 (ref 140–450)
PMV BLD AUTO: 11.3 FL (ref 6–12)
POTASSIUM SERPL-SCNC: 4.1 MMOL/L (ref 3.5–5.2)
RBC # BLD AUTO: 3.96 10*6/MM3 (ref 3.77–5.28)
SARS-COV-2 ORF1AB RESP QL NAA+PROBE: NOT DETECTED
SODIUM SERPL-SCNC: 142 MMOL/L (ref 136–145)
WBC NRBC COR # BLD: 5.3 10*3/MM3 (ref 3.4–10.8)

## 2022-03-07 PROCEDURE — 85025 COMPLETE CBC W/AUTO DIFF WBC: CPT

## 2022-03-07 PROCEDURE — 36415 COLL VENOUS BLD VENIPUNCTURE: CPT

## 2022-03-07 PROCEDURE — C9803 HOPD COVID-19 SPEC COLLECT: HCPCS

## 2022-03-07 PROCEDURE — U0004 COV-19 TEST NON-CDC HGH THRU: HCPCS

## 2022-03-07 PROCEDURE — 80048 BASIC METABOLIC PNL TOTAL CA: CPT

## 2022-03-07 PROCEDURE — U0005 INFEC AGEN DETEC AMPLI PROBE: HCPCS

## 2022-03-09 ENCOUNTER — HOSPITAL ENCOUNTER (OUTPATIENT)
Facility: HOSPITAL | Age: 67
Discharge: HOME | End: 2022-03-10
Attending: INTERNAL MEDICINE | Admitting: INTERNAL MEDICINE

## 2022-03-09 DIAGNOSIS — I49.3 PVCS (PREMATURE VENTRICULAR CONTRACTIONS): Primary | ICD-10-CM

## 2022-03-09 DIAGNOSIS — I49.3 PVC (PREMATURE VENTRICULAR CONTRACTION): ICD-10-CM

## 2022-03-09 LAB
ACT BLD: 291 SECONDS (ref 82–152)
ACT BLD: 321 SECONDS (ref 82–152)
ACT BLD: 344 SECONDS (ref 82–152)
ACT BLD: 362 SECONDS (ref 82–152)
QT INTERVAL: 401 MS
QT INTERVAL: 443 MS

## 2022-03-09 PROCEDURE — G0378 HOSPITAL OBSERVATION PER HR: HCPCS

## 2022-03-09 PROCEDURE — 99152 MOD SED SAME PHYS/QHP 5/>YRS: CPT | Performed by: INTERNAL MEDICINE

## 2022-03-09 PROCEDURE — 93010 ELECTROCARDIOGRAM REPORT: CPT | Performed by: INTERNAL MEDICINE

## 2022-03-09 PROCEDURE — C1894 INTRO/SHEATH, NON-LASER: HCPCS | Performed by: INTERNAL MEDICINE

## 2022-03-09 PROCEDURE — 25010000002 PROTAMINE SULFATE PER 10 MG: Performed by: INTERNAL MEDICINE

## 2022-03-09 PROCEDURE — 25010000002 MIDAZOLAM PER 1 MG: Performed by: INTERNAL MEDICINE

## 2022-03-09 PROCEDURE — C1759 CATH, INTRA ECHOCARDIOGRAPHY: HCPCS | Performed by: INTERNAL MEDICINE

## 2022-03-09 PROCEDURE — C1760 CLOSURE DEV, VASC: HCPCS | Performed by: INTERNAL MEDICINE

## 2022-03-09 PROCEDURE — 93654 COMPRE EP EVAL TX VT: CPT | Performed by: INTERNAL MEDICINE

## 2022-03-09 PROCEDURE — 0 IOPAMIDOL PER 1 ML: Performed by: INTERNAL MEDICINE

## 2022-03-09 PROCEDURE — 25010000002 ONDANSETRON PER 1 MG: Performed by: INTERNAL MEDICINE

## 2022-03-09 PROCEDURE — 85347 COAGULATION TIME ACTIVATED: CPT

## 2022-03-09 PROCEDURE — C1730 CATH, EP, 19 OR FEW ELECT: HCPCS | Performed by: INTERNAL MEDICINE

## 2022-03-09 PROCEDURE — 93005 ELECTROCARDIOGRAM TRACING: CPT | Performed by: INTERNAL MEDICINE

## 2022-03-09 PROCEDURE — C1769 GUIDE WIRE: HCPCS | Performed by: INTERNAL MEDICINE

## 2022-03-09 PROCEDURE — 25010000002 PHENYLEPHRINE 10 MG/ML SOLUTION: Performed by: INTERNAL MEDICINE

## 2022-03-09 PROCEDURE — C1732 CATH, EP, DIAG/ABL, 3D/VECT: HCPCS | Performed by: INTERNAL MEDICINE

## 2022-03-09 PROCEDURE — 93662 INTRACARDIAC ECG (ICE): CPT | Performed by: INTERNAL MEDICINE

## 2022-03-09 PROCEDURE — 25010000002 FENTANYL CITRATE (PF) 50 MCG/ML SOLUTION: Performed by: INTERNAL MEDICINE

## 2022-03-09 PROCEDURE — 25010000002 HEPARIN (PORCINE) PER 1000 UNITS: Performed by: INTERNAL MEDICINE

## 2022-03-09 PROCEDURE — 99153 MOD SED SAME PHYS/QHP EA: CPT | Performed by: INTERNAL MEDICINE

## 2022-03-09 PROCEDURE — C1893 INTRO/SHEATH, FIXED,NON-PEEL: HCPCS | Performed by: INTERNAL MEDICINE

## 2022-03-09 RX ORDER — NALOXONE HCL 0.4 MG/ML
0.4 VIAL (ML) INJECTION
Status: DISCONTINUED | OUTPATIENT
Start: 2022-03-09 | End: 2022-03-10 | Stop reason: HOSPADM

## 2022-03-09 RX ORDER — LIDOCAINE HYDROCHLORIDE 10 MG/ML
0.1 INJECTION, SOLUTION EPIDURAL; INFILTRATION; INTRACAUDAL; PERINEURAL ONCE AS NEEDED
Status: DISCONTINUED | OUTPATIENT
Start: 2022-03-09 | End: 2022-03-09 | Stop reason: HOSPADM

## 2022-03-09 RX ORDER — FENTANYL CITRATE 50 UG/ML
INJECTION, SOLUTION INTRAMUSCULAR; INTRAVENOUS AS NEEDED
Status: DISCONTINUED | OUTPATIENT
Start: 2022-03-09 | End: 2022-03-09 | Stop reason: HOSPADM

## 2022-03-09 RX ORDER — PROTAMINE SULFATE 10 MG/ML
INJECTION, SOLUTION INTRAVENOUS AS NEEDED
Status: DISCONTINUED | OUTPATIENT
Start: 2022-03-09 | End: 2022-03-09 | Stop reason: HOSPADM

## 2022-03-09 RX ORDER — LISINOPRIL 2.5 MG/1
2.5 TABLET ORAL DAILY
Status: DISCONTINUED | OUTPATIENT
Start: 2022-03-09 | End: 2022-03-10 | Stop reason: HOSPADM

## 2022-03-09 RX ORDER — DULOXETIN HYDROCHLORIDE 20 MG/1
20 CAPSULE, DELAYED RELEASE ORAL DAILY
Status: DISCONTINUED | OUTPATIENT
Start: 2022-03-09 | End: 2022-03-10 | Stop reason: HOSPADM

## 2022-03-09 RX ORDER — DIGOXIN 250 MCG
125 TABLET ORAL DAILY
Status: DISCONTINUED | OUTPATIENT
Start: 2022-03-09 | End: 2022-03-10 | Stop reason: HOSPADM

## 2022-03-09 RX ORDER — PHENYLEPHRINE HYDROCHLORIDE 10 MG/ML
INJECTION INTRAVENOUS AS NEEDED
Status: DISCONTINUED | OUTPATIENT
Start: 2022-03-09 | End: 2022-03-09 | Stop reason: HOSPADM

## 2022-03-09 RX ORDER — PANTOPRAZOLE SODIUM 40 MG/1
40 TABLET, DELAYED RELEASE ORAL EVERY MORNING
Refills: 1 | Status: DISCONTINUED | OUTPATIENT
Start: 2022-03-10 | End: 2022-03-10 | Stop reason: HOSPADM

## 2022-03-09 RX ORDER — NITROGLYCERIN 0.4 MG/1
0.4 TABLET SUBLINGUAL
Status: DISCONTINUED | OUTPATIENT
Start: 2022-03-09 | End: 2022-03-10 | Stop reason: HOSPADM

## 2022-03-09 RX ORDER — LIDOCAINE HYDROCHLORIDE AND EPINEPHRINE 10; 10 MG/ML; UG/ML
INJECTION, SOLUTION INFILTRATION; PERINEURAL AS NEEDED
Status: DISCONTINUED | OUTPATIENT
Start: 2022-03-09 | End: 2022-03-09 | Stop reason: HOSPADM

## 2022-03-09 RX ORDER — SODIUM CHLORIDE 0.9 % (FLUSH) 0.9 %
3 SYRINGE (ML) INJECTION EVERY 12 HOURS SCHEDULED
Status: DISCONTINUED | OUTPATIENT
Start: 2022-03-09 | End: 2022-03-09 | Stop reason: HOSPADM

## 2022-03-09 RX ORDER — SODIUM CHLORIDE 9 MG/ML
75 INJECTION, SOLUTION INTRAVENOUS CONTINUOUS
Status: DISCONTINUED | OUTPATIENT
Start: 2022-03-09 | End: 2022-03-10 | Stop reason: HOSPADM

## 2022-03-09 RX ORDER — ACETAMINOPHEN 325 MG/1
650 TABLET ORAL EVERY 6 HOURS PRN
Status: DISCONTINUED | OUTPATIENT
Start: 2022-03-09 | End: 2022-03-10 | Stop reason: HOSPADM

## 2022-03-09 RX ORDER — HYDROMORPHONE HYDROCHLORIDE 1 MG/ML
0.5 INJECTION, SOLUTION INTRAMUSCULAR; INTRAVENOUS; SUBCUTANEOUS
Status: DISCONTINUED | OUTPATIENT
Start: 2022-03-09 | End: 2022-03-10 | Stop reason: HOSPADM

## 2022-03-09 RX ORDER — HEPARIN SODIUM 10000 [USP'U]/100ML
INJECTION, SOLUTION INTRAVENOUS CONTINUOUS PRN
Status: DISCONTINUED | OUTPATIENT
Start: 2022-03-09 | End: 2022-03-09 | Stop reason: HOSPADM

## 2022-03-09 RX ORDER — HEPARIN SODIUM 1000 [USP'U]/ML
INJECTION, SOLUTION INTRAVENOUS; SUBCUTANEOUS AS NEEDED
Status: DISCONTINUED | OUTPATIENT
Start: 2022-03-09 | End: 2022-03-09 | Stop reason: HOSPADM

## 2022-03-09 RX ORDER — ACETAMINOPHEN 650 MG/1
650 SUPPOSITORY RECTAL EVERY 4 HOURS PRN
Status: DISCONTINUED | OUTPATIENT
Start: 2022-03-09 | End: 2022-03-10 | Stop reason: HOSPADM

## 2022-03-09 RX ORDER — ACETAMINOPHEN 325 MG/1
650 TABLET ORAL EVERY 4 HOURS PRN
Status: DISCONTINUED | OUTPATIENT
Start: 2022-03-09 | End: 2022-03-10 | Stop reason: HOSPADM

## 2022-03-09 RX ORDER — ONDANSETRON 2 MG/ML
INJECTION INTRAMUSCULAR; INTRAVENOUS AS NEEDED
Status: DISCONTINUED | OUTPATIENT
Start: 2022-03-09 | End: 2022-03-09 | Stop reason: HOSPADM

## 2022-03-09 RX ORDER — MIDAZOLAM HYDROCHLORIDE 1 MG/ML
INJECTION INTRAMUSCULAR; INTRAVENOUS AS NEEDED
Status: DISCONTINUED | OUTPATIENT
Start: 2022-03-09 | End: 2022-03-09 | Stop reason: HOSPADM

## 2022-03-09 RX ORDER — METOPROLOL SUCCINATE 25 MG/1
25 TABLET, EXTENDED RELEASE ORAL DAILY
Status: DISCONTINUED | OUTPATIENT
Start: 2022-03-09 | End: 2022-03-10 | Stop reason: HOSPADM

## 2022-03-09 RX ORDER — FUROSEMIDE 40 MG/1
40 TABLET ORAL DAILY
Status: DISCONTINUED | OUTPATIENT
Start: 2022-03-09 | End: 2022-03-10 | Stop reason: HOSPADM

## 2022-03-09 RX ORDER — SODIUM CHLORIDE 0.9 % (FLUSH) 0.9 %
10 SYRINGE (ML) INJECTION AS NEEDED
Status: DISCONTINUED | OUTPATIENT
Start: 2022-03-09 | End: 2022-03-09 | Stop reason: HOSPADM

## 2022-03-09 RX ADMIN — SODIUM CHLORIDE 75 ML/HR: 9 INJECTION, SOLUTION INTRAVENOUS at 08:07

## 2022-03-09 RX ADMIN — DULOXETINE HYDROCHLORIDE 20 MG: 20 CAPSULE, DELAYED RELEASE ORAL at 15:53

## 2022-03-09 RX ADMIN — DIGOXIN 125 MCG: 250 TABLET ORAL at 15:53

## 2022-03-09 RX ADMIN — FUROSEMIDE 40 MG: 40 TABLET ORAL at 15:57

## 2022-03-09 RX ADMIN — ACETAMINOPHEN 650 MG: 325 TABLET ORAL at 15:52

## 2022-03-10 ENCOUNTER — READMISSION MANAGEMENT (OUTPATIENT)
Dept: CALL CENTER | Facility: HOSPITAL | Age: 67
End: 2022-03-10

## 2022-03-10 ENCOUNTER — APPOINTMENT (OUTPATIENT)
Dept: CARDIOLOGY | Facility: HOSPITAL | Age: 67
End: 2022-03-10

## 2022-03-10 VITALS
SYSTOLIC BLOOD PRESSURE: 106 MMHG | RESPIRATION RATE: 16 BRPM | DIASTOLIC BLOOD PRESSURE: 64 MMHG | HEART RATE: 72 BPM | OXYGEN SATURATION: 97 % | WEIGHT: 122 LBS | BODY MASS INDEX: 21.62 KG/M2 | TEMPERATURE: 97.8 F | HEIGHT: 63 IN

## 2022-03-10 PROCEDURE — G0378 HOSPITAL OBSERVATION PER HR: HCPCS

## 2022-03-10 PROCEDURE — 99217 PR OBSERVATION CARE DISCHARGE MANAGEMENT: CPT

## 2022-03-10 PROCEDURE — 93246 EXT ECG>7D<15D RECORDING: CPT

## 2022-03-10 RX ADMIN — FUROSEMIDE 40 MG: 40 TABLET ORAL at 08:04

## 2022-03-10 RX ADMIN — DULOXETINE HYDROCHLORIDE 20 MG: 20 CAPSULE, DELAYED RELEASE ORAL at 08:04

## 2022-03-10 RX ADMIN — DIGOXIN 125 MCG: 250 TABLET ORAL at 08:04

## 2022-03-10 RX ADMIN — PANTOPRAZOLE SODIUM 40 MG: 40 TABLET, DELAYED RELEASE ORAL at 06:26

## 2022-03-10 NOTE — DISCHARGE INSTR - ACTIVITY
Watch for bleeding on groin sites.  Contact provider for any bleeding, redness, swelling or fever.

## 2022-03-10 NOTE — OUTREACH NOTE
Prep Survey    Flowsheet Row Responses   [facility]ondenominational facility patient discharged from? [facility]   Is LACE score < 7 ? Yes   Emergency Room discharge w/ pulse ox? No   Eligibility [facility]   Date of Admission 03/09/22   Date of Discharge 03/10/22   Discharge Disposition Home or Self Care   Discharge diagnosis pVC's   Does the patient have one of the following disease processes/diagnoses(primary or secondary)? Other   Does the patient have Home health ordered? No   Is there a DME ordered? No   Prep survey completed? Yes          JANELL GANDHI - Registered Nurse

## 2022-03-10 NOTE — DISCHARGE SUMMARY
DISCHARGE NOTE    Patient Name: Shanika Hector  Age/Sex: 66 y.o. female  : 1955  MRN: [id number]    Date of Discharge:  3/10/2022   Date of Admit: 3/9/2022  Encounter Provider: ABHISHEK Valdez  Place of Service: [facility] CARDIOLOGY  Patient Care Team:  Bryan Vega MD as PCP - General  Bryan Vega MD as PCP - Family Medicine  Darrion Bateman MD as Consulting Physician (Cardiology)    Subjective:     Discharge Diagnosis:    PVCs (premature ventricular contractions)    PVC (premature ventricular contraction)      Hospital Course:   Mrs. Shanika Hector is a 66 year old female who is a patient of Dr. Toscano.  She has a history of PVCs, NSVT, cardiomyopathy.  She was last seen in our office on 2022 with complaints of palpitations, she was also noted on office EKG to be in bigeminy.  Dr. Toscano discussed ablation with patient and she decided to proceed.    She presented to the hospital on 2022 for scheduled outpatient PVC ablation. Dr. Toscano performed successful ablation of pvcs arising from left ventricle.  Patient tolerated the procedure well.  She was admitted overnight for observation due to risk factors and the extent of the procedure.      Today she is doing well with no complaints.  Her incisions are clean, dry, intact, and soft.  Patient ambulated without difficulty. Monitor shows occasional PVCs, patient is asymptomatic. She will be discharged home today with a Zio.  She will follow up with me as an outpatient in one month.     Vital Signs  Temp:  [97.5 °F (36.4 °C)-98.2 °F (36.8 °C)] 97.8 °F (36.6 °C)  Heart Rate:  [58-78] 72  Resp:  [11-20] 16  BP: ()/(51-68) 106/64    Intake/Output Summary (Last 24 hours) at 3/10/2022 0907  Last data filed at 3/10/2022 0839  Gross per 24 hour   Intake 1209 ml   Output 0 ml   Net 1209 ml       Physical  Exam:    General Appearance: No acute distress, well developed and well nourished.   Eyes: Conjunctiva and lids: No erythema, swelling, or discharge. Sclera non-icteric.   HENT: Atraumatic, normocephalic. External eyes, ears, and nose normal.   Respiratory: No signs of respiratory distress. Respiration rhythm and depth normal.   Clear to auscultation. No rales, crackles, rhonchi, or wheezing auscultated.   Cardiovascular:  Heart Rate and Rhythm: Normal, Heart Sounds: Normal S1 and S2. No S3 or S4 noted.   Gastrointestinal:  Abdomen soft, non-distended, non-tender.  Musculoskeletal: Normal movement of extremities  Skin: Warm and dry.   Psychiatric: Patient alert and oriented to person, place, and time. Speech and behavior appropriate. Normal mood and affect.    Labs:   Results from last 7 days   Lab Units 03/07/22  0843   SODIUM mmol/L 142   POTASSIUM mmol/L 4.1   CHLORIDE mmol/L 101   CO2 mmol/L 29.3*   BUN mg/dL 19   CREATININE mg/dL 0.90   GLUCOSE mg/dL 96   CALCIUM mg/dL 9.6         Results from last 7 days   Lab Units 03/07/22  0843   WBC 10*3/mm3 5.30   HEMOGLOBIN g/dL 11.7*   HEMATOCRIT % 35.6   PLATELETS 10*3/mm3 315                           Discharge Diet:    Dietary Orders (From admission, onward)     Start     Ordered    03/09/22 1332  Diet Regular  Diet Effective Now        Question:  Diet Texture / Consistency  Answer:  Regular    03/09/22 1332                  Activity at Discharge: as tolerated    Discharge Medications     Discharge Medications      Continue These Medications      Instructions Start Date   acetaminophen 650 MG 8 hr tablet  Commonly known as: TYLENOL   2 tablets, Oral, 2 Times Daily PRN      Benefiber powder   qd-bid      digoxin 125 MCG tablet  Commonly known as: LANOXIN   125 mcg, Oral, Daily      DULoxetine 20 MG capsule  Commonly known as: CYMBALTA   TAKE ONE CAPSULE BY MOUTH DAILY      furosemide 40 MG tablet  Commonly known as: LASIX   40 mg, Oral, Daily      gabapentin 100 MG  capsule  Commonly known as: NEURONTIN   Take 1 capsule by mouth every morning and then take 2 capsules by mouth every evening      lisinopril 2.5 MG tablet  Commonly known as: PRINIVIL,ZESTRIL   2.5 mg, Oral, Daily, Take at night      metoprolol succinate XL 25 MG 24 hr tablet  Commonly known as: TOPROL-XL   25 mg, Oral, Daily      omeprazole 40 MG capsule  Commonly known as: priLOSEC   TAKE ONE CAPSULE BY MOUTH DAILY             Discharge disposition: Home    Follow-up Appointments   Follow-up Information     Terry Hurtado APRN Follow up in 1 month(s).    Specialties: Nurse Practitioner, Cardiology  Why: The office will call you to schedule your follow up appointment.  Contact information:  [address] [facility] [address]  [place] [id number] [phone]             Bryan Vega MD .    Specialty: Family Medicine  Contact information:  [address]  [facility] [address]  [place] [id number] [phone]                       Future Appointments   Date Time Provider Department Center   5/10/2022 10:00 AM [facility]   5/10/2022 11:00 AM Roxann Roberts APRN [facility]   7/5/2022  9:40 AM [facility] [place] [facility] E[facility]   7/12/2022  9:00 AM Bryan Vega MD [facility]         ABHISHEK Valdez  03/10/22  09:07 EST

## 2022-03-11 ENCOUNTER — TELEPHONE (OUTPATIENT)
Dept: CARDIOLOGY | Facility: CLINIC | Age: 67
End: 2022-03-11

## 2022-03-11 ENCOUNTER — TRANSITIONAL CARE MANAGEMENT TELEPHONE ENCOUNTER (OUTPATIENT)
Dept: CALL CENTER | Facility: HOSPITAL | Age: 67
End: 2022-03-11

## 2022-03-11 NOTE — TELEPHONE ENCOUNTER
Patient s/p PVC ablation on 3/9 with Dr. Toscano, calling today complaining of SOA and she's had a headache since the ablation.    She states that she can just be standing at the counter, and be SOA.    The nurse from [facility] called to f/u up with her today and suggested that she call to let us know.    Patient can be reached at [phone].

## 2022-03-11 NOTE — TELEPHONE ENCOUNTER
I spoke with the patient.  She has some mild shortness of breath with activity that she says is very similar to what she was experiencing prior to the ablation.  I advised her that this is normal after the procedure and could continue to happen over the next few months until everything is healed.  I advised her to rest and take it easy over the weekend and call if she has other associated symptoms like chest pain, n/v, diaphoresis.  No complaints of headache now.

## 2022-03-11 NOTE — OUTREACH NOTE
Call Center TCM Note    Flowsheet Row Responses   [facility] patient discharged from? [place]   Does the patient have one of the following disease processes/diagnoses(primary or secondary)? Other   TCM attempt successful? Yes   Call start time 0947   Call end time 0957   Discharge diagnosis pVC's   Person spoke with today (if not patient) and relationship Patient   Meds reviewed with patient/caregiver? Yes   Is the patient having any side effects they believe may be caused by any medication additions or changes? No   Does the patient have all medications ordered at discharge? Yes   Is the patient taking all medications as directed (includes completed medication regime)? Yes   Does the patient have a primary care provider?  Yes   Does the patient have an appointment with their PCP within 7 days of discharge? No   What is preventing the patient from scheduling follow up appointments within 7 days of discharge? Earlier appointment not available   Nursing Interventions Educated patient on importance of making appointment, Advised patient to make appointment   Has the patient kept scheduled appointments due by today? N/A   Psychosocial issues? No   Did the patient receive a copy of their discharge instructions? Yes   Nursing interventions Reviewed instructions with patient   What is the patient's perception of their health status since discharge? Improving   Is the patient/caregiver able to teach back signs and symptoms related to disease process for when to call PCP? Yes   Is the patient/caregiver able to teach back signs and symptoms related to disease process for when to call 911? Yes   Is the patient/caregiver able to teach back the hierarchy of who to call/visit for symptoms/problems? PCP, Specialist, Home health nurse, Urgent Care, ED, 911 Yes   If the patient is a current smoker, are they able to teach back resources for cessation? Not a smoker   TCM call completed? Yes   Wrap up additional comments Pt  states she has been SOB, with a slight headache this morning post ablation done on 3/9/22. Pt was SOB when talking. RN called Dr. Lawson, cardiologist/surgeon's office, and spoke with Benjie about pt's s/s of SOB and headache. Benjie will report s/s to Dr. Lawson for possible MD orders, and inform pt.          Domenica Carty RN    3/11/2022, 10:05 EST

## 2022-03-28 LAB
MAXIMAL PREDICTED HEART RATE: 152 BPM
STRESS TARGET HR: 129 BPM

## 2022-03-28 PROCEDURE — 93248 EXT ECG>7D<15D REV&INTERPJ: CPT | Performed by: INTERNAL MEDICINE

## 2022-04-11 ENCOUNTER — OFFICE VISIT (OUTPATIENT)
Dept: CARDIOLOGY | Facility: CLINIC | Age: 67
End: 2022-04-11

## 2022-04-11 VITALS
DIASTOLIC BLOOD PRESSURE: 62 MMHG | BODY MASS INDEX: 20.38 KG/M2 | WEIGHT: 115 LBS | SYSTOLIC BLOOD PRESSURE: 110 MMHG | HEART RATE: 60 BPM | HEIGHT: 63 IN

## 2022-04-11 DIAGNOSIS — I50.22 CHRONIC SYSTOLIC CONGESTIVE HEART FAILURE: Primary | Chronic | ICD-10-CM

## 2022-04-11 DIAGNOSIS — I49.3 PVCS (PREMATURE VENTRICULAR CONTRACTIONS): ICD-10-CM

## 2022-04-11 DIAGNOSIS — I42.0 CARDIOMYOPATHY, DILATED: Chronic | ICD-10-CM

## 2022-04-11 PROCEDURE — 99213 OFFICE O/P EST LOW 20 MIN: CPT

## 2022-04-11 PROCEDURE — 93000 ELECTROCARDIOGRAM COMPLETE: CPT

## 2022-04-11 NOTE — PROGRESS NOTES
Date of Office Visit: 2022  Encounter Provider: ABHISHEK Valdez  Place of Service: [facility] CARDIOLOGY  Patient Name: Shanika Hector  :1955    Chief Complaint   Patient presents with   • PVCs     S/p ablation 3/9   :     HPI: Shanika Hector is a 66 y.o. female who is followed by Dr. Toscano and Dr. Bateman.  She has a history of chf, NICM, and PVC-s/p PVC ablation (3/09/2022).      She saw Dr. Toscano in February for increased shortness of breath and frequent palpitations.  She had known PVCs which was being controlled with metoprolol.  She had an echo 2021 which showed a decreased LVEF of 38%, subsequent cardiac catheterization was negative for significant CAD, her medical therapy was optimized.      Repeat echo in Nov. Showed no improvement to her EF and she continued to have frequent PVCS, with a burden of ~12%.  She was referred to Dr. Toscano and underwent PVC ablation on 3/09/2022.  She was discharged with a Zio monitor that showed a decreased PVC burden of 3%.    She presents today for follow up.    She says that she is doing so much better.  She continues to have palpitations but they are much more infrequent and she does not seem to be as symptomatic with them.  He does not really feel her heart racing or skipping beats anymore.  She denies chest pain, shortness of breath, fatigue.    She is able to do more activity, she says that she has been gardening in her yard which she is not able to do before.  She is no longer held back by her PVCs.    She wore a ZIO after discharge which showed a significantly decrease in her PVC burden 3%.         Past Medical History:   Diagnosis Date   • Acute systolic congestive heart failure (HCC) 2021   • ITZEL positive     Repeat ITZEL 2014 negative   • Anxiety    • Bilateral sacroiliitis (HCC) 2017   • Bursitis 2016   • Cardiomyopathy, dilated (HCC)    • Cervical radiculopathy    • Cervical spondylosis    •  Degeneration of intervertebral disc    • Eczema    • Fibromyalgia 3/16/2020   • GERD (gastroesophageal reflux disease)    • Heart murmur Preteen   • History of cardiac murmur as a child    • Irritable bowel syndrome    • Lichen sclerosus of female genitalia    • Low back pain    • Myalgia and myositis    • Nonischemic cardiomyopathy (HCC)     2/2021, EF 20%, normal cors   • Nonrheumatic mitral valve regurgitation    • Osteoporosis    • Peptic ulceration Dec 2019   • Pruritus    • Pure hypercholesterolemia 6/10/2019   • PVCs (premature ventricular contractions) 3/22/2021       Past Surgical History:   Procedure Laterality Date   • BREAST CYST ASPIRATION      10+ years ago   • CARDIAC CATHETERIZATION N/A 02/15/2021    Procedure: Left Heart Cath;  Surgeon: Zee Rojas MD;  Location:  [facility] CATH INVASIVE LOCATION;  Service: Cardiovascular;  Laterality: N/A;   • CARDIAC CATHETERIZATION N/A 02/15/2021    Procedure: Left ventriculography;  Surgeon: Zee Rojas MD;  Location:  [facility] CATH INVASIVE LOCATION;  Service: Cardiovascular;  Laterality: N/A;   • CARDIAC CATHETERIZATION N/A 02/15/2021    Procedure: Coronary angiography;  Surgeon: Zee Rojas MD;  Location:  [facility] CATH INVASIVE LOCATION;  Service: Cardiovascular;  Laterality: N/A;   • CARDIAC CATHETERIZATION N/A 02/15/2021    Procedure: Right Heart Cath;  Surgeon: Zee Rojas MD;  Location: [facility] CATH INVASIVE [facility];  Service: Cardiovascular;  Laterality: N/A;   • CARDIAC ELECTROPHYSIOLOGY PROCEDURE N/A 3/9/2022    Procedure: Ablation PVC;  Surgeon: Mati Toscano MD;  [facility];  Service: Cardiology;  Laterality: N/A;   • CARDIAC ELECTROPHYSIOLOGY PROCEDURE N/A 3/9/2022    Procedure: EP/ABLATION;  Surgeon: Mati Toscano MD;  Location:  [facility] CATH INVASIVE LOCATION;  Service: Cardiology;  Laterality: N/A;   • CHOLECYSTECTOMY     • COLONOSCOPY  07/30/2012    Dr. Zazueta, normal   • ENDOSCOPY  12/04/2015     Mild active duodenitis, reactive gastropathy suggestive of healing erosion or adjacent ulcer   • ENDOSCOPY N/A 12/16/2019    Procedure: ESOPHAGOGASTRODUODENOSCOPY WITH BIOPSY WITH COLD BIOPSY POLYPECTOMY;  Surgeon: Lida Garcia MD;  Location: [facility] ENDOSCOPY;  Service: Gastroenterology   • ENDOSCOPY N/A 07/13/2020    Procedure: ESOPHAGOGASTRODUODENOSCOPY WITH COLD BIOPSY POLYPECTOMY;  Surgeon: Lida Garcia MD;  Location: [facility] ENDOSCOPY;  Service: Gastroenterology;  Laterality: N/A;  pre: history of gastric ulcer  post: healed gastric ulcer and gastric polyp   • EYE SURGERY  April2021    Cararact surgury both eyes       Social History     Socioeconomic History   • Marital status:    Tobacco Use   • Smoking status: Never Smoker   • Smokeless tobacco: Never Used   Vaping Use   • Vaping Use: Never used   Substance and Sexual Activity   • Alcohol use: Not Currently     Alcohol/week: 1.0 standard drink     Types: 1 Glasses of wine per week     Comment: caffeine- 3 cups daily, occasional   • Drug use: No   • Sexual activity: Yes     Partners: Male     Birth control/protection: None       Family History   Problem Relation Age of Onset   • Irritable bowel syndrome Mother    • Hypertension Mother    • Hypertension Father    • Hearing loss Father    • Alcohol abuse Brother    • Early death Brother    • Heart disease Brother    • Arthritis Sister        Review of Systems   Constitutional: Negative for chills, fever and malaise/fatigue.   Cardiovascular: Negative for chest pain, dyspnea on exertion, leg swelling, near-syncope, orthopnea, palpitations, paroxysmal nocturnal dyspnea and syncope.   Respiratory: Negative for cough and shortness of breath.    Musculoskeletal: Negative for joint pain, joint swelling and myalgias.   Gastrointestinal: Negative for abdominal pain, diarrhea, melena, nausea and vomiting.   Genitourinary: Negative for frequency and hematuria.   Neurological: Negative for  "light-headedness, numbness, paresthesias and seizures.   Allergic/Immunologic: Negative.    All other systems reviewed and are negative.      Allergies   Allergen Reactions   • Other Unknown (See Comments)     perfume   • Sulfa Antibiotics Unknown (See Comments)     Past history, unknown reaction. Was told by mother there was a reaction of some type.    • Adhesive Tape Itching         Current Outpatient Medications:   •  acetaminophen (TYLENOL) 650 MG 8 hr tablet, Take 2 tablets by mouth 2 (two) times a day as needed., Disp: , Rfl:   •  digoxin (LANOXIN) 125 MCG tablet, Take 1 tablet by mouth Daily., Disp: 90 tablet, Rfl: 3  •  DULoxetine (CYMBALTA) 20 MG capsule, TAKE ONE CAPSULE BY MOUTH DAILY, Disp: 90 capsule, Rfl: 1  •  furosemide (LASIX) 40 MG tablet, Take 40 mg by mouth Daily., Disp: , Rfl:   •  gabapentin (NEURONTIN) 100 MG capsule, Take 1 capsule by mouth every morning and then take 2 capsules by mouth every evening, Disp: 270 capsule, Rfl: 1  •  lisinopril (PRINIVIL,ZESTRIL) 2.5 MG tablet, Take 1 tablet by mouth Daily. Take at night, Disp: 90 tablet, Rfl: 3  •  metoprolol succinate XL (TOPROL-XL) 25 MG 24 hr tablet, Take 1 tablet by mouth Daily., Disp: 90 tablet, Rfl: 2  •  omeprazole (priLOSEC) 40 MG capsule, TAKE ONE CAPSULE BY MOUTH DAILY, Disp: 90 capsule, Rfl: 1  •  Wheat Dextrin (Benefiber) powder, qd-bid, Disp: , Rfl:       Objective:     Vitals:    04/11/22 1309   BP: 110/62   Pulse: 60   Weight: 52.2 kg (115 lb)   Height: 160 cm (63\")     Body mass index is 20.37 kg/m².    PHYSICAL EXAM:    Vitals Reviewed.   General Appearance: No acute distress, well developed and well nourished.   Eyes: Conjunctiva and lids: No erythema, swelling, or discharge. Sclera non-icteric.   HENT: Atraumatic, normocephalic. External eyes, ears, and nose normal.   Respiratory: No signs of respiratory distress. Respiration rhythm and depth normal.   Clear to auscultation. No rales, crackles, rhonchi, or wheezing " auscultated.   Cardiovascular:  Heart Rate and Rhythm: Normal, Heart Sounds: Normal S1 and S2. No S3 or S4 noted.  Gastrointestinal:  Abdomen soft, non-distended, non-tender.   Musculoskeletal: Normal movement of extremities  Skin: Warm and dry.   Psychiatric: Patient alert and oriented to person, place, and time. Speech and behavior appropriate. Normal mood and affect.       ECG 12 Lead    Date/Time: 4/11/2022 1:29 PM  Performed by: Terry Hurtado APRN  Authorized by: Terry Hurtado APRN   Comparison: compared with previous ECG   Similar to previous ECG  Rhythm: sinus rhythm  BPM: 60                Assessment:       Diagnosis Plan   1. Chronic systolic congestive heart failure (HCC)     2. Cardiomyopathy, dilated (HCC)     3. PVCs (premature ventricular contractions)            Plan:   1-2.  CHF, NICM- her last echo in Nov. Showed a decrease LVEF at 38%.  She had a normal heart cath so it was suspected that her frequent PVCs were causing her LV dysfunction. She is on GDMT.  She has a repeat echo next month to evaluate.    3. PVCs- she is having significantly less PVCs, her burden was 12%,now it is 3%.  She feels much better and is extremely happy about how she is feeling now.  She is able to do all of her daily activities. No PVC on EKG today.    She will follow up in 3 months or sooner if she has issues.      As always, it has been a pleasure to participate in your patient's care.      Sincerely,         ABHISHEK Valdez

## 2022-05-10 ENCOUNTER — OFFICE VISIT (OUTPATIENT)
Dept: CARDIOLOGY | Facility: CLINIC | Age: 67
End: 2022-05-10

## 2022-05-10 ENCOUNTER — HOSPITAL ENCOUNTER (OUTPATIENT)
Dept: CARDIOLOGY | Facility: HOSPITAL | Age: 67
Discharge: HOME | End: 2022-05-10
Admitting: INTERNAL MEDICINE

## 2022-05-10 VITALS
WEIGHT: 116 LBS | BODY MASS INDEX: 20.55 KG/M2 | HEART RATE: 60 BPM | DIASTOLIC BLOOD PRESSURE: 72 MMHG | HEIGHT: 63 IN | SYSTOLIC BLOOD PRESSURE: 100 MMHG

## 2022-05-10 VITALS
HEART RATE: 70 BPM | WEIGHT: 115 LBS | HEIGHT: 63 IN | DIASTOLIC BLOOD PRESSURE: 60 MMHG | SYSTOLIC BLOOD PRESSURE: 100 MMHG | BODY MASS INDEX: 20.38 KG/M2

## 2022-05-10 DIAGNOSIS — I49.3 PVCS (PREMATURE VENTRICULAR CONTRACTIONS): ICD-10-CM

## 2022-05-10 DIAGNOSIS — R94.31 ABNORMAL EKG: ICD-10-CM

## 2022-05-10 DIAGNOSIS — I50.22 CHRONIC SYSTOLIC CONGESTIVE HEART FAILURE: Chronic | ICD-10-CM

## 2022-05-10 DIAGNOSIS — I42.0 CARDIOMYOPATHY, DILATED: ICD-10-CM

## 2022-05-10 DIAGNOSIS — I42.0 CARDIOMYOPATHY, DILATED: Primary | Chronic | ICD-10-CM

## 2022-05-10 DIAGNOSIS — I50.22 CHRONIC SYSTOLIC CONGESTIVE HEART FAILURE: ICD-10-CM

## 2022-05-10 LAB
AORTIC ARCH: 2.1 CM
ASCENDING AORTA: 2.9 CM
BH CV ECHO MEAS - ACS: 1.74 CM
BH CV ECHO MEAS - AO MAX PG: 7.2 MMHG
BH CV ECHO MEAS - AO MEAN PG: 4.1 MMHG
BH CV ECHO MEAS - AO ROOT DIAM: 3.1 CM
BH CV ECHO MEAS - AO V2 MAX: 134.2 CM/SEC
BH CV ECHO MEAS - AO V2 VTI: 28.9 CM
BH CV ECHO MEAS - AVA(I,D): 1.39 CM2
BH CV ECHO MEAS - EDV(CUBED): 104.5 ML
BH CV ECHO MEAS - EDV(MOD-SP2): 96 ML
BH CV ECHO MEAS - EDV(MOD-SP4): 75 ML
BH CV ECHO MEAS - EF(MOD-BP): 50.7 %
BH CV ECHO MEAS - EF(MOD-SP2): 53.1 %
BH CV ECHO MEAS - EF(MOD-SP4): 52 %
BH CV ECHO MEAS - ESV(CUBED): 59.9 ML
BH CV ECHO MEAS - ESV(MOD-SP2): 45 ML
BH CV ECHO MEAS - ESV(MOD-SP4): 36 ML
BH CV ECHO MEAS - FS: 16.9 %
BH CV ECHO MEAS - IVS/LVPW: 0.98 CM
BH CV ECHO MEAS - IVSD: 1.06 CM
BH CV ECHO MEAS - LAT PEAK E' VEL: 12.2 CM/SEC
BH CV ECHO MEAS - LV DIASTOLIC VOL/BSA (35-75): 8.9 CM2
BH CV ECHO MEAS - LV MASS(C)D: 181.5 GRAMS
BH CV ECHO MEAS - LV MAX PG: 2.17 MMHG
BH CV ECHO MEAS - LV MEAN PG: 1.3 MMHG
BH CV ECHO MEAS - LV SYSTOLIC VOL/BSA (12-30): 4.3 CM2
BH CV ECHO MEAS - LV V1 MAX: 73.7 CM/SEC
BH CV ECHO MEAS - LV V1 VTI: 12.9 CM
BH CV ECHO MEAS - LVIDD: 4.7 CM
BH CV ECHO MEAS - LVIDS: 3.9 CM
BH CV ECHO MEAS - LVOT AREA: 3.1 CM2
BH CV ECHO MEAS - LVOT DIAM: 2 CM
BH CV ECHO MEAS - LVPWD: 1.08 CM
BH CV ECHO MEAS - MED PEAK E' VEL: 6.3 CM/SEC
BH CV ECHO MEAS - MR MAX PG: 39 MMHG
BH CV ECHO MEAS - MR MAX VEL: 312.2 CM/SEC
BH CV ECHO MEAS - MV A DUR: 0.11 SEC
BH CV ECHO MEAS - MV A MAX VEL: 65.6 CM/SEC
BH CV ECHO MEAS - MV DEC SLOPE: 204.9 CM/SEC2
BH CV ECHO MEAS - MV DEC TIME: 0.17 MSEC
BH CV ECHO MEAS - MV E MAX VEL: 68.1 CM/SEC
BH CV ECHO MEAS - MV E/A: 1.04
BH CV ECHO MEAS - MV MAX PG: 1.84 MMHG
BH CV ECHO MEAS - MV MEAN PG: 0.8 MMHG
BH CV ECHO MEAS - MV P1/2T: 95 MSEC
BH CV ECHO MEAS - MV V2 VTI: 24.9 CM
BH CV ECHO MEAS - MVA(P1/2T): 2.32 CM2
BH CV ECHO MEAS - MVA(VTI): 1.61 CM2
BH CV ECHO MEAS - PA ACC TIME: 0.15 SEC
BH CV ECHO MEAS - PA PR(ACCEL): 12.5 MMHG
BH CV ECHO MEAS - PA V2 MAX: 69.8 CM/SEC
BH CV ECHO MEAS - PULM A REVS DUR: 0.1 SEC
BH CV ECHO MEAS - PULM A REVS VEL: 29.1 CM/SEC
BH CV ECHO MEAS - PULM DIAS VEL: 42.4 CM/SEC
BH CV ECHO MEAS - PULM S/D: 1.16
BH CV ECHO MEAS - PULM SYS VEL: 49.3 CM/SEC
BH CV ECHO MEAS - QP/QS: 1.02
BH CV ECHO MEAS - RAP SYSTOLE: 3 MMHG
BH CV ECHO MEAS - RV MAX PG: 1.26 MMHG
BH CV ECHO MEAS - RV V1 MAX: 56.1 CM/SEC
BH CV ECHO MEAS - RV V1 VTI: 12.7 CM
BH CV ECHO MEAS - RVOT DIAM: 2.03 CM
BH CV ECHO MEAS - RVSP: 24 MMHG
BH CV ECHO MEAS - SI(MOD-SP2): 6.1 ML/M2
BH CV ECHO MEAS - SI(MOD-SP4): 4.6 ML/M2
BH CV ECHO MEAS - SUP REN AO DIAM: 1.8 CM
BH CV ECHO MEAS - SV(LVOT): 40.2 ML
BH CV ECHO MEAS - SV(MOD-SP2): 51 ML
BH CV ECHO MEAS - SV(MOD-SP4): 39 ML
BH CV ECHO MEAS - SV(RVOT): 40.9 ML
BH CV ECHO MEAS - TAPSE (>1.6): 1.73 CM
BH CV ECHO MEAS - TR MAX PG: 20.8 MMHG
BH CV ECHO MEAS - TR MAX VEL: 227.9 CM/SEC
BH CV ECHO MEASUREMENTS AVERAGE E/E' RATIO: 7.36
BH CV XLRA - RV BASE: 3 CM
BH CV XLRA - RV LENGTH: 5.4 CM
BH CV XLRA - RV MID: 2.34 CM
BH CV XLRA - TDI S': 11.1 CM/SEC
MAXIMAL PREDICTED HEART RATE: 152 BPM
SINUS: 3.3 CM
STJ: 2.7 CM
STRESS TARGET HR: 129 BPM

## 2022-05-10 PROCEDURE — 93000 ELECTROCARDIOGRAM COMPLETE: CPT | Performed by: NURSE PRACTITIONER

## 2022-05-10 PROCEDURE — 93306 TTE W/DOPPLER COMPLETE: CPT | Performed by: INTERNAL MEDICINE

## 2022-05-10 PROCEDURE — 99214 OFFICE O/P EST MOD 30 MIN: CPT | Performed by: NURSE PRACTITIONER

## 2022-05-10 PROCEDURE — 93306 TTE W/DOPPLER COMPLETE: CPT

## 2022-05-10 NOTE — PROGRESS NOTES
Date of Office Visit: 05/10/2022  Encounter Provider: ABHISHEK Stone  Place of Service: [facility] CARDIOLOGY  Patient Name: Shanika Hetcor  :1955  Primary Cardiologist: Dr. Darrion Bateman and Dr. Mati Toscano    Chief Complaint   Patient presents with   • Cardiomyopathy   • PVC   • Follow-up   :     HPI: Shanika Hector is a pleasant 66 y.o. female who presents today for follow-up on nonischemic cardiomyopathy.  I have reviewed her medical records.    She has been diagnosed with nonischemic cardiomyopathy, systolic heart failure, and PVCs.  In 2021, her initial EF was 17% and she had grade 2 diastolic dysfunction per echocardiogram. Coronary angiography showed normal coronary arteries and normal filling pressures.    In May 2021, Holter monitor showed normal sinus rhythm, occasional APCs, rare PVCs, and asymptomatic SVT.    In 2021, echocardiogram showed EF of 38%, left ventricle mildly dilated, and grade 1 diastolic dysfunction.     In 2022, she has had Dr. Mati Toscano for the PVC burden.  Holter monitor showed PVC burden 11.4% and 5% were ventricular couplets and 177 runs of nonsustained VT the longest 8 beats in duration.    In 2022, she underwent PVC ablation by Dr. Mati Toscano.  Her PVC burden decreased to 3% per Zio patch monitor.    She presents today for follow-up visit.  Echocardiogram showed an improved ejection fraction of 50%, diastolic function normal, mild mitral valve prolapse of the anterior mitral leaflet, trace mitral valve regurgitation, and trace tricuspid regurgitation.     She experiences a brief palpitation on occasion.  She denies chest pain, shortness of air, edema, dizziness, syncope, or bleeding.  Her blood pressure and heart rate are normal today.      Past Medical History:   Diagnosis Date   • Acute systolic congestive heart failure (HCC) 2021   • ITZEL positive     Repeat ITZEL 2014 negative   •  Anxiety    • Bilateral sacroiliitis (HCC) 5/8/2017   • Bursitis 2016   • Cardiomyopathy, dilated (HCC)    • Cervical radiculopathy    • Cervical spondylosis    • Degeneration of intervertebral disc    • Eczema    • Fibromyalgia 3/16/2020   • GERD (gastroesophageal reflux disease)    • Heart murmur Preteen   • History of cardiac murmur as a child    • Irritable bowel syndrome    • Lichen sclerosus of female genitalia    • Low back pain    • Myalgia and myositis    • Nonischemic cardiomyopathy (HCC)     2/2021, EF 20%, normal cors   • Nonrheumatic mitral valve regurgitation    • Osteoporosis    • Peptic ulceration Dec 2019   • Pruritus    • Pure hypercholesterolemia 6/10/2019   • PVCs (premature ventricular contractions) 3/22/2021       Past Surgical History:   Procedure Laterality Date   • BREAST CYST ASPIRATION      10+ years ago   • CARDIAC CATHETERIZATION N/A 02/15/2021    Procedure: Left Heart Cath;  Surgeon: Zee Rojas MD;  Location: [facility] CATH INVASIVE LOCATION;  Service: Cardiovascular;  Laterality: N/A;   • CARDIAC CATHETERIZATION N/A 02/15/2021    Procedure: Left ventriculography;  Surgeon: Zee Rojas MD;  Location: [facility] CATH INVASIVE LOCATION;  Service: Cardiovascular;  Laterality: N/A;   • CARDIAC CATHETERIZATION N/A 02/15/2021    Procedure: Coronary angiography;  Surgeon: Zee Rojas MD;  Location: [facility] CATH INVASIVE LOCATION;  Service: Cardiovascular;  Laterality: N/A;   • CARDIAC CATHETERIZATION N/A 02/15/2021    Procedure: Right Heart Cath;  Surgeon: Zee Rojas MD;  Location: [facility] CATH INVASIVE LOCATION;  Service: Cardiovascular;  Laterality: N/A;   • CARDIAC ELECTROPHYSIOLOGY PROCEDURE N/A 3/9/2022    Procedure: Ablation PVC;  Surgeon: Mati Toscano MD;  Location: [facility] CATH INVASIVE LOCATION;  Service: Cardiology;  Laterality: N/A;   • CARDIAC ELECTROPHYSIOLOGY PROCEDURE N/A 3/9/2022    Procedure: EP/ABLATION;  Surgeon: Mati Toscano MD;  Location:   [facility] CATH INVASIVE LOCATION;  Service: Cardiology;  Laterality: N/A;   • CHOLECYSTECTOMY     • COLONOSCOPY  07/30/2012    Dr. Zazueta, normal   • ENDOSCOPY  12/04/2015    Mild active duodenitis, reactive gastropathy suggestive of healing erosion or adjacent ulcer   • ENDOSCOPY N/A 12/16/2019    Procedure: ESOPHAGOGASTRODUODENOSCOPY WITH BIOPSY WITH COLD BIOPSY POLYPECTOMY;  Surgeon: Lida Garcia MD;  Location:  [facility] ENDOSCOPY;  Service: Gastroenterology   • ENDOSCOPY N/A 07/13/2020    Procedure: ESOPHAGOGASTRODUODENOSCOPY WITH COLD BIOPSY POLYPECTOMY;  Surgeon: Lida Garcia MD;  Location:  [facility] ENDOSCOPY;  Service: Gastroenterology;  Laterality: N/A;  pre: history of gastric ulcer  post: healed gastric ulcer and gastric polyp   • EYE SURGERY  April2021    Cararact surgury both eyes       Social History     Socioeconomic History   • Marital status:    Tobacco Use   • Smoking status: Never Smoker   • Smokeless tobacco: Never Used   • Tobacco comment: caffeine- 3 cups daily   Vaping Use   • Vaping Use: Never used   Substance and Sexual Activity   • Alcohol use: Not Currently     Alcohol/week: 1.0 standard drink     Types: 1 Glasses of wine per week     Comment: occasional   • Drug use: No   • Sexual activity: Yes     Partners: Male     Birth control/protection: None       Family History   Problem Relation Age of Onset   • Irritable bowel syndrome Mother    • Hypertension Mother    • Hypertension Father    • Hearing loss Father    • Alcohol abuse Brother    • Early death Brother    • Heart disease Brother    • Arthritis Sister        The following portion of the patient's history were reviewed and updated as appropriate: past medical history, past surgical history, past social history, past family history, allergies, current medications, and problem list.    Review of Systems   Constitutional: Negative.   Cardiovascular: Positive for palpitations.   Respiratory: Negative.    Hematologic/Lymphatic:  "Negative.    Neurological: Negative.        Allergies   Allergen Reactions   • Other Unknown (See Comments)     perfume   • Sulfa Antibiotics Unknown (See Comments)     Past history, unknown reaction. Was told by mother there was a reaction of some type.    • Adhesive Tape Itching         Current Outpatient Medications:   •  acetaminophen (TYLENOL) 650 MG 8 hr tablet, Take 2 tablets by mouth 2 (two) times a day as needed., Disp: , Rfl:   •  digoxin (LANOXIN) 125 MCG tablet, Take 1 tablet by mouth Daily., Disp: 90 tablet, Rfl: 3  •  DULoxetine (CYMBALTA) 20 MG capsule, TAKE ONE CAPSULE BY MOUTH DAILY, Disp: 90 capsule, Rfl: 1  •  furosemide (LASIX) 40 MG tablet, Take 40 mg by mouth Daily., Disp: , Rfl:   •  gabapentin (NEURONTIN) 100 MG capsule, Take 1 capsule by mouth every morning and then take 2 capsules by mouth every evening, Disp: 270 capsule, Rfl: 1  •  lisinopril (PRINIVIL,ZESTRIL) 2.5 MG tablet, Take 1 tablet by mouth Daily. Take at night, Disp: 90 tablet, Rfl: 3  •  metoprolol succinate XL (TOPROL-XL) 25 MG 24 hr tablet, Take 1 tablet by mouth Daily., Disp: 90 tablet, Rfl: 2  •  omeprazole (priLOSEC) 40 MG capsule, TAKE ONE CAPSULE BY MOUTH DAILY, Disp: 90 capsule, Rfl: 1        Objective:     Vitals:    05/10/22 1058 05/10/22 1106   BP: 106/70 100/72   BP Location: Right arm Left arm   Pulse: 60    Weight:  52.6 kg (116 lb)   Height: 160 cm (63\") 160 cm (63\")     Body mass index is 20.55 kg/m².    PHYSICAL EXAM:    Vitals Reviewed.   General Appearance: No acute distress, well developed and well nourished. Obese.   Eyes: Conjunctiva and lids: No erythema, swelling, or discharge. Sclera non-icteric. Glasses.   HENT: Atraumatic, normocephalic. External eyes, ears, and nose normal. No hearing loss noted. Mucous membranes normal. Lips not cyanotic. Neck supple with no tenderness. Wearing mask.   Respiratory: No signs of respiratory distress. Respiration rhythm and depth normal.   Clear to auscultation. No " rales, crackles, rhonchi, or wheezing auscultated.   Cardiovascular:  Jugular Venous Pressure: Normal  Heart Rate and Rhythm: Normal, Heart Sounds: Normal S1 and S2. No S3 or S4 noted.  Murmurs: No murmurs noted. No rubs, thrills, or gallops.   Arterial Pulses: Carotid pulses normal. No carotid bruit noted. Posterior tibialis and dorsalis pedis pulses normal.   Lower Extremities: No edema noted.  Gastrointestinal:  Abdomen soft, non-distended, non-tender.    Musculoskeletal: Normal movement of extremities.  Skin and Nails: General appearance normal. No pallor, cyanosis, diaphoresis. Skin temperature normal. No clubbing of fingernails.   Psychiatric: Patient alert and oriented to person, place, and time. Speech and behavior appropriate. Normal mood and affect.       ECG 12 Lead    Date/Time: 5/10/2022 11:11 AM  Performed by: Roxann Roberts APRN  Authorized by: Roxann Roberts APRN   Comparison: compared with previous ECG from 4/11/2022  Similar to previous ECG  Rhythm: sinus rhythm  Rate: normal  BPM: 60  Conduction: conduction normal  ST Depression: V4, V5 and V6  T inversion: II, III, aVF, V4, V5 and V6  QRS axis: left  Other findings: non-specific ST-T wave changes    Clinical impression: abnormal EKG              Assessment:       Diagnosis Plan   1. Cardiomyopathy, dilated (HCC)     2. Chronic systolic congestive heart failure (HCC)     3. PVCs (premature ventricular contractions)     4. Abnormal EKG            Plan:       1/2.  Nonischemic Cardiomyopathy: Felt to be due to high PVC burden.  Echocardiogram today shows an improved EF of 50%.  She is well compensated and euvolemic.  We decided we will continue with metoprolol succinate and lisinopril.  She is going to do a trial of stopping the furosemide and if she has any fluid retention, I asked her to call me at the office.    3.  PVC: Status post PVC ablation by Dr. Mati Toscano in March 2022.  PVC burden has decreased to 3%.  She occasionally  experiences a brief palpitation, nothing sustained.  She will continue with metoprolol succinate and digoxin.    4.  Abnormal EKG: ST-T abnormalities that are not due to ischemia.    5.  She is scheduled to see Dr. Mati Toscano in 3 months and Dr. Darrion Bateman in 6 months.      As always, it has been a pleasure to participate in your patient's care. Thank you.       Sincerely,         ABHISHEK Tejeda  [facility] Cardiology      · Dictated utilizing Dragon Dictation  · COVID-19 Precautions - Patient was compliant in wearing a mask. When I saw the patient, I used appropriate personal protective equipment (PPE) including mask and eye shield (standard procedure).  Additionally, I used gown and gloves if indicated.  Hand hygiene was completed before and after seeing the patient.  · I spent 30 minutes reviewing her medical records/testing/previous office notes/labs, face-to-face interaction with patient, physical examination, formulating the plan of care, and discussion of plan of care with patient.

## 2022-05-23 DIAGNOSIS — E78.00 PURE HYPERCHOLESTEROLEMIA: Primary | ICD-10-CM

## 2022-06-27 RX ORDER — DULOXETIN HYDROCHLORIDE 20 MG/1
CAPSULE, DELAYED RELEASE ORAL
Qty: 90 CAPSULE | Refills: 1 | Status: SHIPPED | OUTPATIENT
Start: 2022-06-27 | End: 2022-11-30 | Stop reason: SDUPTHER

## 2022-06-27 RX ORDER — GABAPENTIN 100 MG/1
CAPSULE ORAL
Qty: 270 CAPSULE | Refills: 1 | Status: SHIPPED | OUTPATIENT
Start: 2022-06-27 | End: 2022-08-16 | Stop reason: SDUPTHER

## 2022-06-27 RX ORDER — OMEPRAZOLE 40 MG/1
CAPSULE, DELAYED RELEASE ORAL
Qty: 90 CAPSULE | Refills: 1 | Status: SHIPPED | OUTPATIENT
Start: 2022-06-27 | End: 2022-11-30 | Stop reason: SDUPTHER

## 2022-06-27 NOTE — TELEPHONE ENCOUNTER
Rx Refill Note  Requested Prescriptions     Pending Prescriptions Disp Refills   • DULoxetine (CYMBALTA) 20 MG capsule [Pharmacy Med Name: DULoxetine HCL DR 20 MG CAPSULE] 90 capsule 1     Sig: TAKE ONE CAPSULE BY MOUTH DAILY   • gabapentin (NEURONTIN) 100 MG capsule [Pharmacy Med Name: GABAPENTIN 100 MG CAPSULE] 270 capsule 1     Sig: TAKE ONE CAPSULE BY MOUTH EVERY MORNING. TAKE TWO CAPSULES BY MOUTH EVERY EVENING.   • omeprazole (priLOSEC) 40 MG capsule [Pharmacy Med Name: OMEPRAZOLE DR 40 MG CAPSULE] 90 capsule 1     Sig: TAKE ONE CAPSULE BY MOUTH DAILY      Last office visit with prescribing clinician: 1/3/2022      Next office visit with prescribing clinician: 7/12/2022            George Almodovar  06/27/22, 13:31 EDT

## 2022-06-28 ENCOUNTER — PATIENT MESSAGE (OUTPATIENT)
Dept: CARDIOLOGY | Facility: CLINIC | Age: 67
End: 2022-06-28

## 2022-06-28 ENCOUNTER — TELEPHONE (OUTPATIENT)
Dept: CARDIOLOGY | Facility: CLINIC | Age: 67
End: 2022-06-28

## 2022-06-28 DIAGNOSIS — I49.3 PVCS (PREMATURE VENTRICULAR CONTRACTIONS): Primary | ICD-10-CM

## 2022-06-28 NOTE — TELEPHONE ENCOUNTER
Her pharmacy is calling for refills on the furosemide.  In May, we discussed stopping the furosemide.  Is she still taking the medication?    Please also let her know that she is due for a digoxin level blood test at the main lab at the hospital.  I have placed the order.  Ask her to hold her digoxin the day that she is having the blood test drawn and then she can take the digoxin when she gets home.    Please let me know about the furosemide.  Thank you.

## 2022-06-29 ENCOUNTER — LAB (OUTPATIENT)
Dept: LAB | Facility: HOSPITAL | Age: 67
End: 2022-06-29

## 2022-06-29 ENCOUNTER — TELEPHONE (OUTPATIENT)
Dept: CARDIOLOGY | Facility: CLINIC | Age: 67
End: 2022-06-29

## 2022-06-29 DIAGNOSIS — I49.3 PVCS (PREMATURE VENTRICULAR CONTRACTIONS): ICD-10-CM

## 2022-06-29 LAB — DIGOXIN SERPL-MCNC: 0.9 NG/ML (ref 0.6–1.2)

## 2022-06-29 PROCEDURE — 80162 ASSAY OF DIGOXIN TOTAL: CPT

## 2022-06-29 PROCEDURE — 36415 COLL VENOUS BLD VENIPUNCTURE: CPT

## 2022-06-29 RX ORDER — DIGOXIN 125 MCG
TABLET ORAL
Qty: 90 TABLET | Refills: 3 | Status: SHIPPED | OUTPATIENT
Start: 2022-06-29 | End: 2022-11-17

## 2022-06-29 RX ORDER — FUROSEMIDE 40 MG/1
TABLET ORAL
Qty: 90 TABLET | Refills: 3 | Status: SHIPPED | OUTPATIENT
Start: 2022-06-29 | End: 2022-06-30

## 2022-06-29 NOTE — TELEPHONE ENCOUNTER
----- Message from ABHISHEK Cote sent at 6/29/2022 12:08 PM EDT -----  Please call patient. Normal digoxin level. Continue current meds. I have sent in refills for furosemide and digoxin. Thanks.

## 2022-06-29 NOTE — TELEPHONE ENCOUNTER
Notified pt of results and recommendations. She verbalized understanding.    Thank you,    Lee Ann Garcia, RN  Triage [facility]  06/29/22 13:45 EDT

## 2022-06-29 NOTE — TELEPHONE ENCOUNTER
Not sure which medications she is talking about.  Her recent medication refills are for Cymbalta, Gabapentin, and Omeprazole.  All which were sent in by Dr. Bryan Vega.

## 2022-07-05 ENCOUNTER — LAB (OUTPATIENT)
Dept: LAB | Facility: HOSPITAL | Age: 67
End: 2022-07-05

## 2022-07-05 DIAGNOSIS — E78.00 PURE HYPERCHOLESTEROLEMIA: ICD-10-CM

## 2022-07-05 DIAGNOSIS — I50.22 CHRONIC SYSTOLIC CONGESTIVE HEART FAILURE: ICD-10-CM

## 2022-07-05 DIAGNOSIS — I42.0 CARDIOMYOPATHY, DILATED: Primary | ICD-10-CM

## 2022-07-05 LAB
ALBUMIN SERPL-MCNC: 4.4 G/DL (ref 3.5–5.2)
ALBUMIN/GLOB SERPL: 1.7 G/DL
ALP SERPL-CCNC: 75 U/L (ref 39–117)
ALT SERPL W P-5'-P-CCNC: 9 U/L (ref 1–33)
ANION GAP SERPL CALCULATED.3IONS-SCNC: 10.4 MMOL/L (ref 5–15)
AST SERPL-CCNC: 14 U/L (ref 1–32)
BILIRUB SERPL-MCNC: 0.4 MG/DL (ref 0–1.2)
BUN SERPL-MCNC: 17 MG/DL (ref 8–23)
BUN/CREAT SERPL: 20.7 (ref 7–25)
CALCIUM SPEC-SCNC: 9.6 MG/DL (ref 8.6–10.5)
CHLORIDE SERPL-SCNC: 102 MMOL/L (ref 98–107)
CHOLEST SERPL-MCNC: 225 MG/DL (ref 0–200)
CO2 SERPL-SCNC: 28.6 MMOL/L (ref 22–29)
CREAT SERPL-MCNC: 0.82 MG/DL (ref 0.57–1)
EGFRCR SERPLBLD CKD-EPI 2021: 77.5 ML/MIN/1.73
GLOBULIN UR ELPH-MCNC: 2.6 GM/DL
GLUCOSE SERPL-MCNC: 91 MG/DL (ref 65–99)
HDLC SERPL-MCNC: 64 MG/DL (ref 40–60)
LDLC SERPL CALC-MCNC: 145 MG/DL (ref 0–100)
LDLC/HDLC SERPL: 2.23 {RATIO}
POTASSIUM SERPL-SCNC: 4.4 MMOL/L (ref 3.5–5.2)
PROT SERPL-MCNC: 7 G/DL (ref 6–8.5)
SODIUM SERPL-SCNC: 141 MMOL/L (ref 136–145)
TRIGL SERPL-MCNC: 92 MG/DL (ref 0–150)
VLDLC SERPL-MCNC: 16 MG/DL (ref 5–40)

## 2022-07-05 PROCEDURE — 80061 LIPID PANEL: CPT

## 2022-07-05 PROCEDURE — 36415 COLL VENOUS BLD VENIPUNCTURE: CPT

## 2022-07-05 PROCEDURE — 80053 COMPREHEN METABOLIC PANEL: CPT

## 2022-07-08 ENCOUNTER — APPOINTMENT (OUTPATIENT)
Dept: CT IMAGING | Facility: HOSPITAL | Age: 67
End: 2022-07-08

## 2022-07-08 ENCOUNTER — HOSPITAL ENCOUNTER (OUTPATIENT)
Facility: HOSPITAL | Age: 67
Setting detail: OBSERVATION
Discharge: HOME | End: 2022-07-10
Attending: EMERGENCY MEDICINE | Admitting: HOSPITALIST

## 2022-07-08 DIAGNOSIS — K92.1 BLOOD IN STOOL: ICD-10-CM

## 2022-07-08 DIAGNOSIS — K52.9 COLITIS: Primary | ICD-10-CM

## 2022-07-08 DIAGNOSIS — R10.32 LEFT LOWER QUADRANT ABDOMINAL PAIN: ICD-10-CM

## 2022-07-08 LAB
ALBUMIN SERPL-MCNC: 4.3 G/DL (ref 3.5–5.2)
ALBUMIN/GLOB SERPL: 1.5 G/DL
ALP SERPL-CCNC: 81 U/L (ref 39–117)
ALT SERPL W P-5'-P-CCNC: 8 U/L (ref 1–33)
ANION GAP SERPL CALCULATED.3IONS-SCNC: 13 MMOL/L (ref 5–15)
AST SERPL-CCNC: 15 U/L (ref 1–32)
BACTERIA UR QL AUTO: ABNORMAL /HPF
BASOPHILS # BLD AUTO: 0 10*3/MM3 (ref 0–0.2)
BASOPHILS NFR BLD AUTO: 0.4 % (ref 0–1.5)
BILIRUB SERPL-MCNC: 0.2 MG/DL (ref 0–1.2)
BILIRUB UR QL STRIP: NEGATIVE
BUN SERPL-MCNC: 16 MG/DL (ref 8–23)
BUN/CREAT SERPL: 16.7 (ref 7–25)
CALCIUM SPEC-SCNC: 9 MG/DL (ref 8.6–10.5)
CHLORIDE SERPL-SCNC: 102 MMOL/L (ref 98–107)
CLARITY UR: CLEAR
CO2 SERPL-SCNC: 26 MMOL/L (ref 22–29)
COLOR UR: YELLOW
CREAT SERPL-MCNC: 0.96 MG/DL (ref 0.57–1)
DEPRECATED RDW RBC AUTO: 41.6 FL (ref 37–54)
EGFRCR SERPLBLD CKD-EPI 2021: 64.2 ML/MIN/1.73
EOSINOPHIL # BLD AUTO: 0.2 10*3/MM3 (ref 0–0.4)
EOSINOPHIL NFR BLD AUTO: 2 % (ref 0.3–6.2)
ERYTHROCYTE [DISTWIDTH] IN BLOOD BY AUTOMATED COUNT: 13.2 % (ref 12.3–15.4)
GLOBULIN UR ELPH-MCNC: 2.9 GM/DL
GLUCOSE SERPL-MCNC: 115 MG/DL (ref 65–99)
GLUCOSE UR STRIP-MCNC: NEGATIVE MG/DL
HCT VFR BLD AUTO: 37.9 % (ref 34–46.6)
HGB BLD-MCNC: 12.3 G/DL (ref 12–15.9)
HGB UR QL STRIP.AUTO: NEGATIVE
HOLD SPECIMEN: NORMAL
HOLD SPECIMEN: NORMAL
HYALINE CASTS UR QL AUTO: ABNORMAL /LPF
KETONES UR QL STRIP: ABNORMAL
LEUKOCYTE ESTERASE UR QL STRIP.AUTO: ABNORMAL
LIPASE SERPL-CCNC: 20 U/L (ref 13–60)
LYMPHOCYTES # BLD AUTO: 0.9 10*3/MM3 (ref 0.7–3.1)
LYMPHOCYTES NFR BLD AUTO: 9.9 % (ref 19.6–45.3)
MCH RBC QN AUTO: 29 PG (ref 26.6–33)
MCHC RBC AUTO-ENTMCNC: 32.5 G/DL (ref 31.5–35.7)
MCV RBC AUTO: 89.2 FL (ref 79–97)
MONOCYTES # BLD AUTO: 0.7 10*3/MM3 (ref 0.1–0.9)
MONOCYTES NFR BLD AUTO: 7.4 % (ref 5–12)
NEUTROPHILS NFR BLD AUTO: 7.4 10*3/MM3 (ref 1.7–7)
NEUTROPHILS NFR BLD AUTO: 80.3 % (ref 42.7–76)
NITRITE UR QL STRIP: NEGATIVE
NRBC BLD AUTO-RTO: 0 /100 WBC (ref 0–0.2)
PH UR STRIP.AUTO: 8 [PH] (ref 5–8)
PLATELET # BLD AUTO: 297 10*3/MM3 (ref 140–450)
PMV BLD AUTO: 8.7 FL (ref 6–12)
POTASSIUM SERPL-SCNC: 4 MMOL/L (ref 3.5–5.2)
PROT SERPL-MCNC: 7.2 G/DL (ref 6–8.5)
PROT UR QL STRIP: ABNORMAL
RBC # BLD AUTO: 4.24 10*6/MM3 (ref 3.77–5.28)
RBC # UR STRIP: ABNORMAL /HPF
REF LAB TEST METHOD: ABNORMAL
SODIUM SERPL-SCNC: 141 MMOL/L (ref 136–145)
SP GR UR STRIP: 1.02 (ref 1–1.03)
SQUAMOUS #/AREA URNS HPF: ABNORMAL /HPF
TROPONIN T SERPL-MCNC: <0.01 NG/ML (ref 0–0.03)
UROBILINOGEN UR QL STRIP: ABNORMAL
WBC # UR STRIP: ABNORMAL /HPF
WBC NRBC COR # BLD: 9.2 10*3/MM3 (ref 3.4–10.8)
WHOLE BLOOD HOLD COAG: NORMAL
WHOLE BLOOD HOLD SPECIMEN: NORMAL

## 2022-07-08 PROCEDURE — 87086 URINE CULTURE/COLONY COUNT: CPT | Performed by: NURSE PRACTITIONER

## 2022-07-08 PROCEDURE — 84443 ASSAY THYROID STIM HORMONE: CPT | Performed by: NURSE PRACTITIONER

## 2022-07-08 PROCEDURE — 93005 ELECTROCARDIOGRAM TRACING: CPT

## 2022-07-08 PROCEDURE — 96372 THER/PROPH/DIAG INJ SC/IM: CPT

## 2022-07-08 PROCEDURE — 84484 ASSAY OF TROPONIN QUANT: CPT | Performed by: NURSE PRACTITIONER

## 2022-07-08 PROCEDURE — 83735 ASSAY OF MAGNESIUM: CPT | Performed by: NURSE PRACTITIONER

## 2022-07-08 PROCEDURE — 85025 COMPLETE CBC W/AUTO DIFF WBC: CPT | Performed by: NURSE PRACTITIONER

## 2022-07-08 PROCEDURE — 74177 CT ABD & PELVIS W/CONTRAST: CPT

## 2022-07-08 PROCEDURE — 25010000002 DICYCLOMINE PER 20 MG: Performed by: NURSE PRACTITIONER

## 2022-07-08 PROCEDURE — 0 IOPAMIDOL PER 1 ML: Performed by: EMERGENCY MEDICINE

## 2022-07-08 PROCEDURE — 83690 ASSAY OF LIPASE: CPT | Performed by: NURSE PRACTITIONER

## 2022-07-08 PROCEDURE — 81001 URINALYSIS AUTO W/SCOPE: CPT | Performed by: NURSE PRACTITIONER

## 2022-07-08 PROCEDURE — 80074 ACUTE HEPATITIS PANEL: CPT | Performed by: NURSE PRACTITIONER

## 2022-07-08 PROCEDURE — 96374 THER/PROPH/DIAG INJ IV PUSH: CPT

## 2022-07-08 PROCEDURE — 80053 COMPREHEN METABOLIC PANEL: CPT | Performed by: NURSE PRACTITIONER

## 2022-07-08 PROCEDURE — 25010000002 ONDANSETRON PER 1 MG: Performed by: NURSE PRACTITIONER

## 2022-07-08 PROCEDURE — 99285 EMERGENCY DEPT VISIT HI MDM: CPT

## 2022-07-08 RX ORDER — DICYCLOMINE HYDROCHLORIDE 10 MG/ML
20 INJECTION INTRAMUSCULAR ONCE
Status: COMPLETED | OUTPATIENT
Start: 2022-07-08 | End: 2022-07-08

## 2022-07-08 RX ORDER — SODIUM CHLORIDE 0.9 % (FLUSH) 0.9 %
10 SYRINGE (ML) INJECTION AS NEEDED
Status: DISCONTINUED | OUTPATIENT
Start: 2022-07-08 | End: 2022-07-10 | Stop reason: HOSPADM

## 2022-07-08 RX ORDER — ONDANSETRON 2 MG/ML
4 INJECTION INTRAMUSCULAR; INTRAVENOUS ONCE
Status: COMPLETED | OUTPATIENT
Start: 2022-07-08 | End: 2022-07-08

## 2022-07-08 RX ADMIN — ONDANSETRON 4 MG: 2 INJECTION INTRAMUSCULAR; INTRAVENOUS at 22:30

## 2022-07-08 RX ADMIN — DICYCLOMINE HYDROCHLORIDE 20 MG: 20 INJECTION, SOLUTION INTRAMUSCULAR at 22:30

## 2022-07-08 RX ADMIN — SODIUM CHLORIDE 500 ML: 9 INJECTION, SOLUTION INTRAVENOUS at 22:31

## 2022-07-08 RX ADMIN — IOPAMIDOL 90 ML: 755 INJECTION, SOLUTION INTRAVENOUS at 23:04

## 2022-07-09 PROBLEM — K52.9 COLITIS: Status: ACTIVE | Noted: 2022-07-09

## 2022-07-09 LAB
BACTERIA SPEC AEROBE CULT: NO GROWTH
BASOPHILS # BLD AUTO: 0 10*3/MM3 (ref 0–0.2)
BASOPHILS NFR BLD AUTO: 0.5 % (ref 0–1.5)
D-LACTATE SERPL-SCNC: 0.8 MMOL/L (ref 0.5–2)
DEPRECATED RDW RBC AUTO: 42 FL (ref 37–54)
EOSINOPHIL # BLD AUTO: 0.3 10*3/MM3 (ref 0–0.4)
EOSINOPHIL NFR BLD AUTO: 3.5 % (ref 0.3–6.2)
ERYTHROCYTE [DISTWIDTH] IN BLOOD BY AUTOMATED COUNT: 13.4 % (ref 12.3–15.4)
HAV IGM SERPL QL IA: NORMAL
HBV CORE IGM SERPL QL IA: NORMAL
HBV SURFACE AG SERPL QL IA: NORMAL
HCT VFR BLD AUTO: 32.9 % (ref 34–46.6)
HCT VFR BLD AUTO: 35.8 % (ref 34–46.6)
HCV AB SER DONR QL: NORMAL
HGB BLD-MCNC: 10.8 G/DL (ref 12–15.9)
HGB BLD-MCNC: 11.7 G/DL (ref 12–15.9)
LYMPHOCYTES # BLD AUTO: 1.3 10*3/MM3 (ref 0.7–3.1)
LYMPHOCYTES NFR BLD AUTO: 15.2 % (ref 19.6–45.3)
MAGNESIUM SERPL-MCNC: 2.1 MG/DL (ref 1.6–2.4)
MCH RBC QN AUTO: 29.3 PG (ref 26.6–33)
MCHC RBC AUTO-ENTMCNC: 32.8 G/DL (ref 31.5–35.7)
MCV RBC AUTO: 89.6 FL (ref 79–97)
MONOCYTES # BLD AUTO: 0.7 10*3/MM3 (ref 0.1–0.9)
MONOCYTES NFR BLD AUTO: 8.2 % (ref 5–12)
NEUTROPHILS NFR BLD AUTO: 6.1 10*3/MM3 (ref 1.7–7)
NEUTROPHILS NFR BLD AUTO: 72.6 % (ref 42.7–76)
NRBC BLD AUTO-RTO: 0 /100 WBC (ref 0–0.2)
PLATELET # BLD AUTO: 242 10*3/MM3 (ref 140–450)
PMV BLD AUTO: 8.9 FL (ref 6–12)
RBC # BLD AUTO: 3.68 10*6/MM3 (ref 3.77–5.28)
SARS-COV-2 RNA RESP QL NAA+PROBE: NOT DETECTED
TSH SERPL DL<=0.05 MIU/L-ACNC: 1.36 UIU/ML (ref 0.27–4.2)
WBC NRBC COR # BLD: 8.4 10*3/MM3 (ref 3.4–10.8)

## 2022-07-09 PROCEDURE — G0378 HOSPITAL OBSERVATION PER HR: HCPCS

## 2022-07-09 PROCEDURE — 85018 HEMOGLOBIN: CPT | Performed by: NURSE PRACTITIONER

## 2022-07-09 PROCEDURE — U0003 INFECTIOUS AGENT DETECTION BY NUCLEIC ACID (DNA OR RNA); SEVERE ACUTE RESPIRATORY SYNDROME CORONAVIRUS 2 (SARS-COV-2) (CORONAVIRUS DISEASE [COVID-19]), AMPLIFIED PROBE TECHNIQUE, MAKING USE OF HIGH THROUGHPUT TECHNOLOGIES AS DESCRIBED BY CMS-2020-01-R: HCPCS | Performed by: EMERGENCY MEDICINE

## 2022-07-09 PROCEDURE — 96361 HYDRATE IV INFUSION ADD-ON: CPT

## 2022-07-09 PROCEDURE — 80048 BASIC METABOLIC PNL TOTAL CA: CPT | Performed by: NURSE PRACTITIONER

## 2022-07-09 PROCEDURE — 99220 PR INITIAL OBSERVATION CARE/DAY 70 MINUTES: CPT | Performed by: HOSPITALIST

## 2022-07-09 PROCEDURE — 83735 ASSAY OF MAGNESIUM: CPT | Performed by: NURSE PRACTITIONER

## 2022-07-09 PROCEDURE — 85025 COMPLETE CBC W/AUTO DIFF WBC: CPT | Performed by: NURSE PRACTITIONER

## 2022-07-09 PROCEDURE — U0005 INFEC AGEN DETEC AMPLI PROBE: HCPCS | Performed by: EMERGENCY MEDICINE

## 2022-07-09 PROCEDURE — 83605 ASSAY OF LACTIC ACID: CPT

## 2022-07-09 PROCEDURE — 85014 HEMATOCRIT: CPT | Performed by: NURSE PRACTITIONER

## 2022-07-09 PROCEDURE — 36415 COLL VENOUS BLD VENIPUNCTURE: CPT | Performed by: NURSE PRACTITIONER

## 2022-07-09 RX ORDER — DULOXETIN HYDROCHLORIDE 20 MG/1
20 CAPSULE, DELAYED RELEASE ORAL DAILY
Status: DISCONTINUED | OUTPATIENT
Start: 2022-07-09 | End: 2022-07-10 | Stop reason: HOSPADM

## 2022-07-09 RX ORDER — AMOXICILLIN AND CLAVULANATE POTASSIUM 875; 125 MG/1; MG/1
1 TABLET, FILM COATED ORAL ONCE
Status: COMPLETED | OUTPATIENT
Start: 2022-07-09 | End: 2022-07-09

## 2022-07-09 RX ORDER — ONDANSETRON 4 MG/1
4 TABLET, FILM COATED ORAL EVERY 6 HOURS PRN
Status: DISCONTINUED | OUTPATIENT
Start: 2022-07-09 | End: 2022-07-10 | Stop reason: HOSPADM

## 2022-07-09 RX ORDER — PANTOPRAZOLE SODIUM 40 MG/1
40 TABLET, DELAYED RELEASE ORAL EVERY MORNING
Refills: 1 | Status: DISCONTINUED | OUTPATIENT
Start: 2022-07-10 | End: 2022-07-10 | Stop reason: HOSPADM

## 2022-07-09 RX ORDER — ONDANSETRON 2 MG/ML
4 INJECTION INTRAMUSCULAR; INTRAVENOUS EVERY 6 HOURS PRN
Status: DISCONTINUED | OUTPATIENT
Start: 2022-07-09 | End: 2022-07-10 | Stop reason: HOSPADM

## 2022-07-09 RX ORDER — ACETAMINOPHEN 650 MG/1
650 SUPPOSITORY RECTAL EVERY 4 HOURS PRN
Status: DISCONTINUED | OUTPATIENT
Start: 2022-07-09 | End: 2022-07-10 | Stop reason: HOSPADM

## 2022-07-09 RX ORDER — POTASSIUM CHLORIDE 1.5 G/1.77G
40 POWDER, FOR SOLUTION ORAL AS NEEDED
Status: DISCONTINUED | OUTPATIENT
Start: 2022-07-09 | End: 2022-07-10 | Stop reason: HOSPADM

## 2022-07-09 RX ORDER — TRAMADOL HYDROCHLORIDE 50 MG/1
50 TABLET ORAL EVERY 6 HOURS PRN
Status: DISCONTINUED | OUTPATIENT
Start: 2022-07-09 | End: 2022-07-10 | Stop reason: HOSPADM

## 2022-07-09 RX ORDER — POTASSIUM CHLORIDE 7.45 MG/ML
10 INJECTION INTRAVENOUS
Status: DISCONTINUED | OUTPATIENT
Start: 2022-07-09 | End: 2022-07-10 | Stop reason: HOSPADM

## 2022-07-09 RX ORDER — MAGNESIUM SULFATE 1 G/100ML
1 INJECTION INTRAVENOUS AS NEEDED
Status: DISCONTINUED | OUTPATIENT
Start: 2022-07-09 | End: 2022-07-10 | Stop reason: HOSPADM

## 2022-07-09 RX ORDER — SODIUM CHLORIDE, SODIUM LACTATE, POTASSIUM CHLORIDE, CALCIUM CHLORIDE 600; 310; 30; 20 MG/100ML; MG/100ML; MG/100ML; MG/100ML
75 INJECTION, SOLUTION INTRAVENOUS CONTINUOUS
Status: DISCONTINUED | OUTPATIENT
Start: 2022-07-09 | End: 2022-07-10 | Stop reason: HOSPADM

## 2022-07-09 RX ORDER — MAGNESIUM SULFATE HEPTAHYDRATE 40 MG/ML
2 INJECTION, SOLUTION INTRAVENOUS AS NEEDED
Status: DISCONTINUED | OUTPATIENT
Start: 2022-07-09 | End: 2022-07-10 | Stop reason: HOSPADM

## 2022-07-09 RX ORDER — SODIUM CHLORIDE 0.9 % (FLUSH) 0.9 %
10 SYRINGE (ML) INJECTION AS NEEDED
Status: DISCONTINUED | OUTPATIENT
Start: 2022-07-09 | End: 2022-07-10 | Stop reason: HOSPADM

## 2022-07-09 RX ORDER — ACETAMINOPHEN 325 MG/1
650 TABLET ORAL EVERY 4 HOURS PRN
Status: DISCONTINUED | OUTPATIENT
Start: 2022-07-09 | End: 2022-07-10 | Stop reason: HOSPADM

## 2022-07-09 RX ORDER — HYDROCODONE BITARTRATE AND ACETAMINOPHEN 5; 325 MG/1; MG/1
1 TABLET ORAL ONCE AS NEEDED
Status: COMPLETED | OUTPATIENT
Start: 2022-07-09 | End: 2022-07-09

## 2022-07-09 RX ORDER — LISINOPRIL 5 MG/1
2.5 TABLET ORAL DAILY
Status: DISCONTINUED | OUTPATIENT
Start: 2022-07-10 | End: 2022-07-10 | Stop reason: HOSPADM

## 2022-07-09 RX ORDER — ACETAMINOPHEN 160 MG/5ML
650 SOLUTION ORAL EVERY 4 HOURS PRN
Status: DISCONTINUED | OUTPATIENT
Start: 2022-07-09 | End: 2022-07-10 | Stop reason: HOSPADM

## 2022-07-09 RX ORDER — DIGOXIN 125 MCG
125 TABLET ORAL DAILY
Status: DISCONTINUED | OUTPATIENT
Start: 2022-07-09 | End: 2022-07-10 | Stop reason: HOSPADM

## 2022-07-09 RX ORDER — POTASSIUM CHLORIDE 20 MEQ/1
40 TABLET, EXTENDED RELEASE ORAL AS NEEDED
Status: DISCONTINUED | OUTPATIENT
Start: 2022-07-09 | End: 2022-07-10 | Stop reason: HOSPADM

## 2022-07-09 RX ORDER — METOPROLOL SUCCINATE 25 MG/1
25 TABLET, EXTENDED RELEASE ORAL NIGHTLY
Status: DISCONTINUED | OUTPATIENT
Start: 2022-07-09 | End: 2022-07-10 | Stop reason: HOSPADM

## 2022-07-09 RX ORDER — GABAPENTIN 100 MG/1
100 CAPSULE ORAL
Status: DISCONTINUED | OUTPATIENT
Start: 2022-07-10 | End: 2022-07-10 | Stop reason: HOSPADM

## 2022-07-09 RX ORDER — SODIUM CHLORIDE 0.9 % (FLUSH) 0.9 %
10 SYRINGE (ML) INJECTION EVERY 12 HOURS SCHEDULED
Status: DISCONTINUED | OUTPATIENT
Start: 2022-07-09 | End: 2022-07-10 | Stop reason: HOSPADM

## 2022-07-09 RX ORDER — NITROGLYCERIN 0.4 MG/1
0.4 TABLET SUBLINGUAL
Status: DISCONTINUED | OUTPATIENT
Start: 2022-07-09 | End: 2022-07-10 | Stop reason: HOSPADM

## 2022-07-09 RX ORDER — GABAPENTIN 100 MG/1
200 CAPSULE ORAL NIGHTLY
Status: DISCONTINUED | OUTPATIENT
Start: 2022-07-09 | End: 2022-07-10 | Stop reason: HOSPADM

## 2022-07-09 RX ORDER — ACETAMINOPHEN 325 MG/1
650 TABLET ORAL EVERY 4 HOURS PRN
Status: DISCONTINUED | OUTPATIENT
Start: 2022-07-09 | End: 2022-07-09 | Stop reason: SDUPTHER

## 2022-07-09 RX ADMIN — ACETAMINOPHEN 650 MG: 325 TABLET ORAL at 20:11

## 2022-07-09 RX ADMIN — TRAMADOL HYDROCHLORIDE 50 MG: 50 TABLET, COATED ORAL at 18:15

## 2022-07-09 RX ADMIN — SODIUM CHLORIDE, POTASSIUM CHLORIDE, SODIUM LACTATE AND CALCIUM CHLORIDE 75 ML/HR: 600; 310; 30; 20 INJECTION, SOLUTION INTRAVENOUS at 02:48

## 2022-07-09 RX ADMIN — DIGOXIN 125 MCG: 125 TABLET ORAL at 18:15

## 2022-07-09 RX ADMIN — METOPROLOL SUCCINATE 25 MG: 25 TABLET, EXTENDED RELEASE ORAL at 20:11

## 2022-07-09 RX ADMIN — AMOXICILLIN AND CLAVULANATE POTASSIUM 1 TABLET: 875; 125 TABLET, FILM COATED ORAL at 01:35

## 2022-07-09 RX ADMIN — TRAMADOL HYDROCHLORIDE 50 MG: 50 TABLET, COATED ORAL at 10:19

## 2022-07-09 RX ADMIN — GABAPENTIN 200 MG: 100 CAPSULE ORAL at 20:11

## 2022-07-09 RX ADMIN — Medication 10 ML: at 01:36

## 2022-07-09 RX ADMIN — DULOXETINE HYDROCHLORIDE 20 MG: 20 CAPSULE, DELAYED RELEASE ORAL at 20:11

## 2022-07-09 RX ADMIN — HYDROCODONE BITARTRATE AND ACETAMINOPHEN 1 TABLET: 5; 325 TABLET ORAL at 01:35

## 2022-07-09 RX ADMIN — SODIUM CHLORIDE, POTASSIUM CHLORIDE, SODIUM LACTATE AND CALCIUM CHLORIDE 75 ML/HR: 600; 310; 30; 20 INJECTION, SOLUTION INTRAVENOUS at 09:12

## 2022-07-09 RX ADMIN — Medication 10 ML: at 20:11

## 2022-07-09 RX ADMIN — Medication 10 ML: at 09:11

## 2022-07-09 NOTE — ED PROVIDER NOTES
Subjective    Chief Complaint   Patient presents with   • Abdominal Pain     Bryan Vega MD  No LMP recorded. Patient is postmenopausal.  Allergies   Allergen Reactions   • Latex Anaphylaxis   • Other Unknown (See Comments)     perfume   • Sulfa Antibiotics Unknown (See Comments)     Past history, unknown reaction. Was told by mother there was a reaction of some type.    • Adhesive Tape Itching       Patient is a 67-year-old female presents the ED with complaint of abdominal pain, nausea, and diarrhea.  Patient reports this began about 30 minutes after she ate at [employer].  She reports she got home and had severe cramping, had episodes of diaphoresis.  She reports she had 2 normal bowel movements, but then had diarrhea.  Patient denies syncope.  No chest pain shortness of breath.  No urinary symptoms.  Onset: Today  Location: Abdomen  Duration: Waxes and wanes  Character: Cramping  Aggravating Factors: None  Alleviating Factors: None  Radiation: None  Treatments Tried: None          Review of Systems   Constitutional: Negative for chills and fever.   HENT: Negative for congestion.    Eyes: Negative for visual disturbance.   Respiratory: Negative for shortness of breath.    Cardiovascular: Negative for chest pain.   Gastrointestinal: Positive for abdominal pain, blood in stool, diarrhea and nausea. Negative for vomiting.   Genitourinary: Negative for dysuria.   Musculoskeletal: Negative for back pain and neck pain.   Skin: Negative for color change and pallor.   Neurological: Negative for dizziness, syncope, weakness and light-headedness.   Psychiatric/Behavioral: Negative for confusion.       Past Medical History:   Diagnosis Date   • Acute systolic congestive heart failure (HCC) 2/13/2021   • ITZEL positive     Repeat ITZEL November 2014 negative   • Anxiety    • Bilateral sacroiliitis (HCC) 5/8/2017   • Bursitis 2016   • Cardiomyopathy, dilated (HCC)    • Cervical radiculopathy    • Cervical spondylosis    •  Degeneration of intervertebral disc    • Eczema    • Fibromyalgia 3/16/2020   • GERD (gastroesophageal reflux disease)    • Heart murmur Preteen   • History of cardiac murmur as a child    • Irritable bowel syndrome    • Lichen sclerosus of female genitalia    • Low back pain    • Myalgia and myositis    • Nonischemic cardiomyopathy (HCC)     2/2021, EF 20%, normal cors   • Nonrheumatic mitral valve regurgitation    • Osteoporosis    • Peptic ulceration Dec 2019   • Pruritus    • Pure hypercholesterolemia 6/10/2019   • PVCs (premature ventricular contractions) 3/22/2021       Allergies   Allergen Reactions   • Latex Anaphylaxis   • Other Unknown (See Comments)     perfume   • Sulfa Antibiotics Unknown (See Comments)     Past history, unknown reaction. Was told by mother there was a reaction of some type.    • Adhesive Tape Itching       Past Surgical History:   Procedure Laterality Date   • BREAST CYST ASPIRATION      10+ years ago   • CARDIAC CATHETERIZATION N/A 02/15/2021    Procedure: Left Heart Cath;  Surgeon: Zee Rojas MD;  Location: [facility] CATH INVASIVE LOCATION;  Service: Cardiovascular;  Laterality: N/A;   • CARDIAC CATHETERIZATION N/A 02/15/2021    Procedure: Left ventriculography;  Surgeon: Zee Rojas MD;  Location:  [facility] CATH INVASIVE LOCATION;  Service: Cardiovascular;  Laterality: N/A;   • CARDIAC CATHETERIZATION N/A 02/15/2021    Procedure: Coronary angiography;  Surgeon: Zee Rojas MD;  Location:  [facility] CATH INVASIVE LOCATION;  Service: Cardiovascular;  Laterality: N/A;   • CARDIAC CATHETERIZATION N/A 02/15/2021    Procedure: Right Heart Cath;  Surgeon: Zee Rojas MD;  Location:  [facility] CATH INVASIVE LOCATION;  Service: Cardiovascular;  Laterality: N/A;   • CARDIAC ELECTROPHYSIOLOGY PROCEDURE N/A 3/9/2022    Procedure: Ablation PVC;  Surgeon: Mati Toscano MD;  [facility];  Service: Cardiology;  Laterality: N/A;   • CARDIAC  ELECTROPHYSIOLOGY PROCEDURE N/A 3/9/2022    Procedure: EP/ABLATION;  Surgeon: Mati Toscano MD;  Location: [facility] CATH INVASIVE LOC[facility];  Service: Cardiology;  Laterality: N/A;   • CHOLECYSTECTOMY     • COLONOSCOPY  07/30/2012    Dr. Zazueta, normal   • ENDOSCOPY  12/04/2015    Mild active duodenitis, reactive gastropathy suggestive of healing erosion or adjacent ulcer   • ENDOSCOPY N/A 12/16/2019    Procedure: ESOPHAGOGASTRODUODENOSCOPY WITH BIOPSY WITH COLD BIOPSY POLYPECTOMY;  Surgeon: Lida Garcia MD;  Location:  [facility] ENDOSCOPY;  Service: Gastroenterology   • ENDOSCOPY N/A 07/13/2020    Procedure: ESOPHAGOGASTRODUODENOSCOPY WITH COLD BIOPSY POLYPECTOMY;  Surgeon: Lida Garcia MD;  [facility];  Service: Gastroenterology;  Laterality: N/A;  pre: history of gastric ulcer  post: healed gastric ulcer and gastric polyp   • EYE SURGERY  April2021    Cararact surgury both eyes       Family History   Problem Relation Age of Onset   • Irritable bowel syndrome Mother    • Hypertension Mother    • Hypertension Father    • Hearing loss Father    • Alcohol abuse Brother    • Early death Brother    • Heart disease Brother    • Arthritis Sister        Social History     Socioeconomic History   • Marital status:    Tobacco Use   • Smoking status: Never Smoker   • Smokeless tobacco: Never Used   • Tobacco comment: caffeine- 3 cups daily   Vaping Use   • Vaping Use: Never used   Substance and Sexual Activity   • Alcohol use: Not Currently     Alcohol/week: 1.0 standard drink     Types: 1 Glasses of wine per week     Comment: occasional   • Drug use: No   • Sexual activity: Yes     Partners: Male     Birth control/protection: None           Objective   Physical Exam  Vitals and nursing note reviewed.   Constitutional:       General: She is not in acute distress.     Appearance: She is well-developed. She is not ill-appearing, toxic-appearing or diaphoretic.   HENT:      Head: Normocephalic  "and atraumatic.      Mouth/Throat:      Mouth: Mucous membranes are moist.      Pharynx: Oropharynx is clear.   Eyes:      Extraocular Movements: Extraocular movements intact.      Pupils: Pupils are equal, round, and reactive to light.   Cardiovascular:      Rate and Rhythm: Normal rate and regular rhythm.   Abdominal:      General: Abdomen is protuberant. Bowel sounds are normal.      Palpations: Abdomen is soft.      Tenderness: There is abdominal tenderness in the left lower quadrant. There is guarding. There is no rebound.   Skin:     General: Skin is warm and dry.      Capillary Refill: Capillary refill takes less than 2 seconds.   Neurological:      General: No focal deficit present.      Mental Status: She is alert and oriented to person, place, and time.         Procedures           ED Course  ED Course as of 07/09/22 1950   Sat Jul 09, 2022   0049 Pt had BM here in the ED reports continued blood in stools.  [LB]   0126 EKG reviewed by MD and I, our findings are: Sinus armada 73.  Compared to previous 3/9/2022.  No significant change [LB]      ED Course User Index  [LB] Lida Collado, APRN           /50 (BP Location: Left arm, Patient Position: Lying)   Pulse 75   Temp 97.6 °F (36.4 °C) (Axillary)   Resp 16   Ht 160 cm (63\")   Wt 53.5 kg (118 lb)   SpO2 100%   BMI 20.90 kg/m²   Labs Reviewed   COMPREHENSIVE METABOLIC PANEL - Abnormal; Notable for the following components:       Result Value    Glucose 115 (*)     All other components within normal limits    Narrative:     GFR Normal >60  Chronic Kidney Disease <60  Kidney Failure <15     URINALYSIS W/ CULTURE IF INDICATED - Abnormal; Notable for the following components:    Ketones, UA Trace (*)     Protein, UA Trace (*)     Leuk Esterase, UA Small (1+) (*)     All other components within normal limits    Narrative:     In absence of clinical symptoms, the presence of pyuria, bacteria, and/or nitrites on the urinalysis result does not " correlate with infection.   CBC WITH AUTO DIFFERENTIAL - Abnormal; Notable for the following components:    Neutrophil % 80.3 (*)     Lymphocyte % 9.9 (*)     Neutrophils, Absolute 7.40 (*)     All other components within normal limits   URINALYSIS, MICROSCOPIC ONLY - Abnormal; Notable for the following components:    RBC, UA 0-2 (*)     WBC, UA 6-12 (*)     All other components within normal limits   HEMOGLOBIN AND HEMATOCRIT, BLOOD - Abnormal; Notable for the following components:    Hemoglobin 11.7 (*)     All other components within normal limits   URINE CULTURE - Normal   COVID-19,CEPHEID/HELENA,COR/MISSY/PAD/MARK IN-HOUSE,NP SWAB IN TRANSPORT MEDIA 3-4 HR TAT, RT-PCR - Normal    Narrative:     Fact sheet for providers: https://www.fda.gov/media/815762/download     Fact sheet for patients: https://www.fda.gov/media/747414/download  Fact sheet for providers: https://www.fda.gov/media/163326/download     Fact sheet for patients: https://www.fda.gov/media/842972/download   LIPASE - Normal   TROPONIN (IN-HOUSE) - Normal    Narrative:     Troponin T Reference Range:  <= 0.03 ng/mL-   Negative for AMI  >0.03 ng/mL-     Abnormal for myocardial necrosis.  Clinicians would have to utilize clinical acumen, EKG, Troponin and serial changes to determine if it is an Acute Myocardial Infarction or myocardial injury due to an underlying chronic condition.       Results may be falsely decreased if patient taking Biotin.     HEPATITIS PANEL, ACUTE - Normal    Narrative:     Results may be falsely decreased if patient taking Biotin.    TSH - Normal   MAGNESIUM - Normal   POC LACTATE - Normal   COVID PRE-OP / PRE-PROCEDURE SCREENING ORDER (NO ISOLATION)    Narrative:     The following orders were created for panel order COVID PRE-OP / PRE-PROCEDURE SCREENING ORDER (NO ISOLATION) - Swab, Nasopharynx.  Procedure                               Abnormality         Status                     ---------                                -----------         ------                     COVID-19,CEPHEID/HELENA,CO...[[id number]]  Normal              Final result                 Please view results for these tests on the individual orders.   STOOL CULTURE (REFERENCE LAB)   RAINBOW DRAW    Narrative:     The following orders were created for panel order [place] Draw.  Procedure                               Abnormality         Status                     ---------                               -----------         ------                     Green Top (Gel)[[id number]]                                  Final result               Lavender Top[[id number]]                                     Final result               Gold Top - SST[[id number]]                                   Final result               Light Blue Top[[id number]]                                   Final result                 Please view results for these tests on the individual orders.   BASIC METABOLIC PANEL   HEMOGLOBIN A1C   CBC WITH AUTO DIFFERENTIAL   HEMOGLOBIN AND HEMATOCRIT, BLOOD   HEMOGLOBIN AND HEMATOCRIT, BLOOD   HEMOGLOBIN AND HEMATOCRIT, BLOOD   POC LACTATE   GREEN TOP   LAVENDER TOP   GOLD TOP - SST   LIGHT BLUE TOP   CBC AND DIFFERENTIAL    Narrative:     The following orders were created for panel order CBC & Differential.  Procedure                               Abnormality         Status                     ---------                               -----------         ------                     CBC Auto Differential[[id number]]        Abnormal            Final result               Scan Slide[[id number]]                                                                    Please view results for these tests on the individual orders.   CBC AND DIFFERENTIAL    Narrative:     The following orders were created for panel order CBC & Differential.  Procedure                               Abnormality         Status                     ---------                               -----------          ------                     CBC Auto Differential[[id number]]                                                         Please view results for these tests on the individual orders.     Medications   sodium chloride 0.9 % flush 10 mL (10 mL Intravenous Given 7/9/22 0136)   nitroglycerin (NITROSTAT) SL tablet 0.4 mg (has no administration in time range)   sodium chloride 0.9 % flush 10 mL (10 mL Intravenous Given 7/9/22 0911)   sodium chloride 0.9 % flush 10 mL (has no administration in time range)   acetaminophen (TYLENOL) tablet 650 mg (has no administration in time range)     Or   acetaminophen (TYLENOL) 160 MG/5ML solution 650 mg (has no administration in time range)     Or   acetaminophen (TYLENOL) suppository 650 mg (has no administration in time range)   ondansetron (ZOFRAN) tablet 4 mg (has no administration in time range)     Or   ondansetron (ZOFRAN) injection 4 mg (has no administration in time range)   Magnesium Sulfate 2 gram infusion - Mg less than or equal to 1.5 mg/dL (has no administration in time range)     Or   Magnesium Sulfate 1 gram infusion - Mg 1.6-1.9 mg/dL (has no administration in time range)   potassium chloride (K-DUR,KLOR-CON) CR tablet 40 mEq (has no administration in time range)     Or   potassium chloride (KLOR-CON) packet 40 mEq (has no administration in time range)     Or   potassium chloride 10 mEq in 100 mL IVPB (has no administration in time range)   lactated ringers infusion (75 mL/hr Intravenous New Bag 7/9/22 0912)   traMADol (ULTRAM) tablet 50 mg (50 mg Oral Given 7/9/22 1815)   lisinopril (PRINIVIL,ZESTRIL) tablet 2.5 mg (has no administration in time range)   metoprolol succinate XL (TOPROL-XL) 24 hr tablet 25 mg (has no administration in time range)   DULoxetine (CYMBALTA) DR capsule 20 mg (has no administration in time range)   gabapentin (NEURONTIN) capsule 100 mg (has no administration in time range)   digoxin (LANOXIN) tablet 125 mcg (125 mcg Oral Given 7/9/22 1815)    pantoprazole (PROTONIX) EC tablet 40 mg (has no administration in time range)   gabapentin (NEURONTIN) capsule 200 mg (has no administration in time range)   sodium chloride 0.9 % bolus 500 mL (0 mL Intravenous Stopped 7/9/22 0101)   ondansetron (ZOFRAN) injection 4 mg (4 mg Intravenous Given 7/8/22 2230)   dicyclomine (BENTYL) injection 20 mg (20 mg Intramuscular Given 7/8/22 2230)   iopamidol (ISOVUE-370) 76 % injection 100 mL (90 mL Intravenous Given 7/8/22 2304)   amoxicillin-clavulanate (AUGMENTIN) 875-125 MG per tablet 1 tablet (1 tablet Oral Given 7/9/22 0135)   HYDROcodone-acetaminophen (NORCO) 5-325 MG per tablet 1 tablet (1 tablet Oral Given 7/9/22 0135)     CT Abdomen Pelvis With Contrast    Result Date: 7/9/2022   1.  Moderate left colitis, most likely infectious or inflammatory. Electronically signed by:  Laura Li M.D.  7/8/2022 10:20 PM                                    MDM  Number of Diagnoses or Management Options  Blood in stool  Colitis  Left lower quadrant abdominal pain  Diagnosis management comments: -Discussed with ED attending Physician: Dr. Lake    --Differentials: Diverticulitis, colitis, food enteritis  This list is not all inclusive and does not constitute the entireity of considered causes.     --Patient was brought back to the emergency department room for evaluation and placed on appropriate monitoring.      Patient had IV established and blood work obtained    --ED Course /Labs/Imaging/Studies: Patient is a 67-year-old female presents the ED with complaint of abdominal pain.  She underwent the above exam and work-up, patient's findings here in the ED revealed colitis.  Given patient's lab work, the initial plan will be to let patient go home, however patient had another bowel movement when she reported bright red blood.  Given this she will be admitted the hospital service for expected serial CBC, H&H and further monitoring.    --Disposition: I discussed with the patient  their test results, work-up here in the emergency department, and need for admission and further evaluation. Patient is agreeable to the plan of care. At time of disposition patients VS are non concerning, and without acute distress.  Opportunity was provided for questions at the bedside, all questions and concerns were addressed.    Appropriate PPE was worn during the duration of the care for this patient while in the emergency department per [facility] Policy    This document is intended for medical expert use only. Reading of this document by patients and/or patient's family without participating medical staff guidance may result in misinterpretation and unintended morbidity.  Any interpretation of such data is the responsibility of the patient and/or family member responsible for the patient in concert with their primary or specialist providers, not to be left for sources of online searches such as [employer], [employer] or similar queries. Relying on these approaches to knowledge may result in misinterpretation, misguided goals of care and even death should patients or family members try recommendations outside of the realm of professional medical care in a supervised inpatient environment.                                             Amount and/or Complexity of Data Reviewed  Clinical lab tests: reviewed  Tests in the radiology section of CPT®: reviewed  Tests in the medicine section of CPT®: reviewed  Discuss the patient with other providers: yes (Hospitalist)    Patient Progress  Patient progress: stable      Final diagnoses:   Colitis   Left lower quadrant abdominal pain   Blood in stool       ED Disposition  ED Disposition     ED Disposition   Decision to Admit    Condition   --    Comment   Level of Care: Telemetry [5]   Diagnosis: Colitis [114280]   Admitting Physician: FLAKITO COOPER [[id number]]   Attending Physician: FLAKITO COOPER [[id number]]               No follow-up provider specified.        Medication List      No changes were made to your prescriptions during this visit.          Lida Collado, ABHISHEK  07/09/22 0142       Lida Collado, ABHISHEK  07/09/22 1950

## 2022-07-09 NOTE — H&P
[facility] Medicine Services      Patient Name: Shanika Hector  : 1955  MRN: [id number]  Primary Care Physician:  Bryan Vega MD  Date of admission: 2022      Subjective      Chief Complaint: Abdominal pain    History of Present Illness: Shanika Hector is a 67 y.o. female with past medical history of IBS, GERD, chronic pain, CHF who presented to [facility] on 2022 complaining of right upper abdominal pain.  Pain radiates to left upper abdomen.  Patient describes pain as constant dull ache with intermittent cramping.  She currently rates the pain 5 on sliding scale 0-10.  Patient reports abdominal pain began about 30 minutes after she ate at [employer].  She reports she got home and had severe cramping, had episodes of diaphoresis.  She reports she had 2 normal bowel movements, but then had diarrhea.  Patient complains of hematochezia. Patient denies syncope.  No chest pain, cough, shortness of breath, fever, chills.  No urinary symptoms.    In the ED, CT abdomen pelvis showed moderate left colitis, most likely infectious or inflammatory.  EKG showed sinus rhythm.  All labs unremarkable.  All vital signs unremarkable.  Patient received Augmentin, Bentyl, Zofran, IV fluid bolus, hydrocodone in the ED.  Patient admitted to hospitalist service for further evaluation and treatment.    Review of Systems   Constitutional: Positive for malaise/fatigue. Negative for chills and fever.   HENT: Negative.    Eyes: Negative.    Cardiovascular: Negative.  Negative for chest pain.   Respiratory: Negative.  Negative for cough and shortness of breath.    Endocrine: Negative.    Skin: Negative.    Musculoskeletal: Negative.    Gastrointestinal: Positive for abdominal pain, diarrhea, hematochezia and nausea. Negative for melena and vomiting.   Genitourinary: Negative.  Negative for dysuria.   Neurological: Positive for weakness.   Psychiatric/Behavioral: Negative.     Allergic/Immunologic: Negative.        Personal History     Past Medical History:   Diagnosis Date   • Acute systolic congestive heart failure (HCC) 2/13/2021   • ITZEL positive     Repeat ITZEL November 2014 negative   • Anxiety    • Bilateral sacroiliitis (HCC) 5/8/2017   • Bursitis 2016   • Cardiomyopathy, dilated (HCC)    • Cervical radiculopathy    • Cervical spondylosis    • Degeneration of intervertebral disc    • Eczema    • Fibromyalgia 3/16/2020   • GERD (gastroesophageal reflux disease)    • Heart murmur Preteen   • History of cardiac murmur as a child    • Irritable bowel syndrome    • Lichen sclerosus of female genitalia    • Low back pain    • Myalgia and myositis    • Nonischemic cardiomyopathy (HCC)     2/2021, EF 20%, normal cors   • Nonrheumatic mitral valve regurgitation    • Osteoporosis    • Peptic ulceration Dec 2019   • Pruritus    • Pure hypercholesterolemia 6/10/2019   • PVCs (premature ventricular contractions) 3/22/2021       Past Surgical History:   Procedure Laterality Date   • BREAST CYST ASPIRATION      10+ years ago   • CARDIAC CATHETERIZATION N/A 02/15/2021    Procedure: Left Heart Cath;  Surgeon: Zee Rojas MD;  Location:  [facility];  Service: Cardiovascular;  Laterality: N/A;   • CARDIAC CATHETERIZATION N/A 02/15/2021    Procedure: Left ventriculography;  Surgeon: Zee Rojas MD;  Location:  [facility];  Service: Cardiovascular;  Laterality: N/A;   • CARDIAC CATHETERIZATION N/A 02/15/2021    Procedure: Coronary angiography;  Surgeon: Zee Rojas MD;  Location:  [facility];  Service: Cardiovascular;  Laterality: N/A;   • CARDIAC CATHETERIZATION N/A 02/15/2021    Procedure: Right Heart Cath;  Surgeon: Zee Rojas MD;  Location:  [facility];  Service: Cardiovascular;  Laterality: N/A;   • CARDIAC ELECTROPHYSIOLOGY PROCEDURE N/A 3/9/2022    Procedure: Ablation PVC;  Surgeon: Mati Toscano  MD [place];  Location:  [facility];  Service: Cardiology;  Laterality: N/A;   • CARDIAC ELECTROPHYSIOLOGY PROCEDURE N/A 3/9/2022    Procedure: EP/ABLATION;  Surgeon: Mati Toscano MD;  Location:  [facility];  Service: Cardiology;  Laterality: N/A;   • CHOLECYSTECTOMY     • COLONOSCOPY  07/30/2012    Dr. Zazueta, normal   • ENDOSCOPY  12/04/2015    Mild active duodenitis, reactive gastropathy suggestive of healing erosion or adjacent ulcer   • ENDOSCOPY N/A 12/16/2019    Procedure: ESOPHAGOGASTRODUODENOSCOPY WITH BIOPSY WITH COLD BIOPSY POLYPECTOMY;  Surgeon: Lida Garcia MD;  Location:  [facility];  Service: Gastroenterology   • ENDOSCOPY N/A 07/13/2020    Procedure: ESOPHAGOGASTRODUODENOSCOPY WITH COLD BIOPSY POLYPECTOMY;  Surgeon: Lida Garcia MD;  Location:  [facility];  Service: Gastroenterology;  Laterality: N/A;  pre: history of gastric ulcer  post: healed gastric ulcer and gastric polyp   • EYE SURGERY  April2021    Cararact surgury both eyes       Family History: family history includes Alcohol abuse in her brother; Arthritis in her sister; Early death in her brother; Hearing loss in her father; Heart disease in her brother; Hypertension in her father and mother; Irritable bowel syndrome in her mother. Otherwise pertinent FHx was reviewed and not pertinent to current issue.    Social History:  reports that she has never smoked. She has never used smokeless tobacco. She reports previous alcohol use of about 1.0 standard drink of alcohol per week. She reports that she does not use drugs.    Home Medications:  Prior to Admission Medications     Prescriptions Last Dose Informant Patient Reported? Taking?    acetaminophen (TYLENOL) 650 MG 8 hr tablet   Yes No    Take 2 tablets by mouth 2 (two) times a day as needed.    digoxin (LANOXIN) 125 MCG tablet   No No    TAKE ONE TABLET BY MOUTH DAILY    DULoxetine (CYMBALTA) 20 MG capsule   No No    TAKE ONE  CAPSULE BY MOUTH DAILY    gabapentin (NEURONTIN) 100 MG capsule   No No    TAKE ONE CAPSULE BY MOUTH EVERY MORNING. TAKE TWO CAPSULES BY MOUTH EVERY EVENING.    lisinopril (PRINIVIL,ZESTRIL) 2.5 MG tablet   No No    Take 1 tablet by mouth Daily. Take at night    metoprolol succinate XL (TOPROL-XL) 25 MG 24 hr tablet   No No    Take 1 tablet by mouth Daily.    omeprazole (priLOSEC) 40 MG capsule   No No    TAKE ONE CAPSULE BY MOUTH DAILY            Allergies:  Allergies   Allergen Reactions   • Latex Anaphylaxis   • Other Unknown (See Comments)     perfume   • Sulfa Antibiotics Unknown (See Comments)     Past history, unknown reaction. Was told by mother there was a reaction of some type.    • Adhesive Tape Itching       Objective      Vitals:   Temp:  [98.4 °F (36.9 °C)] 98.4 °F (36.9 °C)  Heart Rate:  [68-89] 73  Resp:  [14] 14  BP: (116-136)/(56-78) 128/73    Physical Exam  Vitals and nursing note reviewed.   Constitutional:       Appearance: Normal appearance.   HENT:      Head: Normocephalic.      Nose: Nose normal.      Mouth/Throat:      Pharynx: Oropharynx is clear.   Eyes:      Extraocular Movements: Extraocular movements intact.   Cardiovascular:      Rate and Rhythm: Normal rate and regular rhythm.      Pulses: Normal pulses.      Heart sounds: Normal heart sounds.   Pulmonary:      Effort: Pulmonary effort is normal.      Breath sounds: Normal breath sounds.   Abdominal:      General: Bowel sounds are normal.      Palpations: Abdomen is soft.      Tenderness: There is abdominal tenderness.   Musculoskeletal:         General: Normal range of motion.      Cervical back: Normal range of motion.   Skin:     General: Skin is warm and dry.   Neurological:      Mental Status: She is alert and oriented to person, place, and time.   Psychiatric:         Mood and Affect: Mood normal.         Behavior: Behavior normal.         Result Review    Result Review:  I have personally reviewed the results from the time of  this admission to 7/9/2022 01:40 EDT and agree with these findings:  [x]  Laboratory  []  Microbiology  [x]  Radiology  [x]  EKG/Telemetry   []  Cardiology/Vascular   []  Pathology  []  Old records  []  Other:  Most notable findings include: as above      Assessment & Plan        Active Hospital Problems:  Active Hospital Problems    Diagnosis    • **Colitis    • Cardiomyopathy, dilated (HCC)      Nonischemic cardiomyopathy.  February 2021     • Chronic systolic congestive heart failure (HCC)    • Fibromyalgia    • Chronic pain syndrome    • Acid reflux    • Cervical radiculopathy    • Gastroesophageal reflux disease    • Generalized osteoarthritis      Plan:     Home medication reconciliation not completed.  Will complete home med rec once nursing reviews.    Colitis  -CT abdomen pelvis reviewed  -EKG reviewed  -Stool culture ordered  -LR infusing at 75 mL/h  -Hydrocodone given in the ED  -Tramadol as needed for pain  -IV Zofran as needed for nausea  -Clear liquid diet  -Consider GI consult    Chronic systolic congestive heart failure  Cardiomyopathy, dilated   -Echo on 5/10/2022 showed EF 50.7%    Generalized osteoarthritis    Gastroesophageal reflux disease    DVT prophylaxis:  Mechanical DVT prophylaxis orders are present.    CODE STATUS:    Code Status (Patient has no pulse and is not breathing): CPR (Attempt to Resuscitate)  Medical Interventions (Patient has pulse or is breathing): Full Support    Admission Status:  I believe this patient meets observation status.    I discussed the patient's findings and my recommendations with patient.    This patient has been examined wearing appropriate Personal Protective Equipment. 07/09/22      Signature: Electronically signed by ABHISHEK Mireles, 07/09/22, 2:38 AM EDT.    Attending attestation:  Is a 67-year-old female with a history of systolic and diastolic congestive heart failure that subsequently improved as evidenced by normal echocardiogram performed on  5/10/2022.  She also has a history of IBS, GERD, and chronic pain.  She presented to the emergency room complaining of right upper quadrant abdominal pain that radiates to the left upper abdomen.  She described the pain as severe and cramping that began 30 minutes after she had eaten associated with diaphoresis.  She had to formed bowel movements and subsequently developed diarrhea which had some bright red blood noted.  CT scan of the abdomen and pelvis showed moderate left infectious versus inflammatory colitis.  Patient was started on analgesics, antiemetics, IV fluids, Augmentin and a clear liquid diet.    Physical exam: Vital signs and nurses notes reviewed.  Thin elderly female in no acute distress sitting up in bed awake and alert; mucous membranes moist; sclerae anicteric; lungs clear to auscultation bilaterally; CV regular rate and rhythm; abdomen soft, tender right upper quadrant and midepigastrium without guarding; nondistended with active bowel sounds and no masses; extremities with no edema, cyanosis or calf tenderness.  Assessment and plan:  Acute abdominal pain secondary to acute colitis which may be secondary to ischemia versus infection versus inflammatory etiology versus other  -Continue present management and advance diet as tolerated    The patient was seen and examined and chart reviewed on 7/9/2022.  I agree with the assessment and plan of the APRN.  Greater than 50% of the medical decision making and a complete history and physical was performed by this attending provider.  Electronically signed by Teresa Sandoval MD, 07/15/22, 3:41 PM EDT.

## 2022-07-09 NOTE — PLAN OF CARE
Goal Outcome Evaluation:         Pt admitted to unit. Clear liquid diet tray at bedside. Pt A&O x4. Skin in good condition.

## 2022-07-10 ENCOUNTER — READMISSION MANAGEMENT (OUTPATIENT)
Dept: CALL CENTER | Facility: HOSPITAL | Age: 67
End: 2022-07-10

## 2022-07-10 VITALS
HEIGHT: 63 IN | SYSTOLIC BLOOD PRESSURE: 107 MMHG | OXYGEN SATURATION: 100 % | HEART RATE: 69 BPM | BODY MASS INDEX: 21.45 KG/M2 | DIASTOLIC BLOOD PRESSURE: 57 MMHG | WEIGHT: 121.03 LBS | RESPIRATION RATE: 11 BRPM | TEMPERATURE: 97.4 F

## 2022-07-10 LAB
ANION GAP SERPL CALCULATED.3IONS-SCNC: 7 MMOL/L (ref 5–15)
BUN SERPL-MCNC: 7 MG/DL (ref 8–23)
BUN/CREAT SERPL: 9.5 (ref 7–25)
CALCIUM SPEC-SCNC: 8.3 MG/DL (ref 8.6–10.5)
CHLORIDE SERPL-SCNC: 105 MMOL/L (ref 98–107)
CO2 SERPL-SCNC: 29 MMOL/L (ref 22–29)
CREAT SERPL-MCNC: 0.74 MG/DL (ref 0.57–1)
EGFRCR SERPLBLD CKD-EPI 2021: 87.7 ML/MIN/1.73
GLUCOSE SERPL-MCNC: 92 MG/DL (ref 65–99)
MAGNESIUM SERPL-MCNC: 2.1 MG/DL (ref 1.6–2.4)
POTASSIUM SERPL-SCNC: 4.2 MMOL/L (ref 3.5–5.2)
QT INTERVAL: 410 MS
SODIUM SERPL-SCNC: 141 MMOL/L (ref 136–145)

## 2022-07-10 PROCEDURE — G0378 HOSPITAL OBSERVATION PER HR: HCPCS

## 2022-07-10 PROCEDURE — 99217 PR OBSERVATION CARE DISCHARGE MANAGEMENT: CPT | Performed by: HOSPITALIST

## 2022-07-10 RX ORDER — AMOXICILLIN AND CLAVULANATE POTASSIUM 875; 125 MG/1; MG/1
1 TABLET, FILM COATED ORAL 2 TIMES DAILY
Qty: 14 TABLET | Refills: 0 | Status: SHIPPED | OUTPATIENT
Start: 2022-07-10 | End: 2022-07-25

## 2022-07-10 RX ORDER — AMOXICILLIN AND CLAVULANATE POTASSIUM 875; 125 MG/1; MG/1
1 TABLET, FILM COATED ORAL ONCE
Status: COMPLETED | OUTPATIENT
Start: 2022-07-10 | End: 2022-07-10

## 2022-07-10 RX ADMIN — AMOXICILLIN AND CLAVULANATE POTASSIUM 1 TABLET: 875; 125 TABLET, FILM COATED ORAL at 16:46

## 2022-07-10 RX ADMIN — Medication 10 ML: at 08:05

## 2022-07-10 RX ADMIN — GABAPENTIN 100 MG: 100 CAPSULE ORAL at 08:02

## 2022-07-10 RX ADMIN — PANTOPRAZOLE SODIUM 40 MG: 40 TABLET, DELAYED RELEASE ORAL at 08:00

## 2022-07-10 RX ADMIN — DIGOXIN 125 MCG: 125 TABLET ORAL at 08:02

## 2022-07-10 NOTE — OUTREACH NOTE
Prep Survey    Flowsheet Row Responses   [facility] patient discharged from? [facility]   Is LACE score < 7 ? Yes   Emergency Room discharge w/ pulse ox? No   Eligibility [facility]   Date of Admission 07/08/22   Date of Discharge 07/10/22   Discharge Disposition Home or Self Care   Discharge diagnosis colitis   Does the patient have one of the following disease processes/diagnoses(primary or secondary)? Other   Does the patient have Home health ordered? No   Is there a DME ordered? No   Prep survey completed? Yes          YARELI RUTH - Registered Nurse

## 2022-07-10 NOTE — DISCHARGE SUMMARY
[facility] Medicine Services  DISCHARGE SUMMARY    Patient Name: Shanika Hector  : 1955  MRN: [id number]    Date of Admission: 2022  Date of Discharge: 7/10/2022  Primary Care Physician: Bryan Vega MD      Presenting Problem:   Blood in stool [K92.1]  Colitis [K52.9]  Left lower quadrant abdominal pain [R10.32]    Active and Resolved Hospital Problems:  Active Hospital Problems    Diagnosis POA   • **Colitis [K52.9] Yes   • Cardiomyopathy, dilated (HCC) [I42.0] Yes   • Chronic systolic congestive heart failure (HCC) [I50.22] Yes   • Fibromyalgia [M79.7] Yes   • Gastroesophageal reflux disease [K21.9] Yes   • Generalized osteoarthritis [M15.9] Yes      Resolved Hospital Problems   No resolved problems to display.     Acute abdominal pain secondary to acute colitis which may be secondary to ischemia versus infection versus inflammatory etiology versus other; improved  -Patient tolerated advancing her diet to a low residue  -She remained afebrile with no further pain  -Patient is now felt to be stable for discharge    Hospital Course     Hospital Course:  Shanika Hector is a 67 y.o. female with a history of systolic and diastolic congestive heart failure that subsequently improved as evidenced by normal echocardiogram performed on 5/10/2022.  She also has a history of IBS, GERD, and chronic pain.  She presented to the emergency room complaining of right upper quadrant abdominal pain that radiates to the left upper abdomen.  She described the pain as severe and cramping that began 30 minutes after she had eaten associated with diaphoresis.  She had to formed bowel movements and subsequently developed diarrhea which had some bright red blood noted.  CT scan of the abdomen and pelvis showed moderate left infectious versus inflammatory colitis.  Patient was started on analgesics, antiemetics, IV fluids, Augmentin and a clear liquid diet.        Patient had significant  improvement and her diet was advanced to low residue.  She remained afebrile with stable vital signs.  Her white blood cell count was normal.  Her hemoglobin was 12.3 on admission and decreased to 10.8 during the hospital stay felt to be due to IV fluid hydration.  The patient was counseled and reassured.  She has now felt to be stable for discharge home on low residue diet with oral antibiotics.      Day of Discharge     Vital Signs:  Temp:  [97.4 °F (36.3 °C)-98.1 °F (36.7 °C)] 97.4 °F (36.3 °C)  Heart Rate:  [67-79] 69  Resp:  [10-16] 11  BP: (107-118)/(45-57) 107/57    Physical Exam:  Physical Exam   Vital signs and nurses notes reviewed.  Well-developed well-nourished elderly female in no acute distress sitting up in bed awake and alert; mucous membranes moist; sclerae anicteric; lungs clear to auscultation bilaterally; CV regular rate and rhythm; abdomen soft, nondistended, mildly tender mid epigastrium and bilateral upper quadrants with no guarding or masses; active bowel sounds; extremities with no edema, cyanosis or calf tenderness; palpable pedal pulses bilaterally; no Mariscal catheter.      Pertinent  and/or Most Recent Results     LAB RESULTS:      Lab 07/09/22 2325 07/09/22  1834 07/09/22 0057 07/08/22 2153   WBC 8.40  --   --  9.20   HEMOGLOBIN 10.8* 11.7*  --  12.3   HEMATOCRIT 32.9* 35.8  --  37.9   PLATELETS 242  --   --  297   NEUTROS ABS 6.10  --   --  7.40*   LYMPHS ABS 1.30  --   --  0.90   MONOS ABS 0.70  --   --  0.70   EOS ABS 0.30  --   --  0.20   MCV 89.6  --   --  89.2   LACTATE  --   --  0.8  --          Lab 07/09/22 2325 07/08/22 2153 07/05/22  1018   SODIUM 141 141 141   POTASSIUM 4.2 4.0 4.4   CHLORIDE 105 102 102   CO2 29.0 26.0 28.6   ANION GAP 7.0 13.0 10.4   BUN 7* 16 17   CREATININE 0.74 0.96 0.82   EGFR 87.7 64.2 77.5   GLUCOSE 92 115* 91   CALCIUM 8.3* 9.0 9.6   MAGNESIUM 2.1 2.1  --    TSH  --  1.360  --          Lab 07/08/22  2153 07/05/22  1018   TOTAL PROTEIN 7.2 7.0    ALBUMIN 4.30 4.40   GLOBULIN 2.9 2.6   ALT (SGPT) 8 9   AST (SGOT) 15 14   BILIRUBIN 0.2 0.4   ALK PHOS 81 75   LIPASE 20  --          Lab 07/08/22  2153   TROPONIN T <0.010         Lab 07/05/22  1018   CHOLESTEROL 225*   LDL CHOL 145*   HDL CHOL 64*   TRIGLYCERIDES 92             Brief Urine Lab Results  (Last result in the past 365 days)      Color   Clarity   Blood   Leuk Est   Nitrite   Protein   CREAT   Urine HCG        07/08/22 2239 Yellow   Clear   Negative   Small (1+)   Negative   Trace               Microbiology Results (last 10 days)     Procedure Component Value - Date/Time    COVID PRE-OP / PRE-PROCEDURE SCREENING ORDER (NO ISOLATION) - Swab, Nasopharynx [[id number]]  (Normal) Collected: 07/09/22 0139    Lab Status: Final result Specimen: Swab from Nasopharynx Updated: 07/09/22 0222    Narrative:      The following orders were created for panel order COVID PRE-OP / PRE-PROCEDURE SCREENING ORDER (NO ISOLATION) - Swab, Nasopharynx.  Procedure                               Abnormality         Status                     ---------                               -----------         ------                     COVID-19,CEPHEID/HELENA,CO...[[id number]]  Normal              Final result                 Please view results for these tests on the individual orders.    COVID-19,CEPHEID/HELENA,COR/MISSY/PAD/MARK IN-HOUSE(OR EMERGENT/ADD-ON),NP SWAB IN TRANSPORT MEDIA 3-4 HR TAT, RT-PCR - Swab, Nasopharynx [[id number]]  (Normal) Collected: 07/09/22 0139    Lab Status: Final result Specimen: Swab from Nasopharynx Updated: 07/09/22 0222     COVID19 Not Detected    Narrative:      Fact sheet for providers: https://www.fda.gov/media/985868/download     Fact sheet for patients: https://www.fda.gov/media/023865/download  Fact sheet for providers: https://www.fda.gov/media/161697/download     Fact sheet for patients: https://www.fda.gov/media/590993/download    Urine Culture - Urine, Urine, Clean Catch [[id number]]  (Normal)  Collected: 07/08/22 2239    Lab Status: Final result Specimen: Urine, Clean Catch Updated: 07/09/22 2216     Urine Culture No growth          CT Abdomen Pelvis With Contrast    Result Date: 7/9/2022  Impression:  1.  Moderate left colitis, most likely infectious or inflammatory. Electronically signed by:  Laura Li M.D.  7/8/2022 10:20 PM              Results for orders placed during the hospital encounter of 05/10/22    Adult Transthoracic Echo Complete W/ Cont if Necessary Per Protocol    Interpretation Summary  · Calculated left ventricular EF = 50.7% Estimated left ventricular EF was in agreement with the calculated left ventricular EF. Left ventricular systolic function is low normal.  · Left ventricular diastolic function was normal.      Labs Pending at Discharge:      Procedures Performed           Consults:   Consults     Date and Time Order Name Status Description    7/9/2022  1:16 AM Hospitalist (on-call MD unless specified)              Discharge Details        Discharge Medications      New Medications      Instructions Start Date   amoxicillin-clavulanate 875-125 MG per tablet  Commonly known as: Augmentin   1 tablet, Oral, 2 Times Daily         Changes to Medications      Instructions Start Date   metoprolol succinate XL 25 MG 24 hr tablet  Commonly known as: TOPROL-XL  What changed: when to take this   25 mg, Oral, Daily         Continue These Medications      Instructions Start Date   acetaminophen 650 MG 8 hr tablet  Commonly known as: TYLENOL   2 tablets, Oral, 2 Times Daily PRN      digoxin 125 MCG tablet  Commonly known as: LANOXIN   TAKE ONE TABLET BY MOUTH DAILY      DULoxetine 20 MG capsule  Commonly known as: CYMBALTA   TAKE ONE CAPSULE BY MOUTH DAILY      gabapentin 100 MG capsule  Commonly known as: NEURONTIN   TAKE ONE CAPSULE BY MOUTH EVERY MORNING. TAKE TWO CAPSULES BY MOUTH EVERY EVENING.      lisinopril 2.5 MG tablet  Commonly known as: PRINIVIL,ZESTRIL   2.5 mg, Oral, Daily,  Take at night      omeprazole 40 MG capsule  Commonly known as: priLOSEC   TAKE ONE CAPSULE BY MOUTH DAILY             Allergies   Allergen Reactions   • Latex Anaphylaxis   • Other Unknown (See Comments)     perfume   • Sulfa Antibiotics Unknown (See Comments)     Past history, unknown reaction. Was told by mother there was a reaction of some type.    • Adhesive Tape Itching         Discharge Disposition:   Home or Self Care    Diet:  Hospital:  Diet Order   Procedures   • Diet Gastrointestinal; Low Residue         Discharge Activity:         CODE STATUS:  Code Status and Medical Interventions:   Ordered at: 07/09/22 0138     Code Status (Patient has no pulse and is not breathing):    CPR (Attempt to Resuscitate)     Medical Interventions (Patient has pulse or is breathing):    Full Support         Future Appointments   Date Time Provider Department Center   7/12/2022  9:15 AM Mati Toscano MD [facility]   7/12/2022  1:00 PM Bryan Vega MD [facility]   11/17/2022 11:00 AM Darrion Bateman MD [facility]           Time spent on Discharge including face to face service:   20 minutes    This patient has been examined wearing appropriate Personal Protective Equipment and discussed with hospital infection control department. 07/10/22      Signature: Electronically signed by Teresa Sandoval MD, 07/10/22, 3:36 PM EDT.

## 2022-07-10 NOTE — PLAN OF CARE
Goal Outcome Evaluation:        Patient able to make needs known. Room air, VSS. Pain treated prn pain medication. Tolerating clear liquid diet well. Plan of care on going.

## 2022-07-11 ENCOUNTER — TRANSITIONAL CARE MANAGEMENT TELEPHONE ENCOUNTER (OUTPATIENT)
Dept: CALL CENTER | Facility: HOSPITAL | Age: 67
End: 2022-07-11

## 2022-07-11 NOTE — OUTREACH NOTE
Call Center TCM Note    Flowsheet Row Responses   [facility] patient discharged from? [facility]   Does the patient have one of the following disease processes/diagnoses(primary or secondary)? Other   TCM attempt successful? Yes   Discharge diagnosis colitis   Meds reviewed with patient/caregiver? Yes   Is the patient having any side effects they believe may be caused by any medication additions or changes? No   Does the patient have all medications ordered at discharge? Yes   Is the patient taking all medications as directed (includes completed medication regime)? Yes   Does the patient have a primary care provider?  Yes   Does the patient have an appointment with their PCP within 7 days of discharge? No   Comments regarding PCP Pt declines TCM APPT at this time. SHe has [facility] visit on 07/25/2022. Pt will call if she feels sooner appt needed. [facility] visit is incidentally one day past TCM time frame.    Has the patient kept scheduled appointments due by today? N/A   Has home health visited the patient within 72 hours of discharge? N/A   Psychosocial issues? No   Did the patient receive a copy of their discharge instructions? Yes   Nursing interventions Reviewed instructions with patient   What is the patient's perception of their health status since discharge? Improving   Is the patient/caregiver able to teach back signs and symptoms related to disease process for when to call PCP? Yes   Is the patient/caregiver able to teach back signs and symptoms related to disease process for when to call 911? Yes   Is the patient/caregiver able to teach back the hierarchy of who to call/visit for symptoms/problems? PCP, Specialist, Home health nurse, Urgent Care, ED, 911 Yes   If the patient is a current smoker, are they able to teach back resources for cessation? Not a smoker   TCM call completed? Yes   Wrap up additional comments D/C DX: Infectious colitis. Pt abdominal pain lessened but present, as is diarrhea. Pt is taking  Amoxicillin as directed. Just feels tired and weak. Little appetite but trying to eat lightly and stay hydrated. Pt declines TCM APPT at this time. SHe has [facility] visit on 07/25/2022. Pt will call if she feels sooner appt needed. [facility] visit is incidentally one day past TCM time frame.           Shnaika Jarrell MA    7/11/2022, 15:33 EDT

## 2022-07-25 ENCOUNTER — OFFICE VISIT (OUTPATIENT)
Dept: FAMILY MEDICINE CLINIC | Facility: CLINIC | Age: 67
End: 2022-07-25

## 2022-07-25 VITALS
RESPIRATION RATE: 16 BRPM | SYSTOLIC BLOOD PRESSURE: 108 MMHG | DIASTOLIC BLOOD PRESSURE: 58 MMHG | BODY MASS INDEX: 20.57 KG/M2 | HEART RATE: 66 BPM | TEMPERATURE: 96.9 F | WEIGHT: 116.1 LBS | OXYGEN SATURATION: 97 % | HEIGHT: 63 IN

## 2022-07-25 DIAGNOSIS — F33.0 MILD EPISODE OF RECURRENT MAJOR DEPRESSIVE DISORDER: Chronic | ICD-10-CM

## 2022-07-25 DIAGNOSIS — I42.0 CARDIOMYOPATHY, DILATED: Chronic | ICD-10-CM

## 2022-07-25 DIAGNOSIS — D64.9 ANEMIA, UNSPECIFIED TYPE: ICD-10-CM

## 2022-07-25 DIAGNOSIS — E78.00 PURE HYPERCHOLESTEROLEMIA: Chronic | ICD-10-CM

## 2022-07-25 DIAGNOSIS — Z00.00 MEDICARE ANNUAL WELLNESS VISIT, SUBSEQUENT: Primary | ICD-10-CM

## 2022-07-25 DIAGNOSIS — K52.9 COLITIS: ICD-10-CM

## 2022-07-25 DIAGNOSIS — Z23 NEED FOR VACCINATION: ICD-10-CM

## 2022-07-25 DIAGNOSIS — G89.4 CHRONIC PAIN SYNDROME: Chronic | ICD-10-CM

## 2022-07-25 PROCEDURE — 1160F RVW MEDS BY RX/DR IN RCRD: CPT | Performed by: FAMILY MEDICINE

## 2022-07-25 PROCEDURE — 91305 COVID-19 (PFIZER) 12+ YRS: CPT | Performed by: FAMILY MEDICINE

## 2022-07-25 PROCEDURE — G0439 PPPS, SUBSEQ VISIT: HCPCS | Performed by: FAMILY MEDICINE

## 2022-07-25 PROCEDURE — 99214 OFFICE O/P EST MOD 30 MIN: CPT | Performed by: FAMILY MEDICINE

## 2022-07-25 PROCEDURE — 1170F FXNL STATUS ASSESSED: CPT | Performed by: FAMILY MEDICINE

## 2022-07-25 PROCEDURE — 0051A COVID-19 (PFIZER) 12+ YRS: CPT | Performed by: FAMILY MEDICINE

## 2022-07-25 NOTE — PROGRESS NOTES
"Chief Complaint  Medicare Wellness-Initial Visit    Subjective        Shanika Hector presents to [facility]  History of Present Illness     Follow-up hospital visit earlier this month for abdominal pain and blood in the stool and anemia.  CT scan showed sigmoid colitis but no diverticulitis.  She developed an anemia while hospitalized.  She was having bloody stool.  Her last colonoscopy was about 10 years ago.  She was treated with Augmentin.  The symptoms are now resolved with exception of some ongoing left lower quadrant abdominal pain.  She has history of stable cardiomyopathy, depression in remission, and sciatica with long-term gabapentin use.  She is also prescribed lisinopril metoprolol by cardiology.  She states she was eating a lot of different food including a lot of tomatoes and pickles before the hospitalization.    Objective   Vital Signs:  /58   Pulse 66   Temp 96.9 °F (36.1 °C) (Temporal)   Resp 16   Ht 160 cm (63\")   Wt 52.7 kg (116 lb 1.6 oz)   SpO2 97%   BMI 20.57 kg/m²   Estimated body mass index is 20.57 kg/m² as calculated from the following:    Height as of this encounter: 160 cm (63\").    Weight as of this encounter: 52.7 kg (116 lb 1.6 oz).    BMI is within normal parameters. No other follow-up for BMI required.      Physical Exam  Constitutional:       Appearance: Normal appearance.   HENT:      Head: Atraumatic.      Mouth/Throat:      Pharynx: Oropharynx is clear. No oropharyngeal exudate or posterior oropharyngeal erythema.   Eyes:      Conjunctiva/sclera: Conjunctivae normal.   Cardiovascular:      Rate and Rhythm: Normal rate and regular rhythm.      Pulses: Normal pulses.      Heart sounds: Normal heart sounds.   Pulmonary:      Effort: Pulmonary effort is normal.      Breath sounds: Normal breath sounds.   Abdominal:      General: Abdomen is flat. There is no distension.      Palpations: Abdomen is soft. There is no mass.      Tenderness: " There is abdominal tenderness.      Hernia: No hernia is present.      Comments: She has tenderness left lower quadrant palpation.  Mild to moderate.  No guarding.  Soft.   Musculoskeletal:         General: Normal range of motion.      Cervical back: Normal range of motion and neck supple. No muscular tenderness.   Lymphadenopathy:      Cervical: No cervical adenopathy.   Skin:     General: Skin is warm and dry.      Findings: No rash.   Neurological:      General: No focal deficit present.      Mental Status: She is alert and oriented to person, place, and time.   Psychiatric:         Mood and Affect: Mood normal.        Result Review :  The following data was reviewed by: Bryan Vega MD on 07/25/2022:  Common labs    Common Labsle 7/5/22 7/5/22 7/8/22 7/8/22 7/9/22 7/9/22 7/9/22    1018 1018 2153 2153 1834 2325 2325   Glucose  91  115 (A)   92   BUN  17  16   7 (A)   Creatinine  0.82  0.96   0.74   Sodium  141  141   141   Potassium  4.4  4.0   4.2   Chloride  102  102   105   Calcium  9.6  9.0   8.3 (A)   Albumin  4.40  4.30      Total Bilirubin  0.4  0.2      Alkaline Phosphatase  75  81      AST (SGOT)  14  15      ALT (SGPT)  9  8      WBC   9.20   8.40    Hemoglobin   12.3  11.7 (A) 10.8 (A)    Hematocrit   37.9  35.8 32.9 (A)    Platelets   297   242    Total Cholesterol 225 (A)         Triglycerides 92         HDL Cholesterol 64 (A)         LDL Cholesterol  145 (A)         (A) Abnormal value            Data reviewed: Recent hospitalization notes DC summary and labs           Assessment and Plan   Diagnoses and all orders for this visit:    1. Medicare annual wellness visit, subsequent (Primary)    2. Colitis  -     Ambulatory Referral to Gastroenterology  -     Comprehensive Metabolic Panel  -     CBC & Differential  -     Iron and TIBC  -     Ferritin  -     Sedimentation Rate  -     C-reactive Protein    3. Anemia, unspecified type  -     Ambulatory Referral to Gastroenterology  -     Comprehensive  Metabolic Panel  -     CBC & Differential  -     Iron and TIBC  -     Ferritin  -     Sedimentation Rate  -     C-reactive Protein    4. Pure hypercholesterolemia    5. Cardiomyopathy, dilated (HCC)    6. Mild episode of recurrent major depressive disorder (HCC)    7. Chronic pain syndrome      Anemia and colitis.  Status post hospitalization.  New problem.  With systemic symptoms.  Needs further evaluation.  Prognosis uncertain.  Referral to GI.  She also likely needs repeat colonoscopy but I will leave that up to them.  With regards to the anemia, checking labs today including iron panel.  With regards to the colitis I am checking inflammatory markers.  Holding off on fecal calprotectin.  While this may have been infectious colitis, we cannot at this time rule out inflammatory bowel disease such as ulcerative colitis or Crohn's disease.  She is currently stable with minimal pain and symptoms are improving.  Holding off on further antibiotics at this time.  Pending results of iron panel, may need further evaluation sooner.  Otherwise I will see her back in 6 months.           Follow Up   No follow-ups on file.  Patient was given instructions and counseling regarding her condition or for health maintenance advice. Please see specific information pulled into the AVS if appropriate.

## 2022-07-25 NOTE — PROGRESS NOTES
The ABCs of the Annual Wellness Visit  Subsequent Medicare Wellness Visit    Chief Complaint   Patient presents with   • Medicare Wellness-Initial Visit      Subjective    History of Present Illness:  Shanika Hector is a 67 y.o. female who presents for a Subsequent Medicare Wellness Visit.    The following portions of the patient's history were reviewed and   updated as appropriate: allergies, current medications, past family history, past medical history, past social history, past surgical history and problem list.    Compared to one year ago, the patient feels her physical   health is worse.    Compared to one year ago, the patient feels her mental   health is the same.    Recent Hospitalizations:  This patient has had a [facility] admission record on file within the last 365 days.    Current Medical Providers:  Patient Care Team:  Bryan Vega MD as PCP - General  Bryan Vega MD as PCP - Family Medicine  Darrion Bateman MD as Consulting Physician (Cardiology)    Outpatient Medications Prior to Visit   Medication Sig Dispense Refill   • acetaminophen (TYLENOL) 650 MG 8 hr tablet Take 2 tablets by mouth 2 (two) times a day as needed.     • digoxin (LANOXIN) 125 MCG tablet TAKE ONE TABLET BY MOUTH DAILY 90 tablet 3   • DULoxetine (CYMBALTA) 20 MG capsule TAKE ONE CAPSULE BY MOUTH DAILY 90 capsule 1   • gabapentin (NEURONTIN) 100 MG capsule TAKE ONE CAPSULE BY MOUTH EVERY MORNING. TAKE TWO CAPSULES BY MOUTH EVERY EVENING. 270 capsule 1   • lisinopril (PRINIVIL,ZESTRIL) 2.5 MG tablet Take 1 tablet by mouth Daily. Take at night 90 tablet 3   • metoprolol succinate XL (TOPROL-XL) 25 MG 24 hr tablet Take 1 tablet by mouth Daily. (Patient taking differently: Take 25 mg by mouth Every Night.) 90 tablet 2   • omeprazole (priLOSEC) 40 MG capsule TAKE ONE CAPSULE BY MOUTH DAILY 90 capsule 1   • amoxicillin-clavulanate (Augmentin) 875-125 MG per tablet Take 1 tablet by mouth 2 (Two) Times a Day. 14 tablet 0     No  "facility-administered medications prior to visit.       No opioid medication identified on active medication list. I have reviewed chart for other potential  high risk medication/s and harmful drug interactions in the elderly.          Aspirin is not on active medication list.  Aspirin use is not indicated based on review of current medical condition/s. Risk of harm outweighs potential benefits.  .    Patient Active Problem List   Diagnosis   • Cervical radiculopathy   • Generalized osteoarthritis   • Gastroesophageal reflux disease   • Irritable bowel syndrome with constipation   • Osteoporosis   • Seborrheic eczema   • Osteoarthritis of cervical spine without myelopathy   • Eczema   • DDD (degenerative disc disease), lumbar   • Bilateral sacroiliitis (HCC)   • Lateral femoral cutaneous neuropathy   • Other chronic pain   • Lumbar facet arthropathy   • Pure hypercholesterolemia   • Senile atrophic vaginitis   • Other specified abnormal immunological findings in serum   • Cervical disc disorder, unspecified, unspecified cervical region   • Chronic pain syndrome   • Dyspepsia   • Fibromyalgia   • Mild episode of recurrent major depressive disorder (HCC)   • Chronic systolic congestive heart failure (HCC)   • Cardiomyopathy, dilated (HCC)   • PVCs (premature ventricular contractions)   • Colitis     Advance Care Planning  Advance Directive is on file.  ACP discussion was held with the patient during this visit. Patient has an advance directive in EMR which is still valid.           Objective    Vitals:    07/25/22 0805   BP: 108/58   Pulse: 66   Resp: 16   Temp: 96.9 °F (36.1 °C)   TempSrc: Temporal   SpO2: 97%   Weight: 52.7 kg (116 lb 1.6 oz)   Height: 160 cm (63\")   PainSc: 0-No pain     Estimated body mass index is 20.57 kg/m² as calculated from the following:    Height as of this encounter: 160 cm (63\").    Weight as of this encounter: 52.7 kg (116 lb 1.6 oz).    BMI is within normal parameters. No other " follow-up for BMI required.      Does the patient have evidence of cognitive impairment? No    Physical Exam  Lab Results   Component Value Date    TRIG 92 07/05/2022    HDL 64 (H) 07/05/2022     (H) 07/05/2022    VLDL 16 07/05/2022            HEALTH RISK ASSESSMENT    Smoking Status:  Social History     Tobacco Use   Smoking Status Never Smoker   Smokeless Tobacco Never Used   Tobacco Comment    caffeine- 3 cups daily     Alcohol Consumption:  Social History     Substance and Sexual Activity   Alcohol Use Not Currently   • Alcohol/week: 1.0 standard drink   • Types: 1 Glasses of wine per week    Comment: occasional     Fall Risk Screen:    NAYANAADI Fall Risk Assessment was completed, and patient is at LOW risk for falls.Assessment completed on:7/25/2022    Depression Screening:  PHQ-2/PHQ-9 Depression Screening 7/25/2022   Retired PHQ-9 Total Score -   Retired Total Score -   Little Interest or Pleasure in Doing Things 0-->not at all   Feeling Down, Depressed or Hopeless 0-->not at all   PHQ-9: Brief Depression Severity Measure Score 0       Health Habits and Functional and Cognitive Screening:  Functional & Cognitive Status 7/25/2022   Do you have difficulty preparing food and eating? No   Do you have difficulty bathing yourself, getting dressed or grooming yourself? No   Do you have difficulty using the toilet? No   Do you have difficulty moving around from place to place? No   Do you have trouble with steps or getting out of a bed or a chair? No   Current Diet Well Balanced Diet   Dental Exam Not up to date   Eye Exam Up to date   Exercise (times per week) 7 times per week   Current Exercises Include Gardening   Current Exercise Activities Include -   Do you need help using the phone?  No   Are you deaf or do you have serious difficulty hearing?  No   Do you need help with transportation? No   Do you need help shopping? No   Do you need help preparing meals?  No   Do you need help with housework?  No   Do  you need help with laundry? No   Do you need help taking your medications? No   Do you need help managing money? No   Do you ever drive or ride in a car without wearing a seat belt? No   Have you felt unusual stress, anger or loneliness in the last month? No   Who do you live with? Spouse   If you need help, do you have trouble finding someone available to you? No   Have you been bothered in the last four weeks by sexual problems? No   Do you have difficulty concentrating, remembering or making decisions? No       Age-appropriate Screening Schedule:  Refer to the list below for future screening recommendations based on patient's age, sex and/or medical conditions. Orders for these recommended tests are listed in the plan section. The patient has been provided with a written plan.    Health Maintenance   Topic Date Due   • PAP SMEAR  11/13/2020   • DXA SCAN  07/20/2022   • INFLUENZA VACCINE  10/01/2022   • LIPID PANEL  07/05/2023   • MAMMOGRAM  09/20/2023   • TDAP/TD VACCINES (2 - Td or Tdap) 11/13/2027   • ZOSTER VACCINE  Discontinued              Assessment & Plan   CMS Preventative Services Quick Reference  Risk Factors Identified During Encounter  Chronic Pain   Immunizations Discussed/Encouraged (specific Immunizations; COVID19  The above risks/problems have been discussed with the patient.  Follow up actions/plans if indicated are seen below in the Assessment/Plan Section.  Pertinent information has been shared with the patient in the After Visit Summary.    Diagnoses and all orders for this visit:    1. Medicare annual wellness visit, subsequent (Primary)    2. Pure hypercholesterolemia    3. Cardiomyopathy, dilated (HCC)    4. Mild episode of recurrent major depressive disorder (HCC)    5. Chronic pain syndrome    6. Anemia, unspecified type  -     Ambulatory Referral to Gastroenterology  -     Comprehensive Metabolic Panel  -     CBC & Differential  -     Iron and TIBC  -     Ferritin  -     Sedimentation  Rate  -     C-reactive Protein    7. Colitis  -     Ambulatory Referral to Gastroenterology  -     Comprehensive Metabolic Panel  -     CBC & Differential  -     Iron and TIBC  -     Ferritin  -     Sedimentation Rate  -     C-reactive Protein        Follow Up:   No follow-ups on file.     An After Visit Summary and PPPS were made available to the patient.

## 2022-07-26 ENCOUNTER — OFFICE VISIT (OUTPATIENT)
Dept: CARDIOLOGY | Facility: CLINIC | Age: 67
End: 2022-07-26

## 2022-07-26 VITALS
SYSTOLIC BLOOD PRESSURE: 112 MMHG | DIASTOLIC BLOOD PRESSURE: 68 MMHG | HEIGHT: 63 IN | HEART RATE: 79 BPM | WEIGHT: 117 LBS | BODY MASS INDEX: 20.73 KG/M2

## 2022-07-26 DIAGNOSIS — K52.9 COLITIS: ICD-10-CM

## 2022-07-26 DIAGNOSIS — I49.3 PVCS (PREMATURE VENTRICULAR CONTRACTIONS): Primary | ICD-10-CM

## 2022-07-26 PROBLEM — R10.9 ABDOMINAL PAIN: Status: ACTIVE | Noted: 2022-07-26

## 2022-07-26 LAB
ALBUMIN SERPL-MCNC: 4.4 G/DL (ref 3.8–4.8)
ALBUMIN/GLOB SERPL: 1.5 {RATIO} (ref 1.2–2.2)
ALP SERPL-CCNC: 87 IU/L (ref 44–121)
ALT SERPL-CCNC: 10 IU/L (ref 0–32)
AST SERPL-CCNC: 17 IU/L (ref 0–40)
BASOPHILS # BLD AUTO: 0 X10E3/UL (ref 0–0.2)
BASOPHILS NFR BLD AUTO: 1 %
BILIRUB SERPL-MCNC: 0.4 MG/DL (ref 0–1.2)
BUN SERPL-MCNC: 14 MG/DL (ref 8–27)
BUN/CREAT SERPL: 16 (ref 12–28)
CALCIUM SERPL-MCNC: 9.2 MG/DL (ref 8.7–10.3)
CHLORIDE SERPL-SCNC: 101 MMOL/L (ref 96–106)
CO2 SERPL-SCNC: 24 MMOL/L (ref 20–29)
CREAT SERPL-MCNC: 0.86 MG/DL (ref 0.57–1)
CRP SERPL-MCNC: 2 MG/L (ref 0–10)
EGFRCR SERPLBLD CKD-EPI 2021: 73 ML/MIN/1.73
EOSINOPHIL # BLD AUTO: 0.3 X10E3/UL (ref 0–0.4)
EOSINOPHIL NFR BLD AUTO: 5 %
ERYTHROCYTE [DISTWIDTH] IN BLOOD BY AUTOMATED COUNT: 12.6 % (ref 11.7–15.4)
ERYTHROCYTE [SEDIMENTATION RATE] IN BLOOD BY WESTERGREN METHOD: 11 MM/HR (ref 0–40)
FERRITIN SERPL-MCNC: 42 NG/ML (ref 15–150)
GLOBULIN SER CALC-MCNC: 2.9 G/DL (ref 1.5–4.5)
GLUCOSE SERPL-MCNC: 89 MG/DL (ref 65–99)
HCT VFR BLD AUTO: 36.5 % (ref 34–46.6)
HGB BLD-MCNC: 11.8 G/DL (ref 11.1–15.9)
IMM GRANULOCYTES # BLD AUTO: 0 X10E3/UL (ref 0–0.1)
IMM GRANULOCYTES NFR BLD AUTO: 0 %
IRON SATN MFR SERPL: 25 % (ref 15–55)
IRON SERPL-MCNC: 76 UG/DL (ref 27–139)
LYMPHOCYTES # BLD AUTO: 1.3 X10E3/UL (ref 0.7–3.1)
LYMPHOCYTES NFR BLD AUTO: 25 %
MCH RBC QN AUTO: 29.4 PG (ref 26.6–33)
MCHC RBC AUTO-ENTMCNC: 32.3 G/DL (ref 31.5–35.7)
MCV RBC AUTO: 91 FL (ref 79–97)
MONOCYTES # BLD AUTO: 0.5 X10E3/UL (ref 0.1–0.9)
MONOCYTES NFR BLD AUTO: 9 %
NEUTROPHILS # BLD AUTO: 3.2 X10E3/UL (ref 1.4–7)
NEUTROPHILS NFR BLD AUTO: 60 %
PLATELET # BLD AUTO: 323 X10E3/UL (ref 150–450)
POTASSIUM SERPL-SCNC: 4.8 MMOL/L (ref 3.5–5.2)
PROT SERPL-MCNC: 7.3 G/DL (ref 6–8.5)
RBC # BLD AUTO: 4.02 X10E6/UL (ref 3.77–5.28)
SODIUM SERPL-SCNC: 140 MMOL/L (ref 134–144)
TIBC SERPL-MCNC: 308 UG/DL (ref 250–450)
UIBC SERPL-MCNC: 232 UG/DL (ref 118–369)
WBC # BLD AUTO: 5.4 X10E3/UL (ref 3.4–10.8)

## 2022-07-26 PROCEDURE — 99213 OFFICE O/P EST LOW 20 MIN: CPT

## 2022-07-26 PROCEDURE — 93000 ELECTROCARDIOGRAM COMPLETE: CPT

## 2022-07-26 NOTE — PROGRESS NOTES
Date of Office Visit: 2022  Encounter Provider: ABHISHEK Valdez  Place of Service: [facility] CARDIOLOGY  Patient Name: Shanika Hector  :1955    Chief Complaint   Patient presents with   • PVC's     3 month f/u   • Cardiomyopathy   • Congestive Heart Failure   :     HPI: Shanika Hector is a 67 y.o. female who is followed by Dr. Toscano and Dr. Bateman.  She has a history of chf, NICM, and PVC-s/p PVC ablation (3/09/2022).       She saw Dr. Toscano in February for increased shortness of breath and frequent palpitations.  She had known PVCs which was being controlled with metoprolol.  She had an echo 2021 which showed a decreased LVEF of 38%, subsequent cardiac catheterization was negative for significant CAD, her medical therapy was optimized.       Repeat echo in Nov. Showed no improvement to her EF and she continued to have frequent PVCS, with a burden of ~12%.  She was referred to Dr. Toscano and underwent PVC ablation on 3/09/2022.  She was discharged with a Zio monitor that showed a decreased PVC burden of 3%.    I saw her in April and she was doing very well at that time. Significant reduction in her PVC burden and symptoms.  She felt much better overall.  She was starting to work outside in her garden which she was not able to do before the ablation.                   She presents today for follow up.     From a cardiac standpoint she is doing great.  She says that she is still having PVCs but much less than before the ablation.     Holter monitor after ablation showed reduction from 15% to 3&.     EKG today shows NSR w/ one PVC.     She denies any chest pain, dyspnea, palpitations, or fatigue.     She has been able to work in her garden and do everything that she wants to do. She was not able to do this before the ablation.     Her biggest complaint right now is abdominal pain. She was recently in the ED at [facility] and diagnosed with acute colitis. She has follow up  with Dr. Garcia and may need colonoscopy.     She is on metoprolol and lisinopril.         Past Medical History:   Diagnosis Date   • Acute systolic congestive heart failure (HCC) 2/13/2021   • ITZEL positive     Repeat ITZEL November 2014 negative   • Anxiety    • Bilateral sacroiliitis (HCC) 5/8/2017   • Bursitis 2016   • Cardiomyopathy, dilated (HCC)    • Cervical radiculopathy    • Cervical spondylosis    • Degeneration of intervertebral disc    • Eczema    • Fibromyalgia 3/16/2020   • GERD (gastroesophageal reflux disease)    • Heart murmur Preteen   • History of cardiac murmur as a child    • Irritable bowel syndrome    • Lichen sclerosus of female genitalia    • Low back pain    • Myalgia and myositis    • Nonischemic cardiomyopathy (HCC)     2/2021, EF 20%, normal cors   • Nonrheumatic mitral valve regurgitation    • Osteoporosis    • Peptic ulceration Dec 2019   • Pruritus    • Pure hypercholesterolemia 6/10/2019   • PVCs (premature ventricular contractions) 3/22/2021       Past Surgical History:   Procedure Laterality Date   • BREAST CYST ASPIRATION      10+ years ago   • CARDIAC CATHETERIZATION N/A 02/15/2021    Procedure: Left Heart Cath;  Surgeon: Zee Rojas MD;  Location: [facility] CATH INVASIVE LOCATION;  Service: Cardiovascular;  Laterality: N/A;   • CARDIAC CATHETERIZATION N/A 02/15/2021    Procedure: Left ventriculography;  Surgeon: Zee Rojas MD;  Location:  [facility] INVASIVE LOCATION;  Service: Cardiovascular;  Laterality: N/A;   • CARDIAC CATHETERIZATION N/A 02/15/2021    Procedure: Coronary angiography;  Surgeon: Zee Rojas MD;  Location:  [facility] INVASIVE LOCATION;  Service: Cardiovascular;  Laterality: N/A;   • CARDIAC CATHETERIZATION N/A 02/15/2021    Procedure: Right Heart Cath;  Surgeon: Zee Rojas MD;  Location:  [facility] INVASIVE LOCATION;  Service: Cardiovascular;  Laterality: N/A;   • CARDIAC ELECTROPHYSIOLOGY PROCEDURE N/A 3/9/2022    Procedure: Ablation PVC;   Surgeon: Mati Toscano MD;  Location:  [facility] INVASIVE LOCATION;  Service: Cardiology;  Laterality: N/A;   • CARDIAC ELECTROPHYSIOLOGY PROCEDURE N/A 3/9/2022    Procedure: EP/ABLATION;  Surgeon: Mati Toscano MD;  Location:  [facility] IN[facility];  Service: Cardiology;  Laterality: N/A;   • CHOLECYSTECTOMY     • COLONOSCOPY  07/30/2012    Dr. Zazueta, matt   • ENDOSCOPY  12/04/2015    Mild active duodenitis, reactive gastropathy suggestive of healing erosion or adjacent ulcer   • ENDOSCOPY N/A 12/16/2019    Procedure: ESOPHAGOGASTRODUODENOSCOPY WITH BIOPSY WITH COLD BIOPSY POLYPECTOMY;  Surgeon: Lida Garcia MD;  Location:  [facility];  Service: Gastroenterology   • ENDOSCOPY N/A 07/13/2020    Procedure: ESOPHAGOGASTRODUODENOSCOPY WITH COLD BIOPSY POLYPECTOMY;  Surgeon: Lida Garcia MD;  Location:  [facility];  Service: Gastroenterology;  Laterality: N/A;  pre: history of gastric ulcer  post: healed gastric ulcer and gastric polyp   • EYE SURGERY  April2021    Cararact surgury both eyes       Social History     Socioeconomic History   • Marital status:    Tobacco Use   • Smoking status: Never Smoker   • Smokeless tobacco: Never Used   • Tobacco comment: caffeine- 3 cups daily   Vaping Use   • Vaping Use: Never used   Substance and Sexual Activity   • Alcohol use: Not Currently     Alcohol/week: 1.0 standard drink     Types: 1 Glasses of wine per week     Comment: occasional   • Drug use: No   • Sexual activity: Yes     Partners: Male     Birth control/protection: None       Family History   Problem Relation Age of Onset   • Irritable bowel syndrome Mother    • Hypertension Mother    • Hypertension Father    • Hearing loss Father    • Alcohol abuse Brother    • Early death Brother    • Heart disease Brother    • Arthritis Sister        Review of Systems   Constitutional: Negative for chills, fever and malaise/fatigue.   Cardiovascular: Negative for chest pain,  "dyspnea on exertion, leg swelling, near-syncope, orthopnea, palpitations, paroxysmal nocturnal dyspnea and syncope.   Respiratory: Negative for cough and shortness of breath.    Musculoskeletal: Negative for joint pain, joint swelling and myalgias.   Gastrointestinal: Positive for abdominal pain. Negative for diarrhea, melena, nausea and vomiting.   Genitourinary: Negative for frequency and hematuria.   Neurological: Negative for light-headedness, numbness, paresthesias and seizures.   Allergic/Immunologic: Negative.    All other systems reviewed and are negative.      Allergies   Allergen Reactions   • Latex Anaphylaxis   • Other Unknown (See Comments)     perfume   • Sulfa Antibiotics Unknown (See Comments)     Past history, unknown reaction. Was told by mother there was a reaction of some type.    • Adhesive Tape Itching         Current Outpatient Medications:   •  acetaminophen (TYLENOL) 650 MG 8 hr tablet, Take 2 tablets by mouth 2 (two) times a day as needed., Disp: , Rfl:   •  digoxin (LANOXIN) 125 MCG tablet, TAKE ONE TABLET BY MOUTH DAILY, Disp: 90 tablet, Rfl: 3  •  DULoxetine (CYMBALTA) 20 MG capsule, TAKE ONE CAPSULE BY MOUTH DAILY, Disp: 90 capsule, Rfl: 1  •  gabapentin (NEURONTIN) 100 MG capsule, TAKE ONE CAPSULE BY MOUTH EVERY MORNING. TAKE TWO CAPSULES BY MOUTH EVERY EVENING., Disp: 270 capsule, Rfl: 1  •  lisinopril (PRINIVIL,ZESTRIL) 2.5 MG tablet, Take 1 tablet by mouth Daily. Take at night, Disp: 90 tablet, Rfl: 3  •  metoprolol succinate XL (TOPROL-XL) 25 MG 24 hr tablet, Take 1 tablet by mouth Daily. (Patient taking differently: Take 25 mg by mouth Every Night.), Disp: 90 tablet, Rfl: 2  •  omeprazole (priLOSEC) 40 MG capsule, TAKE ONE CAPSULE BY MOUTH DAILY, Disp: 90 capsule, Rfl: 1      Objective:     Vitals:    07/26/22 0836   BP: 112/68   BP Location: Right arm   Patient Position: Sitting   Cuff Size: Adult   Pulse: 79   Weight: 53.1 kg (117 lb)   Height: 160 cm (63\")     Body mass index " is 20.73 kg/m².    PHYSICAL EXAM:    Vitals Reviewed.   General Appearance: No acute distress, well developed and well nourished.   Eyes: Conjunctiva and lids: No erythema, swelling, or discharge. Sclera non-icteric.   HENT: Atraumatic, normocephalic. External eyes, ears, and nose normal.   Respiratory: No signs of respiratory distress. Respiration rhythm and depth normal.   Clear to auscultation. No rales, crackles, rhonchi, or wheezing auscultated.   Cardiovascular:  Heart Rate and Rhythm: Normal, Heart Sounds: Normal S1 and S2. No S3 or S4 noted.  Gastrointestinal:  Abdomen soft, non-distended, non-tender.   Musculoskeletal: Normal movement of extremities  Skin: Warm and dry.   Psychiatric: Patient alert and oriented to person, place, and time. Speech and behavior appropriate. Normal mood and affect.       ECG 12 Lead    Date/Time: 7/26/2022 9:48 AM  Performed by: Terry Hurtado APRN  Authorized by: Terry Hurtado APRN   Comparison: compared with previous ECG   Similar to previous ECG  Rhythm: sinus rhythm  Ectopy: unifocal PVCs  BPM: 79                Assessment:       Diagnosis Plan   1. PVCs (premature ventricular contractions)     2. Colitis            Plan:   1. PVCs--s/p PVC ablation (3/2022-[facility])- significant reduction in PVCs since ablation in March. Overall she feels much better and QOL has significantly improved.  EKG today shows NSR.    2. Colitis- recently diagnosed with colitis. She has follow up with GI to discuss coloscopy.  She does not have any cardiac condition that would preclude this. No cardiac testing needed.         She will keep routine follow up with Dr. Bateman and follow up with Dr. Toscano as needed.           As always, it has been a pleasure to participate in your patient's care.      Sincerely,         ABHISHEK Valdez

## 2022-08-02 ENCOUNTER — OFFICE VISIT (OUTPATIENT)
Dept: GASTROENTEROLOGY | Facility: CLINIC | Age: 67
End: 2022-08-02

## 2022-08-02 VITALS
WEIGHT: 116.2 LBS | DIASTOLIC BLOOD PRESSURE: 67 MMHG | TEMPERATURE: 97.3 F | BODY MASS INDEX: 20.59 KG/M2 | HEART RATE: 66 BPM | HEIGHT: 63 IN | SYSTOLIC BLOOD PRESSURE: 112 MMHG

## 2022-08-02 DIAGNOSIS — K59.01 SLOW TRANSIT CONSTIPATION: ICD-10-CM

## 2022-08-02 DIAGNOSIS — K21.9 GASTROESOPHAGEAL REFLUX DISEASE WITHOUT ESOPHAGITIS: ICD-10-CM

## 2022-08-02 DIAGNOSIS — K52.9 COLITIS: Primary | ICD-10-CM

## 2022-08-02 DIAGNOSIS — K64.8 INTERNAL HEMORRHOIDS: ICD-10-CM

## 2022-08-02 PROCEDURE — 99214 OFFICE O/P EST MOD 30 MIN: CPT | Performed by: PHYSICIAN ASSISTANT

## 2022-08-02 RX ORDER — VITAMIN B COMPLEX
TABLET ORAL
COMMUNITY
End: 2022-11-08

## 2022-08-02 NOTE — PROGRESS NOTES
"Chief Complaint  Anemia, Heartburn, and Ulcerative Colitis    Subjective          History of Present Illness    Shanika Hector is a  67 y.o. female presents for hospital follow-up for acute colitis.  She is a patient of Dr. Garcia last seen in the office in 2020 for gastric ulcer.  She is new to me.    Hospital discharge note 7/15/2022:  \"...a history of systolic and diastolic congestive heart failure that subsequently improved as evidenced by normal echocardiogram performed on 5/10/2022.  She also has a history of IBS, GERD, and chronic pain.  She presented to the emergency room complaining of right upper quadrant abdominal pain that radiates to the left upper abdomen.  She described the pain as severe and cramping that began 30 minutes after she had eaten associated with diaphoresis.  She had to formed bowel movements and subsequently developed diarrhea which had some bright red blood noted.  CT scan of the abdomen and pelvis showed moderate left infectious versus inflammatory colitis.  Patient was started on analgesics, antiemetics, IV fluids, Augmentin and a clear liquid diet.       Patient had significant improvement and her diet was advanced to low residue. She remained afebrile with stable vital signs.  Her white blood cell count was normal.  Her hemoglobin was 12.3 on admission and decreased to 10.8 during the hospital stay felt to be due to IV fluid hydration.\"   -------------------------------------------------------------------------------------------------------    She completed Augmentin and symptoms are better. She has had some \"hiccups\" since completing antibiotics. She does have some episodes of abdominal cramping at times. She has chronic constipation and constipation makes cramping worse. She denies diarrhea. She also reports large external hemorrhoid which is not painful. She is having leakage of stool and feels this is due to the hemorrhoid. She will wear a pad to protect against " "leakage/incontinence. She admits to not hydrating enough and doesn't eat a whole lot. No weight loss, melena, hematochezia. (Had blood in stool during hospital stay but now resolved).     She takes omeprazole 40 mg daily for GERD and history of gastric ulcers.  This controls her symptoms well.    7/25/2022 labs show normalization of her hemoglobin 11.8.  She was down to 10.8 in the hospital.  Iron panel and ferritin unremarkable as well as CRP and sed rate.    EGD on 12/16/2019 showed cratered gastric ulcer and repeat EGD in 2020 showed healed ulcer.    Last colonoscopy in 2012 was apparently normal with a 10-year recall.  I cannot see those results.    No history of tobacco use, does not drink alcohol. Mom had multiple GI issues but nothing specific. No GI Cnacers.     Objective   Vital Signs:   /67   Pulse 66   Temp 97.3 °F (36.3 °C)   Ht 160 cm (63\")   Wt 52.7 kg (116 lb 3.2 oz)   BMI 20.58 kg/m²       Physical Exam  Vitals reviewed.   Constitutional:       General: She is awake. She is not in acute distress.     Appearance: Normal appearance. She is well-developed and well-groomed.   HENT:      Head: Normocephalic.   Pulmonary:      Effort: Pulmonary effort is normal. No respiratory distress.   Abdominal:      General: Abdomen is flat. Bowel sounds are normal. There is no distension.      Palpations: Abdomen is soft. There is no hepatomegaly or splenomegaly.      Tenderness: There is abdominal tenderness in the suprapubic area. There is no guarding or rebound. Negative signs include Soto's sign.      Comments: External perianal exam: 1 small to moderate sized stage II internal hemorrhoid, noninflamed, no evidence of bleeding or stigmata of bleeding.  Few small external skin tags.  No concerning lesion.  MASSIEL deferred.   Skin:     Coloration: Skin is not pale.   Neurological:      Mental Status: She is alert and oriented to person, place, and time.      Gait: Gait is intact.   Psychiatric:         " Mood and Affect: Mood and affect normal.         Speech: Speech normal.         Behavior: Behavior is cooperative.         Judgment: Judgment normal.          Result Review :             Assessment and Plan    Diagnoses and all orders for this visit:    1. Colitis (Primary)  -     Case Request; Standing  -     Case Request    2. Gastroesophageal reflux disease without esophagitis    3. Internal hemorrhoids  -     Case Request; Standing  -     Case Request    4. Slow transit constipation    Increase fluids and start fiber supplement for constipation.    Plan for Colonoscopy for screening and to follow up on colitis.    Her GERD is well controlled on omeprazole.  She is not a smoker and has no family history of GI cancers.  No evidence of Borjas's esophagus on previous endoscopies.  She does have a history of large cratered gastric ulcer 2 years ago, follow-up EGD showed healing.  Will asked Dr. Garcia if she would like EGD at the same time.     In regards to her hemorrhoid, I did advise that she appears to have been internal hemorrhoid along the anal canal but is not inflamed currently.  She can use over-the-counter hemorrhoid suppositories and cream intermittently for flares of this.  She will need to insert the cream into the anal canal.  We can take a better look at this during the colonoscopy.  He may benefit from internal hemorrhoid laser therapy.    Follow Up   Return for Colonoscopy.    Dragon dictation used throughout this note.     Kalie Soto PA-C

## 2022-08-16 RX ORDER — GABAPENTIN 100 MG/1
CAPSULE ORAL
Qty: 270 CAPSULE | Refills: 1 | Status: SHIPPED | OUTPATIENT
Start: 2022-08-16 | End: 2023-02-14

## 2022-08-16 NOTE — TELEPHONE ENCOUNTER
Rx Refill Note  Requested Prescriptions     Pending Prescriptions Disp Refills   • gabapentin (NEURONTIN) 100 MG capsule 270 capsule 1     Sig: TAKE ONE CAPSULE BY MOUTH EVERY MORNING. TAKE TWO CAPSULES BY MOUTH EVERY EVENING.      Last office visit with prescribing clinician: 7/25/2022      Next office visit with prescribing clinician: 1/23/2023            George Almodovar  08/16/22, 10:12 EDT

## 2022-09-07 ENCOUNTER — TELEPHONE (OUTPATIENT)
Dept: GASTROENTEROLOGY | Facility: CLINIC | Age: 67
End: 2022-09-07

## 2022-09-07 PROBLEM — K64.8 INTERNAL HEMORRHOIDS: Status: ACTIVE | Noted: 2022-09-07

## 2022-09-07 NOTE — TELEPHONE ENCOUNTER
AYLA Zacarias for COLONOSCOPY on 11/9/22 arrive at 10am.Prep instructions given to pt at office.

## 2022-09-13 RX ORDER — LISINOPRIL 2.5 MG/1
2.5 TABLET ORAL DAILY
Qty: 90 TABLET | Refills: 1 | Status: SHIPPED | OUTPATIENT
Start: 2022-09-13 | End: 2023-03-24 | Stop reason: SDUPTHER

## 2022-09-13 RX ORDER — METOPROLOL SUCCINATE 25 MG/1
25 TABLET, EXTENDED RELEASE ORAL DAILY
Qty: 90 TABLET | Refills: 2 | Status: SHIPPED | OUTPATIENT
Start: 2022-09-13

## 2022-09-30 ENCOUNTER — TELEPHONE (OUTPATIENT)
Dept: FAMILY MEDICINE CLINIC | Facility: CLINIC | Age: 67
End: 2022-09-30

## 2022-09-30 PROCEDURE — 87077 CULTURE AEROBIC IDENTIFY: CPT | Performed by: FAMILY MEDICINE

## 2022-09-30 PROCEDURE — 87186 SC STD MICRODIL/AGAR DIL: CPT | Performed by: FAMILY MEDICINE

## 2022-09-30 PROCEDURE — 87086 URINE CULTURE/COLONY COUNT: CPT | Performed by: FAMILY MEDICINE

## 2022-09-30 NOTE — TELEPHONE ENCOUNTER
Shanika Hector called to schedule an appt for a possible UTI. No appts available. Pt stated she will go to .

## 2022-10-19 ENCOUNTER — HOSPITAL ENCOUNTER (OUTPATIENT)
Dept: GENERAL RADIOLOGY | Facility: HOSPITAL | Age: 67
Discharge: HOME | End: 2022-10-19

## 2022-10-19 ENCOUNTER — TRANSCRIBE ORDERS (OUTPATIENT)
Dept: ADMINISTRATIVE | Facility: HOSPITAL | Age: 67
End: 2022-10-19

## 2022-10-19 DIAGNOSIS — M19.90 OSTEOARTHRITIS, UNSPECIFIED OSTEOARTHRITIS TYPE, UNSPECIFIED SITE: ICD-10-CM

## 2022-10-19 DIAGNOSIS — M19.90 OSTEOARTHRITIS, UNSPECIFIED OSTEOARTHRITIS TYPE, UNSPECIFIED SITE: Primary | ICD-10-CM

## 2022-10-19 PROCEDURE — 73620 X-RAY EXAM OF FOOT: CPT

## 2022-11-09 ENCOUNTER — ANESTHESIA (OUTPATIENT)
Dept: GASTROENTEROLOGY | Facility: HOSPITAL | Age: 67
End: 2022-11-09

## 2022-11-09 ENCOUNTER — ANESTHESIA EVENT (OUTPATIENT)
Dept: GASTROENTEROLOGY | Facility: HOSPITAL | Age: 67
End: 2022-11-09

## 2022-11-09 ENCOUNTER — HOSPITAL ENCOUNTER (OUTPATIENT)
Facility: HOSPITAL | Age: 67
Setting detail: HOSPITAL OUTPATIENT SURGERY
Discharge: HOME | End: 2022-11-09
Attending: INTERNAL MEDICINE | Admitting: INTERNAL MEDICINE

## 2022-11-09 VITALS
WEIGHT: 114.3 LBS | HEIGHT: 63 IN | BODY MASS INDEX: 20.25 KG/M2 | DIASTOLIC BLOOD PRESSURE: 86 MMHG | OXYGEN SATURATION: 92 % | SYSTOLIC BLOOD PRESSURE: 104 MMHG | TEMPERATURE: 97.9 F | RESPIRATION RATE: 17 BRPM | HEART RATE: 70 BPM

## 2022-11-09 DIAGNOSIS — K64.8 INTERNAL HEMORRHOIDS: ICD-10-CM

## 2022-11-09 DIAGNOSIS — K52.9 COLITIS: ICD-10-CM

## 2022-11-09 PROCEDURE — 25010000002 PROPOFOL 10 MG/ML EMULSION: Performed by: ANESTHESIOLOGY

## 2022-11-09 PROCEDURE — S0260 H&P FOR SURGERY: HCPCS | Performed by: INTERNAL MEDICINE

## 2022-11-09 PROCEDURE — G0121 COLON CA SCRN NOT HI RSK IND: HCPCS | Performed by: INTERNAL MEDICINE

## 2022-11-09 RX ORDER — SODIUM CHLORIDE, SODIUM LACTATE, POTASSIUM CHLORIDE, CALCIUM CHLORIDE 600; 310; 30; 20 MG/100ML; MG/100ML; MG/100ML; MG/100ML
30 INJECTION, SOLUTION INTRAVENOUS CONTINUOUS
Status: DISCONTINUED | OUTPATIENT
Start: 2022-11-09 | End: 2022-11-09 | Stop reason: HOSPADM

## 2022-11-09 RX ORDER — PROPOFOL 10 MG/ML
VIAL (ML) INTRAVENOUS CONTINUOUS PRN
Status: DISCONTINUED | OUTPATIENT
Start: 2022-11-09 | End: 2022-11-09 | Stop reason: SURG

## 2022-11-09 RX ORDER — PROMETHAZINE HYDROCHLORIDE 25 MG/1
25 SUPPOSITORY RECTAL ONCE AS NEEDED
Status: DISCONTINUED | OUTPATIENT
Start: 2022-11-09 | End: 2022-11-09 | Stop reason: HOSPADM

## 2022-11-09 RX ORDER — PROMETHAZINE HYDROCHLORIDE 25 MG/1
25 TABLET ORAL ONCE AS NEEDED
Status: DISCONTINUED | OUTPATIENT
Start: 2022-11-09 | End: 2022-11-09 | Stop reason: HOSPADM

## 2022-11-09 RX ORDER — LIDOCAINE HYDROCHLORIDE 20 MG/ML
INJECTION, SOLUTION INFILTRATION; PERINEURAL AS NEEDED
Status: DISCONTINUED | OUTPATIENT
Start: 2022-11-09 | End: 2022-11-09 | Stop reason: SURG

## 2022-11-09 RX ADMIN — PROPOFOL 200 MCG/KG/MIN: 10 INJECTION, EMULSION INTRAVENOUS at 10:44

## 2022-11-09 RX ADMIN — LIDOCAINE HYDROCHLORIDE 50 MG: 20 INJECTION, SOLUTION INFILTRATION; PERINEURAL at 10:44

## 2022-11-09 RX ADMIN — SODIUM CHLORIDE, POTASSIUM CHLORIDE, SODIUM LACTATE AND CALCIUM CHLORIDE 30 ML/HR: 600; 310; 30; 20 INJECTION, SOLUTION INTRAVENOUS at 10:14

## 2022-11-09 NOTE — ANESTHESIA POSTPROCEDURE EVALUATION
Patient: Shanika Hector    Procedure Summary     Date: 11/09/22 Room / Location:  [facility] /  [facility]    Anesthesia Start: 1043 Anesthesia Stop: 1105    Procedure: COLONOSCOPY into cecum and TI Diagnosis:       Colitis      Internal hemorrhoids      (Colitis [K52.9])      (Internal hemorrhoids [K64.8])    Surgeons: Lida Garcia MD Provider: Jamal Agudelo MD    Anesthesia Type: MAC ASA Status: 3          Anesthesia Type: MAC    Vitals  No vitals data found for the desired time range.          Post Anesthesia Care and Evaluation    Patient location during evaluation: bedside  Pain management: adequate    Airway patency: patent  Anesthetic complications: No anesthetic complications    Cardiovascular status: acceptable  Respiratory status: acceptable  Hydration status: acceptable

## 2022-11-09 NOTE — ANESTHESIA PREPROCEDURE EVALUATION
Anesthesia Evaluation     NPO Solid Status: > 8 hours             Airway   Mallampati: I  TM distance: >3 FB  Neck ROM: full  Dental - normal exam     Pulmonary - normal exam   Cardiovascular - normal exam    (+) hypertension, valvular problems/murmurs MR, dysrhythmias (s/p AFIB ablation), CHF (improved since AF ablation) Systolic <55%, hyperlipidemia,       Neuro/Psych  GI/Hepatic/Renal/Endo      Musculoskeletal     Abdominal    Substance History      OB/GYN          Other                        Anesthesia Plan    ASA 3     MAC       Anesthetic plan, risks, benefits, and alternatives have been provided, discussed and informed consent has been obtained with: patient.        CODE STATUS:

## 2022-11-09 NOTE — H&P
"[facility]  Pre Procedure History & Physical    Chief Complaint:   screening    Subjective     HPI:   66 yo here today for colonoscopy.  Pt reports no FH CRC/polyps.  Patient reports episodic constipation.  Has tried fiber before but that made her nauseated.  Last exam 2012    Past Medical History:   Past Medical History:   Diagnosis Date   • Abdominal pain     STATES FOR ABOUT THE LAST MONTH:  \"GI TRACT UNWELL\"   • Acute systolic congestive heart failure (HCC) 02/13/2021   • Anxiety    • Bilateral sacroiliitis (HCC) 05/08/2017   • Bladder infection     RECENT AND TREATED FOR   • Cardiomyopathy, dilated (HCC)    • Cervical radiculopathy    • Fibromyalgia 03/16/2020   • Gastric ulcer    • GERD (gastroesophageal reflux disease)    • Heart murmur Preteen   • History of atrial fibrillation     CARDIAC ABLATION MARCH 2022   • Hypertension    • Irritable bowel syndrome     CONSTIPATION ONLY   • Lichen sclerosus of female genitalia    • Nonischemic cardiomyopathy (HCC)     2/2021, EF 20%, normal cors   • Nonrheumatic mitral valve regurgitation    • Osteoporosis    • Peptic ulceration 12/2019   • PVCs (premature ventricular contractions) 03/22/2021   • RASH     STATES UNDER BREASTS BILAT AND WRAPS AROUND BACK-ITCHES.  STATES HAS RASH OFTEN TIMES WITH FOODS       Past Surgical History:  Past Surgical History:   Procedure Laterality Date   • BREAST CYST ASPIRATION      10+ years ago   • CARDIAC ABLATION  03/2022    DR. PEÑA   • CARDIAC CATHETERIZATION N/A 02/15/2021    Procedure: Left Heart Cath;  Surgeon: Zee Rojas MD;  Location:  [facility];  Service: Cardiovascular;  Laterality: N/A;   • CARDIAC CATHETERIZATION N/A 02/15/2021    Procedure: Left ventriculography;  Surgeon: Zee Rojas MD;  Location:  [facility];  Service: Cardiovascular;  Laterality: N/A;   • CARDIAC CATHETERIZATION N/A 02/15/2021    Procedure: Coronary angiography;  Surgeon: Bob" MD Zee;  Location:  [facility];  Service: Cardiovascular;  Laterality: N/A;   • CARDIAC CATHETERIZATION N/A 02/15/2021    Procedure: Right Heart Cath;  Surgeon: Zee Rojas MD;  Location: [facility] CATH INVAS[facility] LOC[facility];  Service: Cardiovascular;  Laterality: N/A;   • CARDIAC ELECTROPHYSIOLOGY PROCEDURE N/A 03/09/2022    Procedure: Ablation PVC;  Surgeon: Mati Toscano MD;  Location: [facility] C[facility];  Service: Cardiology;  Laterality: N/A;   • CARDIAC ELECTROPHYSIOLOGY PROCEDURE N/A 03/09/2022    Procedure: EP/ABLATION;  Surgeon: Mati Toscano MD;  Location: [facility] CATH IN[facility]AS[facility] LOC[facility];  Service: Cardiology;  Laterality: N/A;   • CHOLECYSTECTOMY     • COLONOSCOPY  07/30/2012    Dr. Zazueta, normal   • ENDOSCOPY  12/04/2015    Mild active duodenitis, reactive gastropathy suggestive of healing erosion or adjacent ulcer   • ENDOSCOPY N/A 12/16/2019    Procedure: ESOPHAGOGASTRODUODENOSCOPY WITH BIOPSY WITH COLD BIOPSY POLYPECTOMY;  Surgeon: Lida Garcia MD;  Location: [facility] ENDOSCOPY;  Service: Gastroenterology   • ENDOSCOPY N/A 07/13/2020    Procedure: ESOPHAGOGASTRODUODENOSCOPY WITH COLD BIOPSY POLYPECTOMY;  Surgeon: Lida Garcia MD;  Location: [facility] ENDOSCOPY;  Service: Gastroenterology;  Laterality: N/A;  pre: history of gastric ulcer  post: healed gastric ulcer and gastric polyp   • EYE SURGERY  April2021    Cararact surgury both eyes:  LENS IMPLANTS?       Family History:  Family History   Problem Relation Age of Onset   • Irritable bowel syndrome Mother    • Hypertension Mother    • Hypertension Father    • Hearing loss Father    • Arthritis Sister    • Alcohol abuse Brother    • Early death Brother    • Heart disease Brother    • Malig Hyperthermia Neg Hx        Social History:   reports that she has never smoked. She has never used smokeless tobacco. She reports that she does not currently use alcohol after a past usage of about 1.0 standard  "drink per week. She reports that she does not use drugs.    Medications:   Medications Prior to Admission   Medication Sig Dispense Refill Last Dose   • acetaminophen (TYLENOL) 650 MG 8 hr tablet Take 2 tablets by mouth 2 (two) times a day as needed.      • digoxin (LANOXIN) 125 MCG tablet TAKE ONE TABLET BY MOUTH DAILY (Patient taking differently: Take 1 tablet by mouth Daily.) 90 tablet 3 11/8/2022   • DULoxetine (CYMBALTA) 20 MG capsule TAKE ONE CAPSULE BY MOUTH DAILY (Patient taking differently: 1 capsule Daily.) 90 capsule 1 11/8/2022   • gabapentin (NEURONTIN) 100 MG capsule TAKE ONE CAPSULE BY MOUTH EVERY MORNING. TAKE TWO CAPSULES BY MOUTH EVERY EVENING. (Patient taking differently: Take 1 capsule by mouth 2 (Two) Times a Day. TAKE ONE CAPSULE BY MOUTH EVERY MORNING. TAKE TWO CAPSULES BY MOUTH EVERY EVENING.) 270 capsule 1 11/8/2022   • lisinopril (PRINIVIL,ZESTRIL) 2.5 MG tablet Take 1 tablet by mouth Daily. Take at night (Patient taking differently: Take 1 tablet by mouth Every Night. Take at night) 90 tablet 1 11/8/2022   • metoprolol succinate XL (TOPROL-XL) 25 MG 24 hr tablet Take 1 tablet by mouth Daily. (Patient taking differently: Take 1 tablet by mouth Every Night.) 90 tablet 2 11/8/2022   • omeprazole (priLOSEC) 40 MG capsule TAKE ONE CAPSULE BY MOUTH DAILY (Patient taking differently: Take 1 capsule by mouth Daily.) 90 capsule 1        Allergies:  Other, Sulfa antibiotics, and Adhesive tape    ROS:    Pertinent items are noted in HPI, all other systems reviewed and negative     Objective     Blood pressure 102/73, pulse 66, temperature 97.9 °F (36.6 °C), temperature source Oral, resp. rate 12, height 160 cm (63\"), weight 51.8 kg (114 lb 4.8 oz), SpO2 98 %, not currently breastfeeding.    Physical Exam   Constitutional: Pt is oriented to person, place, and time and well-developed, well-nourished, and in no distress.   Mouth/Throat: Oropharynx is clear and moist.   Neck: Normal range of motion. "   Cardiovascular: Normal rate, regular rhythm    Pulmonary/Chest: Effort normal    Abdominal: Soft. Nontender  Skin: Skin is warm and dry.   Psychiatric: Mood, memory, affect and judgment normal.     Assessment & Plan     Diagnosis:  Screening for colon cancer    Anticipated Surgical Procedure:  colonoscopy    The risks, benefits, and alternatives of this procedure have been discussed with the patient or the responsible party- the patient understands and agrees to proceed.

## 2022-11-16 ENCOUNTER — OFFICE VISIT (OUTPATIENT)
Dept: FAMILY MEDICINE CLINIC | Facility: CLINIC | Age: 67
End: 2022-11-16

## 2022-11-16 VITALS
HEIGHT: 63 IN | DIASTOLIC BLOOD PRESSURE: 80 MMHG | TEMPERATURE: 97.5 F | WEIGHT: 119.3 LBS | BODY MASS INDEX: 21.14 KG/M2 | OXYGEN SATURATION: 99 % | HEART RATE: 71 BPM | SYSTOLIC BLOOD PRESSURE: 122 MMHG

## 2022-11-16 DIAGNOSIS — Z23 INFLUENZA VACCINE ADMINISTERED: ICD-10-CM

## 2022-11-16 DIAGNOSIS — M12.812 ROTATOR CUFF ARTHROPATHY, LEFT: ICD-10-CM

## 2022-11-16 DIAGNOSIS — M54.16 LUMBAR RADICULOPATHY: Primary | ICD-10-CM

## 2022-11-16 DIAGNOSIS — G89.29 OTHER CHRONIC PAIN: ICD-10-CM

## 2022-11-16 PROCEDURE — 90662 IIV NO PRSV INCREASED AG IM: CPT | Performed by: FAMILY MEDICINE

## 2022-11-16 PROCEDURE — G0008 ADMIN INFLUENZA VIRUS VAC: HCPCS | Performed by: FAMILY MEDICINE

## 2022-11-16 PROCEDURE — 99214 OFFICE O/P EST MOD 30 MIN: CPT | Performed by: FAMILY MEDICINE

## 2022-11-16 RX ORDER — ALPRAZOLAM 0.5 MG/1
0.5 TABLET ORAL 2 TIMES DAILY PRN
Qty: 2 TABLET | Refills: 0 | Status: SHIPPED | OUTPATIENT
Start: 2022-11-16

## 2022-11-16 NOTE — PROGRESS NOTES
"Answers for HPI/ROS submitted by the patient on 11/12/2022  Please describe your symptoms.: Lower back pain. Dr Roland my rhemutologist is asking for MRI to determine damage due to arthritis (see report from her). Also left shoulder injury.  Have you had these symptoms before?: Yes  How long have you been having these symptoms?: Greater than 2 weeks  Please list any medications you are currently taking for this condition.: See my chart  Please describe any probable cause for these symptoms. : Arthritis on back., Shoulder injury unknown  What is the primary reason for your visit?: Other    Chief Complaint  Shoulder Pain (Left shoulder x couple weeks ) and Back Pain (Lwer back causing toes to go numb )    Subjective        Shanika Hector presents to [facility]  History of Present Illness     Patient is followed by a rheumatologist in [place] for osteoarthritis with a negative rheumatological work-up.  She continues on acetaminophen and gabapentin.  Patient is a complaining of a few months now of worsening bilateral burning in the feet and numbness in the toes with certain maneuvers such as sitting for prolonged and lying down in certain areas.  Not worse when standing up.  No weakness.  No troubles with gait or stability.  The pain is moderately a problematic.  She does not like the burning though in the feet.  The rheumatologist recommended an MRI.    She was doing some housework.  She lifted up a couch.  Then fell back down on her.  She injured her left shoulder a few weeks ago.  Now she is having pain with internal rotation.    Objective   Vital Signs:  /80   Pulse 71   Temp 97.5 °F (36.4 °C) (Temporal)   Ht 160 cm (63\")   Wt 54.1 kg (119 lb 4.8 oz)   SpO2 99%   BMI 21.13 kg/m²   Estimated body mass index is 21.13 kg/m² as calculated from the following:    Height as of this encounter: 160 cm (63\").    Weight as of this encounter: 54.1 kg (119 lb 4.8 oz).    BMI is within " normal parameters. No other follow-up for BMI required.      Physical Exam  Constitutional:       Appearance: Normal appearance.   Cardiovascular:      Rate and Rhythm: Normal rate.   Pulmonary:      Effort: Pulmonary effort is normal.   Musculoskeletal:      Comments: Left shoulder full range of motion with exception of decrease internal rotation.  Negative impingement testing.  Strength 5-5 in the upper extremities.  Strength 5 out of 5 in lower extremities.  Normal DTRs.  Gait unremarkable.   Neurological:      Mental Status: She is alert.        Result Review :                Assessment and Plan   Diagnoses and all orders for this visit:    1. Lumbar radiculopathy (Primary)  -     MRI Lumbar Spine Without Contrast; Future    2. Rotator cuff arthropathy, left  -     Ambulatory Referral to Physical Therapy Evaluate and treat    3. Other chronic pain  -     MRI Lumbar Spine Without Contrast; Future      Exacerbation of chronic pain syndrome.  Lower extremity burning right greater than left.  Positional changes associated with this.  Suggestive of a type of lumbar radiculopathy.  Less likely primary neuropathy of the lower extremities.  I am recommending a repeat MRI to compare to previous in 2017.  Continue medication as is.    Rotator cuff strain left shoulder.  Recommending physical therapy.  I will see her back as scheduled       Follow Up   No follow-ups on file.  Patient was given instructions and counseling regarding her condition or for health maintenance advice. Please see specific information pulled into the AVS if appropriate.

## 2022-11-17 ENCOUNTER — OFFICE VISIT (OUTPATIENT)
Dept: CARDIOLOGY | Facility: CLINIC | Age: 67
End: 2022-11-17

## 2022-11-17 VITALS
HEIGHT: 63 IN | DIASTOLIC BLOOD PRESSURE: 58 MMHG | SYSTOLIC BLOOD PRESSURE: 106 MMHG | WEIGHT: 118.6 LBS | HEART RATE: 61 BPM | BODY MASS INDEX: 21.02 KG/M2

## 2022-11-17 DIAGNOSIS — I42.0 CARDIOMYOPATHY, DILATED: Primary | Chronic | ICD-10-CM

## 2022-11-17 DIAGNOSIS — I49.3 PVCS (PREMATURE VENTRICULAR CONTRACTIONS): ICD-10-CM

## 2022-11-17 DIAGNOSIS — I50.22 CHRONIC SYSTOLIC CONGESTIVE HEART FAILURE: Chronic | ICD-10-CM

## 2022-11-17 PROCEDURE — 93000 ELECTROCARDIOGRAM COMPLETE: CPT | Performed by: INTERNAL MEDICINE

## 2022-11-17 PROCEDURE — 99214 OFFICE O/P EST MOD 30 MIN: CPT | Performed by: INTERNAL MEDICINE

## 2022-11-17 NOTE — PROGRESS NOTES
"Date of Office Visit: 22  Encounter Provider: Darrion Bateman MD  Place of Service: [facility] CARDIOLOGY  Patient Name: Shanika Hector  :1955    Chief Complaint   Patient presents with   • Cardiomyopathy   :     HPI: Ms. Hector is 67 y.o. and presents today to follow up. I have reviewed prior notes and there are no changes except for any new updates described below. I have also reviewed any information entered into the medical record by the patient or by ancillary staff.     She presented to [facility] on 2020 with dyspnea, palpitations, orthopnea, and abdominal distention.  Her EKG showed sinus tachycardia and LVH/strain. CTA of the chest was negative for pulmonary embolism, bilateral pleural effusions, and showed mild pulmonary edema.  She was noted to have PVCs on telemetry.  An echo revealed an LVEF of 20% and moderate-severe MR.  She underwent right and left heart catheterization; her coronaries were normal, as were her pulmonary pressures and LVEDP.  She was started on metoprolol succinate; her BP was relatively low, an additional medications could not be added.  She had been taking hydroxychloroquine for erosive osteoarthritis, and it was discontinued.     A cardiac MRI in 2020 showed an EF of 30%, and was otherwise normal.  A follow up echo in November showed improvement in her LVEF to 35-40%. In 2022, she was found to have very frequent PVCs. She underwent successful ablation of LV left posterior fascicular PVCs. A follow up Holter showed that her PVC burden had decreased to 3%. An echo in May 2022 showed improvement in her LVEF to 51%.     She feels great. She denies chest pain, dyspnea, leg swelling, orthopnea, or syncope.     Past Medical History:   Diagnosis Date   • Abdominal pain     STATES FOR ABOUT THE LAST MONTH:  \"GI TRACT UNWELL\"   • Acute systolic congestive heart failure (HCC) 2021   • Anxiety    • Bilateral sacroiliitis " (HCC) 05/08/2017   • Bladder infection     RECENT AND TREATED FOR   • Cardiomyopathy, dilated (HCC)    • Cervical radiculopathy    • Diverticulosis     Diagnois on 11-9-22 colonoscopy   • Fibromyalgia 03/16/2020   • Gastric ulcer    • GERD (gastroesophageal reflux disease)    • Heart murmur Preteen   • History of atrial fibrillation     CARDIAC ABLATION MARCH 2022   • Hyperlipidemia 6/10/2019   • Hypertension    • Irritable bowel syndrome     CONSTIPATION ONLY   • Lichen sclerosus of female genitalia    • Nonischemic cardiomyopathy (HCC)     2/2021, EF 20%, normal cors   • Nonrheumatic mitral valve regurgitation    • Osteoporosis    • Peptic ulceration 12/2019   • PVCs (premature ventricular contractions) 03/22/2021   • RASH     STATES UNDER BREASTS BILAT AND WRAPS AROUND BACK-ITCHES.  STATES HAS RASH OFTEN TIMES WITH FOODS       Past Surgical History:   Procedure Laterality Date   • BREAST CYST ASPIRATION      10+ years ago   • CARDIAC ABLATION  03/2022    DR. TSOCANO   • CARDIAC CATHETERIZATION N/A 02/15/2021    Procedure: Left Heart Cath;  Surgeon: Zee Rojas MD;  [facility];  Service: Cardiovascular;  Laterality: N/A;   • CARDIAC CATHETERIZATION N/A 02/15/2021    Procedure: Left ventriculography;  Surgeon: Zee Rojas MD;  Location:  [facility] CATH INVASIVE LOCATION;  Service: Cardiovascular;  Laterality: N/A;   • CARDIAC CATHETERIZATION N/A 02/15/2021    Procedure: Coronary angiography;  Surgeon: Zee Rojas MD;  Location:  [facility] CATH INVASIVE LOCATION;  Service: Cardiovascular;  Laterality: N/A;   • CARDIAC CATHETERIZATION N/A 02/15/2021    Procedure: Right Heart Cath;  Surgeon: Zee Rojas MD;  Location:  [facility] CATH INVASIVE LOCATION;  Service: Cardiovascular;  Laterality: N/A;   • CARDIAC ELECTROPHYSIOLOGY PROCEDURE N/A 03/09/2022    Procedure: Ablation PVC;  Surgeon: Mati Toscano MD;  Location:  [facility] CATH INVASIVE LOCATION;  Service: Cardiology;   Laterality: N/A;   • CARDIAC ELECTROPHYSIOLOGY PROCEDURE N/A 03/09/2022    Procedure: EP/ABLATION;  Surgeon: Mati Toscano MD;  Location: [facility] CATH INVASIVE LOCATION;  Service: Cardiology;  Laterality: N/A;   • CHOLECYSTECTOMY     • COLONOSCOPY  07/30/2012    matt Lo   • COLONOSCOPY N/A 11/9/2022    Procedure: COLONOSCOPY into cecum and TI;  Surgeon: Lida Garcia MD;  [facility];  Service: Gastroenterology;  Laterality: N/A;  pre: screen  post: diverticulosis, hemorrhoids    • ENDOSCOPY  12/04/2015    Mild active duodenitis, reactive gastropathy suggestive of healing erosion or adjacent ulcer   • ENDOSCOPY N/A 12/16/2019    Procedure: ESOPHAGOGASTRODUODENOSCOPY WITH BIOPSY WITH COLD BIOPSY POLYPECTOMY;  Surgeon: Lida Garcia MD;  [facility];  Service: Gastroenterology   • ENDOSCOPY N/A 07/13/2020    Procedure: ESOPHAGOGASTRODUODENOSCOPY WITH COLD BIOPSY POLYPECTOMY;  Surgeon: Lida Garcia MD;  [facility];  Service: Gastroenterology;  Laterality: N/A;  pre: history of gastric ulcer  post: healed gastric ulcer and gastric polyp   • EYE SURGERY  April2021    Cararact surgury both eyes:  LENS IMPLANTS?       Social History     Socioeconomic History   • Marital status:    Tobacco Use   • Smoking status: Never   • Smokeless tobacco: Never   • Tobacco comments:     caffeine- 3 cups daily   Vaping Use   • Vaping Use: Never used   Substance and Sexual Activity   • Alcohol use: Not Currently     Comment: NEVER   • Drug use: No   • Sexual activity: Yes     Partners: Male     Birth control/protection: None       Family History   Problem Relation Age of Onset   • Irritable bowel syndrome Mother    • Hypertension Mother    • Hypertension Father    • Hearing loss Father    • Arthritis Sister    • Alcohol abuse Brother    • Early death Brother    • Heart disease Brother    • Malig Hyperthermia Neg Hx        Review of Systems    Constitutional: Positive for malaise/fatigue.   Respiratory: Positive for cough.    Musculoskeletal: Positive for joint pain.   Neurological: Positive for excessive daytime sleepiness.   All other systems reviewed and are negative.      Allergies   Allergen Reactions   • Other Shortness Of Breath     perfume   • Sulfa Antibiotics Hives     Was told by mother   • Adhesive Tape Itching and Rash     PT STATES TAPE TAKES SKIN OFF         Current Outpatient Medications:   •  acetaminophen (TYLENOL) 650 MG 8 hr tablet, Take 2 tablets by mouth 2 (two) times a day as needed., Disp: , Rfl:   •  ALPRAZolam (XANAX) 0.5 MG tablet, Take 1 tablet by mouth 2 (Two) Times a Day As Needed (MRI anxiety)., Disp: 2 tablet, Rfl: 0  •  digoxin (LANOXIN) 125 MCG tablet, TAKE ONE TABLET BY MOUTH DAILY (Patient taking differently: Take 125 mcg by mouth Daily.), Disp: 90 tablet, Rfl: 3  •  DULoxetine (CYMBALTA) 20 MG capsule, TAKE ONE CAPSULE BY MOUTH DAILY (Patient taking differently: 20 mg Daily.), Disp: 90 capsule, Rfl: 1  •  gabapentin (NEURONTIN) 100 MG capsule, TAKE ONE CAPSULE BY MOUTH EVERY MORNING. TAKE TWO CAPSULES BY MOUTH EVERY EVENING. (Patient taking differently: Take 100 mg by mouth 2 (Two) Times a Day. TAKE ONE CAPSULE BY MOUTH EVERY MORNING. TAKE TWO CAPSULES BY MOUTH EVERY EVENING.), Disp: 270 capsule, Rfl: 1  •  lisinopril (PRINIVIL,ZESTRIL) 2.5 MG tablet, Take 1 tablet by mouth Daily. Take at night (Patient taking differently: Take 2.5 mg by mouth Every Night. Take at night), Disp: 90 tablet, Rfl: 1  •  metoprolol succinate XL (TOPROL-XL) 25 MG 24 hr tablet, Take 1 tablet by mouth Daily. (Patient taking differently: Take 25 mg by mouth Every Night.), Disp: 90 tablet, Rfl: 2  •  omeprazole (priLOSEC) 40 MG capsule, TAKE ONE CAPSULE BY MOUTH DAILY (Patient taking differently: Take 40 mg by mouth Daily.), Disp: 90 capsule, Rfl: 1      Objective:     Vitals:    11/17/22 1127   BP: 106/58   Pulse: 61   Weight: 53.8 kg (118 lb  "9.6 oz)   Height: 160 cm (63\")     Body mass index is 21.01 kg/m².    Constitutional:       Appearance: Healthy appearance. Not in distress.   Eyes:      Conjunctiva/sclera: Conjunctivae normal.   HENT:         Comments: Masked  Neck:      Vascular: JVD normal.   Pulmonary:      Effort: Pulmonary effort is normal.      Breath sounds: Normal breath sounds.   Cardiovascular:      Normal rate. Frequent ectopic beats.      Murmurs: There is no murmur.   Pulses:     Intact distal pulses.   Edema:     Peripheral edema absent.   Abdominal:      Palpations: Abdomen is soft.      Tenderness: There is no abdominal tenderness.   Musculoskeletal: Normal range of motion.      Cervical back: Normal range of motion. Skin:     General: Skin is warm and dry.   Neurological:      General: No focal deficit present.      Mental Status: Alert and oriented to person, place and time.           ECG 12 Lead    Date/Time: 11/17/2022 11:40 AM  Performed by: Darrion Bateman MD  Authorized by: Darrion Bateman MD   Comparison: compared with previous ECG   Similar to previous ECG  Rhythm: sinus rhythm  Conduction: conduction normal  ST Segments: ST segments normal  T flattening: II, III, aVF, V6 and V5  QRS axis: left  Other: no other findings    Clinical impression: non-specific ECG              Assessment:       Diagnosis Plan   1. Cardiomyopathy, dilated (HCC)        2. Chronic systolic congestive heart failure (HCC)        3. PVCs (premature ventricular contractions)             Plan:       She has NICM. Her EF has improved from 20% to 51% (May 22). She is euvolemic, and she is NYHA class 1. She's on metoprolol and lisinopril. She does not have enough BP for spironolactone.  She is on digoxin for inotropy; I don't feel that it's indicated anymore. She had an MRI that was negative for infiltration or fibrosis.     She is s/p successful PVC ablation.     We will see her back in one year and get an echo the same day.     Sincerely,       Darrion VILLALPANDO" MD [place]

## 2022-11-30 RX ORDER — OMEPRAZOLE 40 MG/1
40 CAPSULE, DELAYED RELEASE ORAL DAILY
Qty: 90 CAPSULE | Refills: 1 | Status: SHIPPED | OUTPATIENT
Start: 2022-11-30

## 2022-11-30 RX ORDER — DULOXETIN HYDROCHLORIDE 20 MG/1
20 CAPSULE, DELAYED RELEASE ORAL DAILY
Qty: 90 CAPSULE | Refills: 1 | Status: SHIPPED | OUTPATIENT
Start: 2022-11-30

## 2022-11-30 NOTE — TELEPHONE ENCOUNTER
Rx Refill Note  Requested Prescriptions     Pending Prescriptions Disp Refills   • DULoxetine (CYMBALTA) 20 MG capsule 90 capsule 1     Sig: Take 1 capsule by mouth Daily.   • omeprazole (priLOSEC) 40 MG capsule 90 capsule 1     Sig: Take 1 capsule by mouth Daily.      Last office visit with prescribing clinician: 11/16/2022   Last telemedicine visit with prescribing clinician: 1/23/2023   Next office visit with prescribing clinician: 1/23/2023                         Would you like a call back once the refill request has been completed: [] Yes [] No    If the office needs to give you a call back, can they leave a voicemail: [] Yes [] No    George Almodovar  11/30/22, 11:35 EST

## 2022-12-14 ENCOUNTER — HOSPITAL ENCOUNTER (OUTPATIENT)
Dept: MRI IMAGING | Facility: HOSPITAL | Age: 67
Discharge: HOME | End: 2022-12-14
Admitting: FAMILY MEDICINE

## 2022-12-14 DIAGNOSIS — G89.29 OTHER CHRONIC PAIN: ICD-10-CM

## 2022-12-14 DIAGNOSIS — M54.16 LUMBAR RADICULOPATHY: ICD-10-CM

## 2022-12-14 PROCEDURE — 72148 MRI LUMBAR SPINE W/O DYE: CPT

## 2022-12-16 DIAGNOSIS — M54.16 LUMBAR RADICULOPATHY: Primary | ICD-10-CM

## 2023-02-14 ENCOUNTER — OFFICE VISIT (OUTPATIENT)
Dept: FAMILY MEDICINE CLINIC | Facility: CLINIC | Age: 68
End: 2023-02-14
Payer: MEDICARE

## 2023-02-14 VITALS
BODY MASS INDEX: 22.71 KG/M2 | DIASTOLIC BLOOD PRESSURE: 67 MMHG | HEIGHT: 63 IN | HEART RATE: 73 BPM | WEIGHT: 128.2 LBS | TEMPERATURE: 97.7 F | SYSTOLIC BLOOD PRESSURE: 105 MMHG | OXYGEN SATURATION: 98 %

## 2023-02-14 DIAGNOSIS — G89.4 CHRONIC PAIN SYNDROME: Primary | Chronic | ICD-10-CM

## 2023-02-14 PROCEDURE — 99213 OFFICE O/P EST LOW 20 MIN: CPT | Performed by: FAMILY MEDICINE

## 2023-02-14 RX ORDER — DIGOXIN 125 MCG
TABLET ORAL
COMMUNITY
Start: 2022-12-24

## 2023-02-14 RX ORDER — IBUPROFEN 200 MG
200 TABLET ORAL EVERY 6 HOURS PRN
COMMUNITY

## 2023-02-14 RX ORDER — GABAPENTIN 300 MG/1
300 CAPSULE ORAL
Qty: 90 CAPSULE | Refills: 1 | Status: SHIPPED | OUTPATIENT
Start: 2023-02-14

## 2023-02-14 NOTE — PROGRESS NOTES
"Chief Complaint  Back Pain (Follow up from mri )    Subjective        Shanika Hector presents to [facility] PRIMARY CARE  History of Present Illness     Follow-up chronic back pain.  Bilateral back and hip burning to the knees with sitting.  Better when standing.  Bothersome at nighttime also.  Trouble sleeping with it.  When she initially started taking gabapentin 200 mg at nighttime it helped.  But not as much now.  She continues duloxetine 20 mg a day.  Otherwise doing well.  She is maintaining activity.    Objective   Vital Signs:  /67   Pulse 73   Temp 97.7 °F (36.5 °C) (Temporal)   Ht 160 cm (63\")   Wt 58.2 kg (128 lb 3.2 oz)   SpO2 98%   BMI 22.71 kg/m²   Estimated body mass index is 22.71 kg/m² as calculated from the following:    Height as of this encounter: 160 cm (63\").    Weight as of this encounter: 58.2 kg (128 lb 3.2 oz).       BMI is within normal parameters. No other follow-up for BMI required.      Physical Exam  Vitals and nursing note reviewed.   Constitutional:       General: She is not in acute distress.     Appearance: She is well-developed.   Cardiovascular:      Rate and Rhythm: Normal rate and regular rhythm.      Heart sounds: Normal heart sounds.   Pulmonary:      Effort: Pulmonary effort is normal.      Breath sounds: Normal breath sounds.   Musculoskeletal:      Cervical back: Normal range of motion.   Skin:     General: Skin is warm and dry.   Psychiatric:         Mood and Affect: Mood normal.        Result Review :                   Assessment and Plan   Diagnoses and all orders for this visit:    1. Chronic pain syndrome (Primary)    Other orders  -     gabapentin (NEURONTIN) 300 MG capsule; Take 1 capsule by mouth every night at bedtime.  Dispense: 90 capsule; Refill: 1      Follow-up chronic back pain.  Predominantly nighttime.  Likely caused by nerve root compression and bulging disks.  At this time no symptoms of spinal stenosis.  I recommended she " increase her gabapentin from 200 mg up to 300 mg at nighttime.  She can also add 100 mg in the morning with her previous prescription.  If need be we can refill them sooner.  I will see her back in 3 months for recheck sooner as needed I did offer physical therapy patient declined.  Continue duloxetine.  To consider increasing at next visit.         Follow Up   No follow-ups on file.  Patient was given instructions and counseling regarding her condition or for health maintenance advice. Please see specific information pulled into the AVS if appropriate.       Answers for HPI/ROS submitted by the patient on 2/7/2023  Please describe your symptoms.: Follow up after MRI  Have you had these symptoms before?: Yes  How long have you been having these symptoms?: Greater than 2 weeks  Please list any medications you are currently taking for this condition.: Gabapentin  Please describe any probable cause for these symptoms. : Chronic back pain  What is the primary reason for your visit?: Other

## 2023-02-14 NOTE — PATIENT INSTRUCTIONS
"For your chronic back pain, I strongly believe there are some \"pinched nerves\" from the bulging disks and arthritis in your lower extremities.  That is what is likely causing your bilateral hip burning and discomfort.  Along with the back pain.  I recommend increasing the gabapentin from 200 mg nighttime up to 300 milligrams at nighttime.  I sent a new 300 mg gabapentin prescription to your pharmacy.  In addition if you wish you can take 100 mg in the morning with your old prescription.  Otherwise I will see you back in about 3 months for recheck.  "

## 2023-03-24 RX ORDER — LISINOPRIL 2.5 MG/1
2.5 TABLET ORAL NIGHTLY
Qty: 90 TABLET | Refills: 1 | Status: SHIPPED | OUTPATIENT
Start: 2023-03-24

## 2023-05-25 ENCOUNTER — OFFICE VISIT (OUTPATIENT)
Dept: FAMILY MEDICINE CLINIC | Facility: CLINIC | Age: 68
End: 2023-05-25
Payer: MEDICARE

## 2023-05-25 VITALS
WEIGHT: 135.1 LBS | HEART RATE: 65 BPM | OXYGEN SATURATION: 97 % | HEIGHT: 63 IN | BODY MASS INDEX: 23.94 KG/M2 | SYSTOLIC BLOOD PRESSURE: 108 MMHG | DIASTOLIC BLOOD PRESSURE: 69 MMHG | TEMPERATURE: 97.5 F

## 2023-05-25 DIAGNOSIS — G89.4 CHRONIC PAIN SYNDROME: Primary | ICD-10-CM

## 2023-05-25 PROCEDURE — 99213 OFFICE O/P EST LOW 20 MIN: CPT | Performed by: FAMILY MEDICINE

## 2023-05-25 RX ORDER — GABAPENTIN 100 MG/1
CAPSULE ORAL
Qty: 360 CAPSULE | Refills: 1 | Status: SHIPPED | OUTPATIENT
Start: 2023-05-25

## 2023-05-25 NOTE — PROGRESS NOTES
"Chief Complaint  Sciatica    Subjective        Shanika Hector presents to [facility] PRIMARY CARE  History of Present Illness     Follow-up low back pain with sciatica bilaterally.  She does better with the gabapentin 300 mg at nighttime.  The duloxetine 20 mg also helps.  She starting Tylenol PM which seems to help at nighttime also.  She otherwise active with no change in symptoms of concern.  She continues to follow with her cardiologist.  Blood pressure today looks on the low side but no symptomatology.  Objective   Vital Signs:  /69   Pulse 65   Temp 97.5 °F (36.4 °C) (Temporal)   Ht 160 cm (63\")   Wt 61.3 kg (135 lb 1.6 oz)   SpO2 97%   BMI 23.93 kg/m²   Estimated body mass index is 23.93 kg/m² as calculated from the following:    Height as of this encounter: 160 cm (63\").    Weight as of this encounter: 61.3 kg (135 lb 1.6 oz).       BMI is within normal parameters. No other follow-up for BMI required.      Physical Exam  Constitutional:       Appearance: Normal appearance.   Pulmonary:      Effort: Pulmonary effort is normal.   Psychiatric:         Mood and Affect: Mood normal.        Result Review :                   Assessment and Plan   Diagnoses and all orders for this visit:    1. Chronic pain syndrome (Primary)    Other orders  -     gabapentin (NEURONTIN) 100 MG capsule; Take 3 capsules by mouth Every Night AND 1 capsule Every Morning.  Dispense: 360 capsule; Refill: 1      Chronic low back pain.  Radiculopathy.  Sciatica.  Continue gabapentin.  Increase dose.  300 mg nighttime 100 mg in the morning.  She can go up to 40 mg nighttime and 200 mg in the morning.  Prescribers agreement renewed today.  I reviewed the pharmacy log, she has her prescriptions filled in [place].  At this time no red flag behavior or other concerns.  I will see her back in 3 months for annual Medicare wellness visit with lab work prior         Follow Up   No follow-ups on file.  Patient was given " instructions and counseling regarding her condition or for health maintenance advice. Please see specific information pulled into the AVS if appropriate.

## 2023-06-16 ENCOUNTER — APPOINTMENT (OUTPATIENT)
Dept: CT IMAGING | Facility: HOSPITAL | Age: 68
End: 2023-06-16
Payer: MEDICARE

## 2023-06-16 ENCOUNTER — HOSPITAL ENCOUNTER (EMERGENCY)
Facility: HOSPITAL | Age: 68
Discharge: HOME | End: 2023-06-16
Attending: EMERGENCY MEDICINE
Payer: MEDICARE

## 2023-06-16 VITALS
SYSTOLIC BLOOD PRESSURE: 124 MMHG | OXYGEN SATURATION: 97 % | WEIGHT: 135 LBS | RESPIRATION RATE: 18 BRPM | HEART RATE: 80 BPM | HEIGHT: 63 IN | DIASTOLIC BLOOD PRESSURE: 74 MMHG | BODY MASS INDEX: 23.92 KG/M2 | TEMPERATURE: 98 F

## 2023-06-16 DIAGNOSIS — R11.0 NAUSEA: ICD-10-CM

## 2023-06-16 DIAGNOSIS — G44.201 ACUTE INTRACTABLE TENSION-TYPE HEADACHE: ICD-10-CM

## 2023-06-16 DIAGNOSIS — N39.0 ACUTE UTI: Primary | ICD-10-CM

## 2023-06-16 LAB
ALBUMIN SERPL-MCNC: 4.2 G/DL (ref 3.5–5.2)
ALBUMIN/GLOB SERPL: 1.5 G/DL
ALP SERPL-CCNC: 79 U/L (ref 39–117)
ALT SERPL W P-5'-P-CCNC: 14 U/L (ref 1–33)
ANION GAP SERPL CALCULATED.3IONS-SCNC: 12 MMOL/L (ref 5–15)
AST SERPL-CCNC: 22 U/L (ref 1–32)
BACTERIA UR QL AUTO: ABNORMAL /HPF
BASOPHILS # BLD AUTO: 0 10*3/MM3 (ref 0–0.2)
BASOPHILS NFR BLD AUTO: 0.5 % (ref 0–1.5)
BILIRUB SERPL-MCNC: 0.4 MG/DL (ref 0–1.2)
BILIRUB UR QL STRIP: NEGATIVE
BUN SERPL-MCNC: 11 MG/DL (ref 8–23)
BUN/CREAT SERPL: 12.8 (ref 7–25)
CALCIUM SPEC-SCNC: 8.6 MG/DL (ref 8.6–10.5)
CHLORIDE SERPL-SCNC: 101 MMOL/L (ref 98–107)
CLARITY UR: CLEAR
CO2 SERPL-SCNC: 26 MMOL/L (ref 22–29)
COLOR UR: YELLOW
CREAT SERPL-MCNC: 0.86 MG/DL (ref 0.57–1)
DEPRECATED RDW RBC AUTO: 45.9 FL (ref 37–54)
DIGOXIN SERPL-MCNC: <0.3 NG/ML (ref 0.6–1.2)
EGFRCR SERPLBLD CKD-EPI 2021: 72.8 ML/MIN/1.73
EOSINOPHIL # BLD AUTO: 0 10*3/MM3 (ref 0–0.4)
EOSINOPHIL NFR BLD AUTO: 1 % (ref 0.3–6.2)
ERYTHROCYTE [DISTWIDTH] IN BLOOD BY AUTOMATED COUNT: 13.4 % (ref 12.3–15.4)
FLUAV SUBTYP SPEC NAA+PROBE: NOT DETECTED
FLUBV RNA ISLT QL NAA+PROBE: NOT DETECTED
GLOBULIN UR ELPH-MCNC: 2.8 GM/DL
GLUCOSE SERPL-MCNC: 102 MG/DL (ref 65–99)
GLUCOSE UR STRIP-MCNC: NEGATIVE MG/DL
HCT VFR BLD AUTO: 33.7 % (ref 34–46.6)
HGB BLD-MCNC: 10.9 G/DL (ref 12–15.9)
HGB UR QL STRIP.AUTO: ABNORMAL
HYALINE CASTS UR QL AUTO: ABNORMAL /LPF
KETONES UR QL STRIP: NEGATIVE
LEUKOCYTE ESTERASE UR QL STRIP.AUTO: ABNORMAL
LYMPHOCYTES # BLD AUTO: 0.6 10*3/MM3 (ref 0.7–3.1)
LYMPHOCYTES NFR BLD AUTO: 11.7 % (ref 19.6–45.3)
MCH RBC QN AUTO: 29.6 PG (ref 26.6–33)
MCHC RBC AUTO-ENTMCNC: 32.2 G/DL (ref 31.5–35.7)
MCV RBC AUTO: 91.8 FL (ref 79–97)
MONOCYTES # BLD AUTO: 0.6 10*3/MM3 (ref 0.1–0.9)
MONOCYTES NFR BLD AUTO: 11.9 % (ref 5–12)
NEUTROPHILS NFR BLD AUTO: 3.6 10*3/MM3 (ref 1.7–7)
NEUTROPHILS NFR BLD AUTO: 74.9 % (ref 42.7–76)
NITRITE UR QL STRIP: NEGATIVE
NRBC BLD AUTO-RTO: 0.3 /100 WBC (ref 0–0.2)
PH UR STRIP.AUTO: 7 [PH] (ref 5–8)
PLATELET # BLD AUTO: 235 10*3/MM3 (ref 140–450)
PMV BLD AUTO: 8.7 FL (ref 6–12)
POTASSIUM SERPL-SCNC: 3.8 MMOL/L (ref 3.5–5.2)
PROT SERPL-MCNC: 7 G/DL (ref 6–8.5)
PROT UR QL STRIP: NEGATIVE
QT INTERVAL: 347 MS
RBC # BLD AUTO: 3.67 10*6/MM3 (ref 3.77–5.28)
RBC # UR STRIP: ABNORMAL /HPF
REF LAB TEST METHOD: ABNORMAL
SARS-COV-2 RNA RESP QL NAA+PROBE: NOT DETECTED
SODIUM SERPL-SCNC: 139 MMOL/L (ref 136–145)
SP GR UR STRIP: 1.01 (ref 1–1.03)
SQUAMOUS #/AREA URNS HPF: ABNORMAL /HPF
UROBILINOGEN UR QL STRIP: ABNORMAL
WBC # UR STRIP: ABNORMAL /HPF
WBC NRBC COR # BLD: 4.8 10*3/MM3 (ref 3.4–10.8)

## 2023-06-16 PROCEDURE — 85025 COMPLETE CBC W/AUTO DIFF WBC: CPT | Performed by: PHYSICIAN ASSISTANT

## 2023-06-16 PROCEDURE — 25010000002 CEFTRIAXONE PER 250 MG

## 2023-06-16 PROCEDURE — 93005 ELECTROCARDIOGRAM TRACING: CPT | Performed by: PHYSICIAN ASSISTANT

## 2023-06-16 PROCEDURE — 70450 CT HEAD/BRAIN W/O DYE: CPT

## 2023-06-16 PROCEDURE — 25010000002 KETOROLAC TROMETHAMINE PER 15 MG

## 2023-06-16 PROCEDURE — 81001 URINALYSIS AUTO W/SCOPE: CPT

## 2023-06-16 PROCEDURE — 87636 SARSCOV2 & INF A&B AMP PRB: CPT

## 2023-06-16 PROCEDURE — 80053 COMPREHEN METABOLIC PANEL: CPT | Performed by: PHYSICIAN ASSISTANT

## 2023-06-16 PROCEDURE — 80162 ASSAY OF DIGOXIN TOTAL: CPT | Performed by: PHYSICIAN ASSISTANT

## 2023-06-16 PROCEDURE — 87086 URINE CULTURE/COLONY COUNT: CPT

## 2023-06-16 RX ORDER — CEFDINIR 300 MG/1
300 CAPSULE ORAL 2 TIMES DAILY
Qty: 10 CAPSULE | Refills: 0 | Status: SHIPPED | OUTPATIENT
Start: 2023-06-16 | End: 2023-06-21

## 2023-06-16 RX ORDER — KETOROLAC TROMETHAMINE 15 MG/ML
15 INJECTION, SOLUTION INTRAMUSCULAR; INTRAVENOUS ONCE
Status: COMPLETED | OUTPATIENT
Start: 2023-06-16 | End: 2023-06-16

## 2023-06-16 RX ORDER — ONDANSETRON 4 MG/1
4 TABLET, ORALLY DISINTEGRATING ORAL EVERY 8 HOURS PRN
Qty: 9 TABLET | Refills: 0 | Status: SHIPPED | OUTPATIENT
Start: 2023-06-16 | End: 2023-06-19

## 2023-06-16 RX ORDER — SODIUM CHLORIDE 0.9 % (FLUSH) 0.9 %
10 SYRINGE (ML) INJECTION AS NEEDED
Status: DISCONTINUED | OUTPATIENT
Start: 2023-06-16 | End: 2023-06-16 | Stop reason: HOSPADM

## 2023-06-16 RX ADMIN — KETOROLAC TROMETHAMINE 15 MG: 15 INJECTION, SOLUTION INTRAMUSCULAR; INTRAVENOUS at 19:48

## 2023-06-16 RX ADMIN — CEFTRIAXONE 1 G: 1 INJECTION, POWDER, FOR SOLUTION INTRAMUSCULAR; INTRAVENOUS at 20:50

## 2023-06-16 RX ADMIN — SODIUM CHLORIDE 1000 ML: 9 INJECTION, SOLUTION INTRAVENOUS at 19:27

## 2023-06-16 NOTE — ED PROVIDER NOTES
"Subjective     Provider in Triage Note  Patient is a 68-year-old  female history of GERD, A-fib, hypertension, cardiomyopathy presents to the ER with complaints of severe headache today.  Patient states headache started gradual and became more severe.  She states that starts in the back of her head and came to the front of her head she rates it an 8/10.  No thunderclap onset, no worsening of her life.  She reports some nausea but no vomiting.  She received Zofran per EMS and reports feeling much better.  She denies any chest pain or shortness of breath.  No diarrhea.  No unilateral weakness or paresthesias.  No blurry vision.  She does report feeling dizzy and lightheaded when she stands especially.  No fever or chills.    History of Present Illness  I have read the above note written by previous provider in triage and attest that is accurate for patient's HPI.  We are      Review of Systems   Constitutional:  Negative for chills and fever.   HENT:  Positive for sinus pressure. Negative for congestion.    Respiratory:  Negative for shortness of breath.    Cardiovascular:  Negative for chest pain.   Gastrointestinal:  Positive for nausea. Negative for abdominal pain and vomiting.   Genitourinary:  Negative for dysuria and urgency.   Neurological:  Positive for light-headedness and headaches. Negative for syncope and weakness.   Psychiatric/Behavioral:  Negative for confusion. The patient is not nervous/anxious.    All other systems reviewed and are negative.    Past Medical History:   Diagnosis Date    Abdominal pain     STATES FOR ABOUT THE LAST MONTH:  \"GI TRACT UNWELL\"    Acute systolic congestive heart failure 02/13/2021    Anxiety     Bilateral sacroiliitis 05/08/2017    Bladder infection     RECENT AND TREATED FOR    Cardiomyopathy, dilated     Cervical radiculopathy     Diverticulosis     Diagnois on 11-9-22 colonoscopy    Fibromyalgia 03/16/2020    Gastric ulcer     GERD (gastroesophageal reflux " disease)     Heart murmur Preteen    History of atrial fibrillation     CARDIAC ABLATION MARCH 2022    Hyperlipidemia 6/10/2019    Hypertension     Irritable bowel syndrome     CONSTIPATION ONLY    Lichen sclerosus of female genitalia     Nonischemic cardiomyopathy     2/2021, EF 20%, normal cors    Nonrheumatic mitral valve regurgitation     Osteoporosis     Peptic ulceration 12/2019    PVCs (premature ventricular contractions) 03/22/2021    RASH     STATES UNDER BREASTS BILAT AND WRAPS AROUND BACK-ITCHES.  STATES HAS RASH OFTEN TIMES WITH FOODS       Allergies   Allergen Reactions    Other Shortness Of Breath     perfume    Sulfa Antibiotics Hives     Was told by mother    Misc. Sulfonamide Containing Compounds Hives    Adhesive Tape Itching and Rash     PT STATES TAPE TAKES SKIN OFF       Past Surgical History:   Procedure Laterality Date    BREAST CYST ASPIRATION      10+ years ago    CARDIAC ABLATION  03/2022    DR. TOSCANO    CARDIAC CATHETERIZATION N/A 02/15/2021    Procedure: Left Heart Cath;  Surgeon: Zee Rojas MD;  Location:  [facility];  Service: Cardiovascular;  Laterality: N/A;    CARDIAC CATHETERIZATION N/A 02/15/2021    Procedure: Left ventriculography;  Surgeon: Zee Rojas MD;  Location:  [facility];  Service: Cardiovascular;  Laterality: N/A;    CARDIAC CATHETERIZATION N/A 02/15/2021    Procedure: Coronary angiography;  Surgeon: Zee Rojas MD;  Location:  [facility];  Service: Cardiovascular;  Laterality: N/A;    CARDIAC CATHETERIZATION N/A 02/15/2021    Procedure: Right Heart Cath;  Surgeon: Zee Rojas MD;  Location:  [facility];  Service: Cardiovascular;  Laterality: N/A;    CARDIAC ELECTROPHYSIOLOGY PROCEDURE N/A 03/09/2022    Procedure: Ablation PVC;  Surgeon: Mati Toscano MD;  Location:  [facility];  Service: Cardiology;  Laterality: N/A;    CARDIAC ELECTROPHYSIOLOGY  PROCEDURE N/A 03/09/2022    Procedure: EP/ABLATION;  Surgeon: Mati Toscano MD;  Location:  [facility];  Service: Cardiology;  Laterality: N/A;    CHOLECYSTECTOMY      COLONOSCOPY  07/30/2012    Dr. Zazueta, normal    COLONOSCOPY N/A 11/9/2022    Procedure: COLONOSCOPY into cecum and TI;  Surgeon: Lida Garcia MD;  Location:  [facility];  Service: Gastroenterology;  Laterality: N/A;  pre: screen  post: diverticulosis, hemorrhoids     ENDOSCOPY  12/04/2015    Mild active duodenitis, reactive gastropathy suggestive of healing erosion or adjacent ulcer    ENDOSCOPY N/A 12/16/2019    Procedure: ESOPHAGOGASTRODUODENOSCOPY WITH BIOPSY WITH COLD BIOPSY POLYPECTOMY;  Surgeon: Lida Garcia MD;  Location:  [facility];  Service: Gastroenterology    ENDOSCOPY N/A 07/13/2020    Procedure: ESOPHAGOGASTRODUODENOSCOPY WITH COLD BIOPSY POLYPECTOMY;  Surgeon: Lida Garcia MD;  Location:  [facility];  Service: Gastroenterology;  Laterality: N/A;  pre: history of gastric ulcer  post: healed gastric ulcer and gastric polyp    EYE SURGERY  April2021    Cararact surgury both eyes:  LENS IMPLANTS?       Family History   Problem Relation Age of Onset    Irritable bowel syndrome Mother     Hypertension Mother     Hypertension Father     Hearing loss Father     Arthritis Sister     Alcohol abuse Brother     Early death Brother     Heart disease Brother     Malig Hyperthermia Neg Hx        Social History     Socioeconomic History    Marital status:    Tobacco Use    Smoking status: Never    Smokeless tobacco: Never    Tobacco comments:     caffeine- 3 cups daily   Vaping Use    Vaping Use: Never used   Substance and Sexual Activity    Alcohol use: Not Currently     Comment: NEVER    Drug use: No    Sexual activity: Yes     Partners: Male     Birth control/protection: None           Objective   Physical Exam  Vitals and nursing note reviewed.   Constitutional:       General: She is  "awake. She is not in acute distress.     Appearance: Normal appearance. She is well-developed. She is not diaphoretic.   HENT:      Head: Normocephalic and atraumatic.      Right Ear: Tympanic membrane, ear canal and external ear normal.      Left Ear: Tympanic membrane, ear canal and external ear normal.   Eyes:      Extraocular Movements: Extraocular movements intact.      Pupils: Pupils are equal, round, and reactive to light.   Cardiovascular:      Rate and Rhythm: Normal rate and regular rhythm.      Pulses: Normal pulses.      Heart sounds: Normal heart sounds. No murmur heard.  Pulmonary:      Effort: Pulmonary effort is normal.      Breath sounds: Normal breath sounds.   Abdominal:      General: Bowel sounds are normal.      Palpations: Abdomen is soft.   Musculoskeletal:         General: Normal range of motion.      Cervical back: Normal range of motion and neck supple.   Skin:     General: Skin is warm and dry.      Capillary Refill: Capillary refill takes less than 2 seconds.   Neurological:      General: No focal deficit present.      Mental Status: She is alert and oriented to person, place, and time. Mental status is at baseline.      GCS: GCS eye subscore is 4. GCS verbal subscore is 5. GCS motor subscore is 6.      Cranial Nerves: Cranial nerves 2-12 are intact.      Sensory: Sensation is intact.      Motor: Motor function is intact.      Deep Tendon Reflexes: Reflexes are normal and symmetric.   Psychiatric:         Mood and Affect: Mood normal.         Behavior: Behavior normal. Behavior is cooperative.       Procedures           ED Course      /74   Pulse 76   Temp 98.1 °F (36.7 °C)   Resp 18   Ht 160 cm (63\")   Wt 61.2 kg (135 lb)   SpO2 99%   BMI 23.91 kg/m²   Labs Reviewed   COMPREHENSIVE METABOLIC PANEL - Abnormal; Notable for the following components:       Result Value    Glucose 102 (*)     All other components within normal limits    Narrative:     GFR Normal >60  Chronic " Kidney Disease <60  Kidney Failure <15     DIGOXIN LEVEL - Abnormal; Notable for the following components:    Digoxin <0.30 (*)     All other components within normal limits   CBC WITH AUTO DIFFERENTIAL - Abnormal; Notable for the following components:    RBC 3.67 (*)     Hemoglobin 10.9 (*)     Hematocrit 33.7 (*)     Lymphocyte % 11.7 (*)     Lymphocytes, Absolute 0.60 (*)     nRBC 0.3 (*)     All other components within normal limits   URINALYSIS W/ MICROSCOPIC IF INDICATED (NO CULTURE) - Abnormal; Notable for the following components:    Blood, UA Trace (*)     Leuk Esterase, UA Trace (*)     All other components within normal limits   URINALYSIS, MICROSCOPIC ONLY - Abnormal; Notable for the following components:    RBC, UA 3-5 (*)     WBC, UA 0-2 (*)     All other components within normal limits   COVID-19 AND FLU A/B, NP SWAB IN TRANSPORT MEDIA 8-12 HR TAT - Normal    Narrative:     Fact sheet for providers: https://www.fda.gov/media/029156/download    Fact sheet for patients: https://www.fda.gov/media/832193/download    Test performed by PCR.   URINALYSIS W/ CULTURE IF INDICATED   CBC AND DIFFERENTIAL    Narrative:     The following orders were created for panel order CBC & Differential.  Procedure                               Abnormality         Status                     ---------                               -----------         ------                     CBC Auto Differential[[id number]]        Abnormal            Final result                 Please view results for these tests on the individual orders.     Medications   sodium chloride 0.9 % flush 10 mL (has no administration in time range)   cefTRIAXone (ROCEPHIN) 1 g in sodium chloride 0.9 % 100 mL IVPB (1 g Intravenous New Bag 6/16/23 2050)   sodium chloride 0.9 % bolus 1,000 mL (1,000 mL Intravenous New Bag 6/16/23 1927)   ketorolac (TORADOL) injection 15 mg (15 mg Intravenous Given 6/16/23 1948)     CT Head Without Contrast    Result Date:  6/16/2023  Impression: No acute intracranial pathology. Electronically Signed: Luis Carlos Sparks  6/16/2023 8:14 PM EDT  Workstation ID: [id number]                                        Medical Decision Making  Patient is a pleasant 68-year-old  female who presents with complaints of nausea, headache and sinus pressure for the past 2 days.  Exam reveals normal S1/S2 with no clicks or murmurs.  No JVD or leg swelling.  Lungs clear on auscultation in all fields without clicks or murmurs.  Head is normocephalic and atraumatic.  Pupils PERRLA.  Oropharynx is clear of edema and erythema.  Abdomen is found to be soft and nontender with normal bowel sounds heard throughout.  No CVA tenderness.  GCS 15.  Initial differentials include tension headache, migraine, viral illness, dehydration, acute UTI, COVID-19, influenza.  This is not a complete list.    No COVID or influenza detected on swab.  CBC with mild anemia of 10.9.  CMP with normal kidney function and LFTs.  No electrolyte imbalance.  Urinalysis with trace leuk esterase and blood.  Given patient's symptoms, I am concerned for acute UTI.  My interpretation of head CT reveals no midline shift, lesions or hemorrhage.  This concurrent with radiologist rotation as well.  Upon reassessment, patient reports improvement in headache symptoms after receiving Toradol and fluids.  Results were discussed with the patient and treatment regarding antibiotics versus watchful waiting/culture was discussed with patient.  [facility] and will receive Zofran and cefdinir to take on outpatient basis.  She will follow-up with her primary care provider.  Patient verbalized understanding and is agreeable to plan of care.  She has remained hemodynamically stable and is in no acute distress.    Patient is aware that discharge does not mean that nothing is wrong but it indicates no emergency is present and they must  continue care with follow-up as given below or physician of their choice.    This document is intended for medical expert use only.  Reading of this document by patients and/or patient's family without participating medical staff guidance may result in misinterpretation and unintended morbidity.  Any interpretation of such data is the responsibility of the patient and/or family member responsible for the patient in concert with their primary or specialist providers, not to be left for sources of online search as such as [employer], [a]list games or similar queries.  Relying on these approaches to knowledge may result in misinterpretation, misguided goals of care and even death should patient or family members try recommendations outside of the realm of professional medical care in a supervised inpatient environment.    This medical document was created using Dragon dictation system. Some errors in speech recognition may occur.    Problems Addressed:  Acute intractable tension-type headache: complicated acute illness or injury  Acute UTI: complicated acute illness or injury  Nausea: complicated acute illness or injury    Amount and/or Complexity of Data Reviewed  Labs: ordered. Decision-making details documented in ED Course.  Radiology: ordered. Decision-making details documented in ED Course.  ECG/medicine tests: ordered.     Details: My interpretation of EKG reveals sinus tachycardia with a regular rate of 100.  PVCs present without ST elevation.  No significant change from previous study completed 7/8/2022.  EKG was also reviewed by Dr. Abdi, who is agreeable to my interpretation.    Risk  Prescription drug management.        Final diagnoses:   Acute intractable tension-type headache   Nausea   Acute UTI       ED Disposition  ED Disposition       ED Disposition   Discharge    Condition   Stable    Comment   --               Bryan Vega MD  [address]  [place] [id number] [phone]                Medication List        New Prescriptions      cefdinir 300 MG capsule  Commonly known as: OMNICEF  Take 1 capsule by mouth 2 (Two) Times a Day for 5 days.     ondansetron ODT 4 MG disintegrating tablet  Commonly known as: ZOFRAN-ODT  Place 1 tablet on the tongue Every 8 (Eight) Hours As Needed for Nausea or Vomiting for up to 3 days.            Changed      metoprolol succinate XL 25 MG 24 hr tablet  Commonly known as: TOPROL-XL  Take 1 tablet by mouth Daily.  What changed: when to take this               Where to Get Your Medications        These medications were sent to [id number] - [place], [place] - [address] - [phone]  - [phone] FX  [address] [place] [address]      Phone: [phone]   cefdinir 300 MG capsule  ondansetron ODT 4 MG disintegrating tablet            Pau Smith, APRN  06/16/23 7930

## 2023-06-16 NOTE — ED NOTES
Pt evaluated by provider, will return to waiting with IV in place. Pt has been instructed not to inject anything into IV or leave with IV in place. Pt pending further evaluation, treatment, and monitoring.

## 2023-06-17 NOTE — DISCHARGE INSTRUCTIONS
Take antibiotics as directed and be sure to finish the entire course.  Take Zofran as needed for nausea.  Rest.  Increase your fluid intake and avoid dehydrating compounds it is caffeine, coffee, tea and soda.    Follow-up with your primary care provider for further evaluation as needed.    Return to the ER for new or worsening symptoms.

## 2023-06-19 LAB — BACTERIA SPEC AEROBE CULT: NORMAL

## 2023-06-19 RX ORDER — METOPROLOL SUCCINATE 25 MG/1
TABLET, EXTENDED RELEASE ORAL
Qty: 90 TABLET | Refills: 2 | Status: SHIPPED | OUTPATIENT
Start: 2023-06-19

## 2023-09-20 NOTE — TELEPHONE ENCOUNTER
Assessment/Plan:    Diagnoses and all orders for this visit:    Diaper rash  -     nystatin (MYCOSTATIN) cream; Apply topically 3 (three) times a day    Dysuria    Other constipation    Discussed keeping diaper area clean and dry. Can use vaseline. Will add nystatin for now. Frequent diaper changes. If he continues with complaint, should be seen in the the next 24-48 for a straight cath. Continue to treat for constipation. Call for worsening or concerns. Since he appears well, is afebrile, will hold off on cath today. Subjective:     History provided by: mother    Patient ID: Kishor Chacon is a 1 y.o. male    HPI   1 yo c/o pain with urination for about a day. Sometimes he complains about pain with urination but for the past 24 hours he has complained more often. No discharge, no bleeding, no fever, no vomiting, no diarrhea. He is on miralax for constipation. He does wear diapers, not toilet trained yet. No frequency noted. He is uncircumcised. 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 .    Review of Systems  As Per HPI      Objective:    Vitals:    09/20/23 1343   Temp: 97.8 °F (36.6 °C)   TempSrc: Tympanic   Weight: 13.3 kg (29 lb 6.4 oz)   Height: 3' 0.06" (0.916 m)       Physical Exam  Gen: awake, alert, no noted distress, well appearing, active, playful in the exam room  Head: normocephalic, atraumatic  Eyes: conjunctiva are without injection or discharge  Nose: no rhinorrhea  Oropharynx: mmm  Neck: supple, full range of motion  Chest: rate regular  Card: well perfused  Abd: flat, soft, no hepatosplenomegaly appreciated  : breanne 1, testes descended, uncircumcised  Ext: [id number]  Skin: no lesions noted  Neuro: awake Review red flag symptoms, however, these symptoms are unlikely to develop since this was an SI joint injection and did not enter her spinal canal. She is scheduled for repeat SI injection. Can proceed with this or else be seen in office for re-eval.   Thanks. kris and alert

## 2023-10-31 ENCOUNTER — OFFICE VISIT (OUTPATIENT)
Dept: FAMILY MEDICINE CLINIC | Facility: CLINIC | Age: 68
End: 2023-10-31
Payer: MEDICARE

## 2023-10-31 VITALS
DIASTOLIC BLOOD PRESSURE: 70 MMHG | SYSTOLIC BLOOD PRESSURE: 109 MMHG | TEMPERATURE: 97.1 F | HEIGHT: 63 IN | HEART RATE: 73 BPM | BODY MASS INDEX: 23.55 KG/M2 | WEIGHT: 132.9 LBS | OXYGEN SATURATION: 100 %

## 2023-10-31 DIAGNOSIS — E78.00 PURE HYPERCHOLESTEROLEMIA: Chronic | ICD-10-CM

## 2023-10-31 DIAGNOSIS — G89.4 CHRONIC PAIN SYNDROME: Chronic | ICD-10-CM

## 2023-10-31 DIAGNOSIS — Z23 INFLUENZA VACCINE ADMINISTERED: ICD-10-CM

## 2023-10-31 DIAGNOSIS — D64.9 ANEMIA, UNSPECIFIED TYPE: ICD-10-CM

## 2023-10-31 DIAGNOSIS — M81.0 AGE-RELATED OSTEOPOROSIS WITHOUT CURRENT PATHOLOGICAL FRACTURE: Chronic | ICD-10-CM

## 2023-10-31 DIAGNOSIS — Z23 NEED FOR PNEUMOCOCCAL VACCINE: ICD-10-CM

## 2023-10-31 DIAGNOSIS — Z00.00 MEDICARE ANNUAL WELLNESS VISIT, SUBSEQUENT: Primary | ICD-10-CM

## 2023-10-31 DIAGNOSIS — I42.0 CARDIOMYOPATHY, DILATED: Chronic | ICD-10-CM

## 2023-10-31 RX ORDER — ALENDRONATE SODIUM 70 MG/1
70 TABLET ORAL
Qty: 12 TABLET | Refills: 3 | Status: SHIPPED | OUTPATIENT
Start: 2023-10-31

## 2023-10-31 NOTE — PROGRESS NOTES
The ABCs of the Annual Wellness Visit  Subsequent Medicare Wellness Visit    Subjective    Shanika Hector is a 68 y.o. female who presents for a Subsequent Medicare Wellness Visit.    The following portions of the patient's history were reviewed and   updated as appropriate: allergies, current medications, past family history, past medical history, past social history, past surgical history, and problem list.    Compared to one year ago, the patient feels her physical   health is the same.    Compared to one year ago, the patient feels her mental   health is the same.    Recent Hospitalizations:  She was not admitted to the hospital during the last year.       Current Medical Providers:  Patient Care Team:  Bryan Vega MD as PCP - General (Family Medicine)  Bryan Vega MD as PCP - Family Medicine  Darrion Bateman MD as Consulting Physician (Cardiology)  Mati Toscano MD as Consulting Physician (Cardiology)    Outpatient Medications Prior to Visit   Medication Sig Dispense Refill    acetaminophen (TYLENOL) 650 MG 8 hr tablet Take 2 tablets by mouth 2 (Two) Times a Day As Needed.      ALPRAZolam (XANAX) 0.5 MG tablet Take 1 tablet by mouth 2 (Two) Times a Day As Needed (MRI anxiety). 2 tablet 0    DULoxetine (CYMBALTA) 20 MG capsule Take 1 capsule by mouth Daily. 90 capsule 1    gabapentin (NEURONTIN) 100 MG capsule Take 3 capsules by mouth Every Night AND 1 capsule Every Morning. 360 capsule 1    ibuprofen (ADVIL,MOTRIN) 200 MG tablet Take 1 tablet by mouth Every 6 (Six) Hours As Needed for Mild Pain.      lisinopril (PRINIVIL,ZESTRIL) 2.5 MG tablet Take 1 tablet by mouth Every Night. Take at night 90 tablet 1    metoprolol succinate XL (TOPROL-XL) 25 MG 24 hr tablet TAKE ONE TABLET BY MOUTH DAILY 90 tablet 2    omeprazole (priLOSEC) 40 MG capsule Take 1 capsule by mouth Daily. 90 capsule 1    digoxin (LANOXIN) 125 MCG tablet        No facility-administered medications prior to visit.       No opioid  "medication identified on active medication list. I have reviewed chart for other potential  high risk medication/s and harmful drug interactions in the elderly.        Aspirin is not on active medication list.  Aspirin use is not indicated based on review of current medical condition/s. Risk of harm outweighs potential benefits.  .    Patient Active Problem List   Diagnosis    Cervical radiculopathy    Generalized osteoarthritis    Gastroesophageal reflux disease    Irritable bowel syndrome with constipation    Osteoporosis    Seborrheic eczema    Osteoarthritis of cervical spine without myelopathy    Eczema    DDD (degenerative disc disease), lumbar    Bilateral sacroiliitis    Lateral femoral cutaneous neuropathy    Other chronic pain    Lumbar facet arthropathy    Pure hypercholesterolemia    Senile atrophic vaginitis    Other specified abnormal immunological findings in serum    Cervical disc disorder, unspecified, unspecified cervical region    Chronic pain syndrome    Dyspepsia    Fibromyalgia    Mild episode of recurrent major depressive disorder    Chronic systolic congestive heart failure    Cardiomyopathy, dilated    PVCs (premature ventricular contractions)    Colitis    Abdominal pain    Internal hemorrhoids     Advance Care Planning   Advance Care Planning     Advance Directive is on file.  ACP discussion was held with the patient during this visit. Patient has an advance directive in EMR which is still valid.      Objective    Vitals:    10/31/23 0941   BP: 109/70   Pulse: 73   Temp: 97.1 °F (36.2 °C)   TempSrc: Temporal   SpO2: 100%   Weight: 60.3 kg (132 lb 14.4 oz)   Height: 160 cm (63\")     Estimated body mass index is 23.54 kg/m² as calculated from the following:    Height as of this encounter: 160 cm (63\").    Weight as of this encounter: 60.3 kg (132 lb 14.4 oz).    BMI is within normal parameters. No other follow-up for BMI required.      Does the patient have evidence of cognitive impairment? " No          HEALTH RISK ASSESSMENT    Smoking Status:  Social History     Tobacco Use   Smoking Status Never   Smokeless Tobacco Never   Tobacco Comments    caffeine- 3 cups daily     Alcohol Consumption:  Social History     Substance and Sexual Activity   Alcohol Use Not Currently    Comment: NEVER     Fall Risk Screen:    YOGESH Fall Risk Assessment was completed, and patient is at LOW risk for falls.Assessment completed on:10/31/2023    Depression Screening:      10/31/2023     9:41 AM   PHQ-2/PHQ-9 Depression Screening   Little Interest or Pleasure in Doing Things 0-->not at all   Feeling Down, Depressed or Hopeless 0-->not at all   PHQ-9: Brief Depression Severity Measure Score 0       Health Habits and Functional and Cognitive Screening:      10/31/2023     9:39 AM   Functional & Cognitive Status   Do you have difficulty preparing food and eating? No   Do you have difficulty bathing yourself, getting dressed or grooming yourself? No   Do you have difficulty using the toilet? No   Do you have difficulty moving around from place to place? No   Do you have trouble with steps or getting out of a bed or a chair? No   Current Diet Unhealthy Diet   Dental Exam Up to date   Eye Exam Up to date   Exercise (times per week) 3 times per week   Current Exercises Include Walking   Do you need help using the phone?  No   Are you deaf or do you have serious difficulty hearing?  Yes   Do you need help to go to places out of walking distance? No   Do you need help shopping? No   Do you need help preparing meals?  No   Do you need help with housework?  No   Do you need help with laundry? No   Do you need help taking your medications? No   Do you need help managing money? No   Do you ever drive or ride in a car without wearing a seat belt? No   Have you felt unusual stress, anger or loneliness in the last month? Yes   Who do you live with? Spouse   If you need help, do you have trouble finding someone available to you? No   Have  you been bothered in the last four weeks by sexual problems? No   Do you have difficulty concentrating, remembering or making decisions? No       Age-appropriate Screening Schedule:  Refer to the list below for future screening recommendations based on patient's age, sex and/or medical conditions. Orders for these recommended tests are listed in the plan section. The patient has been provided with a written plan.    Health Maintenance   Topic Date Due    PAP SMEAR  11/13/2020    LIPID PANEL  07/05/2023    ANNUAL WELLNESS VISIT  07/25/2023    INFLUENZA VACCINE  08/01/2023    COVID-19 Vaccine (4 - 2023-24 season) 09/01/2023    DXA SCAN  10/31/2023 (Originally 7/20/2022)    MAMMOGRAM  10/31/2024 (Originally 9/20/2023)    TDAP/TD VACCINES (2 - Td or Tdap) 11/13/2027    COLORECTAL CANCER SCREENING  11/09/2032    HEPATITIS C SCREENING  Completed    Pneumococcal Vaccine 65+  Completed    ZOSTER VACCINE  Discontinued                  CMS Preventative Services Quick Reference  Risk Factors Identified During Encounter  Immunizations Discussed/Encouraged: Influenza and Prevnar 20 (Pneumococcal 20-valent conjugate)  The above risks/problems have been discussed with the patient.  Pertinent information has been shared with the patient in the After Visit Summary.  An After Visit Summary and PPPS were made available to the patient.    Follow Up:   Next Medicare Wellness visit to be scheduled in 1 year.       Additional E&M Note during same encounter follows:  Patient has multiple medical problems which are significant and separately identifiable that require additional work above and beyond the Medicare Wellness Visit.      Chief Complaint  Medicare Wellness-subsequent    Subjective        HPI  Shanika Hector is also being seen today for follow-up multimedical problems.    Chronic pain syndrome/fibromyalgia.  Continues on duloxetine 20 mg daily and gabapentin 300 mg nightly 100 mg in the morning.  No adverse effects.  She states  "medication is worth taking.  No depression issues recently.  She had some stressors however.  But she states she is coping.  She is getting plenty of exercise.    Cardiomyopathy.  Previous EF as low as 20%.  However that was a few years ago and not much trouble since.  She continues on low-dose lisinopril without the diagnosis of hypertension, and metoprolol.  She is follows with her cardiologist yearly.  No dyspnea with exertion.  No cardiovascular symptoms of concern.    Anemia.  Mild.  On and off for the last year or 2.  Normal mean cell volume.  Previous iron panel normal.  I do not see a B12 level.  Hemoglobin is down to 10.9 but it was 10.8 a year ago then went up to 11.8 a few months later.  Normal white blood cell count.  Normal lately,.  No bleeding episodes.         Objective   Vital Signs:  /70   Pulse 73   Temp 97.1 °F (36.2 °C) (Temporal)   Ht 160 cm (63\")   Wt 60.3 kg (132 lb 14.4 oz)   SpO2 100%   BMI 23.54 kg/m²     Physical Exam  Vitals and nursing note reviewed.   Constitutional:       General: She is not in acute distress.     Appearance: Normal appearance. She is well-developed.   HENT:      Head: Atraumatic.      Mouth/Throat:      Pharynx: Oropharynx is clear. No oropharyngeal exudate or posterior oropharyngeal erythema.   Eyes:      Conjunctiva/sclera: Conjunctivae normal.   Neck:      Comments: Thyroid examination unremarkable  Cardiovascular:      Rate and Rhythm: Normal rate and regular rhythm.      Pulses: Normal pulses.      Heart sounds: Normal heart sounds.   Pulmonary:      Effort: Pulmonary effort is normal.      Breath sounds: Normal breath sounds.   Abdominal:      General: Abdomen is flat. There is no distension.      Palpations: Abdomen is soft. There is no mass.      Tenderness: There is no abdominal tenderness.      Hernia: No hernia is present.   Musculoskeletal:         General: Normal range of motion.      Cervical back: Normal range of motion and neck supple. No " muscular tenderness.   Lymphadenopathy:      Cervical: No cervical adenopathy.   Skin:     General: Skin is warm and dry.      Findings: No rash.   Neurological:      Mental Status: She is alert.   Psychiatric:         Mood and Affect: Mood normal.          The following data was reviewed by: Bryan Vega MD on 10/31/2023:  Common labs          6/16/2023    17:10   Common Labs   Glucose 102    BUN 11    Creatinine 0.86    Sodium 139    Potassium 3.8    Chloride 101    Calcium 8.6    Albumin 4.2    Total Bilirubin 0.4    Alkaline Phosphatase 79    AST (SGOT) 22    ALT (SGPT) 14    WBC 4.80    Hemoglobin 10.9    Hematocrit 33.7    Platelets 235                 Assessment and Plan   Diagnoses and all orders for this visit:    1. Medicare annual wellness visit, subsequent (Primary)    2. Chronic pain syndrome  -     CBC & Differential  -     Comprehensive Metabolic Panel  -     Vitamin B12    3. Pure hypercholesterolemia  -     CBC & Differential  -     Comprehensive Metabolic Panel  -     Lipid Panel  -     Iron and TIBC  -     Ferritin  -     Vitamin B12    4. Cardiomyopathy, dilated  -     CBC & Differential  -     Comprehensive Metabolic Panel  -     Lipid Panel  -     Iron and TIBC  -     Ferritin  -     Vitamin B12    5. Anemia, unspecified type  -     CBC & Differential  -     Comprehensive Metabolic Panel  -     Lipid Panel  -     Iron and TIBC  -     Ferritin  -     Vitamin B12    6. Influenza vaccine administered  -     Fluzone High-Dose 65+yrs    7. Age-related osteoporosis without current pathological fracture    Other orders  -     alendronate (FOSAMAX) 70 MG tablet; Take 1 tablet by mouth Every 7 (Seven) Days.  Dispense: 12 tablet; Refill: 3      Chronic pain/fibromyalgia.  Continue gabapentin duloxetine as is.  Continue regular exercise.  See me in 4 months.    Cardiomyopathy.  Nonischemic cardiomyopathy.  Continues on metoprolol low-dose lisinopril.  No cardiovascular or pulmonary vascular symptoms.   I will see her back in 4 months.  The cardiologist should be seeing her relatively soon.    Hyperlipidemia.  Continue statin.    Anemia.  Mild.  On and off for a couple of years.  Rechecking blood work today.  If abnormal, to consider hematological consultation.               Follow Up   No follow-ups on file.  Patient was given instructions and counseling regarding her condition or for health maintenance advice. Please see specific information pulled into the AVS if appropriate.

## 2023-11-01 ENCOUNTER — TELEPHONE (OUTPATIENT)
Dept: FAMILY MEDICINE CLINIC | Facility: CLINIC | Age: 68
End: 2023-11-01

## 2023-11-01 LAB
ALBUMIN SERPL-MCNC: 4.8 G/DL (ref 3.5–5.2)
ALBUMIN/GLOB SERPL: 2.2 G/DL
ALP SERPL-CCNC: 76 U/L (ref 39–117)
ALT SERPL-CCNC: 12 U/L (ref 1–33)
AST SERPL-CCNC: 17 U/L (ref 1–32)
BASOPHILS # BLD AUTO: 0.03 10*3/MM3 (ref 0–0.2)
BASOPHILS NFR BLD AUTO: 0.6 % (ref 0–1.5)
BILIRUB SERPL-MCNC: 0.4 MG/DL (ref 0–1.2)
BUN SERPL-MCNC: 16 MG/DL (ref 8–23)
BUN/CREAT SERPL: 17 (ref 7–25)
CALCIUM SERPL-MCNC: 9.3 MG/DL (ref 8.6–10.5)
CHLORIDE SERPL-SCNC: 103 MMOL/L (ref 98–107)
CHOLEST SERPL-MCNC: 228 MG/DL (ref 0–200)
CO2 SERPL-SCNC: 30.2 MMOL/L (ref 22–29)
CREAT SERPL-MCNC: 0.94 MG/DL (ref 0.57–1)
EGFRCR SERPLBLD CKD-EPI 2021: 65.4 ML/MIN/1.73
EOSINOPHIL # BLD AUTO: 0.29 10*3/MM3 (ref 0–0.4)
EOSINOPHIL NFR BLD AUTO: 6.1 % (ref 0.3–6.2)
ERYTHROCYTE [DISTWIDTH] IN BLOOD BY AUTOMATED COUNT: 12 % (ref 12.3–15.4)
FERRITIN SERPL-MCNC: 33.3 NG/ML (ref 13–150)
GLOBULIN SER CALC-MCNC: 2.2 GM/DL
GLUCOSE SERPL-MCNC: 89 MG/DL (ref 65–99)
HCT VFR BLD AUTO: 36.9 % (ref 34–46.6)
HDLC SERPL-MCNC: 63 MG/DL (ref 40–60)
HGB BLD-MCNC: 11.8 G/DL (ref 12–15.9)
IMM GRANULOCYTES # BLD AUTO: 0.01 10*3/MM3 (ref 0–0.05)
IMM GRANULOCYTES NFR BLD AUTO: 0.2 % (ref 0–0.5)
IRON SATN MFR SERPL: 22 % (ref 20–50)
IRON SERPL-MCNC: 95 MCG/DL (ref 37–145)
LDLC SERPL CALC-MCNC: 148 MG/DL (ref 0–100)
LYMPHOCYTES # BLD AUTO: 1.19 10*3/MM3 (ref 0.7–3.1)
LYMPHOCYTES NFR BLD AUTO: 25.1 % (ref 19.6–45.3)
MCH RBC QN AUTO: 29.9 PG (ref 26.6–33)
MCHC RBC AUTO-ENTMCNC: 32 G/DL (ref 31.5–35.7)
MCV RBC AUTO: 93.7 FL (ref 79–97)
MONOCYTES # BLD AUTO: 0.41 10*3/MM3 (ref 0.1–0.9)
MONOCYTES NFR BLD AUTO: 8.6 % (ref 5–12)
NEUTROPHILS # BLD AUTO: 2.81 10*3/MM3 (ref 1.7–7)
NEUTROPHILS NFR BLD AUTO: 59.4 % (ref 42.7–76)
NRBC BLD AUTO-RTO: 0.2 /100 WBC (ref 0–0.2)
PLATELET # BLD AUTO: 313 10*3/MM3 (ref 140–450)
POTASSIUM SERPL-SCNC: 4.5 MMOL/L (ref 3.5–5.2)
PROT SERPL-MCNC: 7 G/DL (ref 6–8.5)
RBC # BLD AUTO: 3.94 10*6/MM3 (ref 3.77–5.28)
SODIUM SERPL-SCNC: 141 MMOL/L (ref 136–145)
TIBC SERPL-MCNC: 428 MCG/DL
TRIGL SERPL-MCNC: 95 MG/DL (ref 0–150)
UIBC SERPL-MCNC: 333 MCG/DL (ref 112–346)
VIT B12 SERPL-MCNC: 238 PG/ML (ref 211–946)
VLDLC SERPL CALC-MCNC: 17 MG/DL (ref 5–40)
WBC # BLD AUTO: 4.74 10*3/MM3 (ref 3.4–10.8)

## 2023-11-01 NOTE — TELEPHONE ENCOUNTER
Relay     Called pt to discuss results no answer lm to return call. Ok for hub to read:The repeat hemoglobin is nearly normal.  The iron studies are normal.  B12 level normal.  No further immediate work-up needed.  We will continue to monitor in the future.

## 2023-11-02 NOTE — TELEPHONE ENCOUNTER
" Name: Krunal Shanika VALE \"Shivani\"      Relationship: Self      Best Callback Number:     [phone]         HUB PROVIDED THE RELAY MESSAGE FROM THE OFFICE      PATIENT: VOICED UNDERSTANDING AND HAS NO FURTHER QUESTIONS AT THIS TIME    ADDITIONAL INFORMATION:   NONE  "

## 2023-11-06 RX ORDER — DULOXETIN HYDROCHLORIDE 20 MG/1
20 CAPSULE, DELAYED RELEASE ORAL DAILY
Qty: 90 CAPSULE | Refills: 1 | Status: SHIPPED | OUTPATIENT
Start: 2023-11-06

## 2023-11-06 NOTE — TELEPHONE ENCOUNTER
Rx Refill Note  Requested Prescriptions     Pending Prescriptions Disp Refills    DULoxetine (CYMBALTA) 20 MG capsule [Pharmacy Med Name: DULoxetine HCL DR 20 MG CAPSULE] 90 capsule 1     Sig: TAKE 1 CAPSULE BY MOUTH DAILY      Last office visit with prescribing clinician: 10/31/2023   Last telemedicine visit with prescribing clinician: Visit date not found   Next office visit with prescribing clinician: 3/1/2024                         Would you like a call back once the refill request has been completed: [] Yes [] No    If the office needs to give you a call back, can they leave a voicemail: [] Yes [] No    George Almodovar  11/06/23, 16:16 EST

## 2023-12-26 RX ORDER — OMEPRAZOLE 40 MG/1
40 CAPSULE, DELAYED RELEASE ORAL DAILY
Qty: 90 CAPSULE | Refills: 1 | Status: SHIPPED | OUTPATIENT
Start: 2023-12-26

## 2024-01-25 PROCEDURE — 87086 URINE CULTURE/COLONY COUNT: CPT | Performed by: NURSE PRACTITIONER

## 2024-01-29 NOTE — PROGRESS NOTES
RM:________     PCP: Bryan Vega MD    : 1955  AGE: 68 y.o.  EST PATIENT     REASON FOR VISIT/  CC:        BP Readings from Last 3 Encounters:   24 116/72   10/31/23 109/70   23 124/74      Wt Readings from Last 3 Encounters:   24 59.9 kg (132 lb)   10/31/23 60.3 kg (132 lb 14.4 oz)   23 61.2 kg (135 lb)        WT: ____________ BP: __________L __________R HR______    CHEST PAIN: _____________    SOA: _____________PALPS: _______________     LIGHTHEADED: ___________FATIGUE: ________________ EDEMA __________    ALLERGIES:Other, Sulfa antibiotics, Misc. sulfonamide containing compounds, and Adhesive tape SMOKING HISTORY:  Social History     Tobacco Use    Smoking status: Never    Smokeless tobacco: Never    Tobacco comments:     caffeine- 3 cups daily   Vaping Use    Vaping Use: Never used   Substance Use Topics    Alcohol use: Not Currently     Comment: NEVER    Drug use: No     CAFFEINE USE_________________  ALCOHOL ______________________

## 2024-01-30 ENCOUNTER — OFFICE VISIT (OUTPATIENT)
Dept: CARDIOLOGY | Facility: CLINIC | Age: 69
End: 2024-01-30
Payer: MEDICARE

## 2024-01-30 ENCOUNTER — HOSPITAL ENCOUNTER (OUTPATIENT)
Dept: CARDIOLOGY | Facility: HOSPITAL | Age: 69
Discharge: HOME | End: 2024-01-30
Admitting: INTERNAL MEDICINE
Payer: MEDICARE

## 2024-01-30 VITALS
HEIGHT: 63 IN | HEART RATE: 73 BPM | SYSTOLIC BLOOD PRESSURE: 100 MMHG | BODY MASS INDEX: 23.92 KG/M2 | WEIGHT: 135 LBS | DIASTOLIC BLOOD PRESSURE: 70 MMHG

## 2024-01-30 VITALS
BODY MASS INDEX: 23.39 KG/M2 | HEART RATE: 83 BPM | WEIGHT: 132 LBS | HEIGHT: 63 IN | DIASTOLIC BLOOD PRESSURE: 64 MMHG | SYSTOLIC BLOOD PRESSURE: 96 MMHG

## 2024-01-30 DIAGNOSIS — I42.0 CARDIOMYOPATHY, DILATED: Chronic | ICD-10-CM

## 2024-01-30 DIAGNOSIS — I49.3 PVCS (PREMATURE VENTRICULAR CONTRACTIONS): ICD-10-CM

## 2024-01-30 DIAGNOSIS — I50.22 CHRONIC SYSTOLIC CONGESTIVE HEART FAILURE: Chronic | ICD-10-CM

## 2024-01-30 DIAGNOSIS — R00.2 PALPITATIONS: ICD-10-CM

## 2024-01-30 DIAGNOSIS — I42.0 CARDIOMYOPATHY, DILATED: Primary | Chronic | ICD-10-CM

## 2024-01-30 LAB
AORTIC ARCH: 2.9 CM
AORTIC DIMENSIONLESS INDEX: 0.6 (DI)
ASCENDING AORTA: 2.4 CM
BH CV ECHO LEFT VENTRICLE GLOBAL LONGITUDINAL STRAIN: -19 %
BH CV ECHO MEAS - ACS: 1.57 CM
BH CV ECHO MEAS - AO MAX PG: 5 MMHG
BH CV ECHO MEAS - AO MEAN PG: 3 MMHG
BH CV ECHO MEAS - AO ROOT DIAM: 2.7 CM
BH CV ECHO MEAS - AO V2 MAX: 112 CM/SEC
BH CV ECHO MEAS - AO V2 VTI: 22.7 CM
BH CV ECHO MEAS - AVA(I,D): 1.92 CM2
BH CV ECHO MEAS - EDV(CUBED): 132.7 ML
BH CV ECHO MEAS - EDV(MOD-SP2): 69 ML
BH CV ECHO MEAS - EDV(MOD-SP4): 92 ML
BH CV ECHO MEAS - EF(MOD-BP): 50.5 %
BH CV ECHO MEAS - EF(MOD-SP2): 50.7 %
BH CV ECHO MEAS - EF(MOD-SP4): 48.9 %
BH CV ECHO MEAS - EF_3D-VOL: 49 %
BH CV ECHO MEAS - ESV(CUBED): 56.2 ML
BH CV ECHO MEAS - ESV(MOD-SP2): 34 ML
BH CV ECHO MEAS - ESV(MOD-SP4): 47 ML
BH CV ECHO MEAS - FS: 24.9 %
BH CV ECHO MEAS - IVS/LVPW: 1.1 CM
BH CV ECHO MEAS - IVSD: 1.1 CM
BH CV ECHO MEAS - LA 3D VOL INDEX: 21
BH CV ECHO MEAS - LAT PEAK E' VEL: 8.6 CM/SEC
BH CV ECHO MEAS - LV DIASTOLIC VOL/BSA (35-75): 56.8 CM2
BH CV ECHO MEAS - LV MASS(C)D: 200.8 GRAMS
BH CV ECHO MEAS - LV MAX PG: 1.65 MMHG
BH CV ECHO MEAS - LV MEAN PG: 1 MMHG
BH CV ECHO MEAS - LV SYSTOLIC VOL/BSA (12-30): 29 CM2
BH CV ECHO MEAS - LV V1 MAX: 64.3 CM/SEC
BH CV ECHO MEAS - LV V1 VTI: 14.5 CM
BH CV ECHO MEAS - LVIDD: 5.1 CM
BH CV ECHO MEAS - LVIDS: 3.8 CM
BH CV ECHO MEAS - LVOT AREA: 3 CM2
BH CV ECHO MEAS - LVOT DIAM: 1.96 CM
BH CV ECHO MEAS - LVPWD: 1 CM
BH CV ECHO MEAS - MED PEAK E' VEL: 9.4 CM/SEC
BH CV ECHO MEAS - MV A DUR: 0.16 SEC
BH CV ECHO MEAS - MV A MAX VEL: 72.4 CM/SEC
BH CV ECHO MEAS - MV DEC SLOPE: 291.2 CM/SEC2
BH CV ECHO MEAS - MV DEC TIME: 0.23 SEC
BH CV ECHO MEAS - MV E MAX VEL: 59.6 CM/SEC
BH CV ECHO MEAS - MV E/A: 0.82
BH CV ECHO MEAS - MV MAX PG: 2.6 MMHG
BH CV ECHO MEAS - MV MEAN PG: 1.27 MMHG
BH CV ECHO MEAS - MV P1/2T: 57.7 MSEC
BH CV ECHO MEAS - MV V2 VTI: 13.7 CM
BH CV ECHO MEAS - MVA(P1/2T): 3.8 CM2
BH CV ECHO MEAS - MVA(VTI): 3.2 CM2
BH CV ECHO MEAS - PA ACC TIME: 0.07 SEC
BH CV ECHO MEAS - PA V2 MAX: 74.7 CM/SEC
BH CV ECHO MEAS - PULM A REVS DUR: 0.16 SEC
BH CV ECHO MEAS - PULM A REVS VEL: 38.1 CM/SEC
BH CV ECHO MEAS - PULM DIAS VEL: 27.9 CM/SEC
BH CV ECHO MEAS - PULM S/D: 1.91
BH CV ECHO MEAS - PULM SYS VEL: 53.1 CM/SEC
BH CV ECHO MEAS - QP/QS: 1.13
BH CV ECHO MEAS - RAP SYSTOLE: 3 MMHG
BH CV ECHO MEAS - RV MAX PG: 0.89 MMHG
BH CV ECHO MEAS - RV V1 MAX: 47.1 CM/SEC
BH CV ECHO MEAS - RV V1 VTI: 10.9 CM
BH CV ECHO MEAS - RVOT DIAM: 2.39 CM
BH CV ECHO MEAS - RVSP: 23 MMHG
BH CV ECHO MEAS - SI(MOD-SP2): 21.6 ML/M2
BH CV ECHO MEAS - SI(MOD-SP4): 27.8 ML/M2
BH CV ECHO MEAS - SV(LVOT): 43.6 ML
BH CV ECHO MEAS - SV(MOD-SP2): 35 ML
BH CV ECHO MEAS - SV(MOD-SP4): 45 ML
BH CV ECHO MEAS - SV(RVOT): 49.2 ML
BH CV ECHO MEAS - TAPSE (>1.6): 2 CM
BH CV ECHO MEAS - TR MAX PG: 20.1 MMHG
BH CV ECHO MEAS - TR MAX VEL: 224 CM/SEC
BH CV ECHO MEASUREMENTS AVERAGE E/E' RATIO: 6.62
BH CV XLRA - RV BASE: 3 CM
BH CV XLRA - RV LENGTH: 6.7 CM
BH CV XLRA - RV MID: 2.23 CM
BH CV XLRA - TDI S': 8.9 CM/SEC
LEFT ATRIUM VOLUME INDEX: 27.2 ML/M2
SINUS: 2.8 CM
STJ: 2.1 CM

## 2024-01-30 PROCEDURE — 1159F MED LIST DOCD IN RCRD: CPT | Performed by: INTERNAL MEDICINE

## 2024-01-30 PROCEDURE — 1160F RVW MEDS BY RX/DR IN RCRD: CPT | Performed by: INTERNAL MEDICINE

## 2024-01-30 PROCEDURE — 93000 ELECTROCARDIOGRAM COMPLETE: CPT | Performed by: INTERNAL MEDICINE

## 2024-01-30 PROCEDURE — 93356 MYOCRD STRAIN IMG SPCKL TRCK: CPT

## 2024-01-30 PROCEDURE — 93306 TTE W/DOPPLER COMPLETE: CPT

## 2024-01-30 PROCEDURE — 99214 OFFICE O/P EST MOD 30 MIN: CPT | Performed by: INTERNAL MEDICINE

## 2024-01-30 NOTE — PROGRESS NOTES
Date of Office Visit: 24  Encounter Provider: Darrion Bateman MD  Place of Service: [facility] CARDIOLOGY  Patient Name: Shanika Hector  :1955    Chief Complaint   Patient presents with    Cardiomyopathy   :     HPI: Ms. Hector is 68 y.o. and presents today to follow up. I have reviewed prior notes and there are no changes except for any new updates described below. I have also reviewed any information entered into the medical record by the patient or by ancillary staff.     She presented to [facility] on 2020 with dyspnea, palpitations, orthopnea, and abdominal distention.  Her EKG showed sinus tachycardia and LVH/strain. CTA of the chest was negative for pulmonary embolism, bilateral pleural effusions, and showed mild pulmonary edema.  She was noted to have PVCs on telemetry.  An echo revealed an LVEF of 20% and moderate-severe MR.  She underwent right and left heart catheterization; her coronaries were normal, as were her pulmonary pressures and LVEDP.  She was started on metoprolol succinate; her BP was relatively low, an additional medications could not be added.  She had been taking hydroxychloroquine for erosive osteoarthritis, and it was discontinued.     A cardiac MRI in 2020 showed an EF of 30%, and was otherwise normal.  A follow up echo in November showed improvement in her LVEF to 35-40%. In 2022, she was found to have very frequent PVCs. She underwent successful ablation of LV left posterior fascicular PVCs. A follow up Holter showed that her PVC burden had decreased to 3%. An echo in May 2022 showed improvement in her LVEF to 51%.     She feels great. She denies chest pain, dyspnea, leg swelling, orthopnea, or syncope.  However, she is consistently noticing skipped beats at night that seem to be more prominent than they have been previously.    Past Medical History:   Diagnosis Date    Abdominal pain     STATES FOR ABOUT THE LAST MONTH:   "\"GI TRACT UNWELL\"    Acute systolic congestive heart failure 02/13/2021    Anxiety     Bilateral sacroiliitis 05/08/2017    Bladder infection     RECENT AND TREATED FOR    Cardiomyopathy, dilated     Cervical radiculopathy     Diverticulosis     Diagnois on 11-9-22 colonoscopy    Fibromyalgia 03/16/2020    Gastric ulcer     GERD (gastroesophageal reflux disease)     Heart murmur Preteen    History of atrial fibrillation     CARDIAC ABLATION MARCH 2022    Hyperlipidemia 6/10/2019    Hypertension     Irritable bowel syndrome     CONSTIPATION ONLY    Lichen sclerosus of female genitalia     Nonischemic cardiomyopathy     2/2021, EF 20%, normal cors    Nonrheumatic mitral valve regurgitation     Osteoporosis     Peptic ulceration 12/2019    PVCs (premature ventricular contractions) 03/22/2021    RASH     STATES UNDER BREASTS BILAT AND WRAPS AROUND BACK-ITCHES.  STATES HAS RASH OFTEN TIMES WITH FOODS       Past Surgical History:   Procedure Laterality Date    BREAST CYST ASPIRATION      10+ years ago    CARDIAC ABLATION  03/2022    DR. TOSCANO    CARDIAC CATHETERIZATION N/A 02/15/2021    Procedure: Left Heart Cath;  Surgeon: Zee Rojas MD;  Location:  [facility];  Service: Cardiovascular;  Laterality: N/A;    CARDIAC CATHETERIZATION N/A 02/15/2021    Procedure: Left ventriculography;  Surgeon: Zee Rojas MD;  Location:  [facility];  Service: Cardiovascular;  Laterality: N/A;    CARDIAC CATHETERIZATION N/A 02/15/2021    Procedure: Coronary angiography;  Surgeon: Zee Rojas MD;  Location:  [facility];  Service: Cardiovascular;  Laterality: N/A;    CARDIAC CATHETERIZATION N/A 02/15/2021    Procedure: Right Heart Cath;  Surgeon: Zee Rojas MD;  Location:  [facility];  Service: Cardiovascular;  Laterality: N/A;    CARDIAC ELECTROPHYSIOLOGY PROCEDURE N/A 03/09/2022    Procedure: Ablation PVC;  Surgeon: Mati Toscano MD;  " Location:  [facility];  Service: Cardiology;  Laterality: N/A;    CARDIAC ELECTROPHYSIOLOGY PROCEDURE N/A 03/09/2022    Procedure: EP/ABLATION;  Surgeon: Mati Toscano MD;  Location: [facility] C[facility]VASIVE [facility];  Service: Cardiology;  Laterality: N/A;    CHOLECYSTECTOMY      COLONOSCOPY  07/30/2012    Dr. Zazueta, normal    COLONOSCOPY N/A 11/9/2022    Procedure: COLONOSCOPY into cecum and TI;  Surgeon: Lida Garcia MD;  Location: [facility] ENDOSCOPY;  Service: Gastroenterology;  Laterality: N/A;  pre: screen  post: diverticulosis, hemorrhoids     ENDOSCOPY  12/04/2015    Mild active duodenitis, reactive gastropathy suggestive of healing erosion or adjacent ulcer    ENDOSCOPY N/A 12/16/2019    Procedure: ESOPHAGOGASTRODUODENOSCOPY WITH BIOPSY WITH COLD BIOPSY POLYPECTOMY;  Surgeon: Lida Garcia MD;  Location: [facility] ENDOSCOPY;  Service: Gastroenterology    ENDOSCOPY N/A 07/13/2020    Procedure: ESOPHAGOGASTRODUODENOSCOPY WITH COLD BIOPSY POLYPECTOMY;  Surgeon: Lida Garica MD;  Location: [facility] ENDOSCOPY;  Service: Gastroenterology;  Laterality: N/A;  pre: history of gastric ulcer  post: healed gastric ulcer and gastric polyp    EYE SURGERY  April2021    Cararact surgury both eyes:  LENS IMPLANTS?       Social History     Socioeconomic History    Marital status:    Tobacco Use    Smoking status: Never    Smokeless tobacco: Never    Tobacco comments:     caffeine- 3 cups daily   Vaping Use    Vaping Use: Never used   Substance and Sexual Activity    Alcohol use: Not Currently     Comment: NEVER    Drug use: No    Sexual activity: Yes     Partners: Male     Birth control/protection: None       Family History   Problem Relation Age of Onset    Irritable bowel syndrome Mother     Hypertension Mother     Hypertension Father     Hearing loss Father     Arthritis Sister     Alcohol abuse Brother     Early death Brother     Heart disease Brother     Arthritis Daughter          "Breast cancer 2023    Malig Hyperthermia Neg Hx        Review of Systems   Constitutional: Positive for malaise/fatigue.   Respiratory:  Positive for cough.    Musculoskeletal:  Positive for joint pain.   Neurological:  Positive for excessive daytime sleepiness.   All other systems reviewed and are negative.      Allergies   Allergen Reactions    Other Shortness Of Breath     perfume    Sulfa Antibiotics Hives     Was told by mother    Misc. Sulfonamide Containing Compounds Hives    Adhesive Tape Itching and Rash     PT STATES TAPE TAKES SKIN OFF         Current Outpatient Medications:     acetaminophen (TYLENOL) 650 MG 8 hr tablet, Take 2 tablets by mouth 2 (Two) Times a Day As Needed., Disp: , Rfl:     alendronate (FOSAMAX) 70 MG tablet, Take 1 tablet by mouth Every 7 (Seven) Days., Disp: 12 tablet, Rfl: 3    ALPRAZolam (XANAX) 0.5 MG tablet, Take 1 tablet by mouth 2 (Two) Times a Day As Needed (MRI anxiety)., Disp: 2 tablet, Rfl: 0    cefdinir (OMNICEF) 300 MG capsule, Take 1 capsule by mouth 2 (Two) Times a Day for 7 days., Disp: 14 capsule, Rfl: 0    DULoxetine (CYMBALTA) 20 MG capsule, TAKE 1 CAPSULE BY MOUTH DAILY, Disp: 90 capsule, Rfl: 1    gabapentin (NEURONTIN) 100 MG capsule, Take 3 capsules by mouth Every Night AND 1 capsule Every Morning., Disp: 360 capsule, Rfl: 1    ibuprofen (ADVIL,MOTRIN) 200 MG tablet, Take 1 tablet by mouth Every 6 (Six) Hours As Needed for Mild Pain., Disp: , Rfl:     lisinopril (PRINIVIL,ZESTRIL) 2.5 MG tablet, Take 1 tablet by mouth Every Night. Take at night, Disp: 90 tablet, Rfl: 1    metoprolol succinate XL (TOPROL-XL) 25 MG 24 hr tablet, TAKE ONE TABLET BY MOUTH DAILY, Disp: 90 tablet, Rfl: 2    omeprazole (priLOSEC) 40 MG capsule, TAKE 1 CAPSULE BY MOUTH DAILY, Disp: 90 capsule, Rfl: 1      Objective:     Vitals:    01/30/24 1021   BP: 100/70   BP Location: Left arm   Pulse: 73   Weight: 61.2 kg (135 lb)   Height: 160 cm (63\")     Body mass index is 23.91 " kg/m².    Constitutional:       Appearance: Healthy appearance. Not in distress.   Eyes:      Conjunctiva/sclera: Conjunctivae normal.   HENT:    Mouth/Throat:      Pharynx: Oropharynx is clear.   Neck:      Vascular: JVD normal.   Pulmonary:      Effort: Pulmonary effort is normal.      Breath sounds: Normal breath sounds.   Cardiovascular:      Normal rate.      Murmurs: There is no murmur.   Pulses:     Intact distal pulses.   Edema:     Peripheral edema absent.   Abdominal:      Palpations: Abdomen is soft.      Tenderness: There is no abdominal tenderness.   Musculoskeletal: Normal range of motion.      Cervical back: Normal range of motion. Skin:     General: Skin is warm and dry.   Neurological:      General: No focal deficit present.      Mental Status: Alert and oriented to person, place and time.           ECG 12 Lead    Date/Time: 1/30/2024 10:51 AM  Performed by: Darrion Bateman MD    Authorized by: Darrion Bateman MD  Comparison: compared with previous ECG   Similar to previous ECG  Rhythm: sinus rhythm  T inversion: all  QRS axis: left  Other findings: low voltage    Clinical impression: abnormal EKG            Assessment:       Diagnosis Plan   1. Cardiomyopathy, dilated        2. PVCs (premature ventricular contractions)  Holter Monitor - 24 Hour      3. Palpitations  Holter Monitor - 24 Hour           Plan:       She has NICM. Her EF was originally 20%.  She underwent PVC ablation.  She is on goal-directed medical therapy, or at least is much as we can place her on given her very low blood pressure at baseline.  An echo was performed today and shows borderline left ventricular dilation, and stable, low normal left ventricular systolic function, ejection fraction 50%.  She previously had an MRI that had no evidence of late gadolinium enhancement.    She is euvolemic and NYHA class I.    She does have increasing frequency palpitations, so I have recommended that we obtain a 24-hour Holter for further  assessment.  Further recommendations will be made based upon the results of that monitor study.    Sincerely,       Darrion Bateman MD

## 2024-02-09 RX ORDER — GABAPENTIN 100 MG/1
CAPSULE ORAL
Qty: 360 CAPSULE | Refills: 0 | Status: SHIPPED | OUTPATIENT
Start: 2024-02-09

## 2024-02-09 NOTE — TELEPHONE ENCOUNTER
Rx Refill Note  Requested Prescriptions     Pending Prescriptions Disp Refills    gabapentin (NEURONTIN) 100 MG capsule [Pharmacy Med Name: GABAPENTIN 100 MG CAPSULE] 90 capsule 1     Sig: TAKE ONE CAPSULE BY MOUTH EVERY MORNING AND TAKE THREE CAPSULES BY MOUTH ONCE NIGHTLY      Last office visit with prescribing clinician: 10/31/2023   Last telemedicine visit with prescribing clinician: Visit date not found   Next office visit with prescribing clinician: 3/1/2024                         Would you like a call back once the refill request has been completed: [] Yes [] No    If the office needs to give you a call back, can they leave a voicemail: [] Yes [] No    Pelon Calvin MA  02/09/24, 13:53 EST

## 2024-02-14 NOTE — PROGRESS NOTES
"Subjective   Shanika Hector is a 61 y.o. female.     Chief Complaint   Patient presents with   • Urinary Frequency        History of Present Illness    3 days of urinary urgency hesitancy and burning.  No hematuria.  Feeling a maybe a little feverish but not now.  No nausea no vomiting.  She has a history of bilateral hip pain which is thought to be related to her back.  She's been to physical therapy and sports medicine with no help.  The back discomfort does not radiate to the bladder.  She also needs a refill of her omeprazole.      The following portions of the patient's history were reviewed and updated as appropriate: allergies, current medications, past family history, past medical history, past social history, past surgical history and problem list.          Review of Systems   Constitutional: Negative for fever.   Respiratory: Negative.    Cardiovascular: Negative.    Gastrointestinal: Negative.    Genitourinary: Positive for dysuria, frequency and urgency. Negative for hematuria.   Musculoskeletal: Positive for back pain.   Neurological: Negative for weakness and numbness.       Objective   Blood pressure 122/80, pulse 84, temperature 97.6 °F (36.4 °C), temperature source Oral, resp. rate 16, height 63\" (160 cm), weight 123 lb 4.8 oz (55.9 kg), SpO2 98 %, not currently breastfeeding.  Physical Exam   Constitutional: No distress.   Abdominal: Soft. Bowel sounds are normal. She exhibits no distension and no mass. There is tenderness (mild suprapubic tenderness.  No CVA tenderness to percussion). There is no rebound and no guarding.       Assessment/Plan   Shanika was seen today for urinary frequency.    Diagnoses and all orders for this visit:    Urinary frequency  -     POC Urinalysis Dipstick, Automated  -     Urine Culture    Pain of both hip joints  -     Ambulatory Referral to Pain Management    Gastroesophageal reflux disease without esophagitis  -     omeprazole (priLOSEC) 40 MG capsule; Take 1 capsule by " mouth Daily.    Other orders  -     nitrofurantoin, macrocrystal-monohydrate, (MACROBID) 100 MG capsule; Take 1 capsule by mouth 2 (Two) Times a Day.  -     phenazopyridine (PYRIDIUM) 95 MG tablet; Take 1 tablet by mouth 3 (Three) Times a Day As Needed for bladder spasms.     acute cystitis.  Macrobid twice a day for a week.  Pyridium as needed.  If not improving in 48 hours please call.  Patient understands importance of calling if not feeling better.    Referring her to pain management for her bilateral sacroiliac discomfort.  Physical therapy is not helping.  She's also been a sports medicine, bursitis injections did not help.            36.5

## 2024-02-20 PROCEDURE — 87086 URINE CULTURE/COLONY COUNT: CPT | Performed by: NURSE PRACTITIONER

## 2024-02-20 PROCEDURE — 87186 SC STD MICRODIL/AGAR DIL: CPT | Performed by: NURSE PRACTITIONER

## 2024-02-20 PROCEDURE — 87077 CULTURE AEROBIC IDENTIFY: CPT | Performed by: NURSE PRACTITIONER

## 2024-03-01 ENCOUNTER — OFFICE VISIT (OUTPATIENT)
Dept: FAMILY MEDICINE CLINIC | Facility: CLINIC | Age: 69
End: 2024-03-01
Payer: MEDICARE

## 2024-03-01 VITALS
RESPIRATION RATE: 18 BRPM | TEMPERATURE: 97.3 F | DIASTOLIC BLOOD PRESSURE: 72 MMHG | OXYGEN SATURATION: 96 % | HEART RATE: 80 BPM | SYSTOLIC BLOOD PRESSURE: 107 MMHG | BODY MASS INDEX: 23.97 KG/M2 | HEIGHT: 63 IN | WEIGHT: 135.3 LBS

## 2024-03-01 DIAGNOSIS — G89.4 CHRONIC PAIN SYNDROME: Chronic | ICD-10-CM

## 2024-03-01 DIAGNOSIS — R35.0 FREQUENCY OF URINATION: Primary | ICD-10-CM

## 2024-03-01 DIAGNOSIS — I42.0 CARDIOMYOPATHY, DILATED: Chronic | ICD-10-CM

## 2024-03-01 DIAGNOSIS — M79.7 FIBROMYALGIA: Chronic | ICD-10-CM

## 2024-03-01 DIAGNOSIS — E78.00 PURE HYPERCHOLESTEROLEMIA: Chronic | ICD-10-CM

## 2024-03-01 LAB
BILIRUB BLD-MCNC: NEGATIVE MG/DL
CLARITY, POC: CLEAR
COLOR UR: YELLOW
EXPIRATION DATE: ABNORMAL
GLUCOSE UR STRIP-MCNC: NEGATIVE MG/DL
KETONES UR QL: NEGATIVE
LEUKOCYTE EST, POC: NEGATIVE
Lab: 1342
NITRITE UR-MCNC: NEGATIVE MG/ML
PH UR: 5.5 [PH] (ref 5–8)
PROT UR STRIP-MCNC: NEGATIVE MG/DL
RBC # UR STRIP: ABNORMAL /UL
SP GR UR: 1.03 (ref 1–1.03)
UROBILINOGEN UR QL: NORMAL

## 2024-03-01 PROCEDURE — 99214 OFFICE O/P EST MOD 30 MIN: CPT | Performed by: FAMILY MEDICINE

## 2024-03-01 PROCEDURE — 81003 URINALYSIS AUTO W/O SCOPE: CPT | Performed by: FAMILY MEDICINE

## 2024-03-01 NOTE — PROGRESS NOTES
Answers submitted by the patient for this visit:  Other (Submitted on 2/28/2024)  Please describe your symptoms.: Follow up as required for several things:, 1. Every 3-6 months follow up regarding my meds and long term chronic pain ., 2.  Need a follow up regarding a recent UTI. Need a urinalysis to determine if it has cleared up .  Have you had these symptoms before?: Yes  How long have you been having these symptoms?: Greater than 2 weeks  Please list any medications you are currently taking for this condition.: On my chart  Please describe any probable cause for these symptoms. : 2nd UTI in 6 weeks  Primary Reason for Visit (Submitted on 2/28/2024)  What is the primary reason for your visit?: Other  Chief Complaint  Hypertension, Depression, and Urinary Tract Infection (Seen in  twice, currently on antibiotics but wants re-tested.)    Subjective        Shanika Hector presents to [facility]  History of Present Illness    Patient was seen twice in the urgent care in the last 6 weeks for lower urinary tract symptoms.  Dysuria.  Burning hesitancy and urgency.  No fevers chills or vomiting.  No flank pain.  No hematuria.  First episode treated with antibiotics and Azo.  Culture negative.  Second episode Ditto, culture 50,000 colony units of E. coli.  Potential normal gracia.  She has not seen her gynecologist in a couple years.  She feels fine now.  Urinalysis today unremarkable exception of trace blood.    Chronic pain syndrome.  Fibromyalgia.  Continues gabapentin and duloxetine.  Stable overall.  No issues with gabapentin use.  No red flag behavior.    Nonischemic cardiomyopathy.  Seen recently by cardiology.  Echocardiogram improved historically.  Continues on low-dose lisinopril metoprolol.  Not on diuretics at this time.    Objective   Vital Signs:  /72 (BP Location: Right arm, Patient Position: Sitting, Cuff Size: Adult)   Pulse 80   Temp 97.3 °F (36.3 °C) (Temporal)    "Resp 18   Ht 160 cm (62.99\")   Wt 61.4 kg (135 lb 4.8 oz)   SpO2 96%   BMI 23.97 kg/m²   Estimated body mass index is 23.97 kg/m² as calculated from the following:    Height as of this encounter: 160 cm (62.99\").    Weight as of this encounter: 61.4 kg (135 lb 4.8 oz).       BMI is within normal parameters. No other follow-up for BMI required.      Physical Exam  Vitals and nursing note reviewed.   Constitutional:       General: She is not in acute distress.     Appearance: She is well-developed.   Cardiovascular:      Rate and Rhythm: Normal rate and regular rhythm.   Pulmonary:      Effort: Pulmonary effort is normal.   Abdominal:      General: There is no distension.      Palpations: Abdomen is soft. There is no mass.      Tenderness: There is no abdominal tenderness. There is no right CVA tenderness, left CVA tenderness, guarding or rebound.      Hernia: No hernia is present.      Comments: Suprapubic point-of-care ultrasound.  Bladder is mostly contracted.  No large ultrasonic mass seen near the bladder.  Minimal tenderness on exam.   Skin:     General: Skin is warm and dry.   Psychiatric:         Mood and Affect: Mood normal.        Result Review :    The following data was reviewed by: Bryan Vega MD on 03/01/2024:  Common labs          6/16/2023    17:10 10/31/2023    10:57   Common Labs   Glucose 102  89    BUN 11  16    Creatinine 0.86  0.94    Sodium 139  141    Potassium 3.8  4.5    Chloride 101  103    Calcium 8.6  9.3    Total Protein  7.0    Albumin 4.2  4.8    Total Bilirubin 0.4  0.4    Alkaline Phosphatase 79  76    AST (SGOT) 22  17    ALT (SGPT) 14  12    WBC 4.80  4.74    Hemoglobin 10.9  11.8    Hematocrit 33.7  36.9    Platelets 235  313    Total Cholesterol  228    Triglycerides  95    HDL Cholesterol  63    LDL Cholesterol   148                   Assessment and Plan     Diagnoses and all orders for this visit:    1. Frequency of urination (Primary)  -     POCT urinalysis dipstick, " automated    2. Fibromyalgia  -     CBC & Differential; Future  -     Comprehensive Metabolic Panel; Future  -     Lipid Panel; Future    3. Chronic pain syndrome  -     CBC & Differential; Future  -     Comprehensive Metabolic Panel; Future  -     Lipid Panel; Future    4. Pure hypercholesterolemia  -     CBC & Differential; Future  -     Comprehensive Metabolic Panel; Future  -     Lipid Panel; Future    5. Cardiomyopathy, dilated  -     CBC & Differential; Future  -     Comprehensive Metabolic Panel; Future  -     Lipid Panel; Future    Dysuria/lower urinary tract symptoms.  Questionable acute bacterial cystitis few weeks ago.  At this time holding off further treatment or evaluation.  However if she has a third episode in the next month or 2, I want her to get to her gynecologist and or let me know.  Within the ultrasound of the pelvis and/or further evaluation.    Fibromyalgia and chronic pain syndrome.  She continues on gabapentin without issue.  I renewed her prescribers agreement today.    Nonischemic cardiomyopathy.  Followed by cardiology also.  Stable overall.           Follow Up     Return in about 4 months (around 7/1/2024) for Recheck, Lab Visit One Week Prior to Next Appointment.  Patient was given instructions and counseling regarding her condition or for health maintenance advice. Please see specific information pulled into the AVS if appropriate.

## 2024-03-19 RX ORDER — METOPROLOL SUCCINATE 25 MG/1
25 TABLET, EXTENDED RELEASE ORAL DAILY
Qty: 90 TABLET | Refills: 1 | Status: SHIPPED | OUTPATIENT
Start: 2024-03-19

## 2024-03-26 RX ORDER — LISINOPRIL 2.5 MG/1
2.5 TABLET ORAL NIGHTLY
Qty: 90 TABLET | Refills: 1 | Status: SHIPPED | OUTPATIENT
Start: 2024-03-26

## 2024-05-06 RX ORDER — DULOXETIN HYDROCHLORIDE 20 MG/1
20 CAPSULE, DELAYED RELEASE ORAL DAILY
Qty: 90 CAPSULE | Refills: 1 | Status: SHIPPED | OUTPATIENT
Start: 2024-05-06

## 2024-05-13 RX ORDER — GABAPENTIN 100 MG/1
CAPSULE ORAL
Qty: 360 CAPSULE | Refills: 1 | Status: SHIPPED | OUTPATIENT
Start: 2024-05-13

## 2024-05-13 NOTE — TELEPHONE ENCOUNTER
Rx Refill Note  Requested Prescriptions     Pending Prescriptions Disp Refills    gabapentin (NEURONTIN) 100 MG capsule [Pharmacy Med Name: GABAPENTIN 100 MG CAPSULE] 360 capsule 1     Sig: TAKE 1 CAPSULE BY MOUTH EVERY MORNING AND TAKE 3 CAPSULES EVERY NIGHT AT BEDTIME      Last office visit with prescribing clinician: 3/1/2024   Last telemedicine visit with prescribing clinician: Visit date not found   Next office visit with prescribing clinician: 7/1/2024                         Would you like a call back once the refill request has been completed: [] Yes [] No    If the office needs to give you a call back, can they leave a voicemail: [] Yes [] No    George Almodovar  05/13/24, 08:08 EDT

## 2024-06-24 RX ORDER — OMEPRAZOLE 40 MG/1
40 CAPSULE, DELAYED RELEASE ORAL DAILY
Qty: 90 CAPSULE | Refills: 1 | Status: SHIPPED | OUTPATIENT
Start: 2024-06-24

## 2024-06-24 NOTE — TELEPHONE ENCOUNTER
Rx Refill Note  Requested Prescriptions     Pending Prescriptions Disp Refills    omeprazole (priLOSEC) 40 MG capsule [Pharmacy Med Name: OMEPRAZOLE DR 40 MG CAPSULE] 90 capsule 1     Sig: TAKE 1 CAPSULE BY MOUTH DAILY      Last office visit with prescribing clinician: 3/1/2024   Last telemedicine visit with prescribing clinician: Visit date not found   Next office visit with prescribing clinician: 7/1/2024                         Would you like a call back once the refill request has been completed: [] Yes [] No    If the office needs to give you a call back, can they leave a voicemail: [] Yes [] No    George Almodovar  06/24/24, 08:55 EDT

## 2024-07-01 ENCOUNTER — OFFICE VISIT (OUTPATIENT)
Dept: FAMILY MEDICINE CLINIC | Facility: CLINIC | Age: 69
End: 2024-07-01
Payer: MEDICARE

## 2024-07-01 VITALS
HEART RATE: 62 BPM | BODY MASS INDEX: 23.57 KG/M2 | HEIGHT: 63 IN | TEMPERATURE: 96.8 F | DIASTOLIC BLOOD PRESSURE: 60 MMHG | SYSTOLIC BLOOD PRESSURE: 120 MMHG | WEIGHT: 133 LBS | OXYGEN SATURATION: 99 %

## 2024-07-01 DIAGNOSIS — G89.4 CHRONIC PAIN SYNDROME: Primary | Chronic | ICD-10-CM

## 2024-07-01 PROCEDURE — 99213 OFFICE O/P EST LOW 20 MIN: CPT | Performed by: FAMILY MEDICINE

## 2024-07-01 PROCEDURE — 1126F AMNT PAIN NOTED NONE PRSNT: CPT | Performed by: FAMILY MEDICINE

## 2024-07-01 NOTE — PROGRESS NOTES
"Chief Complaint  Office Visit  (4 month follow up)    Subjective        Shanika Hector presents to [facility]  History of Present Illness    Chronic pain syndrome.  Possible fibromyalgia.  Continues on duloxetine.  Continues on gabapentin 200 mg in the morning and 200 mg in the evening.  She thinks it helps.  She has been through pain management for period of time.  Her MRI a couple years ago was without debilitating pathology.    Objective   Vital Signs:  /60 (BP Location: Left arm, Patient Position: Sitting, Cuff Size: Adult)   Pulse 62   Temp 96.8 °F (36 °C)   Ht 160 cm (63\")   Wt 60.3 kg (133 lb)   SpO2 99%   BMI 23.56 kg/m²   Estimated body mass index is 23.56 kg/m² as calculated from the following:    Height as of this encounter: 160 cm (63\").    Weight as of this encounter: 60.3 kg (133 lb).       BMI is within normal parameters. No other follow-up for BMI required.      Physical Exam  Constitutional:       Appearance: Normal appearance.   Cardiovascular:      Rate and Rhythm: Normal rate and regular rhythm.   Pulmonary:      Effort: Pulmonary effort is normal.      Breath sounds: Normal breath sounds.   Musculoskeletal:         General: Normal range of motion.   Psychiatric:         Mood and Affect: Mood normal.        Result Review :    The following data was reviewed by: Bryan Vega MD on 07/01/2024:  Common labs          10/31/2023    10:57 5/24/2024    09:06   Common Labs   Glucose 89  89    BUN 16  16    Creatinine 0.94  0.91    Sodium 141  142    Potassium 4.5  6.0    Chloride 103  103    Calcium 9.3  9.4    Total Protein 7.0  7.2    Albumin 4.8  4.6    Total Bilirubin 0.4  0.3    Alkaline Phosphatase 76  80    AST (SGOT) 17  15    ALT (SGPT) 12  12    WBC 4.74  4.71    Hemoglobin 11.8  12.4    Hematocrit 36.9  38.2    Platelets 313  343    Total Cholesterol 228  275    Triglycerides 95  103    HDL Cholesterol 63  75    LDL Cholesterol  148  182  "                  Assessment and Plan     Diagnoses and all orders for this visit:    1. Chronic pain syndrome (Primary)    Chronic pain syndrome.  Likely an element of fibromyalgia.  Intermittent lumbar radiculopathy.  Degenerative disease of the spine with spondylosis.  At this point she is stable.  She is physically active.  She is welcome to cut back on the gabapentin and see how she does.  Continue duloxetine as is.  See me this autumn for annual Medicare wellness visit.         Follow Up     Return in about 4 months (around 11/1/2024) for Subsequent Medicare Wellness Visit.  Patient was given instructions and counseling regarding her condition or for health maintenance advice. Please see specific information pulled into the AVS if appropriate.

## 2024-09-06 ENCOUNTER — OFFICE VISIT (OUTPATIENT)
Dept: FAMILY MEDICINE CLINIC | Facility: CLINIC | Age: 69
End: 2024-09-06
Payer: MEDICARE

## 2024-09-06 VITALS
OXYGEN SATURATION: 98 % | WEIGHT: 131.2 LBS | HEART RATE: 71 BPM | SYSTOLIC BLOOD PRESSURE: 107 MMHG | BODY MASS INDEX: 23.25 KG/M2 | DIASTOLIC BLOOD PRESSURE: 68 MMHG | HEIGHT: 63 IN | TEMPERATURE: 97.7 F

## 2024-09-06 DIAGNOSIS — N30.00 ACUTE CYSTITIS WITHOUT HEMATURIA: Primary | ICD-10-CM

## 2024-09-06 DIAGNOSIS — R35.0 FREQUENT URINATION: ICD-10-CM

## 2024-09-06 LAB
BILIRUB BLD-MCNC: NEGATIVE MG/DL
CLARITY, POC: ABNORMAL
COLOR UR: YELLOW
EXPIRATION DATE: ABNORMAL
GLUCOSE UR STRIP-MCNC: NEGATIVE MG/DL
KETONES UR QL: NEGATIVE
LEUKOCYTE EST, POC: ABNORMAL
Lab: ABNORMAL
NITRITE UR-MCNC: NEGATIVE MG/ML
PH UR: 7 [PH] (ref 5–8)
PROT UR STRIP-MCNC: ABNORMAL MG/DL
RBC # UR STRIP: ABNORMAL /UL
SP GR UR: 1.02 (ref 1–1.03)
UROBILINOGEN UR QL: NORMAL

## 2024-09-06 PROCEDURE — 81003 URINALYSIS AUTO W/O SCOPE: CPT | Performed by: NURSE PRACTITIONER

## 2024-09-06 PROCEDURE — 99214 OFFICE O/P EST MOD 30 MIN: CPT | Performed by: NURSE PRACTITIONER

## 2024-09-06 PROCEDURE — 1126F AMNT PAIN NOTED NONE PRSNT: CPT | Performed by: NURSE PRACTITIONER

## 2024-09-06 RX ORDER — NITROFURANTOIN 25; 75 MG/1; MG/1
100 CAPSULE ORAL 2 TIMES DAILY
Qty: 6 CAPSULE | Refills: 0 | Status: SHIPPED | OUTPATIENT
Start: 2024-09-06 | End: 2024-09-09

## 2024-09-11 LAB
BACTERIA UR CULT: ABNORMAL
BACTERIA UR CULT: ABNORMAL
OTHER ANTIBIOTIC SUSC ISLT: ABNORMAL

## 2024-09-11 RX ORDER — CIPROFLOXACIN 250 MG/1
250 TABLET, FILM COATED ORAL 2 TIMES DAILY
Qty: 6 TABLET | Refills: 0 | Status: SHIPPED | OUTPATIENT
Start: 2024-09-11 | End: 2024-09-14

## 2024-09-16 RX ORDER — METOPROLOL SUCCINATE 25 MG/1
25 TABLET, EXTENDED RELEASE ORAL DAILY
Qty: 90 TABLET | Refills: 1 | Status: SHIPPED | OUTPATIENT
Start: 2024-09-16

## 2024-09-20 RX ORDER — LISINOPRIL 2.5 MG/1
2.5 TABLET ORAL NIGHTLY
Qty: 90 TABLET | Refills: 1 | Status: SHIPPED | OUTPATIENT
Start: 2024-09-20

## 2024-10-07 ENCOUNTER — FLU SHOT (OUTPATIENT)
Dept: FAMILY MEDICINE CLINIC | Facility: CLINIC | Age: 69
End: 2024-10-07
Payer: MEDICARE

## 2024-10-07 DIAGNOSIS — Z23 NEED FOR IMMUNIZATION AGAINST INFLUENZA: Primary | ICD-10-CM

## 2024-10-07 PROCEDURE — G0008 ADMIN INFLUENZA VIRUS VAC: HCPCS | Performed by: FAMILY MEDICINE

## 2024-10-07 PROCEDURE — 90662 IIV NO PRSV INCREASED AG IM: CPT | Performed by: FAMILY MEDICINE

## 2024-10-16 ENCOUNTER — OFFICE VISIT (OUTPATIENT)
Dept: PODIATRY | Facility: CLINIC | Age: 69
End: 2024-10-16
Payer: MEDICARE

## 2024-10-16 VITALS — RESPIRATION RATE: 20 BRPM | HEIGHT: 63 IN | WEIGHT: 131 LBS | BODY MASS INDEX: 23.21 KG/M2

## 2024-10-16 DIAGNOSIS — M77.41 METATARSALGIA OF BOTH FEET: ICD-10-CM

## 2024-10-16 DIAGNOSIS — M21.861 ACQUIRED POSTERIOR EQUINUS OF BOTH LOWER EXTREMITIES: ICD-10-CM

## 2024-10-16 DIAGNOSIS — M77.42 METATARSALGIA OF BOTH FEET: ICD-10-CM

## 2024-10-16 DIAGNOSIS — M79.671 PAIN IN BOTH FEET: Primary | ICD-10-CM

## 2024-10-16 DIAGNOSIS — M21.862 ACQUIRED POSTERIOR EQUINUS OF BOTH LOWER EXTREMITIES: ICD-10-CM

## 2024-10-16 DIAGNOSIS — L85.3 XEROSIS OF SKIN: ICD-10-CM

## 2024-10-16 DIAGNOSIS — M79.672 PAIN IN BOTH FEET: Primary | ICD-10-CM

## 2024-10-17 ENCOUNTER — PATIENT ROUNDING (BHMG ONLY) (OUTPATIENT)
Dept: ORTHOPEDIC SURGERY | Facility: CLINIC | Age: 69
End: 2024-10-17
Payer: MEDICARE

## 2024-10-17 NOTE — PROGRESS NOTES
"10/16/2024  Foot and Ankle Surgery - New Patient   Provider: Dr. Farshad Pinzon DPM  Location: [facility]    Subjective:  Shanika Hector is a 69 y.o. female.     Chief Complaint   Patient presents with    Right Foot - Pain, Initial Evaluation, Peripheral Neuropathy    Left Foot - Pain, Initial Evaluation, Peripheral Neuropathy    Initial Evaluation     RACHELLE CAMP MD 7/1/2024       History of Present Illness  The patient presents for evaluation of her feet.    She has been diagnosed with osteoarthritis, which affects her hands and feet. She experiences severe pain in her feet, initially attributing it to ill-fitting shoes. However, her rheumatologist suggested that the pain could be due to osteoarthritis affecting two fingers on each hand. She also reports swelling in the joints of different toes and persistent numbness in her toes.    She has numerous calluses on her feet that she is unable to remove, including one under a toenail that causes significant discomfort. She does not receive pedicures and often wears flip flops or goes barefoot.    Additionally, she suffers from lower back issues, specifically at the L4 and L5 vertebrae, and severe sciatica in both legs. She has not undergone any surgeries.       Allergies   Allergen Reactions    Other Shortness Of Breath     perfume    Sulfa Antibiotics Hives     Was told by mother    Misc. Sulfonamide Containing Compounds Hives    Adhesive Tape Itching and Rash     PT STATES TAPE TAKES SKIN OFF       Past Medical History:   Diagnosis Date    Abdominal pain     STATES FOR ABOUT THE LAST MONTH:  \"GI TRACT UNWELL\"    Acute systolic congestive heart failure 02/13/2021    Anxiety     Bilateral sacroiliitis 05/08/2017    Bladder infection     RECENT AND TREATED FOR    Cardiomyopathy, dilated     Cervical radiculopathy     Diverticulosis     Diagnois on 11-9-22 colonoscopy    Fibromyalgia 03/16/2020    Gastric ulcer     GERD (gastroesophageal reflux disease)     Heart " murmur Preteen    History of atrial fibrillation     CARDIAC ABLATION MARCH 2022    Hyperlipidemia 6/10/2019    Hypertension     Irritable bowel syndrome     CONSTIPATION ONLY    Lichen sclerosus of female genitalia     Myocardial infarction     Nonischemic cardiomyopathy     2/2021, EF 20%, normal cors    Nonrheumatic mitral valve regurgitation     Osteoporosis     Peptic ulceration 12/2019    PVCs (premature ventricular contractions) 03/22/2021    RASH     STATES UNDER BREASTS BILAT AND WRAPS AROUND BACK-ITCHES.  STATES HAS RASH OFTEN TIMES WITH FOODS       Past Surgical History:   Procedure Laterality Date    BREAST CYST ASPIRATION      10+ years ago    CARDIAC ABLATION  03/2022    DR. TOSCANO    CARDIAC CATHETERIZATION N/A 02/15/2021    Procedure: Left Heart Cath;  Surgeon: Zee Rojas MD;  Location:  [facility];  Service: Cardiovascular;  Laterality: N/A;    CARDIAC CATHETERIZATION N/A 02/15/2021    Procedure: Left ventriculography;  Surgeon: Zee Rojas MD;  Location:  [facility];  Service: Cardiovascular;  Laterality: N/A;    CARDIAC CATHETERIZATION N/A 02/15/2021    Procedure: Coronary angiography;  Surgeon: Zee Rojas MD;  Location:  [facility];  Service: Cardiovascular;  Laterality: N/A;    CARDIAC CATHETERIZATION N/A 02/15/2021    Procedure: Right Heart Cath;  Surgeon: Zee Rojas MD;  Location:  [facility];  Service: Cardiovascular;  Laterality: N/A;    CARDIAC ELECTROPHYSIOLOGY PROCEDURE N/A 03/09/2022    Procedure: Ablation PVC;  Surgeon: Mati Toscano MD;  Location:  [facility];  Service: Cardiology;  Laterality: N/A;    CARDIAC ELECTROPHYSIOLOGY PROCEDURE N/A 03/09/2022    Procedure: EP/ABLATION;  Surgeon: Mati Toscano MD;  Location:  [facility];  Service: Cardiology;  Laterality: N/A;    CHOLECYSTECTOMY      COLONOSCOPY  07/30/2012    matt Lo     COLONOSCOPY N/A 11/9/2022    Procedure: COLONOSCOPY into cecum and TI;  Surgeon: Lida Garcia MD;  Location:  [facility];  Service: Gastroenterology;  Laterality: N/A;  pre: screen  post: diverticulosis, hemorrhoids     ENDOSCOPY  12/04/2015    Mild active duodenitis, reactive gastropathy suggestive of healing erosion or adjacent ulcer    ENDOSCOPY N/A 12/16/2019    Procedure: ESOPHAGOGASTRODUODENOSCOPY WITH BIOPSY WITH COLD BIOPSY POLYPECTOMY;  Surgeon: Lida Garcia MD;  Location:  [facility];  Service: Gastroenterology    ENDOSCOPY N/A 07/13/2020    Procedure: ESOPHAGOGASTRODUODENOSCOPY WITH COLD BIOPSY POLYPECTOMY;  Surgeon: Lida Garcia MD;  [facility];  Service: Gastroenterology;  Laterality: N/A;  pre: history of gastric ulcer  post: healed gastric ulcer and gastric polyp    EYE SURGERY  April2021    Cararact surgury both eyes:  LENS IMPLANTS?       Family History   Problem Relation Age of Onset    Irritable bowel syndrome Mother     Hypertension Mother     Hypertension Father     Hearing loss Father     Arthritis Sister     Alcohol abuse Brother     Early death Brother     Heart disease Brother     Arthritis Daughter         Breast cancer 2023    Malig Hyperthermia Neg Hx        Social History     Socioeconomic History    Marital status:    Tobacco Use    Smoking status: Never     Passive exposure: Never    Smokeless tobacco: Never    Tobacco comments:     caffeine- 3 cups daily   Vaping Use    Vaping status: Never Used   Substance and Sexual Activity    Alcohol use: Not Currently     Comment: NEVER    Drug use: Never    Sexual activity: Yes     Partners: Male     Birth control/protection: None        Current Outpatient Medications on File Prior to Visit   Medication Sig Dispense Refill    acetaminophen (TYLENOL) 650 MG 8 hr tablet Take 2 tablets by mouth 2 (Two) Times a Day As Needed.      alendronate (FOSAMAX) 70 MG tablet Take 1 tablet by mouth Every 7  "(Seven) Days. 12 tablet 3    DULoxetine (CYMBALTA) 20 MG capsule TAKE 1 CAPSULE BY MOUTH DAILY 90 capsule 1    gabapentin (NEURONTIN) 100 MG capsule TAKE 1 CAPSULE BY MOUTH EVERY MORNING AND TAKE 3 CAPSULES EVERY NIGHT AT BEDTIME 360 capsule 1    ibuprofen (ADVIL,MOTRIN) 200 MG tablet Take 1 tablet by mouth Every 6 (Six) Hours As Needed for Mild Pain.      lisinopril (PRINIVIL,ZESTRIL) 2.5 MG tablet TAKE ONE TABLET BY MOUTH ONCE NIGHTLY 90 tablet 1    metoprolol succinate XL (TOPROL-XL) 25 MG 24 hr tablet TAKE 1 TABLET BY MOUTH DAILY 90 tablet 1    omeprazole (priLOSEC) 40 MG capsule TAKE 1 CAPSULE BY MOUTH DAILY 90 capsule 1     No current facility-administered medications on file prior to visit.         Objective   Resp 20   Ht 160 cm (63\")   Wt 59.4 kg (131 lb)   BMI 23.21 kg/m²     Foot/Ankle Exam    GENERAL  Appearance:  appears stated age  Orientation:  AAOx3  Affect:  appropriate    VASCULAR     Right Foot Vascularity   Normal vascular exam    Dorsalis pedis:  2+  Posterior tibial:  2+  Skin temperature:  warm  Edema grading:  None  CFT:  < 3 seconds  Pedal hair growth:  Present  Varicosities:  none     Left Foot Vascularity   Normal vascular exam    Dorsalis pedis:  2+  Posterior tibial:  2+  Skin temperature:  warm  Edema grading:  None  CFT:  < 3 seconds  Pedal hair growth:  Present  Varicosities:  none     NEUROLOGIC     Right Foot Neurologic   Light touch sensation: normal  Hot/Cold sensation: normal  Achilles reflex:  2+     Left Foot Neurologic   Light touch sensation: normal  Hot/Cold sensation:  normal  Achilles reflex:  2+    MUSCULOSKELETAL     Right Foot Musculoskeletal   Ecchymosis:  none  Tenderness:  metatarsals tenderness    Arch:  Normal     Left Foot Musculoskeletal   Ecchymosis:  none  Tenderness:  metatarsal 5  Arch:  Normal    MUSCLE STRENGTH     Right Foot Muscle Strength   Normal strength    Foot dorsiflexion:  5  Foot plantar flexion:  5  Foot inversion:  5  Foot eversion:  5     " Left Foot Muscle Strength   Normal strength    Foot dorsiflexion:  5  Foot plantar flexion:  5  Foot inversion:  5  Foot eversion:  5    DERMATOLOGIC      Right Foot Dermatologic   Skin  Right foot skin is intact.   Nails comment:  Nails 1-5     Left Foot Dermatologic   Skin  Left foot skin is intact.   Nails comment:  Nails 1-5    TESTS     Right Foot Tests   Anterior drawer: negative  Varus tilt: negative     Left Foot Tests   Anterior drawer: negative  Varus tilt: negative     Right foot additional comments: Moderate equinus contracture with knee extended and flexed to both lower extremities.  No open wounds or signs of infection.  Mild dry skin to both feet.  Minimal callusing.    Physical Exam         Results  Imaging  X-rays of feet show no significant arthritis.       Assessment & Plan   Diagnoses and all orders for this visit:    1. Pain in both feet (Primary)  -     XR Foot 3+ View Bilateral    2. Metatarsalgia of both feet    3. Acquired posterior equinus of both lower extremities    4. Xerosis of skin       Assessment & Plan    Her foot structure is normal, neither flat nor excessively high. The presence of calluses is likely due to skin protection mechanisms. Symptoms are exacerbated by increased activity and uneven terrain. She was advised to moisturize her feet once or twice daily using Aquaphor to alleviate dryness and callusing. Proper shoe support was recommended, with specific brands such as ASICS, Reilly, and New Balance suggested. She was also advised to wear lace-up tennis shoes daily and avoid barefoot, flip flops, sandals, and flats. Over-the-counter arch supports, specifically PowerStep inserts, were recommended for daily use. Low-impact exercises such as biking, elliptical, swimming, water aerobics, and stretching exercises were suggested to help with her back and lower extremities. She was instructed to perform these exercises 3 to 5 times a day, holding each leg for about 10 to 15 seconds  at a time. She was also advised to massage the soft tissues of her feet using a tennis ball while watching TV, reading a book, or eating breakfast. In case of significant discomfort, she was advised to rest, ice, compress, elevate, and take anti-inflammatories to manage the symptoms.    Routine pedicures were recommended to manage calluses. She was advised to moisturize her feet with Aquaphor to help with dryness and callusing. Proper shoe fitting was emphasized, and she was referred to [employer] for appropriate shoe measurement.Reviewed proper basic stretching and manual therapy exercises along with appropriate shoes and activity.  Discussed proper use and/or avoidance of OTC anti-inflammatories.  Patient is to call with any additional issues or concerns.  Greater than 30 minutes was spent before, during, and after evaluation for patient care.    Follow-up  Return in 6 weeks for follow up.           Patient or patient representative verbalized consent for the use of Ambient Listening during the visit with  SHEYLA Pinzon DPM for chart documentation. 10/17/2024  06:58 EDT    SHEYLA Pinzon DPM   Negative

## 2024-10-30 NOTE — PROGRESS NOTES
RM:________     PCP: Bryan Vega MD    : 1955  AGE: 69 y.o.  EST PATIENT     REASON FOR VISIT/  CC:        BP Readings from Last 3 Encounters:   24 107/68   24 120/60   24 107/72      Wt Readings from Last 3 Encounters:   10/16/24 59.4 kg (131 lb)   24 59.5 kg (131 lb 3.2 oz)   24 60.3 kg (133 lb)        WT: ____________ BP: __________L __________R HR______    CHEST PAIN: _____________    SOA: _____________PALPS: _______________     LIGHTHEADED: ___________FATIGUE: ________________ EDEMA __________    ALLERGIES:Other, Sulfa antibiotics, Misc. sulfonamide containing compounds, and Adhesive tape SMOKING HISTORY:  Social History     Tobacco Use    Smoking status: Never     Passive exposure: Never    Smokeless tobacco: Never    Tobacco comments:     caffeine- 3 cups daily   Vaping Use    Vaping status: Never Used   Substance Use Topics    Alcohol use: Not Currently     Comment: NEVER    Drug use: Never     CAFFEINE USE_________________  ALCOHOL ______________________

## 2024-11-04 ENCOUNTER — OFFICE VISIT (OUTPATIENT)
Dept: FAMILY MEDICINE CLINIC | Facility: CLINIC | Age: 69
End: 2024-11-04
Payer: MEDICARE

## 2024-11-04 ENCOUNTER — OFFICE VISIT (OUTPATIENT)
Dept: SPORTS MEDICINE | Facility: CLINIC | Age: 69
End: 2024-11-04
Payer: MEDICARE

## 2024-11-04 VITALS
DIASTOLIC BLOOD PRESSURE: 70 MMHG | SYSTOLIC BLOOD PRESSURE: 120 MMHG | WEIGHT: 134 LBS | OXYGEN SATURATION: 99 % | BODY MASS INDEX: 23.74 KG/M2 | HEIGHT: 63 IN | RESPIRATION RATE: 16 BRPM | HEART RATE: 49 BPM

## 2024-11-04 VITALS
WEIGHT: 134 LBS | OXYGEN SATURATION: 100 % | TEMPERATURE: 97.1 F | BODY MASS INDEX: 23.74 KG/M2 | DIASTOLIC BLOOD PRESSURE: 74 MMHG | SYSTOLIC BLOOD PRESSURE: 104 MMHG | HEIGHT: 63 IN | HEART RATE: 64 BPM

## 2024-11-04 DIAGNOSIS — I42.8 NONISCHEMIC CARDIOMYOPATHY: ICD-10-CM

## 2024-11-04 DIAGNOSIS — G89.4 CHRONIC PAIN SYNDROME: ICD-10-CM

## 2024-11-04 DIAGNOSIS — M79.642 PAIN IN BOTH HANDS: Primary | ICD-10-CM

## 2024-11-04 DIAGNOSIS — K64.8 INTERNAL HEMORRHOIDS: ICD-10-CM

## 2024-11-04 DIAGNOSIS — M79.641 PAIN IN BOTH HANDS: Primary | ICD-10-CM

## 2024-11-04 DIAGNOSIS — M15.1 DEGENERATIVE ARTHRITIS OF DISTAL INTERPHALANGEAL JOINT OF INDEX FINGER OF LEFT HAND: ICD-10-CM

## 2024-11-04 DIAGNOSIS — E78.00 PURE HYPERCHOLESTEROLEMIA: ICD-10-CM

## 2024-11-04 DIAGNOSIS — M18.0 ARTHRITIS OF CARPOMETACARPAL (CMC) JOINT OF BOTH THUMBS: ICD-10-CM

## 2024-11-04 DIAGNOSIS — M79.7 FIBROMYALGIA: ICD-10-CM

## 2024-11-04 DIAGNOSIS — Z00.00 MEDICARE ANNUAL WELLNESS VISIT, SUBSEQUENT: Primary | ICD-10-CM

## 2024-11-04 DIAGNOSIS — M81.0 AGE-RELATED OSTEOPOROSIS WITHOUT CURRENT PATHOLOGICAL FRACTURE: Chronic | ICD-10-CM

## 2024-11-04 PROCEDURE — 99214 OFFICE O/P EST MOD 30 MIN: CPT | Performed by: FAMILY MEDICINE

## 2024-11-04 PROCEDURE — 1159F MED LIST DOCD IN RCRD: CPT | Performed by: FAMILY MEDICINE

## 2024-11-04 PROCEDURE — 20604 DRAIN/INJ JOINT/BURSA W/US: CPT | Performed by: FAMILY MEDICINE

## 2024-11-04 PROCEDURE — 1126F AMNT PAIN NOTED NONE PRSNT: CPT | Performed by: FAMILY MEDICINE

## 2024-11-04 PROCEDURE — 1160F RVW MEDS BY RX/DR IN RCRD: CPT | Performed by: FAMILY MEDICINE

## 2024-11-04 PROCEDURE — 1170F FXNL STATUS ASSESSED: CPT | Performed by: FAMILY MEDICINE

## 2024-11-04 PROCEDURE — G0439 PPPS, SUBSEQ VISIT: HCPCS | Performed by: FAMILY MEDICINE

## 2024-11-04 PROCEDURE — 99213 OFFICE O/P EST LOW 20 MIN: CPT | Performed by: FAMILY MEDICINE

## 2024-11-04 RX ORDER — DULOXETIN HYDROCHLORIDE 30 MG/1
30 CAPSULE, DELAYED RELEASE ORAL DAILY
Qty: 90 CAPSULE | Refills: 1 | Status: SHIPPED | OUTPATIENT
Start: 2024-11-04

## 2024-11-04 RX ORDER — TRIAMCINOLONE ACETONIDE 40 MG/ML
20 INJECTION, SUSPENSION INTRA-ARTICULAR; INTRAMUSCULAR
Status: COMPLETED | OUTPATIENT
Start: 2024-11-04 | End: 2024-11-04

## 2024-11-04 RX ORDER — TRIAMCINOLONE ACETONIDE 40 MG/ML
5 INJECTION, SUSPENSION INTRA-ARTICULAR; INTRAMUSCULAR
Status: COMPLETED | OUTPATIENT
Start: 2024-11-04 | End: 2024-11-04

## 2024-11-04 RX ADMIN — TRIAMCINOLONE ACETONIDE 20 MG: 40 INJECTION, SUSPENSION INTRA-ARTICULAR; INTRAMUSCULAR at 13:03

## 2024-11-04 RX ADMIN — TRIAMCINOLONE ACETONIDE 5 MG: 40 INJECTION, SUSPENSION INTRA-ARTICULAR; INTRAMUSCULAR at 13:04

## 2024-11-04 NOTE — PROGRESS NOTES
Subjective   The ABCs of the Annual Wellness Visit  Medicare Wellness Visit      Shanika Hector is a 69 y.o. patient who presents for a Medicare Wellness Visit.    The following portions of the patient's history were reviewed and   updated as appropriate: allergies, current medications, past family history, past medical history, past social history, past surgical history, and problem list.    Compared to one year ago, the patient's physical   health is the same.  Compared to one year ago, the patient's mental   health is the same.    Recent Hospitalizations:  She was not admitted to the hospital during the last year.     Current Medical Providers:  Patient Care Team:  Bryan Vega MD as PCP - General (Family Medicine)  Bryan Vega MD as PCP - Family Medicine  Darrion Bateman MD as Consulting Physician (Cardiology)  Mati Toscano MD as Consulting Physician (Cardiology)    Outpatient Medications Prior to Visit   Medication Sig Dispense Refill    acetaminophen (TYLENOL) 650 MG 8 hr tablet Take 2 tablets by mouth 2 (Two) Times a Day As Needed.      alendronate (FOSAMAX) 70 MG tablet Take 1 tablet by mouth Every 7 (Seven) Days. 12 tablet 3    gabapentin (NEURONTIN) 100 MG capsule TAKE 1 CAPSULE BY MOUTH EVERY MORNING AND TAKE 3 CAPSULES EVERY NIGHT AT BEDTIME 360 capsule 1    ibuprofen (ADVIL,MOTRIN) 200 MG tablet Take 1 tablet by mouth Every 6 (Six) Hours As Needed for Mild Pain.      lisinopril (PRINIVIL,ZESTRIL) 2.5 MG tablet TAKE ONE TABLET BY MOUTH ONCE NIGHTLY 90 tablet 1    metoprolol succinate XL (TOPROL-XL) 25 MG 24 hr tablet TAKE 1 TABLET BY MOUTH DAILY 90 tablet 1    omeprazole (priLOSEC) 40 MG capsule TAKE 1 CAPSULE BY MOUTH DAILY 90 capsule 1    DULoxetine (CYMBALTA) 20 MG capsule TAKE 1 CAPSULE BY MOUTH DAILY 90 capsule 1     No facility-administered medications prior to visit.     No opioid medication identified on active medication list. I have reviewed chart for other potential  high risk  "medication/s and harmful drug interactions in the elderly.      Aspirin is not on active medication list.  Aspirin use is not indicated based on review of current medical condition/s. Risk of harm outweighs potential benefits.  .    Patient Active Problem List   Diagnosis    Cervical radiculopathy    Generalized osteoarthritis    Gastroesophageal reflux disease    Irritable bowel syndrome with constipation    Osteoporosis    Seborrheic eczema    Osteoarthritis of cervical spine without myelopathy    Eczema    DDD (degenerative disc disease), lumbar    Bilateral sacroiliitis    Lateral femoral cutaneous neuropathy    Other chronic pain    Lumbar facet arthropathy    Pure hypercholesterolemia    Senile atrophic vaginitis    Other specified abnormal immunological findings in serum    Cervical disc disorder, unspecified, unspecified cervical region    Chronic pain syndrome    Dyspepsia    Fibromyalgia    Mild episode of recurrent major depressive disorder    Chronic systolic congestive heart failure    Nonischemic cardiomyopathy    PVCs (premature ventricular contractions)    Colitis    Abdominal pain    Internal hemorrhoids     Advance Care Planning Advance Directive is on file.  ACP discussion was held with the patient during this visit. Patient has an advance directive in EMR which is still valid.             Objective   Vitals:    11/04/24 0953   BP: 104/74   Pulse: 64   Temp: 97.1 °F (36.2 °C)   TempSrc: Temporal   SpO2: 100%   Weight: 60.8 kg (134 lb)   Height: 160 cm (63\")       Estimated body mass index is 23.74 kg/m² as calculated from the following:    Height as of this encounter: 160 cm (63\").    Weight as of this encounter: 60.8 kg (134 lb).    BMI is within normal parameters. No other follow-up for BMI required.       Does the patient have evidence of cognitive impairment? No                                                                                                Health  Risk Assessment    Smoking " Status:  Social History     Tobacco Use   Smoking Status Never    Passive exposure: Never   Smokeless Tobacco Never   Tobacco Comments    caffeine- 3 cups daily     Alcohol Consumption:  Social History     Substance and Sexual Activity   Alcohol Use Not Currently    Comment: NEVER       Fall Risk Screen  STEADI Fall Risk Assessment was completed, and patient is at LOW risk for falls.Assessment completed on:2024    Depression Screenin/4/2024     9:55 AM   PHQ-2/PHQ-9 Depression Screening   Little interest or pleasure in doing things Not at all   Feeling down, depressed, or hopeless Not at all     Health Habits and Functional and Cognitive Screenin/3/2024     1:12 PM   Functional & Cognitive Status   Do you have difficulty preparing food and eating? No    Do you have difficulty bathing yourself, getting dressed or grooming yourself? No    Do you have difficulty using the toilet? No    Do you have difficulty moving around from place to place? No    Do you have trouble with steps or getting out of a bed or a chair? No    Current Diet Frequent Junk Food    Dental Exam Up to date    Eye Exam Not up to date    Exercise (times per week) 3 times per week    Current Exercises Include Gardening;House Cleaning;Walking    Do you need help using the phone?  No    Are you deaf or do you have serious difficulty hearing?  No    Do you need help to go to places out of walking distance? No    Do you need help shopping? No    Do you need help preparing meals?  No    Do you need help with housework?  No    Do you need help with laundry? No    Do you need help taking your medications? No    Do you need help managing money? No    Do you ever drive or ride in a car without wearing a seat belt? No    Have you felt unusual stress, anger or loneliness in the last month? No    Who do you live with? Spouse    If you need help, do you have trouble finding someone available to you? No    Have you been bothered in the last  four weeks by sexual problems? No    Do you have difficulty concentrating, remembering or making decisions? No        Patient-reported           Age-appropriate Screening Schedule:  Refer to the list below for future screening recommendations based on patient's age, sex and/or medical conditions. Orders for these recommended tests are listed in the plan section. The patient has been provided with a written plan.    Health Maintenance List  Health Maintenance   Topic Date Due    PAP SMEAR  11/13/2020    DXA SCAN  07/20/2022    MAMMOGRAM  09/20/2023    COVID-19 Vaccine (4 - 2024-25 season) 09/01/2024    ANNUAL WELLNESS VISIT  10/31/2024    LIPID PANEL  05/24/2025    TDAP/TD VACCINES (2 - Td or Tdap) 11/13/2027    COLORECTAL CANCER SCREENING  11/09/2032    HEPATITIS C SCREENING  Completed    INFLUENZA VACCINE  Completed    Pneumococcal Vaccine 65+  Completed    ZOSTER VACCINE  Discontinued                                                                                                                                                CMS Preventative Services Quick Reference  Risk Factors Identified During Encounter  Immunizations Discussed/Encouraged: Influenza, Shingrix, COVID19, and RSV (Respiratory Syncytial Virus)    The above risks/problems have been discussed with the patient.  Pertinent information has been shared with the patient in the After Visit Summary.  An After Visit Summary and PPPS were made available to the patient.    Follow Up:   Next Medicare Wellness visit to be scheduled in 1 year.         Additional E&M Note during same encounter follows:  Patient has additional, significant, and separately identifiable condition(s)/problem(s) that require work above and beyond the Medicare Wellness Visit     Chief Complaint  Medicare Wellness-subsequent    Subjective   HPI            Chronic pain syndrome/fibromyalgia.  Continues on long-term gabapentin.  She has weaned down the gabapentin.  She finds if she takes  "2 gabapentin at nighttime and helps the best.  More does not help her.  Except for occasionally when she is having some more pain she will go up on the dose for short period of time.  Continues on duloxetine 20 mg daily at low-dose.  She believes that duloxetine has helped since it was started.  Previously on 40 mg but the insurance would not pay for the 2 tablets a day.    Osteoporosis.  Continues alendronate 70 mg weekly.    Nonischemic cardiomyopathy.  Continues on lisinopril at low-dose and metoprolol.  She was on hydroxychloroquine at the time when she was admitted for CHF.  Her EF was 20%.  Earlier this year it was 50%.  The hydroxychloroquine was discontinued at that time.  She had a cardiac catheterization which revealed essentially normal coronary arteries.  She is not on a statin.  However her last LDL cholesterol is 182 about 6 months ago.  She has a high HDL of 75.  Creatinine normal.  Potassium 5 months ago was 6.0.  Has not been repeated.  Lab work is pending today.  She will be seeing a cardiologist in the upcoming weeks.      Objective   Vital Signs:  /74   Pulse 64   Temp 97.1 °F (36.2 °C) (Temporal)   Ht 160 cm (63\")   Wt 60.8 kg (134 lb)   SpO2 100%   BMI 23.74 kg/m²   Physical Exam  Vitals and nursing note reviewed.   Constitutional:       General: She is not in acute distress.     Appearance: She is well-developed.   Cardiovascular:      Rate and Rhythm: Normal rate and regular rhythm.      Heart sounds: Normal heart sounds.   Pulmonary:      Effort: Pulmonary effort is normal.      Breath sounds: Normal breath sounds.   Musculoskeletal:      Right lower leg: No edema.      Left lower leg: No edema.   Skin:     General: Skin is warm and dry.   Psychiatric:         Mood and Affect: Mood normal.                 Assessment and Plan     Annual Medicare wellness visit.    Immunizations discussed.    Fibromyalgia and chronic pain syndrome.  She continues gabapentin 200 mg at night most " days.  Continue same.  I am increasing her duloxetine from 20 mg up to 30 mg day.  Side effects discussed.  Benefits outweigh risks.  See me in 6 months for follow-up.    Hyperlipidemia.  Her LDL cholesterol is 186 months ago.  However her HDL is high.  Her cardiac catheterization 2021 showed clean coronary arteries.  Rechecking lipid panel today.  If very high again, to consider statin use.    Nonischemic cardiomyopathy.  Continues low-dose lisinopril and continues metoprolol.  She also follows with cardiology.    Osteoporosis.  Continues alendronate 70 mg weekly.              Medicare annual wellness visit, subsequent    Chronic pain syndrome    Fibromyalgia    Pure hypercholesterolemia     Internal hemorrhoids    Nonischemic cardiomyopathy        Age-related osteoporosis without current pathological fracture      Orders Placed This Encounter   Procedures    Comprehensive Metabolic Panel     Order Specific Question:   Release to patient     Answer:   Routine Release [[id number]]    Lipid Panel     Order Specific Question:   Release to patient     Answer:   Routine Release [[id number]]    CBC & Differential     Order Specific Question:   Manual Differential     Answer:   No     Order Specific Question:   Release to patient     Answer:   Routine Release [[id number]]     New Medications Ordered This Visit   Medications    DULoxetine (CYMBALTA) 30 MG capsule     Sig: Take 1 capsule by mouth Daily.     Dispense:  90 capsule     Refill:  1          Follow Up   No follow-ups on file.  Patient was given instructions and counseling regarding her condition or for health maintenance advice. Please see specific information pulled into the AVS if appropriate.

## 2024-11-04 NOTE — PROGRESS NOTES
"Shanika is a 69 y.o. year old female presents to [facility]    Chief Complaint   Patient presents with    Hand Pain     Self referral eval for b/l finger pain - reports left thumb and index finger on the left hand, left index trigger finger possibly, thumb and middle finger on the right hand, reports arthritis and would like to have injections - here with new xrays for further evaluation and treatment        History of Present Illness  History of Present Illness  The patient is a 69-year-old female who presents for evaluation of bilateral thumb pain.  Reestablish care.  Last seen 2018.    She is experiencing pain in both thumbs, which she attributes to arthritis, as confirmed by her rheumatologist. The pain is particularly noticeable when she turns objects or opens doors. Despite being right-handed, she finds it challenging to grasp items due to the pain. Gardening activities exacerbate the discomfort.    She has a high pain tolerance, having lived with chronic sciatica pain for approximately 10 years, for which she takes gabapentin. She has also undergone pain management for her back, which resulted in numbness in her entire back and lower back for nearly a year.    I have reviewed the patient's medical, family, and social history in detail and updated the computerized patient record.    /70 (BP Location: Right arm, Patient Position: Sitting, Cuff Size: Adult)   Pulse (!) 49   Resp 16   Ht 160 cm (62.99\")   Wt 60.8 kg (134 lb)   SpO2 99%   BMI 23.74 kg/m²      Physical Exam    Vital signs reviewed.   General: No acute distress.  Eyes: conjunctiva clear; pupils equally round and reactive  ENT: external ears atraumatic  CV: no peripheral edema  Resp: normal respiratory effort, no use of accessory muscles  Skin: no rashes or wounds; normal turgor  Psych: mood and affect appropriate; recent and remote memory intact  Neuro: sensation to light touch intact    MSK Exam  Physical " Exam  Positive CMC grind test in both hands, with the first CMC on the right being more pronounced than the left. Significant ulnar deviation of the left first DIP joint with crepitus is noted.    Bilateral Hand X-Ray  Indication: Pain  AP, Lateral, and Oblique views    Findings:  No fracture  No bony lesion  Normal soft tissues  Normal CMC OA bilaterally.  Substantiative left second DIP OA with bone-on-bone phenomenon    No prior studies were available for comparison.        Results      - Small Joint Arthrocentesis: bilateral thumb CMC on 11/4/2024 1:03 PM  Indications: pain  Details: 30 G needle, ultrasound-guided radial approach  Medications (Right): 20 mg triamcinolone acetonide 40 MG/ML  Medications (Left): 20 mg triamcinolone acetonide 40 MG/ML  Outcome: tolerated well, no immediate complications  Procedure, treatment alternatives, risks and benefits explained, specific risks discussed. Consent was given by the patient. Immediately prior to procedure a time out was called to verify the correct patient, procedure, equipment, support staff and site/side marked as required. Patient was prepped and draped in the usual sterile fashion.       - Small Joint Arthrocentesis: L index DIP on 11/4/2024 1:04 PM  Indications: pain  Details: 30 G needle, ultrasound-guided volar approach  Medications: 5 mg triamcinolone acetonide 40 MG/ML  Outcome: tolerated well, no immediate complications  Procedure, treatment alternatives, risks and benefits explained, specific risks discussed. Consent was given by the patient. Immediately prior to procedure a time out was called to verify the correct patient, procedure, equipment, support staff and site/side marked as required. Patient was prepped and draped in the usual sterile fashion.           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 Bilateral thumb pain.  The patient reports significant pain in both thumbs, particularly in the joints. The pain is exacerbated by activities such as gardening and using door knobs. Injections were administered to both thumbs to alleviate the pain. She was advised that pain relief is the primary treatment option available.    2. Left index finger pain.  The patient experiences pain in the left index finger, which has worsened over time. There is significant ulnar deviation of the left first DI second DIP joint with crepitus. An injection was administered to the left index finger to provide pain relief.    3. Chronic sciatica pain.  The patient has a history of chronic sciatica pain for approximately 10 years, managed with gabapentin. She reports that the pain persists and affects her sleep.              Follow Up     Patient was given instructions and counseling regarding her condition or for health maintenance advice. Please see specific information pulled into the AVS if appropriate.     Patient or patient representative verbalized consent for the use of Ambient Listening during the visit with  SUNNY Amaya Jr., DO for chart documentation. 11/4/2024  13:05 EST

## 2024-11-05 LAB
ALBUMIN SERPL-MCNC: 4 G/DL (ref 3.5–5.2)
ALBUMIN/GLOB SERPL: 1.4 G/DL
ALP SERPL-CCNC: 87 U/L (ref 39–117)
ALT SERPL-CCNC: 8 U/L (ref 1–33)
AST SERPL-CCNC: 17 U/L (ref 1–32)
BASOPHILS # BLD AUTO: 0.04 10*3/MM3 (ref 0–0.2)
BASOPHILS NFR BLD AUTO: 0.8 % (ref 0–1.5)
BILIRUB SERPL-MCNC: 0.2 MG/DL (ref 0–1.2)
BUN SERPL-MCNC: 19 MG/DL (ref 8–23)
BUN/CREAT SERPL: 19.4 (ref 7–25)
CALCIUM SERPL-MCNC: 9.1 MG/DL (ref 8.6–10.5)
CHLORIDE SERPL-SCNC: 103 MMOL/L (ref 98–107)
CHOLEST SERPL-MCNC: 251 MG/DL (ref 0–200)
CO2 SERPL-SCNC: 27.8 MMOL/L (ref 22–29)
CREAT SERPL-MCNC: 0.98 MG/DL (ref 0.57–1)
EGFRCR SERPLBLD CKD-EPI 2021: 61.8 ML/MIN/1.73
EOSINOPHIL # BLD AUTO: 0.28 10*3/MM3 (ref 0–0.4)
EOSINOPHIL NFR BLD AUTO: 5.6 % (ref 0.3–6.2)
ERYTHROCYTE [DISTWIDTH] IN BLOOD BY AUTOMATED COUNT: 11.8 % (ref 12.3–15.4)
GLOBULIN SER CALC-MCNC: 2.9 GM/DL
GLUCOSE SERPL-MCNC: 96 MG/DL (ref 65–99)
HCT VFR BLD AUTO: 35.6 % (ref 34–46.6)
HDLC SERPL-MCNC: 68 MG/DL (ref 40–60)
HGB BLD-MCNC: 11.6 G/DL (ref 12–15.9)
IMM GRANULOCYTES # BLD AUTO: 0.01 10*3/MM3 (ref 0–0.05)
IMM GRANULOCYTES NFR BLD AUTO: 0.2 % (ref 0–0.5)
LDLC SERPL CALC-MCNC: 166 MG/DL (ref 0–100)
LYMPHOCYTES # BLD AUTO: 1.28 10*3/MM3 (ref 0.7–3.1)
LYMPHOCYTES NFR BLD AUTO: 25.5 % (ref 19.6–45.3)
MCH RBC QN AUTO: 30.5 PG (ref 26.6–33)
MCHC RBC AUTO-ENTMCNC: 32.6 G/DL (ref 31.5–35.7)
MCV RBC AUTO: 93.7 FL (ref 79–97)
MONOCYTES # BLD AUTO: 0.55 10*3/MM3 (ref 0.1–0.9)
MONOCYTES NFR BLD AUTO: 11 % (ref 5–12)
NEUTROPHILS # BLD AUTO: 2.86 10*3/MM3 (ref 1.7–7)
NEUTROPHILS NFR BLD AUTO: 56.9 % (ref 42.7–76)
NRBC BLD AUTO-RTO: 0 /100 WBC (ref 0–0.2)
PLATELET # BLD AUTO: 313 10*3/MM3 (ref 140–450)
POTASSIUM SERPL-SCNC: 5.4 MMOL/L (ref 3.5–5.2)
PROT SERPL-MCNC: 6.9 G/DL (ref 6–8.5)
RBC # BLD AUTO: 3.8 10*6/MM3 (ref 3.77–5.28)
SODIUM SERPL-SCNC: 140 MMOL/L (ref 136–145)
TRIGL SERPL-MCNC: 96 MG/DL (ref 0–150)
VLDLC SERPL CALC-MCNC: 17 MG/DL (ref 5–40)
WBC # BLD AUTO: 5.02 10*3/MM3 (ref 3.4–10.8)

## 2024-11-08 ENCOUNTER — PATIENT ROUNDING (BHMG ONLY) (OUTPATIENT)
Dept: SPORTS MEDICINE | Facility: CLINIC | Age: 69
End: 2024-11-08
Payer: MEDICARE

## 2024-11-08 NOTE — PROGRESS NOTES
November 8, 2024    A [employer] Message has been sent to the patient [place] [facility] [place]

## 2024-11-11 ENCOUNTER — OFFICE VISIT (OUTPATIENT)
Dept: CARDIOLOGY | Facility: CLINIC | Age: 69
End: 2024-11-11
Payer: MEDICARE

## 2024-11-11 VITALS
HEIGHT: 63 IN | SYSTOLIC BLOOD PRESSURE: 112 MMHG | BODY MASS INDEX: 23.32 KG/M2 | WEIGHT: 131.6 LBS | DIASTOLIC BLOOD PRESSURE: 68 MMHG | HEART RATE: 60 BPM

## 2024-11-11 DIAGNOSIS — I49.3 PVCS (PREMATURE VENTRICULAR CONTRACTIONS): ICD-10-CM

## 2024-11-11 DIAGNOSIS — I42.8 NONISCHEMIC CARDIOMYOPATHY: Primary | ICD-10-CM

## 2024-11-11 PROBLEM — I50.22 CHRONIC SYSTOLIC CONGESTIVE HEART FAILURE: Chronic | Status: RESOLVED | Noted: 2021-02-13 | Resolved: 2024-11-11

## 2024-11-11 PROBLEM — R10.9 ABDOMINAL PAIN: Status: RESOLVED | Noted: 2022-07-26 | Resolved: 2024-11-11

## 2024-11-11 PROBLEM — K52.9 COLITIS: Status: RESOLVED | Noted: 2022-07-09 | Resolved: 2024-11-11

## 2024-11-11 PROCEDURE — 99214 OFFICE O/P EST MOD 30 MIN: CPT | Performed by: INTERNAL MEDICINE

## 2024-11-11 PROCEDURE — 93000 ELECTROCARDIOGRAM COMPLETE: CPT | Performed by: INTERNAL MEDICINE

## 2024-11-11 PROCEDURE — 1160F RVW MEDS BY RX/DR IN RCRD: CPT | Performed by: INTERNAL MEDICINE

## 2024-11-11 PROCEDURE — 1159F MED LIST DOCD IN RCRD: CPT | Performed by: INTERNAL MEDICINE

## 2024-11-11 NOTE — PROGRESS NOTES
"Date of Office Visit: 24  Encounter Provider: Darrion Bateman MD  Place of Service: [facility]  Patient Name: Shanika Hector  :1955    Chief Complaint   Patient presents with    Cardiomyopathy   :     HPI: Ms. Hector is 69 y.o. and presents today in follow up. I have reviewed prior notes and there are no changes except for any new updates described below. I have also reviewed any information entered into the medical record by the patient or by ancillary staff.     She presented to [facility] on 2020 with dyspnea, palpitations, orthopnea, and abdominal distention.  Her EKG showed sinus tachycardia and LVH/strain. CTA of the chest was negative for pulmonary embolism, bilateral pleural effusions, and showed mild pulmonary edema.  She was noted to have PVCs on telemetry.  An echo revealed an LVEF of 20% and moderate-severe MR.  She underwent right and left heart catheterization; her coronaries were normal, as were her pulmonary pressures and LVEDP.  She was started on metoprolol succinate; her BP was relatively low, an additional medications could not be added.  She had been taking hydroxychloroquine for erosive osteoarthritis, and it was discontinued.     A cardiac MRI in 2020 showed an EF of 30%, and was otherwise normal.  A follow up echo in November showed improvement in her LVEF to 35-40%. In 2022, she was found to have very frequent PVCs. She underwent successful ablation of LV left posterior fascicular PVCs. A follow up Holter showed that her PVC burden had decreased to 3%. An echo in May 2022 showed improvement in her LVEF to 51%.     She feels great. She denies chest pain, dyspnea, leg swelling, orthopnea, or syncope. At our last visit, she reported palpitations. A rhythm monitor was normal. She denies any palpitations at this time.    Past Medical History:   Diagnosis Date    Abdominal pain     STATES FOR ABOUT THE LAST MONTH:  \"GI " "TRACT UNWELL\"    Acute systolic congestive heart failure 02/13/2021    Anxiety     Bilateral sacroiliitis 05/08/2017    Bladder infection     RECENT AND TREATED FOR    Cardiomyopathy, dilated     Cervical radiculopathy     Coronary artery disease     Under Dr Darrion Bateman    Diverticulosis     Diagnois on 11-9-22 colonoscopy    Fibromyalgia 03/16/2020    Fracture of wrist 12/2013    Left wrist    Gastric ulcer     GERD (gastroesophageal reflux disease)     Heart murmur Preteen    History of atrial fibrillation     CARDIAC ABLATION MARCH 2022    Hyperlipidemia 6/10/2019    Hypertension     Injury of neck last 8 years    Irritable bowel syndrome     CONSTIPATION ONLY    Lichen sclerosus of female genitalia     Low back pain     Myocardial infarction     Nonischemic cardiomyopathy     2/2021, EF 20%, normal cors    Nonrheumatic mitral valve regurgitation     Osteoporosis     Peptic ulceration 12/2019    PVCs (premature ventricular contractions) 03/22/2021    RASH     STATES UNDER BREASTS BILAT AND WRAPS AROUND BACK-ITCHES.  STATES HAS RASH OFTEN TIMES WITH FOODS       Past Surgical History:   Procedure Laterality Date    BREAST CYST ASPIRATION      10+ years ago    CARDIAC ABLATION  03/2022    DR. PEÑA    CARDIAC CATHETERIZATION N/A 02/15/2021    Procedure: Left Heart Cath;  Surgeon: Zee Rojas MD;  [facility];  Service: Cardiovascular;  Laterality: N/A;    CARDIAC CATHETERIZATION N/A 02/15/2021    Procedure: Left ventriculography;  Surgeon: Zee Rojas MD;  Location:  [facility] C[facility] INVASIVE LOCATION;  Service: Cardiovascular;  Laterality: N/A;    CARDIAC CATHETERIZATION N/A 02/15/2021    Procedure: Coronary angiography;  Surgeon: Zee Rojas MD;  Location: [facility] CATH INVASIVE LOCATION;  Service: Cardiovascular;  Laterality: N/A;    CARDIAC CATHETERIZATION N/A 02/15/2021    Procedure: Right Heart Cath;  Surgeon: Zee Rojas MD;  [facility];  " Service: Cardiovascular;  Laterality: N/A;    CARDIAC ELECTROPHYSIOLOGY PROCEDURE N/A 03/09/2022    Procedure: Ablation PVC;  Surgeon: Mati Toscano MD;  Location:  [facility] CATH INVASIVE LOCATION;  Service: Cardiology;  Laterality: N/A;    CARDIAC ELECTROPHYSIOLOGY PROCEDURE N/A 03/09/2022    Procedure: EP/ABLATION;  Surgeon: Mati Toscano MD;  Location:  [facility] CATH INVASIVE LOCATION;  Service: Cardiology;  Laterality: N/A;    CHOLECYSTECTOMY      COLONOSCOPY  07/30/2012    Dr. Zazueta, normal    COLONOSCOPY N/A 11/09/2022    Procedure: COLONOSCOPY into cecum and TI;  Surgeon: Lida Garcia MD;  [facility];  Service: Gastroenterology;  Laterality: N/A;  pre: screen  post: diverticulosis, hemorrhoids     ENDOSCOPY  12/04/2015    Mild active duodenitis, reactive gastropathy suggestive of healing erosion or adjacent ulcer    ENDOSCOPY N/A 12/16/2019    Procedure: ESOPHAGOGASTRODUODENOSCOPY WITH BIOPSY WITH COLD BIOPSY POLYPECTOMY;  Surgeon: Lida Garcia MD;  [facility];  Service: Gastroenterology    ENDOSCOPY N/A 07/13/2020    Procedure: ESOPHAGOGASTRODUODENOSCOPY WITH COLD BIOPSY POLYPECTOMY;  Surgeon: Lida Garcia MD;  Location: [facility] ENDOSCOPY;  Service: Gastroenterology;  Laterality: N/A;  pre: history of gastric ulcer  post: healed gastric ulcer and gastric polyp    EYE SURGERY  April2021    Cararact surgury both eyes:  LENS IMPLANTS?    TRIGGER POINT INJECTION  In last 6 months       Social History     Socioeconomic History    Marital status:    Tobacco Use    Smoking status: Never     Passive exposure: Never    Smokeless tobacco: Never    Tobacco comments:     caffeine- 3 cups daily   Vaping Use    Vaping status: Never Used   Substance and Sexual Activity    Alcohol use: Not Currently     Comment: NEVER    Drug use: Never    Sexual activity: Yes     Partners: Male     Birth control/protection: None       Family History   Problem Relation Age of  Onset    Irritable bowel syndrome Mother     Hypertension Mother     Hypertension Father     Hearing loss Father     Arthritis Sister     Osteoporosis Sister     Rheumatologic disease Sister     Alcohol abuse Brother     Early death Brother     Heart disease Brother     Arthritis Daughter         Breast cancer 2023    Malig Hyperthermia Neg Hx        Review of Systems   Constitutional: Positive for malaise/fatigue.   Respiratory:  Positive for cough.    Musculoskeletal:  Positive for joint pain.   Neurological:  Positive for excessive daytime sleepiness.   All other systems reviewed and are negative.      Allergies   Allergen Reactions    Other Shortness Of Breath     perfume    Sulfa Antibiotics Hives     Was told by mother    Misc. Sulfonamide Containing Compounds Hives    Adhesive Tape Itching and Rash     PT STATES TAPE TAKES SKIN OFF         Current Outpatient Medications:     acetaminophen (TYLENOL) 650 MG 8 hr tablet, Take 2 tablets by mouth 2 (Two) Times a Day As Needed., Disp: , Rfl:     DULoxetine (CYMBALTA) 30 MG capsule, Take 1 capsule by mouth Daily., Disp: 90 capsule, Rfl: 1    gabapentin (NEURONTIN) 100 MG capsule, TAKE 1 CAPSULE BY MOUTH EVERY MORNING AND TAKE 3 CAPSULES EVERY NIGHT AT BEDTIME, Disp: 360 capsule, Rfl: 1    ibuprofen (ADVIL,MOTRIN) 200 MG tablet, Take 1 tablet by mouth Every 6 (Six) Hours As Needed for Mild Pain., Disp: , Rfl:     lisinopril (PRINIVIL,ZESTRIL) 2.5 MG tablet, TAKE ONE TABLET BY MOUTH ONCE NIGHTLY, Disp: 90 tablet, Rfl: 1    metoprolol succinate XL (TOPROL-XL) 25 MG 24 hr tablet, TAKE 1 TABLET BY MOUTH DAILY, Disp: 90 tablet, Rfl: 1    omeprazole (priLOSEC) 40 MG capsule, TAKE 1 CAPSULE BY MOUTH DAILY, Disp: 90 capsule, Rfl: 1    alendronate (FOSAMAX) 70 MG tablet, Take 1 tablet by mouth Every 7 (Seven) Days. (Patient not taking: Reported on 11/11/2024), Disp: 12 tablet, Rfl: 3      Objective:     Vitals:    11/11/24 0927   BP: 112/68   BP Location: Left arm   Patient  "Position: Sitting   Pulse: 60   Weight: 59.7 kg (131 lb 9.6 oz)   Height: 160 cm (62.99\")     Body mass index is 23.32 kg/m².    Constitutional:       Appearance: Healthy appearance. Not in distress.   Eyes:      Conjunctiva/sclera: Conjunctivae normal.   HENT:    Mouth/Throat:      Pharynx: Oropharynx is clear.   Neck:      Vascular: JVD normal.   Pulmonary:      Effort: Pulmonary effort is normal.      Breath sounds: Normal breath sounds.   Cardiovascular:      Normal rate.      Murmurs: There is no murmur.   Pulses:     Intact distal pulses.   Edema:     Peripheral edema absent.   Abdominal:      Palpations: Abdomen is soft.      Tenderness: There is no abdominal tenderness.   Musculoskeletal: Normal range of motion.      Cervical back: Normal range of motion. Skin:     General: Skin is warm and dry.   Neurological:      General: No focal deficit present.           ECG 12 Lead    Date/Time: 11/11/2024 9:46 AM  Performed by: Darrion Bateman MD    Authorized by: Darrion Bateman MD  Comparison: compared with previous ECG   Similar to previous ECG  Rhythm: sinus rhythm  Conduction: conduction normal  Other findings: non-specific ST-T wave changes    Clinical impression: non-specific ECG            Assessment:       Diagnosis Plan   1. Nonischemic cardiomyopathy  ECG 12 Lead    Adult Transthoracic Echo Complete W/ Cont if Necessary Per Protocol      2. PVCs (premature ventricular contractions)               Plan:       She has NICM. Her EF was originally 20%.  She underwent PVC ablation.  She is on goal-directed medical therapy; this is limited by the fact that she is not hypertensive. Her last echo showed borderline LV dilation and low normal LVSF, E 50%. She previously had an MRI that had no evidence of late gadolinium enhancement. She is euvolemic and NYHA class I.    She's doing well; no changes have been made. I'll see her back in a year and we'll repeat an echo at that time.     Sincerely,       Darrion Bateman, " MD

## 2024-11-11 NOTE — LETTER
2024     Bryan Vega MD  [address]  [address]  [facility] [address]    Patient: Shanika Hector   YOB: 1955   Date of Visit: 2024       Dear Bryan Vega MD    Shanika Hector was in my office today. Below is a copy of my note.    If you have questions, please do not hesitate to call me. I look forward to following Shanika along with you.         Sincerely,        Darrion Bateman MD        CC: No Recipients    Date of Office Visit: 24  Encounter Provider: Darrion Bateman MD  Place of Service: [facility] CARDIOLOGY  Patient Name: Shanika Hector  :1955    Chief Complaint   Patient presents with   • Cardiomyopathy   :     HPI: Ms. Hector is 69 y.o. and presents today in follow up. I have reviewed prior notes and there are no changes except for any new updates described below. I have also reviewed any information entered into the medical record by the patient or by ancillary staff.     She presented to [facility] on 2020 with dyspnea, palpitations, orthopnea, and abdominal distention.  Her EKG showed sinus tachycardia and LVH/strain. CTA of the chest was negative for pulmonary embolism, bilateral pleural effusions, and showed mild pulmonary edema.  She was noted to have PVCs on telemetry.  An echo revealed an LVEF of 20% and moderate-severe MR.  She underwent right and left heart catheterization; her coronaries were normal, as were her pulmonary pressures and LVEDP.  She was started on metoprolol succinate; her BP was relatively low, an additional medications could not be added.  She had been taking hydroxychloroquine for erosive osteoarthritis, and it was discontinued.     A cardiac MRI in 2020 showed an EF of 30%, and was otherwise normal.  A follow up echo in November showed improvement in her LVEF to 35-40%. In 2022, she was found to have very frequent PVCs. She underwent successful ablation of LV left posterior  "fascicular PVCs. A follow up Holter showed that her PVC burden had decreased to 3%. An echo in May 2022 showed improvement in her LVEF to 51%.     She feels great. She denies chest pain, dyspnea, leg swelling, orthopnea, or syncope. At our last visit, she reported palpitations. A rhythm monitor was normal. She denies any palpitations at this time.    Past Medical History:   Diagnosis Date   • Abdominal pain     STATES FOR ABOUT THE LAST MONTH:  \"GI TRACT UNWELL\"   • Acute systolic congestive heart failure 02/13/2021   • Anxiety    • Bilateral sacroiliitis 05/08/2017   • Bladder infection     RECENT AND TREATED FOR   • Cardiomyopathy, dilated    • Cervical radiculopathy    • Coronary artery disease     Under Dr Darrion Bateman   • Diverticulosis     Diagnois on 11-9-22 colonoscopy   • Fibromyalgia 03/16/2020   • Fracture of wrist 12/2013    Left wrist   • Gastric ulcer    • GERD (gastroesophageal reflux disease)    • Heart murmur Preteen   • History of atrial fibrillation     CARDIAC ABLATION MARCH 2022   • Hyperlipidemia 6/10/2019   • Hypertension    • Injury of neck last 8 years   • Irritable bowel syndrome     CONSTIPATION ONLY   • Lichen sclerosus of female genitalia    • Low back pain    • Myocardial infarction    • Nonischemic cardiomyopathy     2/2021, EF 20%, normal cors   • Nonrheumatic mitral valve regurgitation    • Osteoporosis    • Peptic ulceration 12/2019   • PVCs (premature ventricular contractions) 03/22/2021   • RASH     STATES UNDER BREASTS BILAT AND WRAPS AROUND BACK-ITCHES.  STATES HAS RASH OFTEN TIMES WITH FOODS       Past Surgical History:   Procedure Laterality Date   • BREAST CYST ASPIRATION      10+ years ago   • CARDIAC ABLATION  03/2022    DR. PEÑA   • CARDIAC CATHETERIZATION N/A 02/15/2021    Procedure: Left Heart Cath;  Surgeon: Zee Rojas MD;  Location: [facility] CATH INVASIVE LOCATION;  Service: Cardiovascular;  Laterality: N/A;   • CARDIAC CATHETERIZATION N/A 02/15/2021    Procedure: " Left ventriculography;  Surgeon: Zee Rojas MD;  Location:  [facility] CATH INVASIVE LOCATION;  Service: Cardiovascular;  Laterality: N/A;   • CARDIAC CATHETERIZATION N/A 02/15/2021    Procedure: Coronary angiography;  Surgeon: Zee Rojas MD;  Location: [facility] CATH INVASIVE LOCATION;  Service: Cardiovascular;  Laterality: N/A;   • CARDIAC CATHETERIZATION N/A 02/15/2021    Procedure: Right Heart Cath;  Surgeon: Zee Rojas MD;  Location: [facility] CATH INVASIVE LOCATION;  Service: Cardiovascular;  Laterality: N/A;   • CARDIAC ELECTROPHYSIOLOGY PROCEDURE N/A 03/09/2022    Procedure: Ablation PVC;  Surgeon: Mati Toscano MD;  Location: [facility] CATH INVASIVE LOCATION;  Service: Cardiology;  Laterality: N/A;   • CARDIAC ELECTROPHYSIOLOGY PROCEDURE N/A 03/09/2022    Procedure: EP/ABLATION;  Surgeon: Mati Toscano MD;  Location: [facility] CATH INVASIVE LOCATION;  Service: Cardiology;  Laterality: N/A;   • CHOLECYSTECTOMY     • COLONOSCOPY  07/30/2012    Dr. Zazueta, normal   • COLONOSCOPY N/A 11/09/2022    Procedure: COLONOSCOPY into cecum and TI;  Surgeon: Lida Garcia MD;  Location: [facility] ENDOSCOPY;  Service: Gastroenterology;  Laterality: N/A;  pre: screen  post: diverticulosis, hemorrhoids    • ENDOSCOPY  12/04/2015    Mild active duodenitis, reactive gastropathy suggestive of healing erosion or adjacent ulcer   • ENDOSCOPY N/A 12/16/2019    Procedure: ESOPHAGOGASTRODUODENOSCOPY WITH BIOPSY WITH COLD BIOPSY POLYPECTOMY;  Surgeon: Lida Garcia MD;  Location: [facility] ENDOSCOPY;  Service: Gastroenterology   • ENDOSCOPY N/A 07/13/2020    Procedure: ESOPHAGOGASTRODUODENOSCOPY WITH COLD BIOPSY POLYPECTOMY;  Surgeon: Lida Garcia MD;  Location: [facility] ENDOSCOPY;  Service: Gastroenterology;  Laterality: N/A;  pre: history of gastric ulcer  post: healed gastric ulcer and gastric polyp   • EYE SURGERY  April2021    Cararact surgury both eyes:  LENS IMPLANTS?   • TRIGGER POINT INJECTION  In  last 6 months       Social History     Socioeconomic History   • Marital status:    Tobacco Use   • Smoking status: Never     Passive exposure: Never   • Smokeless tobacco: Never   • Tobacco comments:     caffeine- 3 cups daily   Vaping Use   • Vaping status: Never Used   Substance and Sexual Activity   • Alcohol use: Not Currently     Comment: NEVER   • Drug use: Never   • Sexual activity: Yes     Partners: Male     Birth control/protection: None       Family History   Problem Relation Age of Onset   • Irritable bowel syndrome Mother    • Hypertension Mother    • Hypertension Father    • Hearing loss Father    • Arthritis Sister    • Osteoporosis Sister    • Rheumatologic disease Sister    • Alcohol abuse Brother    • Early death Brother    • Heart disease Brother    • Arthritis Daughter         Breast cancer 2023   • Malig Hyperthermia Neg Hx        Review of Systems   Constitutional: Positive for malaise/fatigue.   Respiratory:  Positive for cough.    Musculoskeletal:  Positive for joint pain.   Neurological:  Positive for excessive daytime sleepiness.   All other systems reviewed and are negative.      Allergies   Allergen Reactions   • Other Shortness Of Breath     perfume   • Sulfa Antibiotics Hives     Was told by mother   • Misc. Sulfonamide Containing Compounds Hives   • Adhesive Tape Itching and Rash     PT STATES TAPE TAKES SKIN OFF         Current Outpatient Medications:   •  acetaminophen (TYLENOL) 650 MG 8 hr tablet, Take 2 tablets by mouth 2 (Two) Times a Day As Needed., Disp: , Rfl:   •  DULoxetine (CYMBALTA) 30 MG capsule, Take 1 capsule by mouth Daily., Disp: 90 capsule, Rfl: 1  •  gabapentin (NEURONTIN) 100 MG capsule, TAKE 1 CAPSULE BY MOUTH EVERY MORNING AND TAKE 3 CAPSULES EVERY NIGHT AT BEDTIME, Disp: 360 capsule, Rfl: 1  •  ibuprofen (ADVIL,MOTRIN) 200 MG tablet, Take 1 tablet by mouth Every 6 (Six) Hours As Needed for Mild Pain., Disp: , Rfl:   •  lisinopril (PRINIVIL,ZESTRIL) 2.5 MG  "tablet, TAKE ONE TABLET BY MOUTH ONCE NIGHTLY, Disp: 90 tablet, Rfl: 1  •  metoprolol succinate XL (TOPROL-XL) 25 MG 24 hr tablet, TAKE 1 TABLET BY MOUTH DAILY, Disp: 90 tablet, Rfl: 1  •  omeprazole (priLOSEC) 40 MG capsule, TAKE 1 CAPSULE BY MOUTH DAILY, Disp: 90 capsule, Rfl: 1  •  alendronate (FOSAMAX) 70 MG tablet, Take 1 tablet by mouth Every 7 (Seven) Days. (Patient not taking: Reported on 11/11/2024), Disp: 12 tablet, Rfl: 3      Objective:     Vitals:    11/11/24 0927   BP: 112/68   BP Location: Left arm   Patient Position: Sitting   Pulse: 60   Weight: 59.7 kg (131 lb 9.6 oz)   Height: 160 cm (62.99\")     Body mass index is 23.32 kg/m².    Constitutional:       Appearance: Healthy appearance. Not in distress.   Eyes:      Conjunctiva/sclera: Conjunctivae normal.   HENT:    Mouth/Throat:      Pharynx: Oropharynx is clear.   Neck:      Vascular: JVD normal.   Pulmonary:      Effort: Pulmonary effort is normal.      Breath sounds: Normal breath sounds.   Cardiovascular:      Normal rate.      Murmurs: There is no murmur.   Pulses:     Intact distal pulses.   Edema:     Peripheral edema absent.   Abdominal:      Palpations: Abdomen is soft.      Tenderness: There is no abdominal tenderness.   Musculoskeletal: Normal range of motion.      Cervical back: Normal range of motion. Skin:     General: Skin is warm and dry.   Neurological:      General: No focal deficit present.           ECG 12 Lead    Date/Time: 11/11/2024 9:46 AM  Performed by: Darrion Bateman MD    Authorized by: Darrion Bateman MD  Comparison: compared with previous ECG   Similar to previous ECG  Rhythm: sinus rhythm  Conduction: conduction normal  Other findings: non-specific ST-T wave changes    Clinical impression: non-specific ECG            Assessment:       Diagnosis Plan   1. Nonischemic cardiomyopathy  ECG 12 Lead    Adult Transthoracic Echo Complete W/ Cont if Necessary Per Protocol      2. PVCs (premature ventricular contractions)      "          Plan:       She has NICM. Her EF was originally 20%.  She underwent PVC ablation.  She is on goal-directed medical therapy; this is limited by the fact that she is not hypertensive. Her last echo showed borderline LV dilation and low normal LVSF, E 50%. She previously had an MRI that had no evidence of late gadolinium enhancement. She is euvolemic and NYHA class I.    She's doing well; no changes have been made. I'll see her back in a year and we'll repeat an echo at that time.     Sincerely,       Darrion Bateman MD

## 2024-11-12 RX ORDER — GABAPENTIN 100 MG/1
CAPSULE ORAL
Qty: 360 CAPSULE | Refills: 1 | Status: SHIPPED | OUTPATIENT
Start: 2024-11-12

## 2024-11-12 NOTE — TELEPHONE ENCOUNTER
Rx Refill Note  Requested Prescriptions     Pending Prescriptions Disp Refills    gabapentin (NEURONTIN) 100 MG capsule [Pharmacy Med Name: GABAPENTIN 100 MG CAPSULE] 360 capsule 1     Sig: TAKE 1 CAPSULE BY MOUTH EVERY MORNING AND TAKE THREE CAPSULES BY MOUTH AT BEDTIME      Last office visit with prescribing clinician: 11/4/2024   Last telemedicine visit with prescribing clinician: Visit date not found   Next office visit with prescribing clinician: 5/5/2025                         Would you like a call back once the refill request has been completed: [] Yes [] No    If the office needs to give you a call back, can they leave a voicemail: [] Yes [] No    George Almodovar  11/12/24, 08:36 EST

## 2024-11-25 ENCOUNTER — OFFICE VISIT (OUTPATIENT)
Age: 69
End: 2024-11-25
Payer: MEDICARE

## 2024-11-25 VITALS — WEIGHT: 131 LBS | HEIGHT: 62 IN | OXYGEN SATURATION: 96 % | HEART RATE: 65 BPM | BODY MASS INDEX: 24.11 KG/M2

## 2024-11-25 DIAGNOSIS — M79.671 PAIN IN BOTH FEET: Primary | ICD-10-CM

## 2024-11-25 DIAGNOSIS — M77.41 METATARSALGIA OF BOTH FEET: ICD-10-CM

## 2024-11-25 DIAGNOSIS — M21.862 ACQUIRED POSTERIOR EQUINUS OF BOTH LOWER EXTREMITIES: ICD-10-CM

## 2024-11-25 DIAGNOSIS — M21.861 ACQUIRED POSTERIOR EQUINUS OF BOTH LOWER EXTREMITIES: ICD-10-CM

## 2024-11-25 DIAGNOSIS — M77.42 METATARSALGIA OF BOTH FEET: ICD-10-CM

## 2024-11-25 DIAGNOSIS — L85.3 XEROSIS OF SKIN: ICD-10-CM

## 2024-11-25 DIAGNOSIS — M79.672 PAIN IN BOTH FEET: Primary | ICD-10-CM

## 2024-11-26 NOTE — PROGRESS NOTES
11/25/2024  Foot and Ankle Surgery - Established Patient/Follow-up  Provider: Dr. Farshad Pinzon DPM  Location: [facility]    Subjective:  Shanika Hecotr is a 69 y.o. female.     Chief Complaint   Patient presents with    Right Foot - Follow-up, Pain, Peripheral Neuropathy    Left Foot - Follow-up, Pain, Peripheral Neuropathy    Follow-up     RACHELLE Vega md 7/1/2024       History of Present Illness  The patient presents for evaluation of foot pain.    He reports an improvement in his foot condition, attributing it to the use of new balance shoes with inserts. However, he continues to experience numbness in his toes, which he believes is a direct consequence of his sciatica.      Allergies   Allergen Reactions    Other Shortness Of Breath     perfume    Sulfa Antibiotics Hives     Was told by mother    Misc. Sulfonamide Containing Compounds Hives    Adhesive Tape Itching and Rash     PT STATES TAPE TAKES SKIN OFF       Current Outpatient Medications on File Prior to Visit   Medication Sig Dispense Refill    acetaminophen (TYLENOL) 650 MG 8 hr tablet Take 2 tablets by mouth 2 (Two) Times a Day As Needed.      alendronate (FOSAMAX) 70 MG tablet Take 1 tablet by mouth Every 7 (Seven) Days. 12 tablet 3    DULoxetine (CYMBALTA) 30 MG capsule Take 1 capsule by mouth Daily. 90 capsule 1    gabapentin (NEURONTIN) 100 MG capsule TAKE 1 CAPSULE BY MOUTH EVERY MORNING AND TAKE THREE CAPSULES BY MOUTH AT BEDTIME 360 capsule 1    ibuprofen (ADVIL,MOTRIN) 200 MG tablet Take 1 tablet by mouth Every 6 (Six) Hours As Needed for Mild Pain.      lisinopril (PRINIVIL,ZESTRIL) 2.5 MG tablet TAKE ONE TABLET BY MOUTH ONCE NIGHTLY 90 tablet 1    metoprolol succinate XL (TOPROL-XL) 25 MG 24 hr tablet TAKE 1 TABLET BY MOUTH DAILY 90 tablet 1    omeprazole (priLOSEC) 40 MG capsule TAKE 1 CAPSULE BY MOUTH DAILY 90 capsule 1     No current facility-administered medications on file prior to visit.       Objective   Pulse 65   Ht 157.5 cm  "(62\")   Wt 59.4 kg (131 lb)   SpO2 96%   BMI 23.96 kg/m²     Foot/Ankle Exam  Physical Exam  GENERAL  Appearance:  appears stated age  Orientation:  AAOx3  Affect:  appropriate     VASCULAR      Right Foot Vascularity   Normal vascular exam    Dorsalis pedis:  2+  Posterior tibial:  2+  Skin temperature:  warm  Edema grading:  None  CFT:  < 3 seconds  Pedal hair growth:  Present  Varicosities:  none      Left Foot Vascularity   Normal vascular exam    Dorsalis pedis:  2+  Posterior tibial:  2+  Skin temperature:  warm  Edema grading:  None  CFT:  < 3 seconds  Pedal hair growth:  Present  Varicosities:  none     NEUROLOGIC      Right Foot Neurologic   Light touch sensation: normal  Hot/Cold sensation: normal  Achilles reflex:  2+      Left Foot Neurologic   Light touch sensation: normal  Hot/Cold sensation:  normal  Achilles reflex:  2+     MUSCULOSKELETAL      Right Foot Musculoskeletal   Ecchymosis:  none  Tenderness:  metatarsals tenderness    Arch:  Normal      Left Foot Musculoskeletal   Ecchymosis:  none  Tenderness:  metatarsal 5  Arch:  Normal     MUSCLE STRENGTH      Right Foot Muscle Strength   Normal strength    Foot dorsiflexion:  5  Foot plantar flexion:  5  Foot inversion:  5  Foot eversion:  5      Left Foot Muscle Strength   Normal strength    Foot dorsiflexion:  5  Foot plantar flexion:  5  Foot inversion:  5  Foot eversion:  5     DERMATOLOGIC       Right Foot Dermatologic   Skin  Right foot skin is intact.   Nails comment:  Nails 1-5      Left Foot Dermatologic   Skin  Left foot skin is intact.   Nails comment:  Nails 1-5     TESTS      Right Foot Tests   Anterior drawer: negative  Varus tilt: negative      Left Foot Tests   Anterior drawer: negative  Varus tilt: negative     Right foot additional comments: Moderate equinus contracture with knee extended and flexed to both lower extremities.  No open wounds or signs of infection.  Mild dry skin to both feet.  Minimal callusing.    11/25/24: " Physical exam is unchanged and stable      Results      Assessment & Plan   Diagnoses and all orders for this visit:    1. Pain in both feet (Primary)    2. Metatarsalgia of both feet    3. Acquired posterior equinus of both lower extremities    4. Xerosis of skin      Assessment & Plan    The foot pain is likely due to a combination of nerve and muscle issues, possibly exacerbated by his sciatica. He was advised to continue with stretching exercises, maintain range of motion, engage in low impact exercises, and provide adequate support to his feet. He was also instructed to perform toe flexion exercises and massage his feet regularly. The use of comfortable footwear was recommended, with a reminder that shoe inserts typically last about 6 months. He was encouraged to reach out if there are any changes in his condition or if he requires further assistance.Reviewed proper basic stretching and manual therapy exercises along with appropriate shoes and activity.  Discussed proper use and/or avoidance of OTC anti-inflammatories.  Patient is to call with any additional issues or concerns.  Greater than 20 minutes was spent before, during, and after evaluation for patient care.                 Patient or patient representative verbalized consent for the use of Ambient Listening during the visit with  SHEYLA Pinzon DPM for chart documentation. 11/26/2024  07:05 JOJO Pinzon DPM

## 2024-12-17 RX ORDER — OMEPRAZOLE 40 MG/1
40 CAPSULE, DELAYED RELEASE ORAL DAILY
Qty: 90 CAPSULE | Refills: 1 | Status: SHIPPED | OUTPATIENT
Start: 2024-12-17

## 2025-01-31 ENCOUNTER — TELEPHONE (OUTPATIENT)
Age: 70
End: 2025-01-31
Payer: MEDICARE

## 2025-01-31 NOTE — TELEPHONE ENCOUNTER
Pt sent message via [employer] to r/s her appt that she currently has with [facility] in March.  -ERICK Price

## 2025-02-03 NOTE — TELEPHONE ENCOUNTER
I've sent message to pt via [employer] asking to r/s her with NP per Rukhsana HEAD.  I am awaiting her reply. -Carine Aburto, A

## 2025-03-18 ENCOUNTER — OFFICE VISIT (OUTPATIENT)
Dept: FAMILY MEDICINE CLINIC | Facility: CLINIC | Age: 70
End: 2025-03-18
Payer: MEDICARE

## 2025-03-18 VITALS
BODY MASS INDEX: 24.84 KG/M2 | DIASTOLIC BLOOD PRESSURE: 72 MMHG | OXYGEN SATURATION: 99 % | HEART RATE: 75 BPM | RESPIRATION RATE: 12 BRPM | HEIGHT: 62 IN | TEMPERATURE: 97 F | SYSTOLIC BLOOD PRESSURE: 110 MMHG | WEIGHT: 135 LBS

## 2025-03-18 DIAGNOSIS — K59.04 CHRONIC IDIOPATHIC CONSTIPATION: ICD-10-CM

## 2025-03-18 DIAGNOSIS — R10.30 LOWER ABDOMINAL PAIN: Primary | ICD-10-CM

## 2025-03-18 DIAGNOSIS — K64.9 HEMORRHOIDS, UNSPECIFIED HEMORRHOID TYPE: ICD-10-CM

## 2025-03-18 PROCEDURE — 99213 OFFICE O/P EST LOW 20 MIN: CPT | Performed by: NURSE PRACTITIONER

## 2025-03-18 PROCEDURE — 81003 URINALYSIS AUTO W/O SCOPE: CPT | Performed by: NURSE PRACTITIONER

## 2025-03-18 PROCEDURE — 1159F MED LIST DOCD IN RCRD: CPT | Performed by: NURSE PRACTITIONER

## 2025-03-18 PROCEDURE — 1125F AMNT PAIN NOTED PAIN PRSNT: CPT | Performed by: NURSE PRACTITIONER

## 2025-03-18 PROCEDURE — 1160F RVW MEDS BY RX/DR IN RCRD: CPT | Performed by: NURSE PRACTITIONER

## 2025-03-18 NOTE — PROGRESS NOTES
Chief Complaint  Abdominal Pain    Subjective        Shanika Hector presents to [facility]  History of Present Illness    History of Present Illness  The patient presents for evaluation of abdominal pain.  She is accompanied by her spouse who helps with history.    She has a known history of irritable bowel syndrome (IBS) with constipation and was previously diagnosed with diverticulitis, which required hospitalization several years ago. On the previous Friday, she consumed a donut, lemon cake, and strong coffee, which triggered immediate cramping within 20 minutes. This episode was followed by severe diarrhea and profuse sweating, leaving her in a state of extreme fatigue. Since then, she has been experiencing persistent cramping, particularly after meals, occurring multiple times daily. The cramps are severe enough to incapacitate her and typically manifest 20 to 30 minutes post-meal. She describes the pain as being located low in the abdomen and occasionally associated with bowel movements. Today, she experienced a bowel movement that provided temporary relief. She also reports a sensation of something moving through her system during these episodes. She has noticed bright red blood in her stools and on toilet paper since the last Friday, which she attributes to her known internal and external hemorrhoids.   She recalls consuming nuts in the past few weeks and speculates that they may have aggravated her condition. Her last colonoscopy was performed by Dr. Garcia in 2022, who advised her that no further colonoscopies were necessary. She was treated for an ulcer approximately 5 years ago, which involved cauterization.     She reports that high-fiber diets tend to constipate her. She does not experience nausea or vomiting. She admits to inadequate water intake and reports that her stools were initially hard and pellet-like today. She has not been using MiraLAX regularly and has started  "taking a stool softener recently, but it has not yet been effective. She is hesitant to use laxatives. She reports a decrease in appetite and weight loss of approximately 10 pounds-although weight appears stable since last visit and actually a little higher than previous.     She has been prescribed Dulcolax pills but has not been taking them. She reports frequent consumption of Coke Zero and excessive coffee intake. She often experiences bloating and distension.     She has been experiencing urinary symptoms, including burning and itching in the vaginal area, for several weeks.    She also reports severe back pain, which is more intense than usual.  This is a chronic issue for her.    MEDICATIONS  Current: MiraLAX       Objective   Vital Signs:  /72   Pulse 75   Temp 97 °F (36.1 °C) (Infrared)   Resp 12   Ht 157.5 cm (62\")   Wt 61.2 kg (135 lb)   SpO2 99%   BMI 24.69 kg/m²   Estimated body mass index is 24.69 kg/m² as calculated from the following:    Height as of this encounter: 157.5 cm (62\").    Weight as of this encounter: 61.2 kg (135 lb).       BMI is within normal parameters. No other follow-up for BMI required.      Physical Exam  Vitals and nursing note reviewed.   Constitutional:       General: She is not in acute distress.     Appearance: She is well-developed. She is not ill-appearing or diaphoretic.   HENT:      Head: Normocephalic and atraumatic.   Eyes:      General:         Right eye: No discharge.         Left eye: No discharge.      Conjunctiva/sclera: Conjunctivae normal.   Cardiovascular:      Rate and Rhythm: Normal rate and regular rhythm.   Pulmonary:      Effort: Pulmonary effort is normal.      Breath sounds: Normal breath sounds.   Abdominal:      General: Bowel sounds are normal. There is no distension.      Palpations: Abdomen is soft.      Tenderness: There is abdominal tenderness (mild generalized low abd). There is no guarding.   Musculoskeletal:         General: No " deformity.      Comments: Gait smooth and steady   Skin:     General: Skin is warm and dry.   Neurological:      Mental Status: She is alert and oriented to person, place, and time.   Psychiatric:         Mood and Affect: Mood normal.         Behavior: Behavior normal.          Physical Exam       Result Review :            Results                  Assessment and Plan     Diagnoses and all orders for this visit:    1. Lower abdominal pain (Primary)  -     POC Urinalysis Dipstick, Automated    2. Chronic idiopathic constipation    3. Hemorrhoids, unspecified hemorrhoid type        Assessment & Plan  1. Abdominal pain.  Her symptoms suggest a colonic origin rather than diverticulitis, given the diffuse nature of the pain.  May be more noticeable due to history of fibromyalgia and chronic pain syndrome.  TTP was decreased with distraction.  The presence of hard stools indicates constipation, which could be causing the pain due to bowel distention. A recent stomach flu may have led to colitis initially, and the subsequent movement of hard stools could be exacerbating the discomfort. The bright red blood in her stool is likely due to hemorrhoids, which have been aggravated by the hard stools. She is advised to increase her water intake to alleviate constipation and prevent urinary tract infections. She is also advised to incorporate MiraLAX into her regimen, taking one capful daily. A urine test conducted to rule out a urinary tract infection was negative.  If pain continues she should follow-up with her PCP or ER.    2. Constipation.  She reports chronic constipation, which has been exacerbated recently. She is advised to use MiraLAX daily to soften her stools. If daily use results in diarrhea, she should adjust the frequency to every other day. Increasing water intake is also recommended to help with stool softening and overall bowel health.  Gradually increase fiber once constipation    3. Hemorrhoids.  The presence  of bright red blood in her stool is likely due to aggravated hemorrhoids. She is advised to continue monitoring her symptoms and to avoid foods that may irritate her hemorrhoids.    PROCEDURE  Colonoscopy performed in [id number] in sigmoid colon, internal and external hemorrhoids            Follow Up     No follow-ups on file.  Patient was given instructions and counseling regarding her condition or for health maintenance advice. Please see specific information pulled into the AVS if appropriate.    Patient or patient representative verbalized consent for the use of Ambient Listening during the visit with  ABHISHEK Cardoza for chart documentation. 3/20/2025  06:34 EDT

## 2025-03-21 RX ORDER — METOPROLOL SUCCINATE 25 MG/1
25 TABLET, EXTENDED RELEASE ORAL DAILY
Qty: 90 TABLET | Refills: 1 | Status: SHIPPED | OUTPATIENT
Start: 2025-03-21

## 2025-03-21 RX ORDER — LISINOPRIL 2.5 MG/1
2.5 TABLET ORAL NIGHTLY
Qty: 90 TABLET | Refills: 1 | Status: SHIPPED | OUTPATIENT
Start: 2025-03-21

## 2025-03-25 LAB
BILIRUB BLD-MCNC: NEGATIVE MG/DL
CLARITY, POC: CLEAR
COLOR UR: YELLOW
EXPIRATION DATE: ABNORMAL
GLUCOSE UR STRIP-MCNC: NEGATIVE MG/DL
KETONES UR QL: NEGATIVE
LEUKOCYTE EST, POC: ABNORMAL
Lab: ABNORMAL
NITRITE UR-MCNC: NEGATIVE MG/ML
PH UR: 6 [PH] (ref 5–8)
PROT UR STRIP-MCNC: NEGATIVE MG/DL
RBC # UR STRIP: NEGATIVE /UL
SP GR UR: 1.02 (ref 1–1.03)
UROBILINOGEN UR QL: ABNORMAL

## 2025-04-30 ENCOUNTER — TELEPHONE (OUTPATIENT)
Dept: SPORTS MEDICINE | Facility: CLINIC | Age: 70
End: 2025-04-30
Payer: MEDICARE

## 2025-04-30 NOTE — TELEPHONE ENCOUNTER
"Provider: JONAS    Caller: Shanika Hector \"Shivani\"    Relationship to Patient: Self    Pharmacy:     Phone Number: [phone]    Reason for Call: PT CALLED TO GET US GUIDED INJ IN CHANDA THUMBS AND CHANDA INDEX - LAST WAS 11/2024  "

## 2025-05-05 ENCOUNTER — OFFICE VISIT (OUTPATIENT)
Dept: FAMILY MEDICINE CLINIC | Facility: CLINIC | Age: 70
End: 2025-05-05
Payer: MEDICARE

## 2025-05-05 VITALS
OXYGEN SATURATION: 98 % | TEMPERATURE: 97.4 F | HEIGHT: 62 IN | HEART RATE: 68 BPM | RESPIRATION RATE: 17 BRPM | SYSTOLIC BLOOD PRESSURE: 110 MMHG | DIASTOLIC BLOOD PRESSURE: 70 MMHG | WEIGHT: 138.7 LBS | BODY MASS INDEX: 25.52 KG/M2

## 2025-05-05 DIAGNOSIS — G89.4 CHRONIC PAIN SYNDROME: Chronic | ICD-10-CM

## 2025-05-05 DIAGNOSIS — Z78.0 POSTMENOPAUSE: ICD-10-CM

## 2025-05-05 DIAGNOSIS — M79.7 FIBROMYALGIA: Primary | Chronic | ICD-10-CM

## 2025-05-05 DIAGNOSIS — E78.00 PURE HYPERCHOLESTEROLEMIA: Chronic | ICD-10-CM

## 2025-05-05 DIAGNOSIS — I42.8 NONISCHEMIC CARDIOMYOPATHY: ICD-10-CM

## 2025-05-05 DIAGNOSIS — H91.92 CHANGE IN HEARING OF LEFT EAR: ICD-10-CM

## 2025-05-05 RX ORDER — AMITRIPTYLINE HYDROCHLORIDE 10 MG/1
10 TABLET ORAL NIGHTLY
Qty: 90 TABLET | Refills: 1 | Status: SHIPPED | OUTPATIENT
Start: 2025-05-05

## 2025-05-05 RX ORDER — ALENDRONATE SODIUM 70 MG/1
70 TABLET ORAL
Qty: 12 TABLET | Refills: 3 | Status: SHIPPED | OUTPATIENT
Start: 2025-05-05

## 2025-05-05 NOTE — PROGRESS NOTES
"Chief Complaint  Hyperlipidemia (Control substance agreement will need to be updated. )    Subjective        Shanika Hector presents to [facility]  History of Present Illness    Chronic pain syndrome including fibromyalgia and osteoarthritis of multiple joints.  She stopped her duloxetine because of cost issues.  She was not sure it was even helping.  She continues gabapentin typically 1 in the morning and 3 at night, total of 40 mg daily without adverse effect.  She did take amitriptyline in the distant past.  She continues to have ongoing pain in many locations.  No fever.  No hot synovitis.  Negative depression screen.  She does have sleep disturbance.    Nonischemic cardiomyopathy.  Followed by cardiology.  Hyperlipidemia.  Continues on lisinopril low-dose 2.5 mg daily along with metoprolol.  In the past her potassium has been slightly high with a normal creatinine.    Objective   Vital Signs:  /70   Pulse 68   Temp 97.4 °F (36.3 °C) (Oral)   Resp 17   Ht 157.5 cm (62\")   Wt 62.9 kg (138 lb 11.2 oz)   SpO2 98%   BMI 25.37 kg/m²   Estimated body mass index is 25.37 kg/m² as calculated from the following:    Height as of this encounter: 157.5 cm (62\").    Weight as of this encounter: 62.9 kg (138 lb 11.2 oz).          Physical Exam  Vitals and nursing note reviewed.   Constitutional:       General: She is not in acute distress.     Appearance: She is well-developed.   Cardiovascular:      Rate and Rhythm: Normal rate and regular rhythm.      Heart sounds: Normal heart sounds.   Pulmonary:      Effort: Pulmonary effort is normal.      Breath sounds: Normal breath sounds.   Skin:     General: Skin is warm and dry.   Psychiatric:         Mood and Affect: Mood normal.        Result Review :  The following data was reviewed by: Bryan Vega MD on 05/05/2025:  Common labs          5/24/2024    09:06 11/4/2024    11:16   Common Labs   Glucose 89  96    BUN 16  19  "   Creatinine 0.91  0.98    Sodium 142  140    Potassium 6.0  5.4    Chloride 103  103    Calcium 9.4  9.1    Albumin 4.6  4.0    Total Bilirubin 0.3  0.2    Alkaline Phosphatase 80  87    AST (SGOT) 15  17    ALT (SGPT) 12  8    WBC 4.71  5.02    Hemoglobin 12.4  11.6    Hematocrit 38.2  35.6    Platelets 343  313    Total Cholesterol 275  251    Triglycerides 103  96    HDL Cholesterol 75  68    LDL Cholesterol  182  166                  Assessment and Plan   Diagnoses and all orders for this visit:    1. Fibromyalgia (Primary)    2. Chronic pain syndrome    3. Nonischemic cardiomyopathy  -     CBC & Differential; Future  -     Comprehensive Metabolic Panel; Future  -     Lipid Panel; Future  -     CBC & Differential  -     Comprehensive Metabolic Panel  -     Lipid Panel    4. Pure hypercholesterolemia  -     CBC & Differential; Future  -     Comprehensive Metabolic Panel; Future  -     Lipid Panel; Future  -     CBC & Differential  -     Comprehensive Metabolic Panel  -     Lipid Panel    5. Postmenopause  -     DEXA Bone Density Axial; Future    6. Change in hearing of left ear  -     Ambulatory Referral to ENT (Otolaryngology)    Other orders  -     amitriptyline (ELAVIL) 10 MG tablet; Take 1 tablet by mouth Every Night.  Dispense: 90 tablet; Refill: 1  -     alendronate (FOSAMAX) 70 MG tablet; Take 1 tablet by mouth Every 7 (Seven) Days. Take with large glass of water, empty stomach, do not eat or lie down for 1 hour.  Dispense: 12 tablet; Refill: 3    Chronic pain syndrome including fibromyalgia and osteoarthritis of multiple joints.  Continue gabapentin as is.  She discontinued the duloxetine.  She was not sure it was helping and there were some cost issues.  I am starting her on low-dose amitriptyline 10 mg at nighttime.  She has tolerated this in the past.    Osteoporosis/osteopenia high risk.  She stopped taking her alendronate.  She was not having any trouble with that.  No dysphagia.  Restarting it  today.  DEXA scan ordered.    Nonischemic cardiomyopathy.  Continues low-dose lisinopril.  Potassium has been borderline high in the past.  Possible spurious lab error.  Rechecking today.  Follow with cardiology as scheduled.  See me 6 months Medicare wellness visit           Follow Up   No follow-ups on file.  Patient was given instructions and counseling regarding her condition or for health maintenance advice. Please see specific information pulled into the AVS if appropriate.

## 2025-05-06 LAB
ALBUMIN SERPL-MCNC: 4.4 G/DL (ref 3.5–5.2)
ALBUMIN/GLOB SERPL: 1.6 G/DL
ALP SERPL-CCNC: 80 U/L (ref 39–117)
ALT SERPL-CCNC: 11 U/L (ref 1–33)
AST SERPL-CCNC: 17 U/L (ref 1–32)
BASOPHILS # BLD AUTO: 0.04 10*3/MM3 (ref 0–0.2)
BASOPHILS NFR BLD AUTO: 0.8 % (ref 0–1.5)
BILIRUB SERPL-MCNC: 0.4 MG/DL (ref 0–1.2)
BUN SERPL-MCNC: 24 MG/DL (ref 8–23)
BUN/CREAT SERPL: 25.5 (ref 7–25)
CALCIUM SERPL-MCNC: 9.4 MG/DL (ref 8.6–10.5)
CHLORIDE SERPL-SCNC: 104 MMOL/L (ref 98–107)
CHOLEST SERPL-MCNC: 272 MG/DL (ref 0–200)
CO2 SERPL-SCNC: 26.2 MMOL/L (ref 22–29)
CREAT SERPL-MCNC: 0.94 MG/DL (ref 0.57–1)
EGFRCR SERPLBLD CKD-EPI 2021: 65 ML/MIN/1.73
EOSINOPHIL # BLD AUTO: 0.18 10*3/MM3 (ref 0–0.4)
EOSINOPHIL NFR BLD AUTO: 3.4 % (ref 0.3–6.2)
ERYTHROCYTE [DISTWIDTH] IN BLOOD BY AUTOMATED COUNT: 11.7 % (ref 12.3–15.4)
GLOBULIN SER CALC-MCNC: 2.8 GM/DL
GLUCOSE SERPL-MCNC: 94 MG/DL (ref 65–99)
HCT VFR BLD AUTO: 37.5 % (ref 34–46.6)
HDLC SERPL-MCNC: 67 MG/DL (ref 40–60)
HGB BLD-MCNC: 12.2 G/DL (ref 12–15.9)
IMM GRANULOCYTES # BLD AUTO: 0.01 10*3/MM3 (ref 0–0.05)
IMM GRANULOCYTES NFR BLD AUTO: 0.2 % (ref 0–0.5)
LDLC SERPL CALC-MCNC: 181 MG/DL (ref 0–100)
LYMPHOCYTES # BLD AUTO: 1.22 10*3/MM3 (ref 0.7–3.1)
LYMPHOCYTES NFR BLD AUTO: 23.1 % (ref 19.6–45.3)
MCH RBC QN AUTO: 30 PG (ref 26.6–33)
MCHC RBC AUTO-ENTMCNC: 32.5 G/DL (ref 31.5–35.7)
MCV RBC AUTO: 92.4 FL (ref 79–97)
MONOCYTES # BLD AUTO: 0.42 10*3/MM3 (ref 0.1–0.9)
MONOCYTES NFR BLD AUTO: 8 % (ref 5–12)
NEUTROPHILS # BLD AUTO: 3.4 10*3/MM3 (ref 1.7–7)
NEUTROPHILS NFR BLD AUTO: 64.5 % (ref 42.7–76)
NRBC BLD AUTO-RTO: 0 /100 WBC (ref 0–0.2)
PLATELET # BLD AUTO: 320 10*3/MM3 (ref 140–450)
POTASSIUM SERPL-SCNC: 4.6 MMOL/L (ref 3.5–5.2)
PROT SERPL-MCNC: 7.2 G/DL (ref 6–8.5)
RBC # BLD AUTO: 4.06 10*6/MM3 (ref 3.77–5.28)
SODIUM SERPL-SCNC: 141 MMOL/L (ref 136–145)
TRIGL SERPL-MCNC: 132 MG/DL (ref 0–150)
VLDLC SERPL CALC-MCNC: 24 MG/DL (ref 5–40)
WBC # BLD AUTO: 5.27 10*3/MM3 (ref 3.4–10.8)

## 2025-05-12 ENCOUNTER — PROCEDURE VISIT (OUTPATIENT)
Dept: SPORTS MEDICINE | Facility: CLINIC | Age: 70
End: 2025-05-12
Payer: MEDICARE

## 2025-05-12 ENCOUNTER — OFFICE VISIT (OUTPATIENT)
Dept: SPORTS MEDICINE | Facility: CLINIC | Age: 70
End: 2025-05-12
Payer: MEDICARE

## 2025-05-12 VITALS
HEIGHT: 62 IN | HEART RATE: 65 BPM | BODY MASS INDEX: 25.76 KG/M2 | DIASTOLIC BLOOD PRESSURE: 72 MMHG | OXYGEN SATURATION: 98 % | TEMPERATURE: 97.9 F | SYSTOLIC BLOOD PRESSURE: 104 MMHG | RESPIRATION RATE: 17 BRPM | WEIGHT: 140 LBS

## 2025-05-12 VITALS
HEIGHT: 62 IN | WEIGHT: 140 LBS | BODY MASS INDEX: 25.76 KG/M2 | SYSTOLIC BLOOD PRESSURE: 104 MMHG | OXYGEN SATURATION: 98 % | HEART RATE: 65 BPM | DIASTOLIC BLOOD PRESSURE: 72 MMHG | TEMPERATURE: 97.9 F | RESPIRATION RATE: 17 BRPM

## 2025-05-12 DIAGNOSIS — M15.1 DEGENERATIVE ARTHRITIS OF DISTAL INTERPHALANGEAL JOINT OF INDEX FINGER OF LEFT HAND: ICD-10-CM

## 2025-05-12 DIAGNOSIS — M18.0 ARTHRITIS OF CARPOMETACARPAL (CMC) JOINT OF BOTH THUMBS: Primary | ICD-10-CM

## 2025-05-12 DIAGNOSIS — S66.513A: ICD-10-CM

## 2025-05-12 DIAGNOSIS — M79.642 PAIN OF LEFT HAND: Primary | ICD-10-CM

## 2025-05-12 DIAGNOSIS — M13.0 POLYARTHROPATHY: ICD-10-CM

## 2025-05-12 PROCEDURE — 20604 DRAIN/INJ JOINT/BURSA W/US: CPT | Performed by: FAMILY MEDICINE

## 2025-05-12 RX ORDER — TRIAMCINOLONE ACETONIDE 40 MG/ML
20 INJECTION, SUSPENSION INTRA-ARTICULAR; INTRAMUSCULAR
Status: COMPLETED | OUTPATIENT
Start: 2025-05-12 | End: 2025-05-12

## 2025-05-12 RX ORDER — METHYLPREDNISOLONE ACETATE 80 MG/ML
10 INJECTION, SUSPENSION INTRA-ARTICULAR; INTRALESIONAL; INTRAMUSCULAR; SOFT TISSUE
Status: COMPLETED | OUTPATIENT
Start: 2025-05-12 | End: 2025-05-12

## 2025-05-12 RX ADMIN — TRIAMCINOLONE ACETONIDE 20 MG: 40 INJECTION, SUSPENSION INTRA-ARTICULAR; INTRAMUSCULAR at 12:12

## 2025-05-12 RX ADMIN — METHYLPREDNISOLONE ACETATE 10 MG: 80 INJECTION, SUSPENSION INTRA-ARTICULAR; INTRALESIONAL; INTRAMUSCULAR; SOFT TISSUE at 12:13

## 2025-05-12 NOTE — PROGRESS NOTES
"Shanika is a 70 y.o. year old female presents to [facility]    Chief Complaint   Patient presents with    Left Hand - Initial Evaluation, Pain     LT middle finger pain x 2 weeks       History of Present Illness  History of Present Illness  The patient presents for evaluation of finger pain.    Finger Pain  - She reports jamming her finger a few weeks ago, resulting in significant pain, reduced flexibility, and persistent swelling despite applying ice.  - No joint dislocation or need to reposition the finger.  - Previous injections for similar issues in other fingers were beneficial.    Thumb Pain  - She notes persistent low-level pain in her thumbs, intensifying with use, especially during gardening.    Toe Rigidity and Numbness  - Her toes have become rigid and unresponsive, similar to her fingers.  - Numbness attributed to sciatica.  - Diagnosed with osteoarthritis by her rheumatologist, ruling out rheumatoid arthritis.  - Orthotics from a podiatrist have provided some relief.    Sciatica  - She has had unresolved sciatica since age 70, with ineffective injections.    Supplemental information: None    FAMILY HISTORY  Mother and grandmother had similar hand issues.    I have reviewed the patient's medical, family, and social history in detail and updated the computerized patient record.    /72 (BP Location: Left arm, Patient Position: Sitting, Cuff Size: Adult)   Pulse 65   Temp 97.9 °F (36.6 °C) (Temporal)   Resp 17   Ht 157.5 cm (62.01\")   Wt 63.5 kg (140 lb)   SpO2 98%   BMI 25.60 kg/m²      Physical Exam    Vital signs reviewed.   General: No acute distress.  Eyes: conjunctiva clear; pupils equally round and reactive  ENT: external ears atraumatic  CV: no peripheral edema  Resp: normal respiratory effort, no use of accessory muscles  Skin: no rashes or wounds; normal turgor  Psych: mood and affect appropriate; recent and remote memory intact  Neuro: sensation to " light touch intact    MSK Exam  Physical Exam  Left hand: Radial deformity of the index finger at DIP joint, chronic.  There is tenderness at the third MCP joint though there is no palpable step-off deformity.  She does have full range of motion with no ligamentous laxity.    XR Hand 2 View Bilateral (11/04/2024 10:39)              Diagnoses and all orders for this visit:    1. Pain of left hand (Primary)    2. Polyarthropathy  -     Diclofenac Sodium 4 %, Topiramate 2 %, cloNIDine HCl 0.2 %, Lidocaine HCl 5 %; Apply 1-2 g topically to the appropriate area as directed 3 (Three) to 4 (Four) times daily.  Dispense: 240 g; Refill: 1    3. Strain of intrinsic muscle, fascia and tendon of left middle finger at wrist and hand level, initial encounter  -     Diclofenac Sodium 4 %, Topiramate 2 %, cloNIDine HCl 0.2 %, Lidocaine HCl 5 %; Apply 1-2 g topically to the appropriate area as directed 3 (Three) to 4 (Four) times daily.  Dispense: 240 g; Refill: 1        Assessment & Plan  1. Finger pain: Acute.  - Prescribed compounded arthritis cream to be applied 4 times daily.  - Administered steroid injections today.  - Further injections can be considered in a minimum of 3 months if the cream is ineffective.    2. Osteoarthritis: Chronic.  - Arthritis cream prescribed for finger pain can also be applied to other affected areas, including toes.     Follow-up  - Further injections can be considered in a minimum of 3 months if the cream is ineffective.    PROCEDURE  Administered steroid injections today.          Patient or patient representative verbalized consent for the use of Ambient Listening during the visit with  SUNNY Amaya Jr.,  for chart documentation on 05/12/2025 at 12:09 EDT.

## 2025-05-12 NOTE — PROGRESS NOTES
- Small Joint Arthrocentesis: bilateral thumb CMC on 5/12/2025 12:12 PM  Indications: pain  Details: 27 G needle, ultrasound-guided radial approach  Medications (Right): 20 mg triamcinolone acetonide 40 MG/ML  Medications (Left): 20 mg triamcinolone acetonide 40 MG/ML  Outcome: tolerated well, no immediate complications  Procedure, treatment alternatives, risks and benefits explained, specific risks discussed. Consent was given by the patient. Immediately prior to procedure a time out was called to verify the correct patient, procedure, equipment, support staff and site/side marked as required. Patient was prepped and draped in the usual sterile fashion.       - Small Joint Arthrocentesis: L index DIP on 5/12/2025 12:13 PM  Indications: pain  Details: 30 G needle, ultrasound-guided volar approach  Medications: 10 mg methylPREDNISolone acetate 80 MG/ML  Outcome: tolerated well, no immediate complications  Procedure, treatment alternatives, risks and benefits explained, specific risks discussed. Consent was given by the patient. Immediately prior to procedure a time out was called to verify the correct patient, procedure, equipment, support staff and site/side marked as required. Patient was prepped and draped in the usual sterile fashion.

## 2025-05-15 ENCOUNTER — HOSPITAL ENCOUNTER (OUTPATIENT)
Dept: BONE DENSITY | Facility: HOSPITAL | Age: 70
Discharge: HOME | End: 2025-05-15
Admitting: FAMILY MEDICINE
Payer: MEDICARE

## 2025-05-15 DIAGNOSIS — Z78.0 POSTMENOPAUSE: ICD-10-CM

## 2025-05-15 PROCEDURE — 77080 DXA BONE DENSITY AXIAL: CPT

## 2025-05-19 RX ORDER — GABAPENTIN 100 MG/1
CAPSULE ORAL
Qty: 360 CAPSULE | Refills: 0 | Status: SHIPPED | OUTPATIENT
Start: 2025-05-19

## 2025-05-19 NOTE — TELEPHONE ENCOUNTER
Rx Refill Note  Requested Prescriptions     Pending Prescriptions Disp Refills    gabapentin (NEURONTIN) 100 MG capsule [Pharmacy Med Name: GABAPENTIN 100 MG CAPSULE] 360 capsule 0     Sig: TAKE 1 CAPSULE BY MOUTH EVERY MORNING AND TAKE THREE CAPSULES BY MOUTH AT BEDTIME      Last office visit with prescribing clinician: 5/5/2025   Last telemedicine visit with prescribing clinician: Visit date not found   Next office visit with prescribing clinician: 11/12/2025                         Would you like a call back once the refill request has been completed: [] Yes [] No    If the office needs to give you a call back, can they leave a voicemail: [] Yes [] No    Mohsen Horan MA  05/19/25, 07:49 EDT

## 2025-05-27 ENCOUNTER — TRANSCRIBE ORDERS (OUTPATIENT)
Dept: ADMINISTRATIVE | Facility: HOSPITAL | Age: 70
End: 2025-05-27
Payer: MEDICARE

## 2025-05-27 DIAGNOSIS — Z12.31 VISIT FOR SCREENING MAMMOGRAM: Primary | ICD-10-CM

## 2025-05-29 ENCOUNTER — RESULTS FOLLOW-UP (OUTPATIENT)
Facility: HOSPITAL | Age: 70
End: 2025-05-29
Payer: MEDICARE

## 2025-05-29 DIAGNOSIS — Z13.79 GENETIC TESTING: Primary | ICD-10-CM

## 2025-05-29 LAB
NCCN CRITERIA FLAG: ABNORMAL
TYRER CUZICK SCORE: 7.4

## 2025-05-29 NOTE — PROGRESS NOTES
This patient recently completed the CARE risk assessment for a mammogram appointment. Based on the patient's responses, NCCN criteria for genetic testing was met. At the time of the assessment, the patient was provided with both written and video educational materials regarding genetic testing.    Navigator follow-up:   I left a voice mail and sent a [facility] message requesting a return call to discuss risk assessment results.

## 2025-05-29 NOTE — PROGRESS NOTES
I discussed the option of hereditary cancer genetic testing with the patient including outlining the next steps to proceed with testing, the insurance process, and potential testing outcomes. The patient would like to proceed with genetic testing.  I will submit an order for approval to the provider and coordinate care

## 2025-05-30 ENCOUNTER — HOSPITAL ENCOUNTER (OUTPATIENT)
Dept: MAMMOGRAPHY | Facility: HOSPITAL | Age: 70
Discharge: HOME | End: 2025-05-30
Payer: MEDICARE

## 2025-05-30 ENCOUNTER — LAB (OUTPATIENT)
Dept: LAB | Facility: HOSPITAL | Age: 70
End: 2025-05-30
Payer: MEDICARE

## 2025-05-30 DIAGNOSIS — E78.00 PURE HYPERCHOLESTEROLEMIA: ICD-10-CM

## 2025-05-30 DIAGNOSIS — Z12.31 VISIT FOR SCREENING MAMMOGRAM: ICD-10-CM

## 2025-05-30 DIAGNOSIS — Z13.79 GENETIC TESTING: ICD-10-CM

## 2025-05-30 DIAGNOSIS — I42.8 NONISCHEMIC CARDIOMYOPATHY: ICD-10-CM

## 2025-05-30 LAB
ALBUMIN SERPL-MCNC: 4.4 G/DL (ref 3.5–5.2)
ALBUMIN/GLOB SERPL: 1.5 G/DL
ALP SERPL-CCNC: 81 U/L (ref 39–117)
ALT SERPL W P-5'-P-CCNC: 12 U/L (ref 1–33)
ANION GAP SERPL CALCULATED.3IONS-SCNC: 7.3 MMOL/L (ref 5–15)
AST SERPL-CCNC: 19 U/L (ref 1–32)
BASOPHILS # BLD AUTO: 0.03 10*3/MM3 (ref 0–0.2)
BASOPHILS NFR BLD AUTO: 0.5 % (ref 0–1.5)
BILIRUB SERPL-MCNC: 0.3 MG/DL (ref 0–1.2)
BUN SERPL-MCNC: 20.8 MG/DL (ref 8–23)
BUN/CREAT SERPL: 18.7 (ref 7–25)
CALCIUM SPEC-SCNC: 9.3 MG/DL (ref 8.6–10.5)
CHLORIDE SERPL-SCNC: 103 MMOL/L (ref 98–107)
CHOLEST SERPL-MCNC: 265 MG/DL (ref 0–200)
CO2 SERPL-SCNC: 27.7 MMOL/L (ref 22–29)
CREAT SERPL-MCNC: 1.11 MG/DL (ref 0.57–1)
DEPRECATED RDW RBC AUTO: 43 FL (ref 37–54)
EGFRCR SERPLBLD CKD-EPI 2021: 52.9 ML/MIN/1.73
EOSINOPHIL # BLD AUTO: 0.18 10*3/MM3 (ref 0–0.4)
EOSINOPHIL NFR BLD AUTO: 2.9 % (ref 0.3–6.2)
ERYTHROCYTE [DISTWIDTH] IN BLOOD BY AUTOMATED COUNT: 12.2 % (ref 12.3–15.4)
GLOBULIN UR ELPH-MCNC: 3 GM/DL
GLUCOSE SERPL-MCNC: 98 MG/DL (ref 65–99)
HCT VFR BLD AUTO: 41.2 % (ref 34–46.6)
HDLC SERPL-MCNC: 78 MG/DL (ref 40–60)
HGB BLD-MCNC: 12.3 G/DL (ref 12–15.9)
IMM GRANULOCYTES # BLD AUTO: 0.02 10*3/MM3 (ref 0–0.05)
IMM GRANULOCYTES NFR BLD AUTO: 0.3 % (ref 0–0.5)
LDLC SERPL CALC-MCNC: 177 MG/DL (ref 0–100)
LDLC/HDLC SERPL: 2.23 {RATIO}
LYMPHOCYTES # BLD AUTO: 1.22 10*3/MM3 (ref 0.7–3.1)
LYMPHOCYTES NFR BLD AUTO: 19.6 % (ref 19.6–45.3)
MCH RBC QN AUTO: 28.8 PG (ref 26.6–33)
MCHC RBC AUTO-ENTMCNC: 29.9 G/DL (ref 31.5–35.7)
MCV RBC AUTO: 96.5 FL (ref 79–97)
MONOCYTES # BLD AUTO: 0.49 10*3/MM3 (ref 0.1–0.9)
MONOCYTES NFR BLD AUTO: 7.9 % (ref 5–12)
NEUTROPHILS NFR BLD AUTO: 4.29 10*3/MM3 (ref 1.7–7)
NEUTROPHILS NFR BLD AUTO: 68.8 % (ref 42.7–76)
NRBC BLD AUTO-RTO: 0 /100 WBC (ref 0–0.2)
PLATELET # BLD AUTO: 293 10*3/MM3 (ref 140–450)
PMV BLD AUTO: 10.7 FL (ref 6–12)
POTASSIUM SERPL-SCNC: 4.9 MMOL/L (ref 3.5–5.2)
PROT SERPL-MCNC: 7.4 G/DL (ref 6–8.5)
RBC # BLD AUTO: 4.27 10*6/MM3 (ref 3.77–5.28)
SODIUM SERPL-SCNC: 138 MMOL/L (ref 136–145)
TRIGL SERPL-MCNC: 66 MG/DL (ref 0–150)
VLDLC SERPL-MCNC: 10 MG/DL (ref 5–40)
WBC NRBC COR # BLD AUTO: 6.23 10*3/MM3 (ref 3.4–10.8)

## 2025-05-30 PROCEDURE — 77063 BREAST TOMOSYNTHESIS BI: CPT

## 2025-05-30 PROCEDURE — 80061 LIPID PANEL: CPT

## 2025-05-30 PROCEDURE — 80053 COMPREHEN METABOLIC PANEL: CPT

## 2025-05-30 PROCEDURE — 77067 SCR MAMMO BI INCL CAD: CPT

## 2025-05-30 PROCEDURE — 85025 COMPLETE CBC W/AUTO DIFF WBC: CPT

## 2025-05-30 PROCEDURE — 36415 COLL VENOUS BLD VENIPUNCTURE: CPT

## 2025-05-30 NOTE — PROGRESS NOTES
Order has been signed and received by provider, kobe message sent to patient with lab locations and next steps

## 2025-06-02 DIAGNOSIS — R92.8 ABNORMALITY OF RIGHT BREAST ON SCREENING MAMMOGRAM: Primary | ICD-10-CM

## 2025-06-09 ENCOUNTER — OFFICE VISIT (OUTPATIENT)
Dept: FAMILY MEDICINE CLINIC | Facility: CLINIC | Age: 70
End: 2025-06-09
Payer: MEDICARE

## 2025-06-09 VITALS
HEART RATE: 72 BPM | OXYGEN SATURATION: 94 % | WEIGHT: 158.6 LBS | SYSTOLIC BLOOD PRESSURE: 110 MMHG | TEMPERATURE: 98.4 F | HEIGHT: 62 IN | RESPIRATION RATE: 17 BRPM | BODY MASS INDEX: 29.19 KG/M2 | DIASTOLIC BLOOD PRESSURE: 80 MMHG

## 2025-06-09 DIAGNOSIS — R10.9 FLANK PAIN: ICD-10-CM

## 2025-06-09 DIAGNOSIS — N30.00 ACUTE CYSTITIS WITHOUT HEMATURIA: Primary | ICD-10-CM

## 2025-06-09 LAB
BILIRUB BLD-MCNC: NEGATIVE MG/DL
CLARITY, POC: CLEAR
COLOR UR: YELLOW
EXPIRATION DATE: ABNORMAL
GLUCOSE UR STRIP-MCNC: NEGATIVE MG/DL
KETONES UR QL: NEGATIVE
LEUKOCYTE EST, POC: ABNORMAL
Lab: ABNORMAL
NITRITE UR-MCNC: NEGATIVE MG/ML
PH UR: 5.5 [PH] (ref 5–8)
PROT UR STRIP-MCNC: ABNORMAL MG/DL
RBC # UR STRIP: ABNORMAL /UL
SP GR UR: 1.03 (ref 1–1.03)
UROBILINOGEN UR QL: ABNORMAL

## 2025-06-09 PROCEDURE — 99213 OFFICE O/P EST LOW 20 MIN: CPT | Performed by: FAMILY MEDICINE

## 2025-06-09 PROCEDURE — 81003 URINALYSIS AUTO W/O SCOPE: CPT | Performed by: FAMILY MEDICINE

## 2025-06-09 PROCEDURE — 1125F AMNT PAIN NOTED PAIN PRSNT: CPT | Performed by: FAMILY MEDICINE

## 2025-06-09 RX ORDER — PHENAZOPYRIDINE HYDROCHLORIDE 100 MG/1
100 TABLET, FILM COATED ORAL 3 TIMES DAILY PRN
Qty: 15 TABLET | Refills: 0 | Status: SHIPPED | OUTPATIENT
Start: 2025-06-09

## 2025-06-09 RX ORDER — CEPHALEXIN 500 MG/1
500 CAPSULE ORAL 2 TIMES DAILY
Qty: 14 CAPSULE | Refills: 0 | Status: SHIPPED | OUTPATIENT
Start: 2025-06-09 | End: 2025-06-16

## 2025-06-09 NOTE — PROGRESS NOTES
"Chief Complaint  Urinary Tract Infection    Subjective        Shanika Hector presents to [facility]  History of Present Illness    About 2 weeks ago patient was seen [facility] a [facility] for urinary tract symptoms.  Frequency burning and urgency and pressure with low back pain.  She was prescribed nitrofurantoin.  For 5 days.  The symptoms got better but she had some GI distress.  She has history of IBS.  And now the symptoms are back.  No fever.  No vomiting.  No gross hematuria.    Objective   Vital Signs:  /80   Pulse 72   Temp 98.4 °F (36.9 °C) (Oral)   Resp 17   Ht 157.5 cm (62.01\")   Wt 71.9 kg (158 lb 9.6 oz)   SpO2 94%   BMI 29.00 kg/m²   Estimated body mass index is 29 kg/m² as calculated from the following:    Height as of this encounter: 157.5 cm (62.01\").    Weight as of this encounter: 71.9 kg (158 lb 9.6 oz).          Physical Exam  Constitutional:       General: She is not in acute distress.     Appearance: She is not ill-appearing.   Cardiovascular:      Rate and Rhythm: Normal rate.   Pulmonary:      Effort: Pulmonary effort is normal.   Abdominal:      General: There is no distension.      Palpations: Abdomen is soft. There is no mass.      Tenderness: There is abdominal tenderness. There is no right CVA tenderness, left CVA tenderness, guarding or rebound.      Hernia: No hernia is present.      Comments: Mild suprapubic tenderness to deep palpation   Skin:     Findings: No rash.        Result Review :          Urinalysis today remarkable on the dipstick for blood protein and white blood cells.     Assessment and Plan   Diagnoses and all orders for this visit:    1. Acute cystitis without hematuria (Primary)  -     Urine Culture - Urine, Urine, Clean Catch    2. Flank pain  -     POCT urinalysis dipstick, automated  -     Urine Culture - Urine, Urine, Clean Catch    Other orders  -     cephalexin (KEFLEX) 500 MG capsule; Take 1 capsule by mouth 2 " (Two) Times a Day for 7 days.  Dispense: 14 capsule; Refill: 0  -     phenazopyridine (Pyridium) 100 MG tablet; Take 1 tablet by mouth 3 (Three) Times a Day As Needed for Bladder Spasms.  Dispense: 15 tablet; Refill: 0      Acute uncomplicated lower urinary tract infection.  She is having low back pain, not flank pain.  At this time very likely no evidence of pyelonephritis.  I am recommending cephalexin twice a day for 5 to 7 days.  Sending urine culture.  She is allergic to sulfa.  Holding off on Cipro.  Nitrofurantoin caused some GI upset.  With very severe symptoms she was counseled to seek medical attention immediately.       Follow Up   No follow-ups on file.  Patient was given instructions and counseling regarding her condition or for health maintenance advice. Please see specific information pulled into the AVS if appropriate.

## 2025-06-11 LAB
AMBRY INTERPRETATION REPORT: NORMAL
GENE DIS ANL INTERP-IMP: NORMAL

## 2025-06-13 ENCOUNTER — HOSPITAL ENCOUNTER (OUTPATIENT)
Dept: ULTRASOUND IMAGING | Facility: HOSPITAL | Age: 70
Discharge: HOME | End: 2025-06-13
Payer: MEDICARE

## 2025-06-13 ENCOUNTER — HOSPITAL ENCOUNTER (OUTPATIENT)
Dept: MAMMOGRAPHY | Facility: HOSPITAL | Age: 70
Discharge: HOME | End: 2025-06-13
Payer: MEDICARE

## 2025-06-13 LAB
BACTERIA UR CULT: ABNORMAL
BACTERIA UR CULT: ABNORMAL
OTHER ANTIBIOTIC SUSC ISLT: ABNORMAL

## 2025-06-13 PROCEDURE — 77065 DX MAMMO INCL CAD UNI: CPT

## 2025-06-13 PROCEDURE — 76642 ULTRASOUND BREAST LIMITED: CPT

## 2025-06-13 PROCEDURE — G0279 TOMOSYNTHESIS, MAMMO: HCPCS

## 2025-06-16 RX ORDER — OMEPRAZOLE 40 MG/1
40 CAPSULE, DELAYED RELEASE ORAL DAILY
Qty: 90 CAPSULE | Refills: 1 | Status: SHIPPED | OUTPATIENT
Start: 2025-06-16

## 2025-06-16 NOTE — TELEPHONE ENCOUNTER
Rx Refill Note  Requested Prescriptions     Pending Prescriptions Disp Refills    omeprazole (priLOSEC) 40 MG capsule [Pharmacy Med Name: OMEPRAZOLE DR 40 MG CAPSULE] 90 capsule 1     Sig: TAKE 1 CAPSULE BY MOUTH DAILY      Last office visit with prescribing clinician: 6/9/2025   Last telemedicine visit with prescribing clinician: Visit date not found   Next office visit with prescribing clinician: 11/12/2025                         Would you like a call back once the refill request has been completed: [] Yes [] No    If the office needs to give you a call back, can they leave a voicemail: [] Yes [] No    Mohsen Horan MA  06/16/25, 07:54 EDT

## 2025-08-05 PROBLEM — R76.8 POSITIVE ANA (ANTINUCLEAR ANTIBODY): Status: ACTIVE | Noted: 2025-08-05

## 2025-08-05 PROBLEM — M25.50 ARTHRALGIA: Status: ACTIVE | Noted: 2025-08-05

## 2025-08-14 ENCOUNTER — OFFICE VISIT (OUTPATIENT)
Age: 70
End: 2025-08-14
Payer: MEDICARE

## 2025-08-14 VITALS
HEART RATE: 80 BPM | BODY MASS INDEX: 25.69 KG/M2 | WEIGHT: 139.6 LBS | HEIGHT: 62 IN | SYSTOLIC BLOOD PRESSURE: 122 MMHG | TEMPERATURE: 97.5 F | DIASTOLIC BLOOD PRESSURE: 78 MMHG

## 2025-08-14 DIAGNOSIS — M51.369 DEGENERATION OF INTERVERTEBRAL DISC OF LUMBAR REGION, UNSPECIFIED WHETHER PAIN PRESENT: ICD-10-CM

## 2025-08-14 DIAGNOSIS — M81.0 OSTEOPOROSIS, UNSPECIFIED OSTEOPOROSIS TYPE, UNSPECIFIED PATHOLOGICAL FRACTURE PRESENCE: ICD-10-CM

## 2025-08-14 DIAGNOSIS — R76.8 POSITIVE ANA (ANTINUCLEAR ANTIBODY): ICD-10-CM

## 2025-08-14 DIAGNOSIS — M25.50 ARTHRALGIA, UNSPECIFIED JOINT: Primary | ICD-10-CM

## 2025-08-21 ENCOUNTER — TELEPHONE (OUTPATIENT)
Age: 70
End: 2025-08-21
Payer: MEDICARE

## 2025-08-21 DIAGNOSIS — M51.369 DEGENERATION OF INTERVERTEBRAL DISC OF LUMBAR REGION, UNSPECIFIED WHETHER PAIN PRESENT: Primary | ICD-10-CM

## 2025-08-22 ENCOUNTER — HOSPITAL ENCOUNTER (OUTPATIENT)
Dept: GENERAL RADIOLOGY | Facility: HOSPITAL | Age: 70
Discharge: HOME | End: 2025-08-22
Payer: MEDICARE

## 2025-08-22 DIAGNOSIS — M79.7 FIBROMYALGIA: Primary | Chronic | ICD-10-CM

## 2025-08-22 DIAGNOSIS — M51.369 DEGENERATION OF INTERVERTEBRAL DISC OF LUMBAR REGION, UNSPECIFIED WHETHER PAIN PRESENT: ICD-10-CM

## 2025-08-22 PROCEDURE — 72100 X-RAY EXAM L-S SPINE 2/3 VWS: CPT

## 2025-08-22 RX ORDER — GABAPENTIN 100 MG/1
CAPSULE ORAL
Qty: 360 CAPSULE | Refills: 1 | Status: SHIPPED | OUTPATIENT
Start: 2025-08-22

## (undated) DEVICE — TUBING, SUCTION, 1/4" X 10', STRAIGHT: Brand: MEDLINE

## (undated) DEVICE — Device: Brand: SOUNDSTAR

## (undated) DEVICE — ADAPT CLN BIOGUARD AIR/H2O DISP

## (undated) DEVICE — GW EMR FIX EXCHG J STD .035 3MM 260CM

## (undated) DEVICE — BITEBLOCK OMNI BLOC

## (undated) DEVICE — LN SMPL CO2 SHTRM SD STREAM W/M LUER

## (undated) DEVICE — BALN PRESS WEDGE 5F 110CM

## (undated) DEVICE — SENSR O2 OXIMAX FNGR A/ 18IN NONSTR

## (undated) DEVICE — LOU EP: Brand: MEDLINE INDUSTRIES, INC.

## (undated) DEVICE — CATH VENT MIV RADL PIG ST TIP 5F 110CM

## (undated) DEVICE — Device

## (undated) DEVICE — PINNACLE INTRODUCER SHEATH: Brand: PINNACLE

## (undated) DEVICE — CATH DIAG IMPULSE FL3.5 5F 100CM

## (undated) DEVICE — CANN O2 ETCO2 FITS ALL CONN CO2 SMPL A/ 7IN DISP LF

## (undated) DEVICE — CANN NASL CO2 TRULINK W/O2 A/

## (undated) DEVICE — CATH DIAG IMPULSE FR4 5F 100CM

## (undated) DEVICE — PERCLOSE™ PROSTYLE™ SUTURE-MEDIATED CLOSURE AND REPAIR SYSTEM: Brand: PERCLOSE™ PROSTYLE™

## (undated) DEVICE — KT ORCA ORCAPOD DISP STRL

## (undated) DEVICE — GLIDESHEATH SLENDER STAINLESS STEEL KIT: Brand: GLIDESHEATH SLENDER

## (undated) DEVICE — INTRO SWARTZ HEMO SL0 8.5F81CM

## (undated) DEVICE — INTRO SHEATH ART/FEM ENGAGE .035 4F12CM

## (undated) DEVICE — GW INQWIRE FC PTFE J/3MM .035 180

## (undated) DEVICE — HI-TORQUE BALANCE MIDDLEWEIGHT GUIDE WIRE .014 STRAIGHT TIP 3.0 CM X 190 CM: Brand: HI-TORQUE BALANCE MIDDLEWEIGHT

## (undated) DEVICE — PREF.GUIDING SHEATH W/MULT.CRV: Brand: PREFACE

## (undated) DEVICE — Device: Brand: DECANAV

## (undated) DEVICE — KT MANIFLD CARDIAC

## (undated) DEVICE — FRCP BX RADJAW4 NDL 2.8 240CM LG OG BX40

## (undated) DEVICE — KT VLV BIOGUARD SXN BIOP AIR/H20 CONN 4PC DISP

## (undated) DEVICE — Device: Brand: REFERENCE PATCH CARTO 3

## (undated) DEVICE — PK CATH CARD 40

## (undated) DEVICE — SKIN PREP TRAY W/CHG: Brand: MEDLINE INDUSTRIES, INC.

## (undated) DEVICE — Device: Brand: THERMOCOOL SMARTTOUCH SF

## (undated) DEVICE — Device: Brand: SMARTABLATE

## (undated) DEVICE — Device: Brand: WEBSTER

## (undated) DEVICE — GLIDESHEATH BASIC HYDROPHILIC COATED INTRODUCER SHEATH: Brand: GLIDESHEATH